# Patient Record
Sex: MALE | Race: WHITE | NOT HISPANIC OR LATINO | Employment: OTHER | URBAN - METROPOLITAN AREA
[De-identification: names, ages, dates, MRNs, and addresses within clinical notes are randomized per-mention and may not be internally consistent; named-entity substitution may affect disease eponyms.]

---

## 2019-04-09 ENCOUNTER — OFFICE VISIT (OUTPATIENT)
Dept: GASTROENTEROLOGY | Facility: CLINIC | Age: 69
End: 2019-04-09

## 2019-04-09 VITALS
BODY MASS INDEX: 27.29 KG/M2 | DIASTOLIC BLOOD PRESSURE: 72 MMHG | WEIGHT: 163.8 LBS | HEIGHT: 65 IN | SYSTOLIC BLOOD PRESSURE: 138 MMHG

## 2019-04-09 DIAGNOSIS — K59.00 CONSTIPATION, UNSPECIFIED CONSTIPATION TYPE: ICD-10-CM

## 2019-04-09 DIAGNOSIS — R13.10 DYSPHAGIA, UNSPECIFIED TYPE: Primary | ICD-10-CM

## 2019-04-09 PROCEDURE — 99203 OFFICE O/P NEW LOW 30 MIN: CPT | Performed by: INTERNAL MEDICINE

## 2019-04-09 RX ORDER — ERGOCALCIFEROL 1.25 MG/1
50000 CAPSULE ORAL WEEKLY
COMMUNITY

## 2019-04-09 NOTE — PROGRESS NOTES
"    PATIENT INFORMATION  Jackson Tariq       - 1950    CHIEF COMPLAINT  Chief Complaint   Patient presents with   • Difficulty Swallowing   • Constipation       HISTORY OF PRESENT ILLNESS  HPI    70 yo with solid food dysphagia for at least 2 years. This occurs about once monthly. No previous egd. Heartburn 2-3 times weekly. Severity is moderate.  Weight has been stable. No family history of colon cancer, polyps or esophageal cancer.  He is edentulous  Last cls was 10 years ago. He turned in cologuard test today.   REVIEW OF SYSTEMS  Review of Systems   HENT: Positive for trouble swallowing.    Respiratory: Positive for cough and choking.    Gastrointestinal: Positive for constipation.   All other systems reviewed and are negative.        ACTIVE PROBLEMS  There are no active problems to display for this patient.        PAST MEDICAL HISTORY  Past Medical History:   Diagnosis Date   • Vitamin D deficiency          SURGICAL HISTORY  History reviewed. No pertinent surgical history.      FAMILY HISTORY  Family History   Problem Relation Age of Onset   • Colon cancer Neg Hx    • Colon polyps Neg Hx          SOCIAL HISTORY  Social History     Occupational History   • Not on file   Tobacco Use   • Smoking status: Never Smoker   • Smokeless tobacco: Never Used   Substance and Sexual Activity   • Alcohol use: No     Frequency: Never   • Drug use: Not on file   • Sexual activity: Not on file       Debilities/Disabilities Identified: None    Emotional Behavior: Appropriate    CURRENT MEDICATIONS    Current Outpatient Medications:   •  vitamin D (ERGOCALCIFEROL) 32261 units capsule capsule, Take 50,000 Units by mouth 1 (One) Time Per Week., Disp: , Rfl:     ALLERGIES  Patient has no known allergies.    VITALS  Vitals:    19 1330   BP: 138/72   Weight: 74.3 kg (163 lb 12.8 oz)   Height: 165.1 cm (65\")       LAST RESULTS   No results found for any previous visit.     No results found.    PHYSICAL EXAM  Physical Exam "   Constitutional: He is oriented to person, place, and time. He appears well-developed and well-nourished. No distress.   HENT:   Head: Normocephalic and atraumatic.   edentulous   Eyes: EOM are normal. Pupils are equal, round, and reactive to light.   Neck: Neck supple. No tracheal deviation present.   Cardiovascular: Normal rate, regular rhythm, normal heart sounds and intact distal pulses. Exam reveals no gallop and no friction rub.   No murmur heard.  Pulmonary/Chest: Effort normal and breath sounds normal. No respiratory distress. He has no wheezes. He has no rales. He exhibits no tenderness.   Abdominal: Soft. Bowel sounds are normal. He exhibits no distension. There is no tenderness. There is no rebound and no guarding.   Musculoskeletal: He exhibits no edema.   Lymphadenopathy:     He has no cervical adenopathy.   Neurological: He is alert and oriented to person, place, and time.   Skin: Skin is warm. He is not diaphoretic. No erythema.   Psychiatric: He has a normal mood and affect. His behavior is normal. Judgment and thought content normal.   Nursing note and vitals reviewed.      ASSESSMENT  Diagnoses and all orders for this visit:    Dysphagia, unspecified type  -     Case Request; Standing    Constipation, unspecified constipation type    Other orders  -     vitamin D (ERGOCALCIFEROL) 34858 units capsule capsule; Take 50,000 Units by mouth 1 (One) Time Per Week.  -     Follow Anesthesia Guidelines / Standing Orders; Future  -     Implement Anesthesia orders day of procedure.; Standing  -     Obtain informed consent; Standing          PLAN  No Follow-up on file.    Antireflux measures and dietary modifications reviewed. Low acid diet reviewed. Keep head of bed elevated. Stop eating/drinking at least 3 hours prior to bedtime. Eliminate caffeine and carbonated beverages.  Weight loss encouraged if BMI over 25.    Indications, risks and procedure were discussed with the patient, including but not limited  to, bleeding, infection, possibility of perforation and possible polypectomy. All of the patient's questions were answered, and signed informed consent was obtained and placed on the chart.

## 2019-04-15 ENCOUNTER — TRANSCRIBE ORDERS (OUTPATIENT)
Dept: ADMINISTRATIVE | Facility: HOSPITAL | Age: 69
End: 2019-04-15

## 2019-04-16 ENCOUNTER — TELEPHONE (OUTPATIENT)
Dept: SURGERY | Facility: CLINIC | Age: 69
End: 2019-04-16

## 2019-04-25 ENCOUNTER — APPOINTMENT (OUTPATIENT)
Dept: BONE DENSITY | Facility: HOSPITAL | Age: 69
End: 2019-04-25

## 2019-04-25 PROCEDURE — 77080 DXA BONE DENSITY AXIAL: CPT

## 2019-05-02 ENCOUNTER — TELEPHONE (OUTPATIENT)
Dept: GASTROENTEROLOGY | Facility: CLINIC | Age: 69
End: 2019-05-02

## 2019-05-02 ENCOUNTER — ANESTHESIA EVENT (OUTPATIENT)
Dept: PERIOP | Facility: HOSPITAL | Age: 69
End: 2019-05-02

## 2019-05-02 NOTE — TELEPHONE ENCOUNTER
Patient did not stop his vitamin D until yesterday. He is scheduled for and EGD with possible dilation tomorrow. It is okay to proceed?

## 2019-05-03 ENCOUNTER — HOSPITAL ENCOUNTER (OUTPATIENT)
Facility: HOSPITAL | Age: 69
Setting detail: HOSPITAL OUTPATIENT SURGERY
Discharge: HOME | End: 2019-05-03
Attending: INTERNAL MEDICINE | Admitting: INTERNAL MEDICINE

## 2019-05-03 ENCOUNTER — ANESTHESIA (OUTPATIENT)
Dept: PERIOP | Facility: HOSPITAL | Age: 69
End: 2019-05-03

## 2019-05-03 VITALS
DIASTOLIC BLOOD PRESSURE: 72 MMHG | OXYGEN SATURATION: 97 % | HEIGHT: 65 IN | HEART RATE: 64 BPM | WEIGHT: 159.6 LBS | SYSTOLIC BLOOD PRESSURE: 131 MMHG | TEMPERATURE: 97.7 F | RESPIRATION RATE: 16 BRPM | BODY MASS INDEX: 26.59 KG/M2

## 2019-05-03 DIAGNOSIS — R13.10 DYSPHAGIA, UNSPECIFIED TYPE: ICD-10-CM

## 2019-05-03 PROCEDURE — 88305 TISSUE EXAM BY PATHOLOGIST: CPT | Performed by: INTERNAL MEDICINE

## 2019-05-03 PROCEDURE — 43239 EGD BIOPSY SINGLE/MULTIPLE: CPT | Performed by: INTERNAL MEDICINE

## 2019-05-03 PROCEDURE — 88312 SPECIAL STAINS GROUP 1: CPT | Performed by: INTERNAL MEDICINE

## 2019-05-03 PROCEDURE — 25010000002 PROPOFOL 10 MG/ML EMULSION: Performed by: NURSE ANESTHETIST, CERTIFIED REGISTERED

## 2019-05-03 RX ORDER — GLYCOPYRROLATE 0.2 MG/ML
INJECTION INTRAMUSCULAR; INTRAVENOUS AS NEEDED
Status: DISCONTINUED | OUTPATIENT
Start: 2019-05-03 | End: 2019-05-03 | Stop reason: SURG

## 2019-05-03 RX ORDER — OMEPRAZOLE 20 MG/1
20 CAPSULE, DELAYED RELEASE ORAL 2 TIMES DAILY
Qty: 60 CAPSULE | Refills: 5 | Status: SHIPPED | OUTPATIENT
Start: 2019-05-03 | End: 2019-12-02 | Stop reason: SDUPTHER

## 2019-05-03 RX ORDER — SODIUM CHLORIDE 9 MG/ML
40 INJECTION, SOLUTION INTRAVENOUS AS NEEDED
Status: DISCONTINUED | OUTPATIENT
Start: 2019-05-03 | End: 2019-05-03 | Stop reason: HOSPADM

## 2019-05-03 RX ORDER — SODIUM CHLORIDE 0.9 % (FLUSH) 0.9 %
1-10 SYRINGE (ML) INJECTION AS NEEDED
Status: DISCONTINUED | OUTPATIENT
Start: 2019-05-03 | End: 2019-05-03 | Stop reason: HOSPADM

## 2019-05-03 RX ORDER — PROPOFOL 10 MG/ML
VIAL (ML) INTRAVENOUS AS NEEDED
Status: DISCONTINUED | OUTPATIENT
Start: 2019-05-03 | End: 2019-05-03 | Stop reason: SURG

## 2019-05-03 RX ORDER — LIDOCAINE HYDROCHLORIDE 10 MG/ML
INJECTION, SOLUTION INFILTRATION; PERINEURAL AS NEEDED
Status: DISCONTINUED | OUTPATIENT
Start: 2019-05-03 | End: 2019-05-03 | Stop reason: SURG

## 2019-05-03 RX ORDER — SODIUM CHLORIDE, SODIUM LACTATE, POTASSIUM CHLORIDE, CALCIUM CHLORIDE 600; 310; 30; 20 MG/100ML; MG/100ML; MG/100ML; MG/100ML
9 INJECTION, SOLUTION INTRAVENOUS CONTINUOUS
Status: DISCONTINUED | OUTPATIENT
Start: 2019-05-03 | End: 2019-05-03 | Stop reason: HOSPADM

## 2019-05-03 RX ORDER — LIDOCAINE HYDROCHLORIDE 10 MG/ML
0.5 INJECTION, SOLUTION EPIDURAL; INFILTRATION; INTRACAUDAL; PERINEURAL ONCE AS NEEDED
Status: DISCONTINUED | OUTPATIENT
Start: 2019-05-03 | End: 2019-05-03 | Stop reason: HOSPADM

## 2019-05-03 RX ORDER — PROPOFOL 10 MG/ML
VIAL (ML) INTRAVENOUS CONTINUOUS PRN
Status: DISCONTINUED | OUTPATIENT
Start: 2019-05-03 | End: 2019-05-03 | Stop reason: SURG

## 2019-05-03 RX ORDER — SODIUM CHLORIDE 0.9 % (FLUSH) 0.9 %
3 SYRINGE (ML) INJECTION EVERY 12 HOURS SCHEDULED
Status: DISCONTINUED | OUTPATIENT
Start: 2019-05-03 | End: 2019-05-03 | Stop reason: HOSPADM

## 2019-05-03 RX ADMIN — PROPOFOL 50 MG: 10 INJECTION, EMULSION INTRAVENOUS at 10:50

## 2019-05-03 RX ADMIN — PROPOFOL 50 MG: 10 INJECTION, EMULSION INTRAVENOUS at 10:47

## 2019-05-03 RX ADMIN — LIDOCAINE HYDROCHLORIDE 50 MG: 10 INJECTION, SOLUTION INFILTRATION; PERINEURAL at 10:43

## 2019-05-03 RX ADMIN — SODIUM CHLORIDE, POTASSIUM CHLORIDE, SODIUM LACTATE AND CALCIUM CHLORIDE: 600; 310; 30; 20 INJECTION, SOLUTION INTRAVENOUS at 10:41

## 2019-05-03 RX ADMIN — GLYCOPYRROLATE 0.1 MG: 0.2 INJECTION INTRAMUSCULAR; INTRAVENOUS at 10:41

## 2019-05-03 RX ADMIN — PROPOFOL 50 MG: 10 INJECTION, EMULSION INTRAVENOUS at 10:43

## 2019-05-03 RX ADMIN — PROPOFOL 100 MCG/KG/MIN: 10 INJECTION, EMULSION INTRAVENOUS at 10:43

## 2019-05-03 NOTE — ANESTHESIA PREPROCEDURE EVALUATION
Anesthesia Evaluation     Patient summary reviewed   no history of anesthetic complications:  NPO Solid Status: > 8 hours  NPO Liquid Status: > 8 hours           Airway   Mallampati: II  TM distance: >3 FB  Neck ROM: full  No difficulty expected  Dental    (+) edentulous    Pulmonary - negative pulmonary ROS and normal exam    breath sounds clear to auscultation  Cardiovascular - negative cardio ROS and normal exam    Rhythm: regular  Rate: normal        Neuro/Psych- negative ROS  GI/Hepatic/Renal/Endo - negative ROS     Musculoskeletal (-) negative ROS    Abdominal  - normal exam   Substance History - negative use     OB/GYN negative ob/gyn ROS         Other - negative ROS                       Anesthesia Plan    ASA 1     MAC     intravenous induction   Anesthetic plan, all risks, benefits, and alternatives have been provided, discussed and informed consent has been obtained with: patient.

## 2019-05-03 NOTE — ANESTHESIA POSTPROCEDURE EVALUATION
Patient: Jackson Tariq    Procedure Summary     Date:  05/03/19 Room / Location:   [facility] /  [facility]    Anesthesia Start:  1041 Anesthesia Stop:  1058    Procedure:  ESOPHAGOGASTRODUODENOSCOPY with biopsies (N/A Esophagus) Diagnosis:       Dysphagia, unspecified type      (Dysphagia, unspecified type [R13.10])    Surgeon:  Radha Del Real MD Provider:  Chris Shields CRNA    Anesthesia Type:  MAC ASA Status:  1          Anesthesia Type: MAC  Last vitals  BP   115/60 (05/03/19 1102)   Temp   97.7 °F (36.5 °C) (05/03/19 1102)   Pulse   77 (05/03/19 1102)   Resp   16 (05/03/19 1102)     SpO2   98 % (05/03/19 1102)     Post Anesthesia Care and Evaluation    Patient location during evaluation: bedside  Patient participation: complete - patient participated  Level of consciousness: awake and alert  Pain score: 0  Pain management: adequate  Airway patency: patent  Anesthetic complications: No anesthetic complications  PONV Status: none  Cardiovascular status: acceptable  Respiratory status: acceptable  Hydration status: acceptable

## 2019-05-03 NOTE — OP NOTE
Patient Name:  Jackson Tariq  YOB: 1950    Date of Procedure:  5/3/2019    Procedure Performed: EGD     Indications: Dysphagia    Pre-op Diagnosis:   Dysphagia, unspecified type [R13.10]    Post-Op Diagnosis Codes:     * Dysphagia, unspecified type [R13.10]         Staff:  Surgeon(s):  Radha Del Real MD         Consent: Procedure EGDIndications, risks and procedure were discussed with the patient, including but not limited to, bleeding, infection, possibility of perforation and possible polypectomy. All of the patient's questions were answered, and signed informed consent was obtained and placed on the chart.      Sedation: Sedation was provided by anesthesia.      Estimated Blood Loss: minimal    Specimens:   ID Type Source Tests Collected by Time   A :  Tissue Gastric, Body TISSUE PATHOLOGY EXAM Radha Del Real MD 5/3/2019 1050   B :  Tissue Esophagus, Distal TISSUE PATHOLOGY EXAM Radha Del Real MD 5/3/2019 1052         Description of Procedure:   After excellent sedation a flexible endoscope was passed into the oropharynx into the esophagus.  There is evidence of grade D ulcerative esophagitis.  There is a sliding hiatal hernia that is noted.  The scope was easily traversed into the stomach following to the antrum.  Gastritis is noted here biopsies are obtained using forceps.  Scope was retroflexed here straightened and passed into the duodenal bulb following to the second portion with ease.  The entire examined small bowel mucosa overall appears normal.  Upon withdrawal scope careful examination mucosa was made.  The scope was then withdrawn back into the esophageal area and multiple biopsies are obtained using forceps.  The scope was withdrawn outpatient with no immediate complications       Findings:   Ulcerative esophagitis  Hiatal hernia  Gastritis we will start PPI therapy twice daily  Antireflux measures and dietary modifications  History back in the office in 4 to 6  weeks    Complications: None        Radha Del Real MD     Date: 5/3/2019  Time: 10:59 AM

## 2019-05-03 NOTE — H&P
"PATIENT INFORMATION  Jackson Tariq         - 1950     CHIEF COMPLAINT      Chief Complaint   Patient presents with   • Difficulty Swallowing   • Constipation         HISTORY OF PRESENT ILLNESS  HPI     68 yo with solid food dysphagia for at least 2 years. This occurs about once monthly. No previous egd. Heartburn 2-3 times weekly. Severity is moderate.  Weight has been stable. No family history of colon cancer, polyps or esophageal cancer.  He is edentulous  Last cls was 10 years ago. He turned in cologuard test today.   REVIEW OF SYSTEMS  Review of Systems   HENT: Positive for trouble swallowing.    Respiratory: Positive for cough and choking.    Gastrointestinal: Positive for constipation.   All other systems reviewed and are negative.           ACTIVE PROBLEMS  There are no active problems to display for this patient.           PAST MEDICAL HISTORY  Medical History        Past Medical History:   Diagnosis Date   • Vitamin D deficiency                 SURGICAL HISTORY  Surgical History   History reviewed. No pertinent surgical history.           FAMILY HISTORY        Family History   Problem Relation Age of Onset   • Colon cancer Neg Hx     • Colon polyps Neg Hx              SOCIAL HISTORY  Social History            Occupational History   • Not on file   Tobacco Use   • Smoking status: Never Smoker   • Smokeless tobacco: Never Used   Substance and Sexual Activity   • Alcohol use: No       Frequency: Never   • Drug use: Not on file   • Sexual activity: Not on file         Debilities/Disabilities Identified: None     Emotional Behavior: Appropriate     CURRENT MEDICATIONS     Current Outpatient Medications:   •  vitamin D (ERGOCALCIFEROL) 20798 units capsule capsule, Take 50,000 Units by mouth 1 (One) Time Per Week., Disp: , Rfl:      ALLERGIES  Patient has no known allergies.     VITALS  Vitals       Vitals:     19 1330   BP: 138/72   Weight: 74.3 kg (163 lb 12.8 oz)   Height: 165.1 cm (65\")    "         LAST RESULTS           No results found for any previous visit.      No results found.     PHYSICAL EXAM  Physical Exam   Constitutional: He is oriented to person, place, and time. He appears well-developed and well-nourished. No distress.   HENT:   Head: Normocephalic and atraumatic.   edentulous   Eyes: EOM are normal. Pupils are equal, round, and reactive to light.   Neck: Neck supple. No tracheal deviation present.   Cardiovascular: Normal rate, regular rhythm, normal heart sounds and intact distal pulses. Exam reveals no gallop and no friction rub.   No murmur heard.  Pulmonary/Chest: Effort normal and breath sounds normal. No respiratory distress. He has no wheezes. He has no rales. He exhibits no tenderness.   Abdominal: Soft. Bowel sounds are normal. He exhibits no distension. There is no tenderness. There is no rebound and no guarding.   Musculoskeletal: He exhibits no edema.   Lymphadenopathy:     He has no cervical adenopathy.   Neurological: He is alert and oriented to person, place, and time.   Skin: Skin is warm. He is not diaphoretic. No erythema.   Psychiatric: He has a normal mood and affect. His behavior is normal. Judgment and thought content normal.   Nursing note and vitals reviewed.        ASSESSMENT  Diagnoses and all orders for this visit:     Dysphagia, unspecified type  -     Case Request; Standing     Constipation, unspecified constipation type     Other orders  -     vitamin D (ERGOCALCIFEROL) 47262 units capsule capsule; Take 50,000 Units by mouth 1 (One) Time Per Week.  -     Follow Anesthesia Guidelines / Standing Orders; Future  -     Implement Anesthesia orders day of procedure.; Standing  -     Obtain informed consent; Standing              PLAN  No Follow-up on file.     Antireflux measures and dietary modifications reviewed. Low acid diet reviewed. Keep head of bed elevated. Stop eating/drinking at least 3 hours prior to bedtime. Eliminate caffeine and carbonated  beverages.  Weight loss encouraged if BMI over 25.     Indications, risks and procedure were discussed with the patient, including but not limited to, bleeding, infection, possibility of perforation and possible polypectomy. All of the patient's questions were answered, and signed informed consent was obtained and placed on the chart.

## 2019-05-06 LAB
CYTO UR: NORMAL
LAB AP CASE REPORT: NORMAL
PATH REPORT.FINAL DX SPEC: NORMAL
PATH REPORT.GROSS SPEC: NORMAL

## 2019-05-09 NOTE — PROGRESS NOTES
EGD with esophageal biopsies with ulcerative esophagitis.  No evidence of Wylie's esophagus.  Fungal stains are negative.  Have him continue on PPI therapy and see me back in the office in follow-up.

## 2019-05-29 ENCOUNTER — OFFICE VISIT (OUTPATIENT)
Dept: GASTROENTEROLOGY | Facility: CLINIC | Age: 69
End: 2019-05-29

## 2019-05-29 VITALS
WEIGHT: 162 LBS | SYSTOLIC BLOOD PRESSURE: 132 MMHG | BODY MASS INDEX: 26.99 KG/M2 | DIASTOLIC BLOOD PRESSURE: 74 MMHG | HEIGHT: 65 IN

## 2019-05-29 DIAGNOSIS — K21.9 GASTROESOPHAGEAL REFLUX DISEASE, ESOPHAGITIS PRESENCE NOT SPECIFIED: ICD-10-CM

## 2019-05-29 DIAGNOSIS — K20.90 ESOPHAGITIS: Primary | ICD-10-CM

## 2019-05-29 PROCEDURE — 99213 OFFICE O/P EST LOW 20 MIN: CPT | Performed by: INTERNAL MEDICINE

## 2019-05-29 NOTE — PROGRESS NOTES
PATIENT INFORMATION  Jackson Tariq       - 1950    CHIEF COMPLAINT  Chief Complaint   Patient presents with   • Follow-up     follow up on EGD       HISTORY OF PRESENT ILLNESS  HPI    He is here in follow up after EGD. Ulcerative esophagitis. He was started on omeprazole daily in am and pm. Initially had diarrhea but is better now.  Pathology reviewed with him:  1. Stomach, Biopsy: Benign gastric mucosa with               A. No atypia or metaplasia.               B.  No significant inflammation.               C.  No Helicobacter pylori by H&E staining.     2. Esophagus, Distal, Biopsy: Benign squamous and gastric mucosa with               A. Area consistent with ulceration.               B. Erosion.               C. No viral changes.               D. No fungal organisms identified.               E. No intestinal metaplasia.    Dysphagia is better since starting medications.  Eating well. Weight is stable.  cologuard was negative.  REVIEW OF SYSTEMS  Review of Systems   Gastrointestinal: Positive for diarrhea.   All other systems reviewed and are negative.        ACTIVE PROBLEMS  Patient Active Problem List    Diagnosis   • Dysphagia [R13.10]         PAST MEDICAL HISTORY  Past Medical History:   Diagnosis Date   • Vitamin D deficiency          SURGICAL HISTORY  Past Surgical History:   Procedure Laterality Date   • ENDOSCOPY N/A 5/3/2019    Procedure: ESOPHAGOGASTRODUODENOSCOPY with biopsies;  Surgeon: Radha Del Real MD;  Location: [facility];  Service: Gastroenterology         FAMILY HISTORY  Family History   Problem Relation Age of Onset   • Colon cancer Neg Hx    • Colon polyps Neg Hx          SOCIAL HISTORY  Social History     Occupational History   • Not on file   Tobacco Use   • Smoking status: Never Smoker   • Smokeless tobacco: Never Used   Substance and Sexual Activity   • Alcohol use: No     Frequency: Never   • Drug use: Not on file   • Sexual activity: Not on file       Debilities/Disabilities  "Identified: None    Emotional Behavior: Appropriate    CURRENT MEDICATIONS    Current Outpatient Medications:   •  omeprazole (priLOSEC) 20 MG capsule, Take 1 capsule by mouth 2 (Two) Times a Day., Disp: 60 capsule, Rfl: 5  •  vitamin D (ERGOCALCIFEROL) 98823 units capsule capsule, Take 50,000 Units by mouth 1 (One) Time Per Week., Disp: , Rfl:     ALLERGIES  Patient has no known allergies.    VITALS  Vitals:    05/29/19 1446   BP: 132/74   Weight: 73.5 kg (162 lb)   Height: 165.1 cm (65\")       LAST RESULTS   Admission on 05/03/2019, Discharged on 05/03/2019   Component Date Value Ref Range Status   • Case Report 05/03/2019    Final                    Value:Surgical Pathology Report                         Case: [id number]                                  Authorizing Provider:  Radha Del Real MD         Collected:           05/03/2019 10:50 AM          Ordering Location:     [facility]   Received:            05/03/2019 12:53 PM                                 OR                                                                           Pathologist:           Matteo Oconnell MD                                                         Specimens:   1) - Gastric, Body                                                                                  2) - Esophagus, Distal                                                                    • Final Diagnosis 05/03/2019    Final                    Value:This result contains rich text formatting which cannot be displayed here.   • Gross Description 05/03/2019    Final                    Value:This result contains rich text formatting which cannot be displayed here.   • Microscopic Description 05/03/2019    Final                    Value:This result contains rich text formatting which cannot be displayed here.     No results found.    PHYSICAL EXAM  Physical Exam   Constitutional: He is oriented to person, place, and time. He appears well-developed and " "well-nourished. No distress.   HENT:   Head: Normocephalic and atraumatic.   Eyes: EOM are normal. Pupils are equal, round, and reactive to light.   Neck: Neck supple. No tracheal deviation present.   Cardiovascular: Normal rate, regular rhythm, normal heart sounds and intact distal pulses. Exam reveals no gallop and no friction rub.   No murmur heard.  Pulmonary/Chest: Effort normal and breath sounds normal. No respiratory distress. He has no wheezes. He has no rales. He exhibits no tenderness.   Abdominal: Soft. Bowel sounds are normal. He exhibits no distension. There is no tenderness. There is no rebound and no guarding.   Musculoskeletal: He exhibits no edema.   Lymphadenopathy:     He has no cervical adenopathy.   Neurological: He is alert and oriented to person, place, and time.   Skin: Skin is warm. He is not diaphoretic. No erythema.   Psychiatric: He has a normal mood and affect. His behavior is normal. Judgment and thought content normal.   Nursing note and vitals reviewed.      ASSESSMENT  Diagnoses and all orders for this visit:    Esophagitis    Gastroesophageal reflux disease, esophagitis presence not specified          PLAN  No Follow-up on file.    Antireflux measures and dietary modifications reviewed. Low acid diet reviewed. Keep head of bed elevated. Stop eating/drinking at least 3 hours prior to bedtime. Eliminate caffeine and carbonated beverages.  Weight loss encouraged if BMI over 25.      Continue on ppi bid   Return in 3 months.  He has osteopenia and is started on a \"bone pill\".   Risk of long term ppi use discussed.  Plan on decreasing dose to once daily on return visit.                "

## 2019-08-28 ENCOUNTER — OFFICE VISIT (OUTPATIENT)
Dept: GASTROENTEROLOGY | Facility: CLINIC | Age: 69
End: 2019-08-28

## 2019-08-28 VITALS
HEIGHT: 65 IN | DIASTOLIC BLOOD PRESSURE: 72 MMHG | BODY MASS INDEX: 26.29 KG/M2 | SYSTOLIC BLOOD PRESSURE: 128 MMHG | WEIGHT: 157.8 LBS

## 2019-08-28 DIAGNOSIS — K21.9 GASTROESOPHAGEAL REFLUX DISEASE, ESOPHAGITIS PRESENCE NOT SPECIFIED: Primary | ICD-10-CM

## 2019-08-28 PROCEDURE — 99213 OFFICE O/P EST LOW 20 MIN: CPT | Performed by: INTERNAL MEDICINE

## 2019-08-28 RX ORDER — ALENDRONATE SODIUM 70 MG/1
TABLET ORAL
COMMUNITY
Start: 2019-08-20

## 2019-08-28 NOTE — PROGRESS NOTES
PATIENT INFORMATION  Jackson Tariq       - 1950    CHIEF COMPLAINT  Chief Complaint   Patient presents with   • Follow-up     3 mo follow up on Esophagitis       HISTORY OF PRESENT ILLNESS  HPI    He is here in follow up for gerd. He is on bid dosing. Dysphagia is better. He has noticed some issues with diarrhea on omeprazole.  Weight is stable.  No breakthrough episodes     REVIEW OF SYSTEMS  Review of Systems   HENT: Positive for mouth sores.    All other systems reviewed and are negative.        ACTIVE PROBLEMS  Patient Active Problem List    Diagnosis   • Dysphagia [R13.10]         PAST MEDICAL HISTORY  Past Medical History:   Diagnosis Date   • Vitamin D deficiency          SURGICAL HISTORY  Past Surgical History:   Procedure Laterality Date   • COLONOSCOPY     • ENDOSCOPY N/A 5/3/2019    Procedure: ESOPHAGOGASTRODUODENOSCOPY with biopsies;  Surgeon: Radha Del Real MD;  Location: [facility];  Service: Gastroenterology         FAMILY HISTORY  Family History   Problem Relation Age of Onset   • Colon cancer Neg Hx    • Colon polyps Neg Hx          SOCIAL HISTORY  Social History     Occupational History   • Not on file   Tobacco Use   • Smoking status: Never Smoker   • Smokeless tobacco: Never Used   Substance and Sexual Activity   • Alcohol use: No     Frequency: Never   • Drug use: Not on file   • Sexual activity: Not on file       Debilities/Disabilities Identified: None    Emotional Behavior: Appropriate    CURRENT MEDICATIONS    Current Outpatient Medications:   •  alendronate (FOSAMAX) 70 MG tablet, , Disp: , Rfl:   •  omeprazole (priLOSEC) 20 MG capsule, Take 1 capsule by mouth 2 (Two) Times a Day., Disp: 60 capsule, Rfl: 5  •  vitamin D (ERGOCALCIFEROL) 59748 units capsule capsule, Take 50,000 Units by mouth 1 (One) Time Per Week., Disp: , Rfl:     ALLERGIES  Patient has no known allergies.    VITALS  Vitals:    19 1311   BP: 128/72   Weight: 71.6 kg (157 lb 12.8 oz)   Height: 165.1 cm  "(65\")       LAST RESULTS   Admission on 05/03/2019, Discharged on 05/03/2019   Component Date Value Ref Range Status   • Case Report 05/03/2019    Final                    Value:Surgical Pathology Report                         Case: [id number]                                  Authorizing Provider:  Radha Del Real MD         Collected:           05/03/2019 10:50 AM          Ordering Location:     [facility]   Received:            05/03/2019 12:53 PM                                 OR                                                                           Pathologist:           Matteo Oconnell MD                                                         Specimens:   1) - Gastric, Body                                                                                  2) - Esophagus, Distal                                                                    • Final Diagnosis 05/03/2019    Final                    Value:This result contains rich text formatting which cannot be displayed here.   • Gross Description 05/03/2019    Final                    Value:This result contains rich text formatting which cannot be displayed here.   • Microscopic Description 05/03/2019    Final                    Value:This result contains rich text formatting which cannot be displayed here.     No results found.    PHYSICAL EXAM  Physical Exam   Constitutional: He is oriented to person, place, and time. He appears well-developed and well-nourished. No distress.   HENT:   Head: Normocephalic and atraumatic.   Eyes: EOM are normal. Pupils are equal, round, and reactive to light.   Neck: Neck supple. No tracheal deviation present.   Cardiovascular: Normal rate, regular rhythm, normal heart sounds and intact distal pulses. Exam reveals no gallop and no friction rub.   No murmur heard.  Pulmonary/Chest: Effort normal and breath sounds normal. No respiratory distress. He has no wheezes. He has no rales. He exhibits no " tenderness.   Abdominal: Soft. Bowel sounds are normal. He exhibits no distension. There is no tenderness. There is no rebound and no guarding.   Musculoskeletal: He exhibits no edema.   Lymphadenopathy:     He has no cervical adenopathy.   Neurological: He is alert and oriented to person, place, and time.   Skin: Skin is warm. He is not diaphoretic. No erythema.   Psychiatric: He has a normal mood and affect. His behavior is normal. Judgment and thought content normal.   Nursing note and vitals reviewed.      ASSESSMENT  Diagnoses and all orders for this visit:    Gastroesophageal reflux disease, esophagitis presence not specified    Other orders  -     alendronate (FOSAMAX) 70 MG tablet          PLAN  No Follow-up on file.    Antireflux measures and dietary modifications reviewed. Low acid diet reviewed. Keep head of bed elevated. Stop eating/drinking at least 3 hours prior to bedtime. Eliminate caffeine and carbonated beverages.  Weight loss encouraged if BMI over 25.    Decrease ppi to once daily.  He states recent cologuard test was negative

## 2019-12-02 RX ORDER — OMEPRAZOLE 20 MG/1
CAPSULE, DELAYED RELEASE ORAL
Qty: 180 CAPSULE | Refills: 1 | Status: SHIPPED | OUTPATIENT
Start: 2019-12-02 | End: 2020-12-08 | Stop reason: SDUPTHER

## 2020-12-08 RX ORDER — OMEPRAZOLE 20 MG/1
20 CAPSULE, DELAYED RELEASE ORAL 2 TIMES DAILY
Qty: 180 CAPSULE | Refills: 3 | Status: SHIPPED | OUTPATIENT
Start: 2020-12-08

## 2021-05-03 ENCOUNTER — TRANSCRIBE ORDERS (OUTPATIENT)
Dept: ADMINISTRATIVE | Facility: HOSPITAL | Age: 71
End: 2021-05-03

## 2021-05-03 DIAGNOSIS — Z78.0 MENOPAUSE: Primary | ICD-10-CM

## 2021-05-11 ENCOUNTER — APPOINTMENT (OUTPATIENT)
Dept: BONE DENSITY | Facility: HOSPITAL | Age: 71
End: 2021-05-11

## 2021-05-11 DIAGNOSIS — Z78.0 MENOPAUSE: ICD-10-CM

## 2021-05-11 PROCEDURE — 77080 DXA BONE DENSITY AXIAL: CPT

## 2022-07-20 RX ORDER — OMEPRAZOLE 20 MG/1
CAPSULE, DELAYED RELEASE ORAL
Qty: 180 CAPSULE | Refills: 0 | OUTPATIENT
Start: 2022-07-20

## 2023-07-17 PROBLEM — E55.9 VITAMIN D DEFICIENCY: Chronic | Status: ACTIVE | Noted: 2023-07-17

## 2023-07-17 PROBLEM — M85.89 OSTEOPENIA OF MULTIPLE SITES: Chronic | Status: ACTIVE | Noted: 2023-07-17

## 2023-07-31 ENCOUNTER — TELEPHONE (OUTPATIENT)
Dept: INTERNAL MEDICINE | Facility: CLINIC | Age: 73
End: 2023-07-31

## 2023-07-31 ENCOUNTER — OFFICE VISIT (OUTPATIENT)
Dept: INTERNAL MEDICINE | Facility: CLINIC | Age: 73
End: 2023-07-31
Payer: MEDICARE

## 2023-07-31 VITALS
SYSTOLIC BLOOD PRESSURE: 138 MMHG | OXYGEN SATURATION: 97 % | TEMPERATURE: 98.4 F | WEIGHT: 150.2 LBS | HEIGHT: 65 IN | HEART RATE: 68 BPM | DIASTOLIC BLOOD PRESSURE: 60 MMHG | BODY MASS INDEX: 25.02 KG/M2

## 2023-07-31 DIAGNOSIS — D64.9 MILD ANEMIA: ICD-10-CM

## 2023-07-31 DIAGNOSIS — N28.9 RENAL INSUFFICIENCY: ICD-10-CM

## 2023-07-31 DIAGNOSIS — M85.89 OSTEOPENIA OF MULTIPLE SITES: Chronic | ICD-10-CM

## 2023-07-31 DIAGNOSIS — R97.20 ELEVATED PSA: ICD-10-CM

## 2023-07-31 DIAGNOSIS — Z00.00 ENCOUNTER FOR MEDICARE ANNUAL WELLNESS EXAM: Primary | ICD-10-CM

## 2023-07-31 PROCEDURE — 1159F MED LIST DOCD IN RCRD: CPT | Performed by: NURSE PRACTITIONER

## 2023-07-31 PROCEDURE — G0439 PPPS, SUBSEQ VISIT: HCPCS | Performed by: NURSE PRACTITIONER

## 2023-07-31 PROCEDURE — 1160F RVW MEDS BY RX/DR IN RCRD: CPT | Performed by: NURSE PRACTITIONER

## 2023-07-31 PROCEDURE — 1170F FXNL STATUS ASSESSED: CPT | Performed by: NURSE PRACTITIONER

## 2023-08-04 ENCOUNTER — HOSPITAL ENCOUNTER (OUTPATIENT)
Dept: CARDIOLOGY | Facility: HOSPITAL | Age: 73
Discharge: HOME | End: 2023-08-04
Payer: MEDICARE

## 2023-08-04 VITALS — HEIGHT: 65 IN | WEIGHT: 150 LBS | BODY MASS INDEX: 24.99 KG/M2

## 2023-08-04 DIAGNOSIS — R01.1 CARDIAC MURMUR: ICD-10-CM

## 2023-08-04 PROCEDURE — 93306 TTE W/DOPPLER COMPLETE: CPT | Performed by: INTERNAL MEDICINE

## 2023-08-04 PROCEDURE — 93306 TTE W/DOPPLER COMPLETE: CPT

## 2023-08-05 ENCOUNTER — TELEPHONE (OUTPATIENT)
Dept: CARDIOLOGY | Facility: CLINIC | Age: 73
End: 2023-08-05
Payer: MEDICARE

## 2023-08-05 LAB
AORTIC DIMENSIONLESS INDEX: 0.7 (DI)
BH CV ECHO MEAS - AI P1/2T: 687.4 MSEC
BH CV ECHO MEAS - AO MAX PG: 6.6 MMHG
BH CV ECHO MEAS - AO MEAN PG: 3 MMHG
BH CV ECHO MEAS - AO ROOT DIAM: 3.4 CM
BH CV ECHO MEAS - AO V2 MAX: 128 CM/SEC
BH CV ECHO MEAS - AO V2 VTI: 29.1 CM
BH CV ECHO MEAS - AVA(I,D): 2.47 CM2
BH CV ECHO MEAS - EDV(CUBED): 148.9 ML
BH CV ECHO MEAS - EDV(MOD-SP2): 162 ML
BH CV ECHO MEAS - EDV(MOD-SP4): 171 ML
BH CV ECHO MEAS - EF(MOD-BP): 57.1 %
BH CV ECHO MEAS - EF(MOD-SP2): 59.3 %
BH CV ECHO MEAS - EF(MOD-SP4): 57.9 %
BH CV ECHO MEAS - ESV(CUBED): 58.1 ML
BH CV ECHO MEAS - ESV(MOD-SP2): 66 ML
BH CV ECHO MEAS - ESV(MOD-SP4): 72 ML
BH CV ECHO MEAS - FS: 26.9 %
BH CV ECHO MEAS - IVS/LVPW: 0.91 CM
BH CV ECHO MEAS - IVSD: 1 CM
BH CV ECHO MEAS - LAT PEAK E' VEL: 16.6 CM/SEC
BH CV ECHO MEAS - LV DIASTOLIC VOL/BSA (35-75): 97.7 CM2
BH CV ECHO MEAS - LV MASS(C)D: 213.9 GRAMS
BH CV ECHO MEAS - LV MAX PG: 2.6 MMHG
BH CV ECHO MEAS - LV MEAN PG: 1 MMHG
BH CV ECHO MEAS - LV SYSTOLIC VOL/BSA (12-30): 41.1 CM2
BH CV ECHO MEAS - LV V1 MAX: 80.4 CM/SEC
BH CV ECHO MEAS - LV V1 VTI: 19.6 CM
BH CV ECHO MEAS - LVIDD: 5.3 CM
BH CV ECHO MEAS - LVIDS: 3.9 CM
BH CV ECHO MEAS - LVOT AREA: 3.7 CM2
BH CV ECHO MEAS - LVOT DIAM: 2.16 CM
BH CV ECHO MEAS - LVPWD: 1.1 CM
BH CV ECHO MEAS - MED PEAK E' VEL: 10.7 CM/SEC
BH CV ECHO MEAS - MR MAX PG: 159 MMHG
BH CV ECHO MEAS - MR MAX VEL: 630.4 CM/SEC
BH CV ECHO MEAS - MV A DUR: 0.12 SEC
BH CV ECHO MEAS - MV A MAX VEL: 44 CM/SEC
BH CV ECHO MEAS - MV DEC SLOPE: 322.6 CM/SEC2
BH CV ECHO MEAS - MV DEC TIME: 174 MSEC
BH CV ECHO MEAS - MV E MAX VEL: 73.4 CM/SEC
BH CV ECHO MEAS - MV E/A: 1.67
BH CV ECHO MEAS - MV MAX PG: 3.9 MMHG
BH CV ECHO MEAS - MV MEAN PG: 1.59 MMHG
BH CV ECHO MEAS - MV P1/2T: 90.9 MSEC
BH CV ECHO MEAS - MV V2 VTI: 35.6 CM
BH CV ECHO MEAS - MVA(P1/2T): 2.42 CM2
BH CV ECHO MEAS - MVA(VTI): 2.02 CM2
BH CV ECHO MEAS - PA ACC TIME: 0.12 SEC
BH CV ECHO MEAS - PA V2 MAX: 88.3 CM/SEC
BH CV ECHO MEAS - RAP SYSTOLE: 8 MMHG
BH CV ECHO MEAS - RV MAX PG: 1.03 MMHG
BH CV ECHO MEAS - RV V1 MAX: 50.8 CM/SEC
BH CV ECHO MEAS - RV V1 VTI: 9.8 CM
BH CV ECHO MEAS - RVSP: 18.7 MMHG
BH CV ECHO MEAS - SI(MOD-SP2): 54.8 ML/M2
BH CV ECHO MEAS - SI(MOD-SP4): 56.6 ML/M2
BH CV ECHO MEAS - SV(LVOT): 71.9 ML
BH CV ECHO MEAS - SV(MOD-SP2): 96 ML
BH CV ECHO MEAS - SV(MOD-SP4): 99 ML
BH CV ECHO MEAS - TAPSE (>1.6): 2.6 CM
BH CV ECHO MEAS - TR MAX PG: 10.7 MMHG
BH CV ECHO MEAS - TR MAX VEL: 163.6 CM/SEC
BH CV ECHO MEASUREMENTS AVERAGE E/E' RATIO: 5.38
BH CV XLRA - RV BASE: 3.8 CM
BH CV XLRA - RV LENGTH: 7.9 CM
BH CV XLRA - RV MID: 3.2 CM
BH CV XLRA - TDI S': 12.6 CM/SEC
LEFT ATRIUM VOLUME INDEX: 48.9 ML/M2

## 2023-08-07 DIAGNOSIS — I34.1 MITRAL VALVE PROLAPSE: ICD-10-CM

## 2023-08-07 DIAGNOSIS — I34.0 MITRAL VALVE INSUFFICIENCY, UNSPECIFIED ETIOLOGY: ICD-10-CM

## 2023-08-07 DIAGNOSIS — I51.7 LEFT ATRIAL ENLARGEMENT: Primary | ICD-10-CM

## 2023-08-07 NOTE — TELEPHONE ENCOUNTER
I talked to ENRIQUE Medina who will contact patient referred to cardiology for further evaluation.

## 2023-08-07 NOTE — TELEPHONE ENCOUNTER
Called ENRIQUE Medina's office and transferred call to Dr. Mcdaniels.    Thank you,  Becka FRANCO RN  Triage Nurse [facility]   14:20 EDT

## 2023-08-07 NOTE — TELEPHONE ENCOUNTER
Discussed with Dr. Mcdaniels: call after clinic is finished.  If ENRIQUE Medina is unavailable when call is made, ok to leave message with Dr. Mcdaniels' cell phone. Primary purpose of call is to notify of abnormal echo and determine if anything further is needed from out office.    Thank you,  Becka FRANCO RN  Triage Nurse [facility] [place]   09:54 EDT

## 2023-08-10 ENCOUNTER — APPOINTMENT (OUTPATIENT)
Dept: BONE DENSITY | Facility: HOSPITAL | Age: 73
End: 2023-08-10
Payer: MEDICARE

## 2023-08-10 DIAGNOSIS — M85.89 OSTEOPENIA OF MULTIPLE SITES: Chronic | ICD-10-CM

## 2023-08-10 PROCEDURE — 77080 DXA BONE DENSITY AXIAL: CPT

## 2023-08-15 ENCOUNTER — OFFICE VISIT (OUTPATIENT)
Dept: CARDIOLOGY | Facility: CLINIC | Age: 73
End: 2023-08-15
Payer: MEDICARE

## 2023-08-15 VITALS
OXYGEN SATURATION: 96 % | HEART RATE: 60 BPM | WEIGHT: 147 LBS | SYSTOLIC BLOOD PRESSURE: 150 MMHG | BODY MASS INDEX: 24.49 KG/M2 | RESPIRATION RATE: 16 BRPM | DIASTOLIC BLOOD PRESSURE: 70 MMHG | HEIGHT: 65 IN

## 2023-08-15 DIAGNOSIS — R94.31 ABNORMAL ELECTROCARDIOGRAM (ECG) (EKG): ICD-10-CM

## 2023-08-15 DIAGNOSIS — I34.0 NONRHEUMATIC MITRAL VALVE REGURGITATION: Primary | ICD-10-CM

## 2023-08-15 PROCEDURE — 99204 OFFICE O/P NEW MOD 45 MIN: CPT | Performed by: STUDENT IN AN ORGANIZED HEALTH CARE EDUCATION/TRAINING PROGRAM

## 2023-08-15 PROCEDURE — 1160F RVW MEDS BY RX/DR IN RCRD: CPT | Performed by: STUDENT IN AN ORGANIZED HEALTH CARE EDUCATION/TRAINING PROGRAM

## 2023-08-15 PROCEDURE — 1159F MED LIST DOCD IN RCRD: CPT | Performed by: STUDENT IN AN ORGANIZED HEALTH CARE EDUCATION/TRAINING PROGRAM

## 2023-08-15 PROCEDURE — 93000 ELECTROCARDIOGRAM COMPLETE: CPT | Performed by: STUDENT IN AN ORGANIZED HEALTH CARE EDUCATION/TRAINING PROGRAM

## 2023-08-15 NOTE — PROGRESS NOTES
[facility]    Subjective:     Encounter Date:08/15/23      Patient ID: Jackson Tariq is a 73 y.o. male.    Chief Complaint: No chief complaint on file.  Establish care for murmur  History of Present Illness    Mr. Tariq is a very pleasant 73-year-old gentleman without significant past medical history outside of osteopenia and vitamin D deficiency, who presents for further evaluation of a cardiac murmur.  He denies any prior cardiac history or cardiac testing.  He states that he recently saw a new PCP who detected a murmur on exam and underwent an echocardiogram.  He does report that at baseline, he states he feels well and is without symptoms.  He denies any chest pains, denies any significant shortness of breath, and states that he tries to do aerobic exercises every morning.  When he does his morning activities, he does not particularly note any significant cardiac symptoms.  He does not have any palpitations, no shortness of breath he does have intermittent PVCs on his EKG.  He is never been told that he has a murmur in the past, and otherwise states he feels well.    The following portions of the patient's history were reviewed and updated as appropriate: allergies, current medications, past family history, past medical history, past social history, past surgical history and problem list.    Past Medical History:   Diagnosis Date    Osteopenia of multiple sites 07/17/2023    Vitamin D deficiency        Past Surgical History:   Procedure Laterality Date    COLONOSCOPY      ENDOSCOPY N/A 5/3/2019    Procedure: ESOPHAGOGASTRODUODENOSCOPY with biopsies;  Surgeon: Radha Del Real MD;  Location: [facility];  Service: Gastroenterology           ECG 12 Lead    Date/Time: 8/15/2023 10:10 AM  Performed by: Konstantin Driscoll MD  Authorized by: Konstantin Driscoll MD   Comparison: compared with previous ECG from 8/15/2023  Similar to previous ECG  Comparison to previous ECG: I reviewed his EKG.  It is similar  "in appearance to the EKG obtained July 17, 2023.  At that time he had blood work that showed normal potassium levels.,  And his T waves were quite peaked at that time.  Rhythm: sinus rhythm  Rate: normal  Conduction: conduction normal  ST Segments: ST segments normal  T Waves: T waves normal  QRS axis: normal  Other findings: left ventricular hypertrophy and left atrial abnormality    Clinical impression: abnormal EKG           Objective:     Vitals:    08/15/23 0952   BP: 150/70   Pulse: 60   Resp: 16   SpO2: 96%   Weight: 66.7 kg (147 lb)   Height: 165.1 cm (65\")         Constitutional:       Appearance: Healthy appearance. Not in distress.   Neck:      Vascular: JVD normal.   Pulmonary:      Effort: Pulmonary effort is normal.      Breath sounds: Normal breath sounds.   Cardiovascular:      PMI at left midclavicular line. Normal rate. Regular rhythm. Normal S2.       Murmurs: There is no murmur.   Pulses:     Intact distal pulses.   Edema:     Peripheral edema absent.   Skin:     General: Skin is warm and dry.   Neurological:      General: No focal deficit present.      Mental Status: Alert, oriented to person, place, and time and oriented to person, place and time.   Psychiatric:         Mood and Affect: Mood and affect normal.       Lab Review:     Lipid Panel          7/21/2023    07:55   Lipid Panel   Total Cholesterol 168    Triglycerides 61    HDL Cholesterol 65    VLDL Cholesterol 12    LDL Cholesterol  91      BUN   Date Value Ref Range Status   07/21/2023 17 8 - 23 mg/dL Final     Creatinine   Date Value Ref Range Status   07/21/2023 1.47 (H) 0.76 - 1.27 mg/dL Final     Potassium   Date Value Ref Range Status   07/21/2023 4.2 3.5 - 5.2 mmol/L Final     ALT (SGPT)   Date Value Ref Range Status   07/21/2023 15 1 - 41 U/L Final     AST (SGOT)   Date Value Ref Range Status   07/21/2023 18 1 - 40 U/L Final         Performed        Assessment:          Diagnosis Plan   1. Nonrheumatic mitral valve " regurgitation  ECG 12 Lead    Adult Stress Echo W/ Cont or Stress Agent if Necessary Per Protocol      2. Abnormal electrocardiogram (ECG) (EKG)  Adult Stress Echo W/ Cont or Stress Agent if Necessary Per Protocol             Plan:         Mitral regurgitation, severe: LVEF 59, LVESD 39 mm  Abnormal EKG  He is very much borderline for severe MR stage C2  He denies any exertional symptoms, but I do question his functional capacity.  We will arrange for stress echo to evaluate severity of the mitral regurgitation ensure there is no complications like pulmonary hypertension.  If his exercise capacity is excellent, I do think that very close monitoring can be reasonable, but if he does have diminished exercise capacity, worsening pulmonary hypertension, I think proceeding with valve replacement is the next best step.    His T waves could almost suggest hyperkalemia, but they were noted on his prior EKG 3 weeks ago which time his electrolytes were unremarkable.  This is likely just related to LVH.  Stress echo per above  Elevated blood pressure without diagnosis of hypertension: This may be exacerbating some of the hemodynamics were evaluating.  The pain on the stress echo results, with his blood pressure, we may end up needing to start him on losartan or antihypertensive therapy and then repeat an echo to ensure that this is not contributing.    Thank you for allowing me to participate in the care of Jackson Tariq. Feel free to contact me directly with any further questions or concerns.    RTC 3 months for symptom check-in, may need repeat echo at that time.  We will monitor his blood pressures at the time of his stress echo, and if he remains hypertensive, and his stress echo looks low risk, will start him on antihypertensive therapy and repeat his echo in 3 months, otherwise we will need to replete proceed with BRIE and valve replacement work-up if the echo shows any significant abnormalities.    Konstantin Driscoll,  MD [facility]  08/15/23  10:04 EDT       Current Outpatient Medications:     omeprazole (priLOSEC) 20 MG capsule, Take 1 capsule by mouth 2 (Two) Times a Day., Disp: 180 capsule, Rfl: 3    VITAMIN D PO, Take  by mouth., Disp: , Rfl:          Return in about 3 months (around 11/15/2023).      Part of this note may be an electronic transcription/translation of spoken language to printed text using the Dragon Dictation System.

## 2023-08-15 NOTE — PATIENT INSTRUCTIONS
We will obtain the stress heart ultrasound to make sure that there are no higher risk features due to the leaky valve, and make sure it is not causing symptoms or any heart failure.

## 2023-08-16 ENCOUNTER — TELEPHONE (OUTPATIENT)
Dept: INTERNAL MEDICINE | Facility: CLINIC | Age: 73
End: 2023-08-16
Payer: MEDICARE

## 2023-08-16 NOTE — TELEPHONE ENCOUNTER
Left him a message to see if he wants his old medical records or if he wants us to shred them.  They have been scanned into chart.

## 2023-08-23 ENCOUNTER — HOSPITAL ENCOUNTER (OUTPATIENT)
Dept: CARDIOLOGY | Facility: HOSPITAL | Age: 73
Discharge: HOME | End: 2023-08-23
Admitting: STUDENT IN AN ORGANIZED HEALTH CARE EDUCATION/TRAINING PROGRAM
Payer: MEDICARE

## 2023-08-23 VITALS — BODY MASS INDEX: 24.49 KG/M2 | WEIGHT: 147 LBS | HEIGHT: 65 IN

## 2023-08-23 DIAGNOSIS — I10 ESSENTIAL HYPERTENSION: Primary | ICD-10-CM

## 2023-08-23 DIAGNOSIS — I34.0 NONRHEUMATIC MITRAL VALVE REGURGITATION: ICD-10-CM

## 2023-08-23 DIAGNOSIS — R94.31 ABNORMAL ELECTROCARDIOGRAM (ECG) (EKG): ICD-10-CM

## 2023-08-23 LAB
BH CV ECHO MEAS - EDV(MOD-SP2): 142 ML
BH CV ECHO MEAS - EDV(MOD-SP4): 149 ML
BH CV ECHO MEAS - EF(MOD-BP): 60.4 %
BH CV ECHO MEAS - EF(MOD-SP2): 60.4 %
BH CV ECHO MEAS - EF(MOD-SP4): 69.1 %
BH CV ECHO MEAS - ESV(MOD-SP2): 51 ML
BH CV ECHO MEAS - ESV(MOD-SP4): 46 ML
BH CV ECHO MEAS - LAT PEAK E' VEL: 20.9 CM/SEC
BH CV ECHO MEAS - LV DIASTOLIC VOL/BSA (35-75): 85.9 CM2
BH CV ECHO MEAS - LV SYSTOLIC VOL/BSA (12-30): 26.5 CM2
BH CV ECHO MEAS - MED PEAK E' VEL: 12.7 CM/SEC
BH CV ECHO MEAS - RAP SYSTOLE: 3 MMHG
BH CV ECHO MEAS - RVSP: 41.6 MMHG
BH CV ECHO MEAS - SI(MOD-SP2): 52.4 ML/M2
BH CV ECHO MEAS - SI(MOD-SP4): 59.3 ML/M2
BH CV ECHO MEAS - SV(MOD-SP2): 91 ML
BH CV ECHO MEAS - SV(MOD-SP4): 103 ML
BH CV ECHO MEAS - TR MAX PG: 38.6 MMHG
BH CV ECHO MEAS - TR MAX VEL: 310.7 CM/SEC
BH CV STRESS BP STAGE 1: NORMAL
BH CV STRESS BP STAGE 2: NORMAL
BH CV STRESS BP STAGE 3: NORMAL
BH CV STRESS DURATION MIN STAGE 1: 3
BH CV STRESS DURATION MIN STAGE 2: 3
BH CV STRESS DURATION SEC STAGE 1: 0
BH CV STRESS DURATION SEC STAGE 2: 0
BH CV STRESS DURATION SEC STAGE 3: 26
BH CV STRESS ECHO POST STRESS EJECTION FRACTION EF: 65 %
BH CV STRESS GRADE STAGE 1: 10
BH CV STRESS GRADE STAGE 2: 12
BH CV STRESS GRADE STAGE 3: 14
BH CV STRESS HR STAGE 1: 98
BH CV STRESS HR STAGE 2: 124
BH CV STRESS HR STAGE 3: 136
BH CV STRESS METS STAGE 1: 4.6
BH CV STRESS METS STAGE 2: 7.1
BH CV STRESS METS STAGE 3: 7.7
BH CV STRESS PROTOCOL 1: NORMAL
BH CV STRESS RECOVERY BP: NORMAL MMHG
BH CV STRESS RECOVERY HR: 82 BPM
BH CV STRESS SPEED STAGE 1: 1.7
BH CV STRESS SPEED STAGE 2: 2.5
BH CV STRESS SPEED STAGE 3: 3.4
BH CV STRESS STAGE 1: 1
BH CV STRESS STAGE 2: 2
BH CV STRESS STAGE 3: 3
MAXIMAL PREDICTED HEART RATE: 148 BPM
PERCENT MAX PREDICTED HR: 94.59 %
STRESS BASELINE BP: NORMAL MMHG
STRESS BASELINE HR: 70 BPM
STRESS O2 SAT REST: 100 %
STRESS PERCENT HR: 111 %
STRESS POST ESTIMATED WORKLOAD: 7.7 METS
STRESS POST EXERCISE DUR MIN: 6 MIN
STRESS POST EXERCISE DUR SEC: 26 SEC
STRESS POST O2 SAT PEAK: 100 %
STRESS POST PEAK BP: NORMAL MMHG
STRESS POST PEAK HR: 140 BPM
STRESS TARGET HR: 126 BPM

## 2023-08-23 PROCEDURE — 93017 CV STRESS TEST TRACING ONLY: CPT

## 2023-08-23 PROCEDURE — 93350 STRESS TTE ONLY: CPT

## 2023-08-23 RX ORDER — VALSARTAN 160 MG/1
160 TABLET ORAL DAILY
Qty: 30 TABLET | Refills: 11 | Status: SHIPPED | OUTPATIENT
Start: 2023-08-23 | End: 2024-08-22

## 2023-08-23 NOTE — PROGRESS NOTES
I discussed findings of echocardiogram with patient.  He did develop mild pulmonary hypertension at peak stress, however his blood pressure was severely elevated at systolic 210, which likely influenced the severity of his mitral regurgitation as well as the pulmonary hypertension at peak stress.  His resting LVEF is 60, and although the stress echo was not geared to look for ischemia, he had a increase in his LVEF with stress and there were no significant wall motion abnormalities.  His exercise capacity is normal, he did not have symptoms during his treadmill.  His EKG findings are equivocal given the resting LVH pattern on the EKG.    We will start patient on valsartan for antihypertensive control.  He will return in 1 to 2 weeks for blood pressure check and obtain labs same day.  We will focus on antihypertensive regimen and monitor his echo very closely, he is in stage C1 of severe asymptomatic mitral regurgitation, very close to stage C2.  We will further discuss watchful waiting versus valve intervention with patient with BRIE/heart cath at follow-up in 4 weeks.

## 2023-09-05 ENCOUNTER — CLINICAL SUPPORT (OUTPATIENT)
Dept: CARDIOLOGY | Facility: CLINIC | Age: 73
End: 2023-09-05
Payer: MEDICARE

## 2023-09-05 ENCOUNTER — LAB (OUTPATIENT)
Dept: LAB | Facility: HOSPITAL | Age: 73
End: 2023-09-05
Payer: MEDICARE

## 2023-09-05 VITALS — DIASTOLIC BLOOD PRESSURE: 68 MMHG | SYSTOLIC BLOOD PRESSURE: 132 MMHG

## 2023-09-05 DIAGNOSIS — I10 ESSENTIAL HYPERTENSION: ICD-10-CM

## 2023-09-05 LAB
ANION GAP SERPL CALCULATED.3IONS-SCNC: 8.7 MMOL/L (ref 5–15)
BUN SERPL-MCNC: 16 MG/DL (ref 8–23)
BUN/CREAT SERPL: 13.3 (ref 7–25)
CALCIUM SPEC-SCNC: 9.2 MG/DL (ref 8.6–10.5)
CHLORIDE SERPL-SCNC: 100 MMOL/L (ref 98–107)
CO2 SERPL-SCNC: 23.3 MMOL/L (ref 22–29)
CREAT SERPL-MCNC: 1.2 MG/DL (ref 0.76–1.27)
EGFRCR SERPLBLD CKD-EPI 2021: 64.3 ML/MIN/1.73
GLUCOSE SERPL-MCNC: 110 MG/DL (ref 65–99)
POTASSIUM SERPL-SCNC: 3.9 MMOL/L (ref 3.5–5.2)
SODIUM SERPL-SCNC: 132 MMOL/L (ref 136–145)

## 2023-09-05 PROCEDURE — 36415 COLL VENOUS BLD VENIPUNCTURE: CPT

## 2023-09-05 PROCEDURE — 80048 BASIC METABOLIC PNL TOTAL CA: CPT

## 2023-09-05 RX ORDER — VALSARTAN 160 MG/1
160 TABLET ORAL 2 TIMES DAILY
Qty: 60 TABLET | Refills: 1 | Status: SHIPPED | OUTPATIENT
Start: 2023-09-05 | End: 2024-09-04

## 2023-09-05 NOTE — PROGRESS NOTES
Can you please call Mr. Tariq and let him know that his blood work looked great.  His kidney functions actually better than it was before despite starting the new medication.  There is a very mild issue with one of his electrolytes, called sodium, and isolation it does not mean there is any particular problems.  He just needs to monitor for any new symptoms of shortness of breath that might suggest that his valve issue is getting worse.  I think that he will do quite well with the afternoon dose of the valsartan, he is to take 160 mg twice per day and this should help his blood pressure remain normal, and that can help his heart valve out to.  Thank you for the help

## 2023-09-05 NOTE — PROGRESS NOTES
Pt presents today for bp check.  Verified medication and any medication changes.      BP Readings from Last 3 Encounters:   08/15/23 150/70   07/31/23 138/60   07/17/23 150/58         Today pt's bp was :    138/72 right   132/68 Left     Discussed pt with Dr. Driscoll, he recommended pt to increase Valsartan to 160 mg bid and to complete lab work today.  Informed pt he verbalized understanding.  Updated current rx for next refill.  Pt bp within office parameters.  Pt had no complaints and released from the office.

## 2023-09-06 NOTE — PROGRESS NOTES
Reviewed results and recommendations with Jackson Tariq.  Patient verbalized understanding of results and recommendations.    Thank you,  Becka FRANCO RN  Triage Nurse [facility] [place]   10:22 EDT

## 2023-09-20 ENCOUNTER — OFFICE VISIT (OUTPATIENT)
Dept: CARDIOLOGY | Facility: CLINIC | Age: 73
End: 2023-09-20
Payer: MEDICARE

## 2023-09-20 VITALS — HEIGHT: 65 IN | WEIGHT: 146 LBS | OXYGEN SATURATION: 99 % | BODY MASS INDEX: 24.32 KG/M2

## 2023-09-20 DIAGNOSIS — I34.0 SEVERE MITRAL REGURGITATION: ICD-10-CM

## 2023-09-20 DIAGNOSIS — R00.2 PALPITATIONS: ICD-10-CM

## 2023-09-20 DIAGNOSIS — R13.10 DYSPHAGIA, UNSPECIFIED TYPE: Primary | ICD-10-CM

## 2023-09-20 PROCEDURE — 1160F RVW MEDS BY RX/DR IN RCRD: CPT | Performed by: STUDENT IN AN ORGANIZED HEALTH CARE EDUCATION/TRAINING PROGRAM

## 2023-09-20 PROCEDURE — 99214 OFFICE O/P EST MOD 30 MIN: CPT | Performed by: STUDENT IN AN ORGANIZED HEALTH CARE EDUCATION/TRAINING PROGRAM

## 2023-09-20 PROCEDURE — 1159F MED LIST DOCD IN RCRD: CPT | Performed by: STUDENT IN AN ORGANIZED HEALTH CARE EDUCATION/TRAINING PROGRAM

## 2023-09-20 NOTE — PATIENT INSTRUCTIONS
We will get the heart monitor to make sure that the gas pains you have are not due to irregular heartbeats, called Afib. If you are having irregular heartbeats, it could suggest that we need to fix your heart valve sooner rather than later.     Before we would replace or fix the heart valve, we would need to do a scope ultrasound procedure called a BRIE. To do this safely, given your history of swallowing difficulty, I would like for you to be seen by a GI doctor before we do that, and get a swallowing test as well.

## 2023-09-20 NOTE — PROGRESS NOTES
"      [facility]    Subjective:     Encounter Date:09/20/23      Patient ID: Jackson Tariq is a 73 y.o. male.    Chief Complaint: No chief complaint on file.  Establish care for murmur  History of Present Illness    Mr. Tariq is a very pleasant 73-year-old gentleman without significant past medical history outside of osteopenia and vitamin D deficiency, who presents for further evaluation of a cardiac murmur.  He denies any prior cardiac history or cardiac testing.  He states that he recently saw a new PCP who detected a murmur on exam and underwent an echocardiogram.      He does report that at baseline, he states he feels well and is without symptoms.  He underwent a stress echo which revealed normal augmentation with stress, no significant ischemia, but most importantly did show that he had a mild elevation in his pulmonary pressures with exertion.  He remains in stage C 1 of mitral regurgitation.      He does report intermittent episodes of \"gas pains\" where he feels his heart race and feels a pressure in the center of his abdomen which seems to improve with belching.  He has had this ongoing for several years.  He does have a history of esophageal dysphagia and does have trouble swallowing some of his valsartan pills.  He was last eval by gastroenterology in 2020.       The following portions of the patient's history were reviewed and updated as appropriate: allergies, current medications, past family history, past medical history, past social history, past surgical history and problem list.    Past Medical History:   Diagnosis Date    Osteopenia of multiple sites 07/17/2023    Vitamin D deficiency        Past Surgical History:   Procedure Laterality Date    COLONOSCOPY      ENDOSCOPY N/A 5/3/2019    Procedure: ESOPHAGOGASTRODUODENOSCOPY with biopsies;  Surgeon: Radha Del Real MD;  Location: [facility];  Service: Gastroenterology         Procedures       Objective:     Vitals:    09/20/23 0906 " "09/20/23 0914 09/20/23 0916   SpO2: 100% 99% 99%   Weight: 66.2 kg (146 lb)     Height: 165.1 cm (65\")           Constitutional:       Appearance: Healthy appearance. Not in distress.   Neck:      Vascular: JVD normal.   Pulmonary:      Effort: Pulmonary effort is normal.      Breath sounds: Normal breath sounds.   Cardiovascular:      PMI at left midclavicular line. Normal rate. Regular rhythm. Normal S2.       Murmurs: There is no murmur.   Pulses:     Intact distal pulses.   Edema:     Peripheral edema absent.   Skin:     General: Skin is warm and dry.   Neurological:      General: No focal deficit present.      Mental Status: Alert, oriented to person, place, and time and oriented to person, place and time.   Psychiatric:         Mood and Affect: Mood and affect normal.       Lab Review:     Lipid Panel          7/21/2023    07:55   Lipid Panel   Total Cholesterol 168    Triglycerides 61    HDL Cholesterol 65    VLDL Cholesterol 12    LDL Cholesterol  91      BUN   Date Value Ref Range Status   09/05/2023 16 8 - 23 mg/dL Final     Creatinine   Date Value Ref Range Status   09/05/2023 1.20 0.76 - 1.27 mg/dL Final     Potassium   Date Value Ref Range Status   09/05/2023 3.9 3.5 - 5.2 mmol/L Final     ALT (SGPT)   Date Value Ref Range Status   07/21/2023 15 1 - 41 U/L Final     AST (SGOT)   Date Value Ref Range Status   07/21/2023 18 1 - 40 U/L Final         Performed  Orthostatics were obtained today in clinic as follows:  Supine 118/70, heart 56  Sitting 118/68, heart 58  Standing 116/62, heart 66      Assessment:          Diagnosis Plan   1. Dysphagia, unspecified type  Ambulatory Referral to Gastroenterology    FL esophagram complete      2. Severe mitral regurgitation  Holter Monitor - 72 Hour Up To 15 Days    Adult Transthoracic Echo Complete w/ Color, Spectral and Contrast if necessary per protocol      3. Palpitations  Holter Monitor - 72 Hour Up To 15 Days             Plan:         Mitral regurgitation, " "severe, stage C 1: LVEF 59, LVESD 39 mm  Abnormal EKG  He is very much borderline for severe MR stage C2.  Stress echo shows normal exercise capacity, but mild elevation of pulmonary pressures.  He is now having some intermittent \"gas pain\" which may ultimately be atrial fibrillation.  Arrange for 2-week patch monitor, if atrial fibrillation is demonstrated, he will need valve repair versus replacement  Otherwise, if the gas pains are likely due to GI origin, gastroenterology referral per below, and we will repeat an echocardiogram in February to reassess his LVEF or any findings that would suggest he needs repair of his asymptomatic severe MR  hypertension: Very well controlled today.  Orthostatics negative.  Continue valsartan 160 twice daily.  His creatinine actually improved on this regimen.  I suspect he does have a mild component of CKD related to hypertension that was previously uncontrolled.    History of esophagitis/dysphagia  He is going to need a BRIE at some point, but given his history with swallowing difficulty with valsartan, he needs further investigation before we proceed with elective BRIE  We will arrange for barium swallow  We will refer to GI for further evaluation giving his ongoing dysphagia, to get their input on whether or not a BRIE is safe to proceed given his previous history     RTC in February with echo same day to assess for any findings that would suggest he needs intervention with mitral repair in the setting of asymptomatic mitral regurgitation.  Currently stage C 1.  Arranging for barium swallow to ensure safety of BRIE, as well as GI evaluation.  We will check 2-week patch monitor to make sure atrial fibrillation is not occurring, as this would push us towards valve repair.     Konstantin Driscoll MD  [facility]  09/20/23  10:04 EDT       Current Outpatient Medications:     omeprazole (priLOSEC) 20 MG capsule, Take 1 capsule by mouth 2 (Two) Times a Day. (Patient taking " differently: Take 1 capsule by mouth Daily.), Disp: 180 capsule, Rfl: 3    valsartan (DIOVAN) 160 MG tablet, Take 1 tablet by mouth 2 (Two) Times a Day., Disp: 60 tablet, Rfl: 1    VITAMIN D PO, Take  by mouth., Disp: , Rfl:          Return in about 4 months (around 1/20/2024).      Part of this note may be an electronic transcription/translation of spoken language to printed text using the Dragon Dictation System.

## 2023-09-27 ENCOUNTER — HOSPITAL ENCOUNTER (OUTPATIENT)
Dept: CARDIOLOGY | Facility: HOSPITAL | Age: 73
Discharge: HOME | End: 2023-09-27
Admitting: STUDENT IN AN ORGANIZED HEALTH CARE EDUCATION/TRAINING PROGRAM
Payer: MEDICARE

## 2023-09-27 DIAGNOSIS — R00.2 PALPITATIONS: ICD-10-CM

## 2023-09-27 DIAGNOSIS — I34.0 SEVERE MITRAL REGURGITATION: ICD-10-CM

## 2023-09-27 PROCEDURE — 93246 EXT ECG>7D<15D RECORDING: CPT

## 2023-10-04 ENCOUNTER — HOSPITAL ENCOUNTER (OUTPATIENT)
Dept: GENERAL RADIOLOGY | Facility: HOSPITAL | Age: 73
Discharge: HOME | End: 2023-10-04
Admitting: STUDENT IN AN ORGANIZED HEALTH CARE EDUCATION/TRAINING PROGRAM
Payer: MEDICARE

## 2023-10-04 DIAGNOSIS — R13.10 DYSPHAGIA, UNSPECIFIED TYPE: ICD-10-CM

## 2023-10-04 PROCEDURE — 74221 X-RAY XM ESOPHAGUS 2CNTRST: CPT

## 2023-10-04 RX ADMIN — BARIUM SULFATE 340 ML: 980 POWDER, FOR SUSPENSION ORAL at 09:16

## 2023-10-04 RX ADMIN — BARIUM SULFATE 183 ML: 960 POWDER, FOR SUSPENSION ORAL at 09:16

## 2023-10-04 RX ADMIN — BARIUM SULFATE 700 MG: 700 TABLET ORAL at 09:16

## 2023-10-04 RX ADMIN — ANTACID/ANTIFLATULENT 1 PACKET: 380; 550; 10; 10 GRANULE, EFFERVESCENT ORAL at 09:16

## 2023-10-05 ENCOUNTER — TELEPHONE (OUTPATIENT)
Dept: CARDIOLOGY | Facility: CLINIC | Age: 73
End: 2023-10-05
Payer: MEDICARE

## 2023-10-05 NOTE — PROGRESS NOTES
Can we please call Mr. Tariq and let him know that his swallow study did not show any significant obstructions that would explain difficulty swallowing. There were no issues with his esophagus, or significant problems that we would encounter if we have to do the scope procedure, BRIE, that we talked about in clinic. For now, we can monitor his symptoms in clinic when I see him next in January, and we need to get another ultrasound across the chest before we need to proceed with the ultrasound scope procedure.     Unfortunately, it looks like he had an appointment with Dr. Moss that was marked as a no-show. Can we check into that and maybe get that rescheduled? Even though his barium swallow was normal, I would still like GI input to ensure it is safe to do a BRIE if we need to. Thank you!

## 2023-10-05 NOTE — TELEPHONE ENCOUNTER
Dr. Driscoll,    Called and spoke with patient. Went over results and recommendations. Pt verbalized understanding.    Pt said he isn't sure he wants to even do a BRIE because he had a friend who did it and had more problems with his throat afterwards. Because of this, he decided not to go to Dr. Moss.    I told him that you still recommend he goes, but the decision is up to him. He verbalized understanding and said he is going to think about it.    Thank you,    Jessica Perdue RN  Triage [facility] [place]  10/05/23 08:55 EDT     [facility] Pregnancy And Lactation Text: This medication is Pregnancy Category X and is absolutely contraindicated during pregnancy. It is unknown if it is excreted in breast milk.

## 2023-10-05 NOTE — TELEPHONE ENCOUNTER
----- Message from Konstantin Driscoll MD sent at 10/5/2023 12:56 AM EDT -----  Can we please call Mr. Tariq and let him know that his swallow study did not show any significant obstructions that would explain difficulty swallowing. There were no issues with his esophagus, or significant problems that we would encounter if we have to do the scope procedure, BRIE, that we talked about in clinic. For now, we can monitor his symptoms in clinic when I see him next in January, and we need to get another ultrasound across the chest before we need to proceed with the ultrasound scope procedure.     Unfortunately, it looks like he had an appointment with Dr. Moss that was marked as a no-show. Can we check into that and maybe get that rescheduled? Even though his barium swallow was normal, I would still like GI input to ensure it is safe to do a BRIE if we need to. Thank you!

## 2023-11-08 RX ORDER — VALSARTAN 160 MG/1
160 TABLET ORAL 2 TIMES DAILY
Qty: 60 TABLET | Refills: 0 | Status: SHIPPED | OUTPATIENT
Start: 2023-11-08

## 2023-12-12 RX ORDER — VALSARTAN 160 MG/1
160 TABLET ORAL 2 TIMES DAILY
Qty: 60 TABLET | Refills: 0 | Status: SHIPPED | OUTPATIENT
Start: 2023-12-12

## 2024-01-10 DIAGNOSIS — N28.9 RENAL INSUFFICIENCY: ICD-10-CM

## 2024-01-10 DIAGNOSIS — D64.9 MILD ANEMIA: ICD-10-CM

## 2024-01-10 DIAGNOSIS — R97.20 ELEVATED PSA: ICD-10-CM

## 2024-01-15 RX ORDER — VALSARTAN 160 MG/1
160 TABLET ORAL 2 TIMES DAILY
Qty: 60 TABLET | Refills: 0 | Status: SHIPPED | OUTPATIENT
Start: 2024-01-15

## 2024-01-18 LAB
ALBUMIN SERPL-MCNC: 4.7 G/DL (ref 3.5–5.2)
ALBUMIN/GLOB SERPL: 2 G/DL
ALP SERPL-CCNC: 106 U/L (ref 39–117)
ALT SERPL-CCNC: 15 U/L (ref 1–41)
AST SERPL-CCNC: 19 U/L (ref 1–40)
BASOPHILS # BLD AUTO: 0.04 10*3/MM3 (ref 0–0.2)
BASOPHILS NFR BLD AUTO: 0.6 % (ref 0–1.5)
BILIRUB SERPL-MCNC: 0.9 MG/DL (ref 0–1.2)
BUN SERPL-MCNC: 13 MG/DL (ref 8–23)
BUN/CREAT SERPL: 9 (ref 7–25)
CALCIUM SERPL-MCNC: 9.6 MG/DL (ref 8.6–10.5)
CHLORIDE SERPL-SCNC: 103 MMOL/L (ref 98–107)
CO2 SERPL-SCNC: 26 MMOL/L (ref 22–29)
CREAT SERPL-MCNC: 1.44 MG/DL (ref 0.76–1.27)
EGFRCR SERPLBLD CKD-EPI 2021: 51.6 ML/MIN/1.73
EOSINOPHIL # BLD AUTO: 0.06 10*3/MM3 (ref 0–0.4)
EOSINOPHIL NFR BLD AUTO: 0.9 % (ref 0.3–6.2)
ERYTHROCYTE [DISTWIDTH] IN BLOOD BY AUTOMATED COUNT: 12.8 % (ref 12.3–15.4)
FERRITIN SERPL-MCNC: 209 NG/ML (ref 30–400)
FOLATE SERPL-MCNC: 16.5 NG/ML (ref 4.78–24.2)
GLOBULIN SER CALC-MCNC: 2.3 GM/DL
GLUCOSE SERPL-MCNC: 96 MG/DL (ref 65–99)
HCT VFR BLD AUTO: 39.6 % (ref 37.5–51)
HGB BLD-MCNC: 13.6 G/DL (ref 13–17.7)
IMM GRANULOCYTES # BLD AUTO: 0.02 10*3/MM3 (ref 0–0.05)
IMM GRANULOCYTES NFR BLD AUTO: 0.3 % (ref 0–0.5)
IRON SATN MFR SERPL: 37 % (ref 20–50)
IRON SERPL-MCNC: 126 MCG/DL (ref 59–158)
LYMPHOCYTES # BLD AUTO: 1.57 10*3/MM3 (ref 0.7–3.1)
LYMPHOCYTES NFR BLD AUTO: 22.8 % (ref 19.6–45.3)
MCH RBC QN AUTO: 32 PG (ref 26.6–33)
MCHC RBC AUTO-ENTMCNC: 34.3 G/DL (ref 31.5–35.7)
MCV RBC AUTO: 93.2 FL (ref 79–97)
MONOCYTES # BLD AUTO: 0.46 10*3/MM3 (ref 0.1–0.9)
MONOCYTES NFR BLD AUTO: 6.7 % (ref 5–12)
NEUTROPHILS # BLD AUTO: 4.73 10*3/MM3 (ref 1.7–7)
NEUTROPHILS NFR BLD AUTO: 68.7 % (ref 42.7–76)
NRBC BLD AUTO-RTO: 0 /100 WBC (ref 0–0.2)
PLATELET # BLD AUTO: 238 10*3/MM3 (ref 140–450)
POTASSIUM SERPL-SCNC: 4.7 MMOL/L (ref 3.5–5.2)
PROT SERPL-MCNC: 7 G/DL (ref 6–8.5)
PSA SERPL-MCNC: 4.26 NG/ML (ref 0–4)
RBC # BLD AUTO: 4.25 10*6/MM3 (ref 4.14–5.8)
SODIUM SERPL-SCNC: 141 MMOL/L (ref 136–145)
TIBC SERPL-MCNC: 337 MCG/DL
UIBC SERPL-MCNC: 211 MCG/DL (ref 112–346)
VIT B12 SERPL-MCNC: 570 PG/ML (ref 211–946)
WBC # BLD AUTO: 6.88 10*3/MM3 (ref 3.4–10.8)

## 2024-01-24 ENCOUNTER — HOSPITAL ENCOUNTER (OUTPATIENT)
Dept: CARDIOLOGY | Facility: HOSPITAL | Age: 74
Discharge: HOME | End: 2024-01-24
Admitting: STUDENT IN AN ORGANIZED HEALTH CARE EDUCATION/TRAINING PROGRAM
Payer: MEDICARE

## 2024-01-24 ENCOUNTER — OFFICE VISIT (OUTPATIENT)
Dept: CARDIOLOGY | Facility: CLINIC | Age: 74
End: 2024-01-24
Payer: MEDICARE

## 2024-01-24 VITALS
WEIGHT: 154.5 LBS | DIASTOLIC BLOOD PRESSURE: 65 MMHG | SYSTOLIC BLOOD PRESSURE: 140 MMHG | HEART RATE: 75 BPM | OXYGEN SATURATION: 98 % | BODY MASS INDEX: 25.74 KG/M2 | HEIGHT: 65 IN

## 2024-01-24 VITALS — BODY MASS INDEX: 24.32 KG/M2 | WEIGHT: 146 LBS | HEIGHT: 65 IN

## 2024-01-24 DIAGNOSIS — I10 PRIMARY HYPERTENSION: ICD-10-CM

## 2024-01-24 DIAGNOSIS — I34.0 SEVERE MITRAL REGURGITATION: ICD-10-CM

## 2024-01-24 DIAGNOSIS — I34.1 MITRAL VALVE PROLAPSE: ICD-10-CM

## 2024-01-24 DIAGNOSIS — I34.0 SEVERE MITRAL VALVE REGURGITATION: Primary | ICD-10-CM

## 2024-01-24 LAB
AORTIC DIMENSIONLESS INDEX: 0.8 (DI)
BH CV ECHO MEAS - AI P1/2T: 418.1 MSEC
BH CV ECHO MEAS - AO MAX PG: 7.1 MMHG
BH CV ECHO MEAS - AO MEAN PG: 4 MMHG
BH CV ECHO MEAS - AO ROOT DIAM: 3.3 CM
BH CV ECHO MEAS - AO V2 MAX: 133 CM/SEC
BH CV ECHO MEAS - AO V2 VTI: 26.7 CM
BH CV ECHO MEAS - AVA(I,D): 2.6 CM2
BH CV ECHO MEAS - EDV(CUBED): 157.5 ML
BH CV ECHO MEAS - EDV(MOD-SP2): 135 ML
BH CV ECHO MEAS - EDV(MOD-SP4): 139 ML
BH CV ECHO MEAS - EF(MOD-BP): 62.6 %
BH CV ECHO MEAS - EF(MOD-SP2): 63 %
BH CV ECHO MEAS - EF(MOD-SP4): 62.6 %
BH CV ECHO MEAS - ESV(CUBED): 64.3 ML
BH CV ECHO MEAS - ESV(MOD-SP2): 50 ML
BH CV ECHO MEAS - ESV(MOD-SP4): 52 ML
BH CV ECHO MEAS - FS: 25.8 %
BH CV ECHO MEAS - IVS/LVPW: 1 CM
BH CV ECHO MEAS - IVSD: 1 CM
BH CV ECHO MEAS - LAT PEAK E' VEL: 18 CM/SEC
BH CV ECHO MEAS - LV DIASTOLIC VOL/BSA (35-75): 80.3 CM2
BH CV ECHO MEAS - LV MASS(C)D: 206.7 GRAMS
BH CV ECHO MEAS - LV MAX PG: 4.1 MMHG
BH CV ECHO MEAS - LV MEAN PG: 2 MMHG
BH CV ECHO MEAS - LV SYSTOLIC VOL/BSA (12-30): 30 CM2
BH CV ECHO MEAS - LV V1 MAX: 101 CM/SEC
BH CV ECHO MEAS - LV V1 VTI: 20.2 CM
BH CV ECHO MEAS - LVIDD: 5.4 CM
BH CV ECHO MEAS - LVIDS: 4 CM
BH CV ECHO MEAS - LVOT AREA: 3.4 CM2
BH CV ECHO MEAS - LVOT DIAM: 2.07 CM
BH CV ECHO MEAS - LVPWD: 1 CM
BH CV ECHO MEAS - MED PEAK E' VEL: 10.9 CM/SEC
BH CV ECHO MEAS - MR MAX PG: 155.6 MMHG
BH CV ECHO MEAS - MR MAX VEL: 623.7 CM/SEC
BH CV ECHO MEAS - MV A DUR: 0.12 SEC
BH CV ECHO MEAS - MV A MAX VEL: 60.3 CM/SEC
BH CV ECHO MEAS - MV DEC SLOPE: 537.1 CM/SEC2
BH CV ECHO MEAS - MV DEC TIME: 264 SEC
BH CV ECHO MEAS - MV E MAX VEL: 92.8 CM/SEC
BH CV ECHO MEAS - MV E/A: 1.54
BH CV ECHO MEAS - MV MAX PG: 4.8 MMHG
BH CV ECHO MEAS - MV MEAN PG: 2.35 MMHG
BH CV ECHO MEAS - MV P1/2T: 58.8 MSEC
BH CV ECHO MEAS - MV V2 VTI: 28 CM
BH CV ECHO MEAS - MVA(P1/2T): 3.7 CM2
BH CV ECHO MEAS - MVA(VTI): 2.44 CM2
BH CV ECHO MEAS - PA ACC TIME: 0.1 SEC
BH CV ECHO MEAS - PA V2 MAX: 103.1 CM/SEC
BH CV ECHO MEAS - PULM A REVS DUR: 0.11 SEC
BH CV ECHO MEAS - PULM A REVS VEL: 38.8 CM/SEC
BH CV ECHO MEAS - PULM DIAS VEL: 76.7 CM/SEC
BH CV ECHO MEAS - PULM S/D: 0.7
BH CV ECHO MEAS - PULM SYS VEL: 53.5 CM/SEC
BH CV ECHO MEAS - RAP SYSTOLE: 3 MMHG
BH CV ECHO MEAS - RV MAX PG: 2.09 MMHG
BH CV ECHO MEAS - RV V1 MAX: 72.3 CM/SEC
BH CV ECHO MEAS - RV V1 VTI: 13.1 CM
BH CV ECHO MEAS - RVSP: 23.8 MMHG
BH CV ECHO MEAS - SI(MOD-SP2): 49.1 ML/M2
BH CV ECHO MEAS - SI(MOD-SP4): 50.3 ML/M2
BH CV ECHO MEAS - SV(LVOT): 68.1 ML
BH CV ECHO MEAS - SV(MOD-SP2): 85 ML
BH CV ECHO MEAS - SV(MOD-SP4): 87 ML
BH CV ECHO MEAS - TAPSE (>1.6): 2.48 CM
BH CV ECHO MEAS - TR MAX PG: 20.8 MMHG
BH CV ECHO MEAS - TR MAX VEL: 227.8 CM/SEC
BH CV ECHO MEASUREMENTS AVERAGE E/E' RATIO: 6.42
BH CV XLRA - RV BASE: 3.4 CM
BH CV XLRA - TDI S': 15.8 CM/SEC
LEFT ATRIUM VOLUME INDEX: 35.1 ML/M2

## 2024-01-24 PROCEDURE — 1160F RVW MEDS BY RX/DR IN RCRD: CPT | Performed by: STUDENT IN AN ORGANIZED HEALTH CARE EDUCATION/TRAINING PROGRAM

## 2024-01-24 PROCEDURE — 93306 TTE W/DOPPLER COMPLETE: CPT

## 2024-01-24 PROCEDURE — 1159F MED LIST DOCD IN RCRD: CPT | Performed by: STUDENT IN AN ORGANIZED HEALTH CARE EDUCATION/TRAINING PROGRAM

## 2024-01-24 PROCEDURE — 99214 OFFICE O/P EST MOD 30 MIN: CPT | Performed by: STUDENT IN AN ORGANIZED HEALTH CARE EDUCATION/TRAINING PROGRAM

## 2024-01-24 PROCEDURE — 93306 TTE W/DOPPLER COMPLETE: CPT | Performed by: INTERNAL MEDICINE

## 2024-01-24 RX ORDER — VALSARTAN 160 MG/1
160 TABLET ORAL 2 TIMES DAILY
Qty: 180 TABLET | Refills: 3 | Status: SHIPPED | OUTPATIENT
Start: 2024-01-24 | End: 2025-01-23

## 2024-01-24 RX ORDER — CARVEDILOL 3.12 MG/1
3.12 TABLET ORAL 2 TIMES DAILY
Qty: 60 TABLET | Refills: 11 | Status: SHIPPED | OUTPATIENT
Start: 2024-01-24

## 2024-01-24 NOTE — PATIENT INSTRUCTIONS
I would like to add carvedilol to your regimen. We are starting at 3.125mg twice per day, this goes all the way up to 25mg twice per day.    You should continue the valsartan medication 160mg twice per day. You can take carvedilol with this.     In two weeks, we would like to bring you back to recheck your blood pressure. We might increase the carvedilol then.

## 2024-01-24 NOTE — PROGRESS NOTES
[facility]    Subjective:     Encounter Date:01/24/24      Patient ID: Jackson Tariq is a 73 y.o. male.    Chief Complaint: No chief complaint on file.  Establish care for murmur  History of Present Illness    Mr. Tariq is a very pleasant 73-year-old gentleman without significant past medical history outside of osteopenia and vitamin D deficiency, who presents for further evaluation of a cardiac murmur.  He was referred after a new PCP detected a murmur    He was noted to have severe mitral regurgitation with bileaflet prolapse on TTE.  He did 6 minutes 30 seconds on the treadmill.  No ischemia was noted.  He was noted to be markedly hypertensive for which he was started on medical therapy and presents today for further evaluation.     He underwent a stress echo which revealed normal augmentation with stress, no significant ischemia, but most importantly did show that he had a mild elevation in his pulmonary pressures with exertion.  His blood pressure systolic was near 220 around that time.  He remains in stage C 1 of mitral regurgitation.      Since last visit.  He has done well.  He is tolerating valsartan 160 twice daily very well.  No chest pain, no significant shortness of breath.     Previous card testing:    Left ventricular systolic function is normal. Calculated left ventricular EF = 62.6%    Left ventricular wall thickness is consistent with mild concentric hypertrophy.    Left ventricular diastolic function was normal.    There is moderate, holosystolic bileaflet mitral valve prolapse present.Moderate to severe mitral valve regurgitation is present with multiple jets directed both anteriorly and posteriorly.    Normal left atrial size present.  There is no evidence for pulmonary hypertension.    Holter monitor, 5-day October 2023:    A relatively benign monitor study.    There was a 6 beat ventricular run.    There were 3 nonsustained atrial runs with longest of 5 beats.    No  "significant sustained tachyarrhythmia noted.    A patient triggered event did not have arrhythmia correlation.    The following portions of the patient's history were reviewed and updated as appropriate: allergies, current medications, past family history, past medical history, past social history, past surgical history and problem list.    Past Medical History:   Diagnosis Date    Osteopenia of multiple sites 07/17/2023    Vitamin D deficiency        Past Surgical History:   Procedure Laterality Date    COLONOSCOPY      ENDOSCOPY N/A 5/3/2019    Procedure: ESOPHAGOGASTRODUODENOSCOPY with biopsies;  Surgeon: Radha Del Real MD;  Location: [facility];  Service: Gastroenterology         Procedures       Objective:     Vitals:    01/24/24 1045   BP: 140/65   BP Location: Left arm   Pulse: 75   SpO2: 98%   Weight: 70.1 kg (154 lb 8 oz)   Height: 165.1 cm (65\")         Constitutional:       Appearance: Healthy appearance. Not in distress.   Neck:      Vascular: JVD normal.   Pulmonary:      Effort: Pulmonary effort is normal.      Breath sounds: Normal breath sounds.   Cardiovascular:      PMI at left midclavicular line. Normal rate. Regular rhythm. Normal S2.       Murmurs: There is a grade 4/6 crescendo-decrescendo, mid to late systolic murmur at the apex.   Pulses:     Intact distal pulses.   Edema:     Peripheral edema absent.   Skin:     General: Skin is warm and dry.   Neurological:      General: No focal deficit present.      Mental Status: Alert, oriented to person, place, and time and oriented to person, place and time.   Psychiatric:         Mood and Affect: Mood and affect normal.         Lab Review:     Lipid Panel          7/21/2023    07:55   Lipid Panel   Total Cholesterol 168    Triglycerides 61    HDL Cholesterol 65    VLDL Cholesterol 12    LDL Cholesterol  91      BUN   Date Value Ref Range Status   01/18/2024 13 8 - 23 mg/dL Final   09/05/2023 16 8 - 23 mg/dL Final     Creatinine   Date Value Ref Range " Status   01/18/2024 1.44 (H) 0.76 - 1.27 mg/dL Final   09/05/2023 1.20 0.76 - 1.27 mg/dL Final     Potassium   Date Value Ref Range Status   01/18/2024 4.7 3.5 - 5.2 mmol/L Final   09/05/2023 3.9 3.5 - 5.2 mmol/L Final     ALT (SGPT)   Date Value Ref Range Status   01/18/2024 15 1 - 41 U/L Final     AST (SGOT)   Date Value Ref Range Status   01/18/2024 19 1 - 40 U/L Final            Assessment:          Diagnosis Plan   1. Severe mitral valve regurgitation  valsartan (DIOVAN) 160 MG tablet    carvedilol (COREG) 3.125 MG tablet    Adult Transthoracic Echo Limited W/ Cont if Necessary Per Protocol      2. Mitral valve prolapse  valsartan (DIOVAN) 160 MG tablet    carvedilol (COREG) 3.125 MG tablet    Adult Transthoracic Echo Limited W/ Cont if Necessary Per Protocol      3. Primary hypertension               Plan:         Mitral regurgitation, severe, stage C 1: LVEF 63, LVESD 39 mm  Abnormal EKG  He is very much borderline for severe MR stage C2.  Stress echo shows normal exercise capacity, but mild elevation of pulmonary pressures.  He was very hypertensive during the stress echo 210/62  He had some intermittent gas pains most recently but was checked with a patch Holter and there is no evidence of any sustained arrhythmias or A-fib.  His left atrial cavity is borderline dilated, we will keep a close watch on this Frankie 35 mL/m²  hypertension: Mildly hypertensive, better better controlled overall continue valsartan 160 twice daily.  Stable.  I suspect he does have a mild component of CKD related to hypertension that was previously uncontrolled.    Add low-dose carvedilol 3.125 twice daily to his regimen  Bring back in 2 weeks for BP recheck  History of esophagitis/dysphagia  He is going to need a BRIE at some point, he had a history of dysphagia, underwent Esophagram October 2023 which revealed silent aspiration with hypertrophied cricopharyngeus muscle and impaired relaxation, tiny sliding hiatal hernia with small  volume of esophageal reflux.  Patient declined GI evaluation.  Will continue this discussion to see whether or not BRIE is warranted, patient declines a BRIE at this time as he states he had a friend who had 1 and had significant dysphagia afterward.  I am not sure what a BRIE would change right now since he does remain in stage C1 of mitral regurgitation.  Monitoring this closely.      RTC 6 months.  Monitor closely for any worsening dyspnea or any chest pain episodes.  He remains in stage C1 of mitral regurgitation, EF improved with blood pressure control.    Will monitor closely for any subclinical signs of worsening MR on repeat echo, limited, in 6 months.  If he needs surgical evaluation, will likely send for surgical opinion on whether or not the valve can be repaired or replaced.  Patient declines BRIE at this time, obviously this could determine our timing of intervention should he have any worsening of his EF or LV dilation     Konstantin Driscoll MD  [facility]  01/24/24  10:04 EDT       Current Outpatient Medications:     omeprazole (priLOSEC) 20 MG capsule, Take 1 capsule by mouth 2 (Two) Times a Day. (Patient taking differently: Take 1 capsule by mouth Daily.), Disp: 180 capsule, Rfl: 3    valsartan (DIOVAN) 160 MG tablet, Take 1 tablet by mouth 2 (Two) Times a Day., Disp: 180 tablet, Rfl: 3    VITAMIN D PO, Take  by mouth., Disp: , Rfl:     carvedilol (COREG) 3.125 MG tablet, Take 1 tablet by mouth 2 (Two) Times a Day., Disp: 60 tablet, Rfl: 11         Return in about 6 months (around 7/24/2024).      Part of this note may be an electronic transcription/translation of spoken language to printed text using the Dragon Dictation System.

## 2024-02-01 ENCOUNTER — OFFICE VISIT (OUTPATIENT)
Dept: INTERNAL MEDICINE | Facility: CLINIC | Age: 74
End: 2024-02-01
Payer: MEDICARE

## 2024-02-01 VITALS
OXYGEN SATURATION: 98 % | HEART RATE: 55 BPM | HEIGHT: 65 IN | WEIGHT: 156.4 LBS | BODY MASS INDEX: 26.06 KG/M2 | DIASTOLIC BLOOD PRESSURE: 62 MMHG | SYSTOLIC BLOOD PRESSURE: 138 MMHG | TEMPERATURE: 97.5 F

## 2024-02-01 DIAGNOSIS — I34.0 NONRHEUMATIC MITRAL VALVE REGURGITATION: Primary | ICD-10-CM

## 2024-02-01 DIAGNOSIS — R13.10 DYSPHAGIA, UNSPECIFIED TYPE: ICD-10-CM

## 2024-02-01 DIAGNOSIS — R97.20 ELEVATED PSA: ICD-10-CM

## 2024-02-01 DIAGNOSIS — N18.31 STAGE 3A CHRONIC KIDNEY DISEASE: ICD-10-CM

## 2024-02-01 DIAGNOSIS — I15.8 OTHER SECONDARY HYPERTENSION: ICD-10-CM

## 2024-02-01 PROCEDURE — 1159F MED LIST DOCD IN RCRD: CPT | Performed by: NURSE PRACTITIONER

## 2024-02-01 PROCEDURE — 99214 OFFICE O/P EST MOD 30 MIN: CPT | Performed by: NURSE PRACTITIONER

## 2024-02-01 PROCEDURE — 1160F RVW MEDS BY RX/DR IN RCRD: CPT | Performed by: NURSE PRACTITIONER

## 2024-02-01 NOTE — PROGRESS NOTES
"Subjective   Jackson Tariq is a 73 y.o. male presenting today for follow up of   Chief Complaint   Patient presents with    Anemia     F/u    Renal insufficiency         Outpatient Medications Marked as Taking for the 2/1/24 encounter (Office Visit) with Caroline Medina APRN   Medication Sig Dispense Refill    carvedilol (COREG) 3.125 MG tablet Take 1 tablet by mouth 2 (Two) Times a Day. 60 tablet 11    omeprazole (priLOSEC) 20 MG capsule Take 1 capsule by mouth 2 (Two) Times a Day. (Patient taking differently: Take 1 capsule by mouth Daily.) 180 capsule 3    valsartan (DIOVAN) 160 MG tablet Take 1 tablet by mouth 2 (Two) Times a Day. 180 tablet 3    VITAMIN D PO Take  by mouth.         Mr. Tariq is a 74 yo male w/ MVR, HTN, dysphagia, CKD3A.    He is f/b Cardiology and is UTD w/ f/u care. Cardiology notes were reviewed.    Dysphagia is improved w/ omeprazole.      The following portions of the patient's history were reviewed and updated as appropriate: allergies, current medications, past family history, past medical history, past social history, past surgical history and problem list.    Review of Systems   Genitourinary:  Negative for decreased urine volume, difficulty urinating, dysuria, frequency, hematuria, nocturia, urgency and urinary incontinence.       Objective   Vitals:    02/01/24 0951   BP: 138/62   BP Location: Left arm   Patient Position: Sitting   Cuff Size: Adult   Pulse: 55   Temp: 97.5 °F (36.4 °C)   TempSrc: Infrared   SpO2: 98%   Weight: 70.9 kg (156 lb 6.4 oz)   Height: 165.1 cm (65\")       BP Readings from Last 3 Encounters:   02/01/24 138/62   01/24/24 140/65   09/05/23 132/68        Wt Readings from Last 3 Encounters:   02/01/24 70.9 kg (156 lb 6.4 oz)   01/24/24 66.2 kg (146 lb)   01/24/24 70.1 kg (154 lb 8 oz)        Body mass index is 26.03 kg/m².  Nursing notes and vitals reviewed.    Physical Exam  Constitutional:       General: He is not in acute distress.     Appearance: He is " well-developed.   Neck:      Thyroid: No thyroid mass or thyromegaly.   Cardiovascular:      Rate and Rhythm: Regular rhythm.      Heart sounds: Murmur heard.   Pulmonary:      Effort: Pulmonary effort is normal.      Breath sounds: Normal breath sounds.   Musculoskeletal:      Cervical back: Neck supple.      Right lower leg: No edema.      Left lower leg: No edema.   Lymphadenopathy:      Cervical: No cervical adenopathy.   Neurological:      Mental Status: He is alert and oriented to person, place, and time.   Psychiatric:         Attention and Perception: He is attentive.         Mood and Affect: Mood and affect normal.         Speech: Speech normal.         Behavior: Behavior normal.         Thought Content: Thought content normal.         Recent Results (from the past 672 hour(s))   PSA DIAGNOSTIC    Collection Time: 01/18/24  8:40 AM    Specimen: Blood   Result Value Ref Range    PSA 4.260 (H) 0.000 - 4.000 ng/mL   Vitamin B12    Collection Time: 01/18/24  8:40 AM    Specimen: Blood   Result Value Ref Range    Vitamin B-12 570 211 - 946 pg/mL   Iron Profile    Collection Time: 01/18/24  8:40 AM    Specimen: Blood   Result Value Ref Range    TIBC 337 mcg/dL    UIBC 211 112 - 346 mcg/dL    Iron 126 59 - 158 mcg/dL    Iron Saturation 37 20 - 50 %   Ferritin    Collection Time: 01/18/24  8:40 AM    Specimen: Blood   Result Value Ref Range    Ferritin 209.00 30.00 - 400.00 ng/mL   Folate    Collection Time: 01/18/24  8:40 AM    Specimen: Blood   Result Value Ref Range    Folate 16.50 4.78 - 24.20 ng/mL   Comprehensive Metabolic Panel    Collection Time: 01/18/24  8:40 AM    Specimen: Blood   Result Value Ref Range    Glucose 96 65 - 99 mg/dL    BUN 13 8 - 23 mg/dL    Creatinine 1.44 (H) 0.76 - 1.27 mg/dL    EGFR Result 51.6 (L) >60.0 mL/min/1.73    BUN/Creatinine Ratio 9.0 7.0 - 25.0    Sodium 141 136 - 145 mmol/L    Potassium 4.7 3.5 - 5.2 mmol/L    Chloride 103 98 - 107 mmol/L    Total CO2 26.0 22.0 - 29.0  mmol/L    Calcium 9.6 8.6 - 10.5 mg/dL    Total Protein 7.0 6.0 - 8.5 g/dL    Albumin 4.7 3.5 - 5.2 g/dL    Globulin 2.3 gm/dL    A/G Ratio 2.0 g/dL    Total Bilirubin 0.9 0.0 - 1.2 mg/dL    Alkaline Phosphatase 106 39 - 117 U/L    AST (SGOT) 19 1 - 40 U/L    ALT (SGPT) 15 1 - 41 U/L   CBC & Differential    Collection Time: 01/18/24  8:40 AM    Specimen: Blood   Result Value Ref Range    WBC 6.88 3.40 - 10.80 10*3/mm3    RBC 4.25 4.14 - 5.80 10*6/mm3    Hemoglobin 13.6 13.0 - 17.7 g/dL    Hematocrit 39.6 37.5 - 51.0 %    MCV 93.2 79.0 - 97.0 fL    MCH 32.0 26.6 - 33.0 pg    MCHC 34.3 31.5 - 35.7 g/dL    RDW 12.8 12.3 - 15.4 %    Platelets 238 140 - 450 10*3/mm3    Neutrophil Rel % 68.7 42.7 - 76.0 %    Lymphocyte Rel % 22.8 19.6 - 45.3 %    Monocyte Rel % 6.7 5.0 - 12.0 %    Eosinophil Rel % 0.9 0.3 - 6.2 %    Basophil Rel % 0.6 0.0 - 1.5 %    Neutrophils Absolute 4.73 1.70 - 7.00 10*3/mm3    Lymphocytes Absolute 1.57 0.70 - 3.10 10*3/mm3    Monocytes Absolute 0.46 0.10 - 0.90 10*3/mm3    Eosinophils Absolute 0.06 0.00 - 0.40 10*3/mm3    Basophils Absolute 0.04 0.00 - 0.20 10*3/mm3    Immature Granulocyte Rel % 0.3 0.0 - 0.5 %    Immature Grans Absolute 0.02 0.00 - 0.05 10*3/mm3    nRBC 0.0 0.0 - 0.2 /100 WBC   Adult Transthoracic Echo Complete w/ Color, Spectral and Contrast if necessary per protocol    Collection Time: 01/24/24  9:57 AM   Result Value Ref Range    EF(MOD-bp) 62.6 %    LVIDd 5.4 cm    LVIDs 4.0 cm    IVSd 1.00 cm    LVPWd 1.00 cm    FS 25.8 %    IVS/LVPW 1.00 cm    ESV(cubed) 64.3 ml    LV Sys Vol (BSA corrected) 30.0 cm2    EDV(cubed) 157.5 ml    LV Paez Vol (BSA corrected) 80.3 cm2    LV mass(C)d 206.7 grams    LVOT area 3.4 cm2    LVOT diam 2.07 cm    EDV(MOD-sp2) 135.0 ml    EDV(MOD-sp4) 139.0 ml    ESV(MOD-sp2) 50.0 ml    ESV(MOD-sp4) 52.0 ml    SV(MOD-sp2) 85.0 ml    SV(MOD-sp4) 87.0 ml    SI(MOD-sp2) 49.1 ml/m2    SI(MOD-sp4) 50.3 ml/m2    EF(MOD-sp2) 63.0 %    EF(MOD-sp4) 62.6 %    MV E max  mitch 92.8 cm/sec    MV A max mitch 60.3 cm/sec    MV dec time 264 sec    MV E/A 1.54     Pulm A Revs Dur 0.11 sec    MV A dur 0.12 sec    LA ESV Index (BP) 35.1 ml/m2    Med Peak E' Mitch 10.9 cm/sec    Lat Peak E' Mitch 18.0 cm/sec    TR max mitch 227.8 cm/sec    Avg E/e' ratio 6.42     SV(LVOT) 68.1 ml    RV Base 3.4 cm    TAPSE (>1.6) 2.48 cm    RV S' 15.8 cm/sec    Pulm Sys Mitch 53.5 cm/sec    Pulm Paez Mitch 76.7 cm/sec    Pulm S/D 0.70     Pulm A Revs Mitch 38.8 cm/sec    LV V1 max 101.0 cm/sec    LV V1 max PG 4.1 mmHg    LV V1 mean PG 2.00 mmHg    LV V1 VTI 20.2 cm    Ao pk mitch 133.0 cm/sec    Ao max PG 7.1 mmHg    Ao mean PG 4.0 mmHg    Ao V2 VTI 26.7 cm    KATRINA(I,D) 2.6 cm2    AI P1/2t 418.1 msec    MV max PG 4.8 mmHg    MV mean PG 2.35 mmHg    MV V2 VTI 28.0 cm    MV P1/2t 58.8 msec    MVA(P1/2t) 3.7 cm2    MVA(VTI) 2.44 cm2    MV dec slope 537.1 cm/sec2    MR max mitch 623.7 cm/sec    MR max .6 mmHg    TR max PG 20.8 mmHg    RVSP(TR) 23.8 mmHg    RAP systole 3.0 mmHg    RV V1 max PG 2.09 mmHg    RV V1 max 72.3 cm/sec    RV V1 VTI 13.1 cm    PA V2 max 103.1 cm/sec    PA acc time 0.10 sec    Ao root diam 3.3 cm    Dimensionless Index 0.80 (DI)         Assessment & Plan   Diagnoses and all orders for this visit:    1. Nonrheumatic mitral valve regurgitation (Primary)    2. Other secondary hypertension  -     CBC (No Diff); Future  -     Comprehensive Metabolic Panel; Future  -     Lipid Panel With / Chol / HDL Ratio; Future  -     Urinalysis without microscopic (no culture) - Urine, Clean Catch; Future    3. Dysphagia, unspecified type    4. Elevated PSA  -     Ambulatory Referral to Urology  -     PSA DIAGNOSTIC; Future    5. Stage 3a chronic kidney disease  -     Comprehensive Metabolic Panel; Future  -     Urinalysis without microscopic (no culture) - Urine, Clean Catch; Future        Except as noted above, pt will continue current medications as noted in the medication list. I will continue to authorize refills as  needed.    Discussed adequate water intake and avoidance of OTC nephrotoxins.        Medications, including side effects, were discussed with the patient. Patient verbalized understanding.  The plan of care was discussed. All questions were answered. Patient verbalized understanding.      Return in about 6 months (around 8/1/2024) for fasting labs one week prior to, [employer] Well[employer].

## 2024-02-06 ENCOUNTER — CLINICAL SUPPORT (OUTPATIENT)
Dept: CARDIOLOGY | Facility: CLINIC | Age: 74
End: 2024-02-06
Payer: MEDICARE

## 2024-02-06 VITALS — DIASTOLIC BLOOD PRESSURE: 60 MMHG | SYSTOLIC BLOOD PRESSURE: 130 MMHG | HEART RATE: 58 BPM | OXYGEN SATURATION: 98 %

## 2024-05-01 ENCOUNTER — LAB (OUTPATIENT)
Dept: LAB | Facility: HOSPITAL | Age: 74
End: 2024-05-01
Payer: MEDICARE

## 2024-05-01 ENCOUNTER — TRANSCRIBE ORDERS (OUTPATIENT)
Dept: ADMINISTRATIVE | Facility: HOSPITAL | Age: 74
End: 2024-05-01
Payer: MEDICARE

## 2024-05-01 DIAGNOSIS — R97.20 ELEVATED PROSTATE SPECIFIC ANTIGEN (PSA): Primary | ICD-10-CM

## 2024-05-01 DIAGNOSIS — R97.20 ELEVATED PROSTATE SPECIFIC ANTIGEN (PSA): ICD-10-CM

## 2024-05-01 PROCEDURE — 36415 COLL VENOUS BLD VENIPUNCTURE: CPT

## 2024-05-01 PROCEDURE — 84153 ASSAY OF PSA TOTAL: CPT

## 2024-05-01 PROCEDURE — 84154 ASSAY OF PSA FREE: CPT

## 2024-05-02 LAB
PSA FREE MFR SERPL: 35.1 %
PSA FREE SERPL-MCNC: 1.37 NG/ML
PSA SERPL-MCNC: 3.9 NG/ML (ref 0–4)

## 2024-05-07 ENCOUNTER — APPOINTMENT (OUTPATIENT)
Dept: GENERAL RADIOLOGY | Facility: HOSPITAL | Age: 74
End: 2024-05-07
Payer: MEDICARE

## 2024-05-07 ENCOUNTER — HOSPITAL ENCOUNTER (EMERGENCY)
Facility: HOSPITAL | Age: 74
Discharge: HOME | End: 2024-05-07
Attending: EMERGENCY MEDICINE
Payer: MEDICARE

## 2024-05-07 VITALS
HEART RATE: 75 BPM | TEMPERATURE: 98 F | DIASTOLIC BLOOD PRESSURE: 86 MMHG | SYSTOLIC BLOOD PRESSURE: 140 MMHG | WEIGHT: 160 LBS | BODY MASS INDEX: 25.71 KG/M2 | OXYGEN SATURATION: 98 % | HEIGHT: 66 IN | RESPIRATION RATE: 18 BRPM

## 2024-05-07 DIAGNOSIS — S61.012A THUMB LACERATION, LEFT, INITIAL ENCOUNTER: Primary | ICD-10-CM

## 2024-05-07 DIAGNOSIS — Z23 IMMUNIZATION DUE: ICD-10-CM

## 2024-05-07 PROCEDURE — 90715 TDAP VACCINE 7 YRS/> IM: CPT | Performed by: EMERGENCY MEDICINE

## 2024-05-07 PROCEDURE — 25010000002 TETANUS-DIPHTH-ACELL PERTUSSIS 5-2.5-18.5 LF-MCG/0.5 SUSPENSION PREFILLED SYRINGE: Performed by: EMERGENCY MEDICINE

## 2024-05-07 PROCEDURE — 99283 EMERGENCY DEPT VISIT LOW MDM: CPT

## 2024-05-07 PROCEDURE — 90471 IMMUNIZATION ADMIN: CPT | Performed by: EMERGENCY MEDICINE

## 2024-05-07 PROCEDURE — 73140 X-RAY EXAM OF FINGER(S): CPT

## 2024-05-07 RX ORDER — LIDOCAINE HYDROCHLORIDE 20 MG/ML
10 INJECTION, SOLUTION INFILTRATION; PERINEURAL ONCE
Status: COMPLETED | OUTPATIENT
Start: 2024-05-07 | End: 2024-05-07

## 2024-05-07 RX ORDER — CEPHALEXIN 500 MG/1
500 CAPSULE ORAL 4 TIMES DAILY
Qty: 28 CAPSULE | Refills: 0 | Status: SHIPPED | OUTPATIENT
Start: 2024-05-07 | End: 2024-05-14

## 2024-05-07 RX ORDER — LIDOCAINE HYDROCHLORIDE 20 MG/ML
INJECTION, SOLUTION INFILTRATION; PERINEURAL
Status: COMPLETED
Start: 2024-05-07 | End: 2024-05-07

## 2024-05-07 RX ADMIN — LIDOCAINE HYDROCHLORIDE 10 ML: 20 INJECTION, SOLUTION INFILTRATION; PERINEURAL at 12:28

## 2024-05-07 RX ADMIN — TETANUS TOXOID, REDUCED DIPHTHERIA TOXOID AND ACELLULAR PERTUSSIS VACCINE, ADSORBED 0.5 ML: 5; 2.5; 8; 8; 2.5 SUSPENSION INTRAMUSCULAR at 11:02

## 2024-05-07 NOTE — DISCHARGE INSTRUCTIONS
Rest and increase fluids.  Take medications as directed.  Follow-up with your primary care, ER or urgent care in 10 to 14 days for suture removal.  Keep area clean and dry.  Keep area covered with bandage as instructed.  Return to the emergency department if symptoms get worse or change

## 2024-05-07 NOTE — ED PROVIDER NOTES
Subjective   History of Present Illness  74-year-old  male patient presented to the emergency department via private vehicle with a chief complaint of left thumb pain/laceration.  Patient states he was working in his basement when a door slammed down on his thumb.  He states that he began having significant pain and bleeding.  He states that he is not sure when his last tetanus immunization was.    History provided by:  Medical records and patient      Review of Systems   Constitutional: Negative.    HENT: Negative.     Respiratory: Negative.     Cardiovascular: Negative.    Gastrointestinal: Negative.    Musculoskeletal: Negative.    Skin:  Positive for wound. Negative for color change, pallor and rash.   Neurological: Negative.    Hematological: Negative.    Psychiatric/Behavioral: Negative.     All other systems reviewed and are negative.      Past Medical History:   Diagnosis Date    Osteopenia of multiple sites 07/17/2023    Vitamin D deficiency        No Known Allergies    Past Surgical History:   Procedure Laterality Date    COLONOSCOPY      ENDOSCOPY N/A 5/3/2019    Procedure: ESOPHAGOGASTRODUODENOSCOPY with biopsies;  Surgeon: Radha Del Real MD;  Location: [facility];  Service: Gastroenterology       Family History   Problem Relation Age of Onset    Colon cancer Neg Hx     Colon polyps Neg Hx        Social History     Socioeconomic History    Marital status:    Tobacco Use    Smoking status: Never    Smokeless tobacco: Never   Vaping Use    Vaping status: Never Used   Substance and Sexual Activity    Alcohol use: No    Drug use: Never    Sexual activity: Defer           Objective   Physical Exam  Vitals and nursing note reviewed.   Constitutional:       General: He is not in acute distress.     Appearance: He is normal weight. He is not ill-appearing, toxic-appearing or diaphoretic.   HENT:      Head: Normocephalic and atraumatic.      Mouth/Throat:      Mouth: Mucous membranes are moist.       Pharynx: Oropharynx is clear.   Eyes:      Extraocular Movements: Extraocular movements intact.      Pupils: Pupils are equal, round, and reactive to light.   Cardiovascular:      Rate and Rhythm: Normal rate and regular rhythm.      Heart sounds: Normal heart sounds.   Pulmonary:      Effort: Pulmonary effort is normal.      Breath sounds: Normal breath sounds.   Abdominal:      General: Abdomen is scaphoid. Bowel sounds are normal. There is no distension or abdominal bruit. There are no signs of injury.      Palpations: Abdomen is soft.      Tenderness: There is no abdominal tenderness.   Musculoskeletal:         General: Signs of injury present.      Left hand: Laceration and tenderness present.        Hands:    Skin:     General: Skin is warm and dry.      Capillary Refill: Capillary refill takes less than 2 seconds.   Neurological:      General: No focal deficit present.      Mental Status: He is alert and oriented to person, place, and time.   Psychiatric:         Mood and Affect: Mood normal.         Behavior: Behavior normal.         Thought Content: Thought content normal.         Judgment: Judgment normal.         Laceration Repair    Date/Time: 5/7/2024 12:22 PM    Performed by: Russ Oleary APRN  Authorized by: Anthony Baron MD    Consent:     Consent obtained:  Verbal    Consent given by:  Patient    Risks, benefits, and alternatives were discussed: yes      Risks discussed:  Infection, pain, retained foreign body, tendon damage, poor cosmetic result, need for additional repair, nerve damage, poor wound healing and vascular damage    Alternatives discussed:  No treatment, delayed treatment, observation and referral  Universal protocol:     Procedure explained and questions answered to patient or proxy's satisfaction: yes      Relevant documents present and verified: yes      Test results available: yes      Imaging studies available: yes      Required blood products, implants, devices, and  special equipment available: yes      Site/side marked: yes      Immediately prior to procedure, a time out was called: yes      Patient identity confirmed:  Verbally with patient, hospital-assigned identification number and arm band  Anesthesia:     Anesthesia method:  Nerve block    Block location:  Thumb web space    Block needle gauge:  25 G    Block anesthetic:  Lidocaine 2% w/o epi    Block injection procedure:  Anatomic landmarks identified, introduced needle and incremental injection    Block outcome:  Anesthesia achieved  Laceration details:     Location:  Finger    Finger location:  L thumb    Length (cm):  2    Depth (mm):  0.5  Pre-procedure details:     Preparation:  Patient was prepped and draped in usual sterile fashion and imaging obtained to evaluate for foreign bodies  Exploration:     Limited defect created (wound extended): no      Hemostasis achieved with:  Direct pressure    Imaging obtained: x-ray      Imaging outcome: foreign body not noted      Wound exploration: wound explored through full range of motion and entire depth of wound visualized      Wound extent: areolar tissue violated      Contaminated: no    Treatment:     Area cleansed with:  Chlorhexidine and saline    Amount of cleaning:  Extensive    Irrigation solution:  Sterile saline    Irrigation volume:  20    Irrigation method:  Syringe    Visualized foreign bodies/material removed: no      Debridement:  None    Undermining:  None    Scar revision: no      Layers/structures repaired:  Deep dermal/superficial fascia  Deep dermal/superficial fascia:     Suture size:  6-0    Suture material:  Plain gut    Suture technique:  Horizontal mattress    Number of sutures:  2  Skin repair:     Repair method:  Sutures    Suture size:  4-0    Suture material:  Nylon    Suture technique:  Simple interrupted    Number of sutures:  18  Approximation:     Approximation:  Close  Repair type:     Repair type:  Complex  Post-procedure details:      Dressing:  Antibiotic ointment, non-adherent dressing and adhesive bandage    Procedure completion:  Tolerated             ED Course                                             Medical Decision Making  Differential diagnosis includes fracture, dislocation, tendon injury, nerve injury, and complex laceration.    XR Finger 2+ View Left    Result Date: 5/7/2024  Impression: No evidence of fracture or dislocation. Electronically Signed: Fran Michael MD  5/7/2024 11:18 AM EDT  Workstation ID: [id number]       Discussed imaging and assessment findings.  Patient has a complex laceration of his left thumb.  Laceration was repaired.  We will send in the patient Keflex 500 mg by mouth 4 times a day for 7 days.  He should follow-up in 10 to 14 days for suture removal.  He should return to the emergency department if the wound becomes more severe or changes.  This was discussed with him at length.  Patient understands and agrees to discharge plan.    Problems Addressed:  Immunization due: complicated acute illness or injury  Thumb laceration, left, initial encounter: complicated acute illness or injury    Amount and/or Complexity of Data Reviewed  Radiology: ordered. Decision-making details documented in ED Course.    Risk  Prescription drug management.        Final diagnoses:   Thumb laceration, left, initial encounter   Immunization due       ED Disposition  ED Disposition       ED Disposition   Discharge    Condition   Stable    Comment   --               Caroline Medina Ch, APRN  [address]  [address]  [place] [address] [phone]    Schedule an appointment as soon as possible for a visit in 14 days  For wound re-check, For suture removal    [facility] EMERGENCY DEPARTMENT  [address]  [place] [address] [phone]    If symptoms worsen, For wound re-check, For suture removal         Medication List        New Prescriptions      cephalexin 500 MG capsule  Commonly known as:  KEFLEX  Take 1 capsule by mouth 4 (Four) Times a Day for 7 days.            Changed      omeprazole 20 MG capsule  Commonly known as: priLOSEC  Take 1 capsule by mouth 2 (Two) Times a Day.  What changed: when to take this               Where to Get Your Medications        These medications were sent to [facility] - [place] - [address] - [phone]  - [phone]   [address], [place] [address]      Phone: [phone]   cephalexin 500 MG capsule            Russ Oleary, APRN  05/07/24 3055

## 2024-05-08 ENCOUNTER — TELEPHONE (OUTPATIENT)
Dept: INTERNAL MEDICINE | Facility: CLINIC | Age: 74
End: 2024-05-08

## 2024-05-08 ENCOUNTER — PATIENT OUTREACH (OUTPATIENT)
Dept: CASE MANAGEMENT | Facility: OTHER | Age: 74
End: 2024-05-08
Payer: MEDICARE

## 2024-05-08 NOTE — TELEPHONE ENCOUNTER
Caller: Ysabel Tariq    Relationship: Emergency Contact    Best call back number: [phone]     Who are you requesting to speak with (clinical staff, provider,  specific staff member): CLINICAL STAFF    What was the call regarding: PATIENT'S WIFE STATES THAT HE GOT STITCHES IN HIS THUMB ON 5/7/24. HE WAS ADVISED TO FOLLOW UP WITH PCP IN 14 DAYS TO HAVE THEM REMOVED. FIRST AVAILABLE APPOINTMENT WAS 16 DAYS AFTER STITCHES. REQUESTS CALL BACK TO CONFIRM IF THAT IS CLOSE ENOUGH, OR IF HE NEEDS TO BE SEEN SOONER

## 2024-05-08 NOTE — TELEPHONE ENCOUNTER
Caller: Ysabel Tariq    Relationship to patient: Emergency Contact    Best call back number: [phone]    Chief complaint: STITCHES REMOVAL    Type of visit: PROCEDURE VISIT    Requested date: 2 WEEKS

## 2024-05-09 NOTE — TELEPHONE ENCOUNTER
Tried to call patient to get appointment changed but was unable to reach and left a voicemail for her to call back.

## 2024-05-23 ENCOUNTER — OFFICE VISIT (OUTPATIENT)
Dept: INTERNAL MEDICINE | Facility: CLINIC | Age: 74
End: 2024-05-23
Payer: MEDICARE

## 2024-05-23 VITALS
HEART RATE: 55 BPM | DIASTOLIC BLOOD PRESSURE: 64 MMHG | TEMPERATURE: 98 F | BODY MASS INDEX: 24.11 KG/M2 | WEIGHT: 150 LBS | OXYGEN SATURATION: 96 % | SYSTOLIC BLOOD PRESSURE: 128 MMHG | HEIGHT: 66 IN

## 2024-05-23 DIAGNOSIS — S61.012A LACERATION OF LEFT THUMB WITHOUT FOREIGN BODY WITHOUT DAMAGE TO NAIL, INITIAL ENCOUNTER: Primary | ICD-10-CM

## 2024-05-23 DIAGNOSIS — Z48.02 ENCOUNTER FOR REMOVAL OF SUTURES: ICD-10-CM

## 2024-05-23 PROCEDURE — 1159F MED LIST DOCD IN RCRD: CPT | Performed by: NURSE PRACTITIONER

## 2024-05-23 PROCEDURE — 99213 OFFICE O/P EST LOW 20 MIN: CPT | Performed by: NURSE PRACTITIONER

## 2024-05-23 PROCEDURE — 15853 REMOVAL SUTR/STAPL XREQ ANES: CPT | Performed by: NURSE PRACTITIONER

## 2024-05-23 PROCEDURE — 1160F RVW MEDS BY RX/DR IN RCRD: CPT | Performed by: NURSE PRACTITIONER

## 2024-05-23 NOTE — PROGRESS NOTES
"Subjective   Jackson Tariq is a 74 y.o. male presenting today for follow up of   Chief Complaint   Patient presents with    Suture / Staple Removal     5/7 stitches placed by ED for thumb laceration. States he has 18 external stitches.          Outpatient Medications Marked as Taking for the 5/23/24 encounter (Office Visit) with Caroline Medina APRN   Medication Sig Dispense Refill    carvedilol (COREG) 3.125 MG tablet Take 1 tablet by mouth 2 (Two) Times a Day. 60 tablet 11    omeprazole (priLOSEC) 20 MG capsule Take 1 capsule by mouth 2 (Two) Times a Day. (Patient taking differently: Take 1 capsule by mouth Daily.) 180 capsule 3    valsartan (DIOVAN) 160 MG tablet Take 1 tablet by mouth 2 (Two) Times a Day. 180 tablet 3    VITAMIN D PO Take  by mouth.         ED notes reviewed. Pt reports completion of abx.      The following portions of the patient's history were reviewed and updated as appropriate: allergies, current medications, past family history, past medical history, past social history, past surgical history and problem list.    Review of Systems   Skin:  Positive for wound (denies discharge).       Objective   Vitals:    05/23/24 1437   BP: 128/64   BP Location: Left arm   Patient Position: Sitting   Cuff Size: Adult   Pulse: 55   Temp: 98 °F (36.7 °C)   TempSrc: Infrared   SpO2: 96%   Weight: 68 kg (150 lb)   Height: 167.6 cm (66\")       BP Readings from Last 3 Encounters:   05/23/24 128/64   05/07/24 140/86   02/06/24 130/60        Wt Readings from Last 3 Encounters:   05/23/24 68 kg (150 lb)   05/07/24 72.6 kg (160 lb)   02/01/24 70.9 kg (156 lb 6.4 oz)        Body mass index is 24.21 kg/m².  Nursing notes and vitals reviewed.    Physical Exam  Constitutional:       General: He is not in acute distress.     Appearance: He is well-developed.   Pulmonary:      Effort: Pulmonary effort is normal. No respiratory distress.   Skin:     General: Skin is warm and dry.      Findings: Laceration (L hand " 1st digit) present.   Neurological:      Mental Status: He is alert and oriented to person, place, and time.   Psychiatric:         Speech: Speech normal.         Thought Content: Thought content normal.               Assessment & Plan   Diagnoses and all orders for this visit:    1. Laceration of left thumb without foreign body without damage to nail, initial encounter (Primary)    2. Encounter for removal of sutures    Other orders  -     Suture Removal        Suture Removal    Date/Time: 5/23/2024 3:19 PM    Performed by: Caroline Medina APRN  Authorized by: Caroline Medina APRN  Consent: Verbal consent obtained.  Risks and benefits: risks, benefits and alternatives were discussed  Consent given by: patient  Patient understanding: patient states understanding of the procedure being performed  Body area: upper extremity  Location details: left thumb  Wound Appearance: clean  Sutures Removed: 18  Post-removal: Steri-Strips applied and dressing applied  Patient tolerance: patient tolerated the procedure well with no immediate complications          Advised to continue to keep covered w/ clean dry dressing and change this as often as needed.        The plan of care was discussed. All questions were answered. Patient verbalized understanding.      Return if symptoms worsen or fail to improve.

## 2024-06-21 ENCOUNTER — OFFICE VISIT (OUTPATIENT)
Dept: INTERNAL MEDICINE | Facility: CLINIC | Age: 74
End: 2024-06-21
Payer: MEDICARE

## 2024-06-21 VITALS
TEMPERATURE: 98.4 F | BODY MASS INDEX: 23.88 KG/M2 | DIASTOLIC BLOOD PRESSURE: 62 MMHG | HEART RATE: 61 BPM | WEIGHT: 148.6 LBS | OXYGEN SATURATION: 98 % | HEIGHT: 66 IN | SYSTOLIC BLOOD PRESSURE: 134 MMHG

## 2024-06-21 DIAGNOSIS — L03.012 CELLULITIS OF FINGER OF LEFT HAND: Primary | ICD-10-CM

## 2024-06-21 PROCEDURE — 99213 OFFICE O/P EST LOW 20 MIN: CPT | Performed by: INTERNAL MEDICINE

## 2024-06-21 RX ORDER — SULFAMETHOXAZOLE AND TRIMETHOPRIM 800; 160 MG/1; MG/1
1 TABLET ORAL 2 TIMES DAILY
Qty: 14 TABLET | Refills: 0 | Status: SHIPPED | OUTPATIENT
Start: 2024-06-21

## 2024-06-21 NOTE — PROGRESS NOTES
"      Jackson Tariq is a 74 y.o. male who presents with a chief complaint of   Chief Complaint   Patient presents with    Wound Infection     Blistering/swelling where sutures were removed on L thumb       Wound Infection         5/7 had injury to thumb and had 20 stitches placed and keflex given.  PCP took out sutures on 5/23/24      The following portions of the patient's history were reviewed and updated as appropriate: allergies, current medications, past family history, past medical history, past social history, past surgical history and problem list.      Current Outpatient Medications:     carvedilol (COREG) 3.125 MG tablet, Take 1 tablet by mouth 2 (Two) Times a Day., Disp: 60 tablet, Rfl: 11    omeprazole (priLOSEC) 20 MG capsule, Take 1 capsule by mouth 2 (Two) Times a Day. (Patient taking differently: Take 1 capsule by mouth Daily.), Disp: 180 capsule, Rfl: 3    valsartan (DIOVAN) 160 MG tablet, Take 1 tablet by mouth 2 (Two) Times a Day., Disp: 180 tablet, Rfl: 3    VITAMIN D PO, Take  by mouth., Disp: , Rfl:     sulfamethoxazole-trimethoprim (Bactrim DS) 800-160 MG per tablet, Take 1 tablet by mouth 2 (Two) Times a Day., Disp: 14 tablet, Rfl: 0            Physical Exam  /62 (BP Location: Left arm, Patient Position: Sitting, Cuff Size: Adult)   Pulse 61   Temp 98.4 °F (36.9 °C) (Infrared)   Ht 167.6 cm (65.98\")   Wt 67.4 kg (148 lb 9.6 oz)   SpO2 98%   BMI 24.00 kg/m²     Physical Exam  Vitals reviewed.   Constitutional:       General: He is not in acute distress.     Appearance: Normal appearance.   HENT:      Head: Normocephalic and atraumatic.      Nose: Nose normal.      Mouth/Throat:      Mouth: Mucous membranes are moist.   Eyes:      Conjunctiva/sclera: Conjunctivae normal.   Pulmonary:      Effort: Pulmonary effort is normal.   Musculoskeletal:         General: Tenderness present.   Skin:     General: Skin is warm and dry.      Findings: Erythema present.   Neurological:      General: " No focal deficit present.      Mental Status: He is alert.   Psychiatric:         Mood and Affect: Mood normal.           Results for orders placed or performed in visit on 05/01/24   PSA, Total & Free    Specimen: Blood   Result Value Ref Range    PSA 3.9 0.0 - 4.0 ng/mL    PSA, Free 1.37 N/A ng/mL    PSA, Free % 35.1 %           Diagnoses and all orders for this visit:    1. Cellulitis of finger of left hand (Primary)  -     sulfamethoxazole-trimethoprim (Bactrim DS) 800-160 MG per tablet; Take 1 tablet by mouth 2 (Two) Times a Day.  Dispense: 14 tablet; Refill: 0  -     Ambulatory Referral to Hand Surgery

## 2024-07-17 ENCOUNTER — OFFICE VISIT (OUTPATIENT)
Dept: CARDIOLOGY | Facility: CLINIC | Age: 74
End: 2024-07-17
Payer: MEDICARE

## 2024-07-17 ENCOUNTER — TRANSCRIBE ORDERS (OUTPATIENT)
Dept: CARDIOLOGY | Facility: CLINIC | Age: 74
End: 2024-07-17
Payer: MEDICARE

## 2024-07-17 ENCOUNTER — HOSPITAL ENCOUNTER (OUTPATIENT)
Dept: CARDIOLOGY | Facility: HOSPITAL | Age: 74
Discharge: HOME | End: 2024-07-17
Payer: MEDICARE

## 2024-07-17 ENCOUNTER — LAB (OUTPATIENT)
Dept: LAB | Facility: HOSPITAL | Age: 74
End: 2024-07-17
Payer: MEDICARE

## 2024-07-17 VITALS
DIASTOLIC BLOOD PRESSURE: 58 MMHG | OXYGEN SATURATION: 100 % | WEIGHT: 145.8 LBS | BODY MASS INDEX: 23.43 KG/M2 | RESPIRATION RATE: 18 BRPM | HEART RATE: 58 BPM | HEIGHT: 66 IN | SYSTOLIC BLOOD PRESSURE: 130 MMHG

## 2024-07-17 VITALS
SYSTOLIC BLOOD PRESSURE: 156 MMHG | DIASTOLIC BLOOD PRESSURE: 56 MMHG | BODY MASS INDEX: 24.66 KG/M2 | WEIGHT: 148 LBS | HEIGHT: 65 IN | HEART RATE: 61 BPM

## 2024-07-17 DIAGNOSIS — I15.8 OTHER SECONDARY HYPERTENSION: ICD-10-CM

## 2024-07-17 DIAGNOSIS — Z13.6 SCREENING FOR ISCHEMIC HEART DISEASE: ICD-10-CM

## 2024-07-17 DIAGNOSIS — I34.1 MITRAL VALVE PROLAPSE: ICD-10-CM

## 2024-07-17 DIAGNOSIS — R97.20 ELEVATED PSA: ICD-10-CM

## 2024-07-17 DIAGNOSIS — I34.0 SEVERE MITRAL VALVE REGURGITATION: ICD-10-CM

## 2024-07-17 DIAGNOSIS — I34.0 SEVERE MITRAL REGURGITATION: Primary | ICD-10-CM

## 2024-07-17 DIAGNOSIS — Z01.810 PRE-OPERATIVE CARDIOVASCULAR EXAMINATION: ICD-10-CM

## 2024-07-17 DIAGNOSIS — N18.31 STAGE 3A CHRONIC KIDNEY DISEASE: ICD-10-CM

## 2024-07-17 DIAGNOSIS — Z01.810 PRE-OPERATIVE CARDIOVASCULAR EXAMINATION: Primary | ICD-10-CM

## 2024-07-17 LAB
ANION GAP SERPL CALCULATED.3IONS-SCNC: 9 MMOL/L (ref 5–15)
BASOPHILS # BLD AUTO: 0.04 10*3/MM3 (ref 0–0.2)
BASOPHILS NFR BLD AUTO: 0.8 % (ref 0–1.5)
BH CV ECHO MEAS - EDV(CUBED): 185.2 ML
BH CV ECHO MEAS - EDV(MOD-SP2): 172 ML
BH CV ECHO MEAS - EDV(MOD-SP4): 174 ML
BH CV ECHO MEAS - EF(MOD-BP): 59.6 %
BH CV ECHO MEAS - EF(MOD-SP2): 61 %
BH CV ECHO MEAS - EF(MOD-SP4): 57.5 %
BH CV ECHO MEAS - ESV(CUBED): 64.3 ML
BH CV ECHO MEAS - ESV(MOD-SP2): 67 ML
BH CV ECHO MEAS - ESV(MOD-SP4): 74 ML
BH CV ECHO MEAS - FS: 29.7 %
BH CV ECHO MEAS - IVS/LVPW: 1 CM
BH CV ECHO MEAS - IVSD: 0.9 CM
BH CV ECHO MEAS - LV DIASTOLIC VOL/BSA (35-75): 100 CM2
BH CV ECHO MEAS - LV MASS(C)D: 197.5 GRAMS
BH CV ECHO MEAS - LV SYSTOLIC VOL/BSA (12-30): 42.5 CM2
BH CV ECHO MEAS - LVIDD: 5.7 CM
BH CV ECHO MEAS - LVIDS: 4 CM
BH CV ECHO MEAS - LVPWD: 0.9 CM
BH CV ECHO MEAS - MR MAX PG: 151.8 MMHG
BH CV ECHO MEAS - MR MAX VEL: 616 CM/SEC
BH CV ECHO MEAS - RAP SYSTOLE: 3 MMHG
BH CV ECHO MEAS - RVSP: 28.6 MMHG
BH CV ECHO MEAS - SV(MOD-SP2): 105 ML
BH CV ECHO MEAS - SV(MOD-SP4): 100 ML
BH CV ECHO MEAS - SVI(MOD-SP2): 60.3 ML/M2
BH CV ECHO MEAS - SVI(MOD-SP4): 57.5 ML/M2
BH CV ECHO MEAS - TR MAX PG: 25.6 MMHG
BH CV ECHO MEAS - TR MAX VEL: 253.1 CM/SEC
BUN SERPL-MCNC: 14 MG/DL (ref 8–23)
BUN/CREAT SERPL: 9.6 (ref 7–25)
CALCIUM SPEC-SCNC: 9.2 MG/DL (ref 8.6–10.5)
CHLORIDE SERPL-SCNC: 106 MMOL/L (ref 98–107)
CO2 SERPL-SCNC: 23 MMOL/L (ref 22–29)
CREAT SERPL-MCNC: 1.46 MG/DL (ref 0.76–1.27)
DEPRECATED RDW RBC AUTO: 42.3 FL (ref 37–54)
EGFRCR SERPLBLD CKD-EPI 2021: 50.5 ML/MIN/1.73
EOSINOPHIL # BLD AUTO: 0.07 10*3/MM3 (ref 0–0.4)
EOSINOPHIL NFR BLD AUTO: 1.4 % (ref 0.3–6.2)
ERYTHROCYTE [DISTWIDTH] IN BLOOD BY AUTOMATED COUNT: 12.7 % (ref 12.3–15.4)
GLUCOSE SERPL-MCNC: 93 MG/DL (ref 65–99)
HCT VFR BLD AUTO: 34.1 % (ref 37.5–51)
HGB BLD-MCNC: 11.5 G/DL (ref 13–17.7)
IMM GRANULOCYTES # BLD AUTO: 0.01 10*3/MM3 (ref 0–0.05)
IMM GRANULOCYTES NFR BLD AUTO: 0.2 % (ref 0–0.5)
LYMPHOCYTES # BLD AUTO: 1.43 10*3/MM3 (ref 0.7–3.1)
LYMPHOCYTES NFR BLD AUTO: 28 % (ref 19.6–45.3)
MCH RBC QN AUTO: 31.3 PG (ref 26.6–33)
MCHC RBC AUTO-ENTMCNC: 33.7 G/DL (ref 31.5–35.7)
MCV RBC AUTO: 92.9 FL (ref 79–97)
MONOCYTES # BLD AUTO: 0.49 10*3/MM3 (ref 0.1–0.9)
MONOCYTES NFR BLD AUTO: 9.6 % (ref 5–12)
NEUTROPHILS NFR BLD AUTO: 3.06 10*3/MM3 (ref 1.7–7)
NEUTROPHILS NFR BLD AUTO: 60 % (ref 42.7–76)
NRBC BLD AUTO-RTO: 0 /100 WBC (ref 0–0.2)
PLATELET # BLD AUTO: 197 10*3/MM3 (ref 140–450)
PMV BLD AUTO: 10.7 FL (ref 6–12)
POTASSIUM SERPL-SCNC: 4.2 MMOL/L (ref 3.5–5.2)
RBC # BLD AUTO: 3.67 10*6/MM3 (ref 4.14–5.8)
SODIUM SERPL-SCNC: 138 MMOL/L (ref 136–145)
WBC NRBC COR # BLD AUTO: 5.1 10*3/MM3 (ref 3.4–10.8)

## 2024-07-17 PROCEDURE — 93325 DOPPLER ECHO COLOR FLOW MAPG: CPT

## 2024-07-17 PROCEDURE — 85025 COMPLETE CBC W/AUTO DIFF WBC: CPT

## 2024-07-17 PROCEDURE — 36415 COLL VENOUS BLD VENIPUNCTURE: CPT

## 2024-07-17 PROCEDURE — 93308 TTE F-UP OR LMTD: CPT

## 2024-07-17 PROCEDURE — 80048 BASIC METABOLIC PNL TOTAL CA: CPT

## 2024-07-17 PROCEDURE — 93321 DOPPLER ECHO F-UP/LMTD STD: CPT

## 2024-07-17 NOTE — H&P (VIEW-ONLY)
[facility]    Subjective:     Encounter Date:07/17/24      Patient ID: Jackson Tariq is a 74 y.o. male.    Chief Complaint:   Chief Complaint   Patient presents with    Follow-up     6 mths   Follow-up for mitral regurgitation  History of Present Illness    Mr. Tariq is a very pleasant 73-year-old gentleman without significant past medical history outside of osteopenia and vitamin D deficiency, who presents for further evaluation of a cardiac murmur.  He was referred after a new PCP detected a murmur    He was noted to have severe mitral regurgitation with bileaflet prolapse on TTE.  He did 6 minutes 30 seconds on the treadmill.  No ischemia was noted.  He was noted to be markedly hypertensive for which he was started on medical therapy and presents today for further evaluation.    Today, he denies any symptoms.  He states he is functionally active, and he has no complaints.   He underwent a stress echo which revealed normal augmentation with stress, no significant ischemia, but most importantly did show that he had a mild elevation in his pulmonary pressures with exertion.  His blood pressure systolic was near 220 around that time.  He remains in stage C 1 of mitral regurgitation.      Since last visit.  He has done well.  He is tolerating valsartan 160 twice daily very well.  No chest pain, no significant shortness of breath.     Previous card testing:   Echocardiogram performed July 1724:    Left ventricular systolic function is normal. Calculated left ventricular EF = 59.6%    The left ventricular cavity is borderline dilated when indexed for BSA    Left ventricular diastolic function was not assessed.    Severe mitral valve regurgitation is present.  PI SA radius 1.2 cm.  The jet is highly eccentric however appears definitively severe on today's study and there appears to be a mobile element around the P2 segment likely indicative of a torn cord with subsequent flail component.  The left  atrial size does appear mildly enlarged when compared to previous study.    Estimated right ventricular systolic pressure from tricuspid regurgitation is normal (<35 mmHg). Calculated right ventricular systolic pressure from tricuspid regurgitation is 29 mmHg.    Holter monitor, 5-day October 2023:    A relatively benign monitor study.    There was a 6 beat ventricular run.    There were 3 nonsustained atrial runs with longest of 5 beats.    No significant sustained tachyarrhythmia noted.    A patient triggered event did not have arrhythmia correlation.    Stress echo August 23:    Equivocal echocardiographic evidence for myocardial ischemia.  Wall motion was normal, however delayed stress echo images were obtained, in lieu of obtaining TR severity to evaluate severity of mitral regurgitation.  Functional capacity is normal    Estimated right ventricular systolic pressure from tricuspid regurgitation normal at rest.  Calculated right ventricular systolic pressure from tricuspid regurgitation is 42 mmHg at peak stress, notably his blood pressure showed a hypertensive response with /62 at time of peak stress.    Left ventricular systolic function is normal. Calculated left ventricular EF = 60.4%    Left ventricular diastolic function was normal.    There are myxomatous changes of the mitral valve apparatus present. There is bileaflet mitral valve prolapse present.    Equivocal ECG evidence of myocardial ischemia. Baseline previous EKG with LVH findings renders the ST segment changes at peak stress equivocal    The following portions of the patient's history were reviewed and updated as appropriate: allergies, current medications, past family history, past medical history, past social history, past surgical history and problem list.    Past Medical History:   Diagnosis Date    Osteopenia of multiple sites 07/17/2023    Vitamin D deficiency        Past Surgical History:   Procedure Laterality Date    COLONOSCOPY    "   ENDOSCOPY N/A 5/3/2019    Procedure: ESOPHAGOGASTRODUODENOSCOPY with biopsies;  Surgeon: Radha Del Real MD;  Location: [facility];  Service: Gastroenterology         Procedures       Objective:     Vitals:    07/17/24 1026   BP: 130/58   BP Location: Right arm   Patient Position: Sitting   Cuff Size: Adult   Pulse: 58   Resp: 18   SpO2: 100%   Weight: 66.1 kg (145 lb 12.8 oz)   Height: 167.6 cm (66\")         Constitutional:       Appearance: Healthy appearance. Not in distress.   Neck:      Vascular: JVD normal.   Pulmonary:      Effort: Pulmonary effort is normal.      Breath sounds: Normal breath sounds.   Cardiovascular:      PMI at left midclavicular line. Normal rate. Regular rhythm. Normal S2.       Murmurs: There is a grade 4/6 crescendo-decrescendo, mid to late systolic murmur at the apex.   Pulses:     Intact distal pulses.   Edema:     Peripheral edema absent.   Skin:     General: Skin is warm and dry.   Neurological:      General: No focal deficit present.      Mental Status: Alert, oriented to person, place, and time and oriented to person, place and time.   Psychiatric:         Mood and Affect: Mood and affect normal.         Lab Review:         BUN   Date Value Ref Range Status   01/18/2024 13 8 - 23 mg/dL Final   09/05/2023 16 8 - 23 mg/dL Final     Creatinine   Date Value Ref Range Status   01/18/2024 1.44 (H) 0.76 - 1.27 mg/dL Final   09/05/2023 1.20 0.76 - 1.27 mg/dL Final     Potassium   Date Value Ref Range Status   01/18/2024 4.7 3.5 - 5.2 mmol/L Final   09/05/2023 3.9 3.5 - 5.2 mmol/L Final     ALT (SGPT)   Date Value Ref Range Status   01/18/2024 15 1 - 41 U/L Final     AST (SGOT)   Date Value Ref Range Status   01/18/2024 19 1 - 40 U/L Final            Assessment:          Diagnosis Plan   1. Severe mitral regurgitation  Case Request Cath Lab: Right and Left Heart Cath    Ambulatory Referral to Cardiothoracic Surgery             Plan:         Mitral regurgitation, severe, stage C 2: LVEF " 59, LVESD 40mm  Abnormal EKG  On today's echo the LVEF is mildly decreased compared to previous.  This could be normal variation, but given his overall valve appearance, even though he lacks symptoms, it may be time to move forward with valve repair   In reviewing his echo, it does appear that there is a flail, probably P2 segment with possible torn cord, there is a subsequent severe MR jet.  His left atrium does appear mildly dilated compared to previous.  It does appear this valve could be repairable, we could get a better look at it with a transesophageal echo but patient has declined this.  He is concerned due to complications that a friend of his did during his scope ultrasound.  I do think the technical quality of today's transthoracic echo is actually quite good and I think the etiology of his mitral regurgitation is pretty well-delineated on this transthoracic..  I will refer him to Dr. Gaitan for surgical opinion for valve repair.    Will arrange for left and right heart cath, obviously if there is significant coronary heart disease this may push us even more strongly towards repair and replacement   hypertension: Controlled on current regimen of valsartan 160 twice daily and carvedilol 3.125 twice daily    History of esophagitis/dysphagia  He declined a GI evaluation as he does not want to get a transesophageal echo due to his concerns about esophageal damage, as his friend had an experience with that apparently..  If ultimately this is required to further investigate his valve, we can consider this since the new probes are much smaller.  Patient is still hesitant, but again his transthoracic has pretty good windows and I think we can see what is going on with his valve.       RTC 3 months.  Arranging for left and right heart cath, and will arrange for surgical planning with Dr. Gaitan regarding valve repair.        Konstantin Driscoll MD  [facility]  07/17/24  10:04 EDT       Current Outpatient  Medications:     carvedilol (COREG) 3.125 MG tablet, Take 1 tablet by mouth 2 (Two) Times a Day., Disp: 60 tablet, Rfl: 11    omeprazole (priLOSEC) 20 MG capsule, Take 1 capsule by mouth 2 (Two) Times a Day. (Patient taking differently: Take 1 capsule by mouth Daily.), Disp: 180 capsule, Rfl: 3    sulfamethoxazole-trimethoprim (Bactrim DS) 800-160 MG per tablet, Take 1 tablet by mouth 2 (Two) Times a Day., Disp: 14 tablet, Rfl: 0    valsartan (DIOVAN) 160 MG tablet, Take 1 tablet by mouth 2 (Two) Times a Day., Disp: 180 tablet, Rfl: 3    VITAMIN D PO, Take  by mouth., Disp: , Rfl:          Return in about 3 months (around 10/17/2024).      Part of this note may be an electronic transcription/translation of spoken language to printed text using the Dragon Dictation System.

## 2024-07-17 NOTE — LETTER
July 17, 2024       No Recipients    Patient: Jackson Tariq   YOB: 1950   Date of Visit: 7/17/2024     Dear Farshad Gaitan MD:       Thank you for referring Jackson Tariq to me for evaluation. Below are the relevant portions of my assessment and plan of care.    If you have questions, please do not hesitate to call me. I look forward to following Jackson along with you.         Sincerely,        Konstantin Driscoll MD        CC:   No Recipients    Konstantin Driscoll MD  07/17/24 1251  Sign when Signing Visit        [facility]    Subjective:     Encounter Date:07/17/24      Patient ID: Jackson Tariq is a 74 y.o. male.    Chief Complaint:   Chief Complaint   Patient presents with   • Follow-up     6 mths   Follow-up for mitral regurgitation  History of Present Illness    Mr. Tariq is a very pleasant 73-year-old gentleman without significant past medical history outside of osteopenia and vitamin D deficiency, who presents for further evaluation of a cardiac murmur.  He was referred after a new PCP detected a murmur    He was noted to have severe mitral regurgitation with bileaflet prolapse on TTE.  He did 6 minutes 30 seconds on the treadmill.  No ischemia was noted.  He was noted to be markedly hypertensive for which he was started on medical therapy and presents today for further evaluation.    Today, he denies any symptoms.  He states he is functionally active, and he has no complaints.   He underwent a stress echo which revealed normal augmentation with stress, no significant ischemia, but most importantly did show that he had a mild elevation in his pulmonary pressures with exertion.  His blood pressure systolic was near 220 around that time.  He remains in stage C 1 of mitral regurgitation.      Since last visit.  He has done well.  He is tolerating valsartan 160 twice daily very well.  No chest pain, no significant shortness of breath.     Previous card testing:  • Echocardiogram  performed July 1724:  •  Left ventricular systolic function is normal. Calculated left ventricular EF = 59.6%  •  The left ventricular cavity is borderline dilated when indexed for BSA  •  Left ventricular diastolic function was not assessed.  •  Severe mitral valve regurgitation is present.  PI SA radius 1.2 cm.  The jet is highly eccentric however appears definitively severe on today's study and there appears to be a mobile element around the P2 segment likely indicative of a torn cord with subsequent flail component.  The left atrial size does appear mildly enlarged when compared to previous study.  •  Estimated right ventricular systolic pressure from tricuspid regurgitation is normal (<35 mmHg). Calculated right ventricular systolic pressure from tricuspid regurgitation is 29 mmHg.    Holter monitor, 5-day October 2023:  •  A relatively benign monitor study.  •  There was a 6 beat ventricular run.  •  There were 3 nonsustained atrial runs with longest of 5 beats.  •  No significant sustained tachyarrhythmia noted.  •  A patient triggered event did not have arrhythmia correlation.    Stress echo August 23:  •  Equivocal echocardiographic evidence for myocardial ischemia.  Wall motion was normal, however delayed stress echo images were obtained, in lieu of obtaining TR severity to evaluate severity of mitral regurgitation.  Functional capacity is normal  •  Estimated right ventricular systolic pressure from tricuspid regurgitation normal at rest.  Calculated right ventricular systolic pressure from tricuspid regurgitation is 42 mmHg at peak stress, notably his blood pressure showed a hypertensive response with /62 at time of peak stress.  •  Left ventricular systolic function is normal. Calculated left ventricular EF = 60.4%  •  Left ventricular diastolic function was normal.  •  There are myxomatous changes of the mitral valve apparatus present. There is bileaflet mitral valve prolapse present.  •   "Equivocal ECG evidence of myocardial ischemia. Baseline previous EKG with LVH findings renders the ST segment changes at peak stress equivocal    The following portions of the patient's history were reviewed and updated as appropriate: allergies, current medications, past family history, past medical history, past social history, past surgical history and problem list.    Past Medical History:   Diagnosis Date   • Osteopenia of multiple sites 07/17/2023   • Vitamin D deficiency        Past Surgical History:   Procedure Laterality Date   • COLONOSCOPY     • ENDOSCOPY N/A 5/3/2019    Procedure: ESOPHAGOGASTRODUODENOSCOPY with biopsies;  Surgeon: Radha Del Real MD;  Location: [facility];  Service: Gastroenterology         Procedures       Objective:     Vitals:    07/17/24 1026   BP: 130/58   BP Location: Right arm   Patient Position: Sitting   Cuff Size: Adult   Pulse: 58   Resp: 18   SpO2: 100%   Weight: 66.1 kg (145 lb 12.8 oz)   Height: 167.6 cm (66\")         Constitutional:       Appearance: Healthy appearance. Not in distress.   Neck:      Vascular: JVD normal.   Pulmonary:      Effort: Pulmonary effort is normal.      Breath sounds: Normal breath sounds.   Cardiovascular:      PMI at left midclavicular line. Normal rate. Regular rhythm. Normal S2.       Murmurs: There is a grade 4/6 crescendo-decrescendo, mid to late systolic murmur at the apex.   Pulses:     Intact distal pulses.   Edema:     Peripheral edema absent.   Skin:     General: Skin is warm and dry.   Neurological:      General: No focal deficit present.      Mental Status: Alert, oriented to person, place, and time and oriented to person, place and time.   Psychiatric:         Mood and Affect: Mood and affect normal.         Lab Review:         BUN   Date Value Ref Range Status   01/18/2024 13 8 - 23 mg/dL Final   09/05/2023 16 8 - 23 mg/dL Final     Creatinine   Date Value Ref Range Status   01/18/2024 1.44 (H) 0.76 - 1.27 mg/dL Final   09/05/2023 " 1.20 0.76 - 1.27 mg/dL Final     Potassium   Date Value Ref Range Status   01/18/2024 4.7 3.5 - 5.2 mmol/L Final   09/05/2023 3.9 3.5 - 5.2 mmol/L Final     ALT (SGPT)   Date Value Ref Range Status   01/18/2024 15 1 - 41 U/L Final     AST (SGOT)   Date Value Ref Range Status   01/18/2024 19 1 - 40 U/L Final            Assessment:          Diagnosis Plan   1. Severe mitral regurgitation  Case Request Cath Lab: Right and Left Heart Cath    Ambulatory Referral to Cardiothoracic Surgery             Plan:         Mitral regurgitation, severe, stage C 2: LVEF 59, LVESD 40mm  Abnormal EKG  On today's echo the LVEF is mildly decreased compared to previous.  This could be normal variation, but given his overall valve appearance, even though he lacks symptoms, it may be time to move forward with valve repair   In reviewing his echo, it does appear that there is a flail, probably P2 segment with possible torn cord, there is a subsequent severe MR jet.  His left atrium does appear mildly dilated compared to previous.  It does appear this valve could be repairable, we could get a better look at it with a transesophageal echo but patient has declined this.  He is concerned due to complications that a friend of his did during his scope ultrasound.  I do think the technical quality of today's transthoracic echo is actually quite good and I think the etiology of his mitral regurgitation is pretty well-delineated on this transthoracic..  I will refer him to Dr. Gaitan for surgical opinion for valve repair.    Will arrange for left and right heart cath, obviously if there is significant coronary heart disease this may push us even more strongly towards repair and replacement   hypertension: Controlled on current regimen of valsartan 160 twice daily and carvedilol 3.125 twice daily    History of esophagitis/dysphagia  He declined a GI evaluation as he does not want to get a transesophageal echo due to his concerns about esophageal  damage, as his friend had an experience with that apparently..  If ultimately this is required to further investigate his valve, we can consider this since the new probes are much smaller.  Patient is still hesitant, but again his transthoracic has pretty good windows and I think we can see what is going on with his valve.       RTC 3 months.  Arranging for left and right heart cath, and will arrange for surgical planning with Dr. Gaitan regarding valve repair.        Konstantin Driscoll MD  [facility]  07/17/24  10:04 EDT       Current Outpatient Medications:   •  carvedilol (COREG) 3.125 MG tablet, Take 1 tablet by mouth 2 (Two) Times a Day., Disp: 60 tablet, Rfl: 11  •  omeprazole (priLOSEC) 20 MG capsule, Take 1 capsule by mouth 2 (Two) Times a Day. (Patient taking differently: Take 1 capsule by mouth Daily.), Disp: 180 capsule, Rfl: 3  •  sulfamethoxazole-trimethoprim (Bactrim DS) 800-160 MG per tablet, Take 1 tablet by mouth 2 (Two) Times a Day., Disp: 14 tablet, Rfl: 0  •  valsartan (DIOVAN) 160 MG tablet, Take 1 tablet by mouth 2 (Two) Times a Day., Disp: 180 tablet, Rfl: 3  •  VITAMIN D PO, Take  by mouth., Disp: , Rfl:          Return in about 3 months (around 10/17/2024).      Part of this note may be an electronic transcription/translation of spoken language to printed text using the Dragon Dictation System.

## 2024-07-17 NOTE — PROGRESS NOTES
[facility]    Subjective:     Encounter Date:07/17/24      Patient ID: aJckson Tariq is a 74 y.o. male.    Chief Complaint:   Chief Complaint   Patient presents with    Follow-up     6 mths   Follow-up for mitral regurgitation  History of Present Illness    Mr. Tariq is a very pleasant 73-year-old gentleman without significant past medical history outside of osteopenia and vitamin D deficiency, who presents for further evaluation of a cardiac murmur.  He was referred after a new PCP detected a murmur    He was noted to have severe mitral regurgitation with bileaflet prolapse on TTE.  He did 6 minutes 30 seconds on the treadmill.  No ischemia was noted.  He was noted to be markedly hypertensive for which he was started on medical therapy and presents today for further evaluation.    Today, he denies any symptoms.  He states he is functionally active, and he has no complaints.   He underwent a stress echo which revealed normal augmentation with stress, no significant ischemia, but most importantly did show that he had a mild elevation in his pulmonary pressures with exertion.  His blood pressure systolic was near 220 around that time.  He remains in stage C 1 of mitral regurgitation.      Since last visit.  He has done well.  He is tolerating valsartan 160 twice daily very well.  No chest pain, no significant shortness of breath.     Previous card testing:   Echocardiogram performed July 1724:    Left ventricular systolic function is normal. Calculated left ventricular EF = 59.6%    The left ventricular cavity is borderline dilated when indexed for BSA    Left ventricular diastolic function was not assessed.    Severe mitral valve regurgitation is present.  PI SA radius 1.2 cm.  The jet is highly eccentric however appears definitively severe on today's study and there appears to be a mobile element around the P2 segment likely indicative of a torn cord with subsequent flail component.  The left  atrial size does appear mildly enlarged when compared to previous study.    Estimated right ventricular systolic pressure from tricuspid regurgitation is normal (<35 mmHg). Calculated right ventricular systolic pressure from tricuspid regurgitation is 29 mmHg.    Holter monitor, 5-day October 2023:    A relatively benign monitor study.    There was a 6 beat ventricular run.    There were 3 nonsustained atrial runs with longest of 5 beats.    No significant sustained tachyarrhythmia noted.    A patient triggered event did not have arrhythmia correlation.    Stress echo August 23:    Equivocal echocardiographic evidence for myocardial ischemia.  Wall motion was normal, however delayed stress echo images were obtained, in lieu of obtaining TR severity to evaluate severity of mitral regurgitation.  Functional capacity is normal    Estimated right ventricular systolic pressure from tricuspid regurgitation normal at rest.  Calculated right ventricular systolic pressure from tricuspid regurgitation is 42 mmHg at peak stress, notably his blood pressure showed a hypertensive response with /62 at time of peak stress.    Left ventricular systolic function is normal. Calculated left ventricular EF = 60.4%    Left ventricular diastolic function was normal.    There are myxomatous changes of the mitral valve apparatus present. There is bileaflet mitral valve prolapse present.    Equivocal ECG evidence of myocardial ischemia. Baseline previous EKG with LVH findings renders the ST segment changes at peak stress equivocal    The following portions of the patient's history were reviewed and updated as appropriate: allergies, current medications, past family history, past medical history, past social history, past surgical history and problem list.    Past Medical History:   Diagnosis Date    Osteopenia of multiple sites 07/17/2023    Vitamin D deficiency        Past Surgical History:   Procedure Laterality Date    COLONOSCOPY    "   ENDOSCOPY N/A 5/3/2019    Procedure: ESOPHAGOGASTRODUODENOSCOPY with biopsies;  Surgeon: Radha Del Real MD;  Location: [facility];  Service: Gastroenterology         Procedures       Objective:     Vitals:    07/17/24 1026   BP: 130/58   BP Location: Right arm   Patient Position: Sitting   Cuff Size: Adult   Pulse: 58   Resp: 18   SpO2: 100%   Weight: 66.1 kg (145 lb 12.8 oz)   Height: 167.6 cm (66\")         Constitutional:       Appearance: Healthy appearance. Not in distress.   Neck:      Vascular: JVD normal.   Pulmonary:      Effort: Pulmonary effort is normal.      Breath sounds: Normal breath sounds.   Cardiovascular:      PMI at left midclavicular line. Normal rate. Regular rhythm. Normal S2.       Murmurs: There is a grade 4/6 crescendo-decrescendo, mid to late systolic murmur at the apex.   Pulses:     Intact distal pulses.   Edema:     Peripheral edema absent.   Skin:     General: Skin is warm and dry.   Neurological:      General: No focal deficit present.      Mental Status: Alert, oriented to person, place, and time and oriented to person, place and time.   Psychiatric:         Mood and Affect: Mood and affect normal.         Lab Review:         BUN   Date Value Ref Range Status   01/18/2024 13 8 - 23 mg/dL Final   09/05/2023 16 8 - 23 mg/dL Final     Creatinine   Date Value Ref Range Status   01/18/2024 1.44 (H) 0.76 - 1.27 mg/dL Final   09/05/2023 1.20 0.76 - 1.27 mg/dL Final     Potassium   Date Value Ref Range Status   01/18/2024 4.7 3.5 - 5.2 mmol/L Final   09/05/2023 3.9 3.5 - 5.2 mmol/L Final     ALT (SGPT)   Date Value Ref Range Status   01/18/2024 15 1 - 41 U/L Final     AST (SGOT)   Date Value Ref Range Status   01/18/2024 19 1 - 40 U/L Final            Assessment:          Diagnosis Plan   1. Severe mitral regurgitation  Case Request Cath Lab: Right and Left Heart Cath    Ambulatory Referral to Cardiothoracic Surgery             Plan:         Mitral regurgitation, severe, stage C 2: LVEF " 59, LVESD 40mm  Abnormal EKG  On today's echo the LVEF is mildly decreased compared to previous.  This could be normal variation, but given his overall valve appearance, even though he lacks symptoms, it may be time to move forward with valve repair   In reviewing his echo, it does appear that there is a flail, probably P2 segment with possible torn cord, there is a subsequent severe MR jet.  His left atrium does appear mildly dilated compared to previous.  It does appear this valve could be repairable, we could get a better look at it with a transesophageal echo but patient has declined this.  He is concerned due to complications that a friend of his did during his scope ultrasound.  I do think the technical quality of today's transthoracic echo is actually quite good and I think the etiology of his mitral regurgitation is pretty well-delineated on this transthoracic..  I will refer him to Dr. Gaitan for surgical opinion for valve repair.    Will arrange for left and right heart cath, obviously if there is significant coronary heart disease this may push us even more strongly towards repair and replacement   hypertension: Controlled on current regimen of valsartan 160 twice daily and carvedilol 3.125 twice daily    History of esophagitis/dysphagia  He declined a GI evaluation as he does not want to get a transesophageal echo due to his concerns about esophageal damage, as his friend had an experience with that apparently..  If ultimately this is required to further investigate his valve, we can consider this since the new probes are much smaller.  Patient is still hesitant, but again his transthoracic has pretty good windows and I think we can see what is going on with his valve.       RTC 3 months.  Arranging for left and right heart cath, and will arrange for surgical planning with Dr. Gaitan regarding valve repair.        Konstantin Driscoll MD  [facility]  07/17/24  10:04 EDT       Current Outpatient  Medications:     carvedilol (COREG) 3.125 MG tablet, Take 1 tablet by mouth 2 (Two) Times a Day., Disp: 60 tablet, Rfl: 11    omeprazole (priLOSEC) 20 MG capsule, Take 1 capsule by mouth 2 (Two) Times a Day. (Patient taking differently: Take 1 capsule by mouth Daily.), Disp: 180 capsule, Rfl: 3    sulfamethoxazole-trimethoprim (Bactrim DS) 800-160 MG per tablet, Take 1 tablet by mouth 2 (Two) Times a Day., Disp: 14 tablet, Rfl: 0    valsartan (DIOVAN) 160 MG tablet, Take 1 tablet by mouth 2 (Two) Times a Day., Disp: 180 tablet, Rfl: 3    VITAMIN D PO, Take  by mouth., Disp: , Rfl:          Return in about 3 months (around 10/17/2024).      Part of this note may be an electronic transcription/translation of spoken language to printed text using the Dragon Dictation System.

## 2024-07-24 ENCOUNTER — HOSPITAL ENCOUNTER (OUTPATIENT)
Facility: HOSPITAL | Age: 74
Setting detail: HOSPITAL OUTPATIENT SURGERY
Discharge: HOME | End: 2024-07-24
Attending: INTERNAL MEDICINE | Admitting: INTERNAL MEDICINE
Payer: MEDICARE

## 2024-07-24 VITALS
RESPIRATION RATE: 16 BRPM | SYSTOLIC BLOOD PRESSURE: 136 MMHG | DIASTOLIC BLOOD PRESSURE: 62 MMHG | BODY MASS INDEX: 22.4 KG/M2 | HEART RATE: 60 BPM | OXYGEN SATURATION: 99 % | TEMPERATURE: 98.5 F | HEIGHT: 67 IN | WEIGHT: 142.7 LBS

## 2024-07-24 DIAGNOSIS — I34.0 SEVERE MITRAL REGURGITATION: ICD-10-CM

## 2024-07-24 LAB
HCT VFR BLDA CALC: 33 % (ref 38–51)
HCT VFR BLDA CALC: 34 % (ref 38–51)
HGB BLDA-MCNC: 11.2 G/DL (ref 12–17)
HGB BLDA-MCNC: 11.6 G/DL (ref 12–17)
SAO2 % BLDA: 79 % (ref 95–98)
SAO2 % BLDA: 92 % (ref 95–98)

## 2024-07-24 PROCEDURE — C1894 INTRO/SHEATH, NON-LASER: HCPCS | Performed by: INTERNAL MEDICINE

## 2024-07-24 PROCEDURE — 25510000001 IOPAMIDOL PER 1 ML: Performed by: INTERNAL MEDICINE

## 2024-07-24 PROCEDURE — 93460 R&L HRT ART/VENTRICLE ANGIO: CPT | Performed by: INTERNAL MEDICINE

## 2024-07-24 PROCEDURE — C1769 GUIDE WIRE: HCPCS | Performed by: INTERNAL MEDICINE

## 2024-07-24 PROCEDURE — 82810 BLOOD GASES O2 SAT ONLY: CPT

## 2024-07-24 PROCEDURE — 25010000002 MIDAZOLAM PER 1 MG: Performed by: INTERNAL MEDICINE

## 2024-07-24 PROCEDURE — 85014 HEMATOCRIT: CPT

## 2024-07-24 PROCEDURE — 25010000002 FENTANYL CITRATE (PF) 50 MCG/ML SOLUTION: Performed by: INTERNAL MEDICINE

## 2024-07-24 PROCEDURE — 85018 HEMOGLOBIN: CPT

## 2024-07-24 PROCEDURE — 25810000003 SODIUM CHLORIDE 0.9 % SOLUTION: Performed by: INTERNAL MEDICINE

## 2024-07-24 RX ORDER — NITROGLYCERIN 0.4 MG/1
0.4 TABLET SUBLINGUAL
Status: DISCONTINUED | OUTPATIENT
Start: 2024-07-24 | End: 2024-07-24 | Stop reason: HOSPADM

## 2024-07-24 RX ORDER — FENTANYL CITRATE 50 UG/ML
INJECTION, SOLUTION INTRAMUSCULAR; INTRAVENOUS
Status: DISCONTINUED | OUTPATIENT
Start: 2024-07-24 | End: 2024-07-24 | Stop reason: HOSPADM

## 2024-07-24 RX ORDER — SODIUM CHLORIDE 9 MG/ML
75 INJECTION, SOLUTION INTRAVENOUS CONTINUOUS
Status: DISCONTINUED | OUTPATIENT
Start: 2024-07-24 | End: 2024-07-24 | Stop reason: HOSPADM

## 2024-07-24 RX ORDER — ACETAMINOPHEN 325 MG/1
650 TABLET ORAL EVERY 4 HOURS PRN
Status: DISCONTINUED | OUTPATIENT
Start: 2024-07-24 | End: 2024-07-24 | Stop reason: HOSPADM

## 2024-07-24 RX ORDER — MIDAZOLAM HYDROCHLORIDE 1 MG/ML
INJECTION INTRAMUSCULAR; INTRAVENOUS
Status: DISCONTINUED | OUTPATIENT
Start: 2024-07-24 | End: 2024-07-24 | Stop reason: HOSPADM

## 2024-07-24 RX ORDER — SODIUM CHLORIDE 0.9 % (FLUSH) 0.9 %
10 SYRINGE (ML) INJECTION EVERY 12 HOURS SCHEDULED
Status: DISCONTINUED | OUTPATIENT
Start: 2024-07-24 | End: 2024-07-24 | Stop reason: HOSPADM

## 2024-07-24 RX ORDER — LIDOCAINE HYDROCHLORIDE 20 MG/ML
INJECTION, SOLUTION INFILTRATION; PERINEURAL
Status: DISCONTINUED | OUTPATIENT
Start: 2024-07-24 | End: 2024-07-24 | Stop reason: HOSPADM

## 2024-07-24 RX ADMIN — SODIUM CHLORIDE 75 ML/HR: 9 INJECTION, SOLUTION INTRAVENOUS at 09:12

## 2024-07-24 NOTE — DISCHARGE INSTRUCTIONS
[facility]  [address] [place] [address]    Coronary Angiogram (Radial/Ulnar Approach) After Care    Refer to this sheet in the next few weeks. These instructions provide you with information on caring for yourself after your procedure. Your caregiver may also give you more specific instructions. Your treatment has been planned according to current medical practices, but problems sometimes occur. Call your caregiver if you have any problems or questions after your procedure.    Home Care Instructions:  You may shower the day after the procedure. Remove the bandage (dressing) and gently wash the site with plain soap and water. Gently pat the site dry. You may apply a band aid daily for 2 days if desired.    Do not apply powder or lotion to the site.  Do not submerge the affected site in water for 3 to 5 days or until the site is completely healed.   Do not lift, push or pull anything over 5 pounds for 5 days after your procedure or as directed by your physician.  As a reference, a gallon of milk weighs 8 pounds.   Inspect the site at least twice daily. You may notice some bruising at the site and it may be tender for 1 to 2 weeks.     Increase your fluid intake for the next 2 days.    Keep arm elevated for 24 hours. For the remainder of the day, keep your arm in “Pledge of Allegiance” position when up and about.     You may drive 24 hours after the procedure unless otherwise instructed by your caregiver.  Do not operate machinery or power tools for 24 hours.  A responsible adult should be with you for the first 24 hours after you arrive home. Do not make any important legal decisions or sign legal papers for 24 hours.  Do not drink alcohol for 24 hours.    Metformin or any medications containing Metformin should not be taken for 48 hours after your procedure.      Call Your Doctor if:   You have unusual pain at the radial/ulnar (wrist) site.  You have redness, warmth, swelling, or pain at the  radial/ulnar (wrist) site.  You have drainage (other than a small amount of blood on the dressing).  `You have chills or a fever > 101.  Your arm becomes pale or dark, cool, tingly, or numb.  You develop chest pain, shortness of breath, feel faint or pass out.    You have heavy bleeding from the site, hold pressure on the site for 20 minutes.  If the bleeding stops, apply a fresh bandage and call your cardiologist.  However, if you        continue to have bleeding, call 911 and continue to apply pressure to the site.   You have any symptoms of a stroke.  Remember BE FAST  B-balance. Sudden trouble walking or loss of balance.  E-eyes.  Sudden changes in how you see or a sudden onset of a very bad headache.   F-face. Sudden weakness or loss of feeling of the face or facial droop on one side.   A-arms Sudden weakness or numbness in one arm.  One arm drifts down if they are both held out in front of you. This happens suddenly and usually on one side of the body.   S-speech.  Sudden trouble speaking, slurred speech or trouble understanding what are saying.   T-time  Time to call emergency services.  Write down the symptoms and the time they started.

## 2024-07-24 NOTE — Clinical Note
Hemostasis started on the right radial artery. Manual pressure applied to vessel. Manual pressure was held by . Manual pressure was held for 15 min. Hemostasis achieved successfully. Closure device additional comment: 4x4 and tensoplast applied

## 2024-07-24 NOTE — Clinical Note
Hemostasis started on the right brachial vein. Manual pressure applied to vessel. Manual pressure was held by . Manual pressure was held for 5 min. Hemostasis achieved successfully. Closure device additional comment: 4x4 and tegaderm applied

## 2024-07-26 LAB
ALBUMIN SERPL-MCNC: 4.5 G/DL (ref 3.5–5.2)
ALBUMIN/GLOB SERPL: 1.9 G/DL
ALP SERPL-CCNC: 95 U/L (ref 39–117)
ALT SERPL-CCNC: 12 U/L (ref 1–41)
APPEARANCE UR: CLEAR
AST SERPL-CCNC: 18 U/L (ref 1–40)
BILIRUB SERPL-MCNC: 1.1 MG/DL (ref 0–1.2)
BILIRUB UR QL STRIP: NEGATIVE
BUN SERPL-MCNC: 12 MG/DL (ref 8–23)
BUN/CREAT SERPL: 8.2 (ref 7–25)
CALCIUM SERPL-MCNC: 9.4 MG/DL (ref 8.6–10.5)
CHLORIDE SERPL-SCNC: 104 MMOL/L (ref 98–107)
CHOLEST SERPL-MCNC: 171 MG/DL (ref 0–200)
CHOLEST/HDLC SERPL: 2.71 {RATIO}
CO2 SERPL-SCNC: 24.3 MMOL/L (ref 22–29)
COLOR UR: YELLOW
CREAT SERPL-MCNC: 1.46 MG/DL (ref 0.76–1.27)
EGFRCR SERPLBLD CKD-EPI 2021: 50.5 ML/MIN/1.73
ERYTHROCYTE [DISTWIDTH] IN BLOOD BY AUTOMATED COUNT: 12.9 % (ref 12.3–15.4)
GLOBULIN SER CALC-MCNC: 2.4 GM/DL
GLUCOSE SERPL-MCNC: 93 MG/DL (ref 65–99)
GLUCOSE UR QL STRIP: NEGATIVE
HCT VFR BLD AUTO: 36.8 % (ref 37.5–51)
HDLC SERPL-MCNC: 63 MG/DL (ref 40–60)
HGB BLD-MCNC: 12.6 G/DL (ref 13–17.7)
HGB UR QL STRIP: NEGATIVE
KETONES UR QL STRIP: NEGATIVE
LDLC SERPL CALC-MCNC: 91 MG/DL (ref 0–100)
LEUKOCYTE ESTERASE UR QL STRIP: ABNORMAL
MCH RBC QN AUTO: 31.8 PG (ref 26.6–33)
MCHC RBC AUTO-ENTMCNC: 34.2 G/DL (ref 31.5–35.7)
MCV RBC AUTO: 92.9 FL (ref 79–97)
NITRITE UR QL STRIP: NEGATIVE
PH UR STRIP: 7.5 [PH] (ref 5–8)
PLATELET # BLD AUTO: 212 10*3/MM3 (ref 140–450)
POTASSIUM SERPL-SCNC: 4.5 MMOL/L (ref 3.5–5.2)
PROT SERPL-MCNC: 6.9 G/DL (ref 6–8.5)
PROT UR QL STRIP: NEGATIVE
PSA SERPL-MCNC: 4.07 NG/ML (ref 0–4)
RBC # BLD AUTO: 3.96 10*6/MM3 (ref 4.14–5.8)
SODIUM SERPL-SCNC: 137 MMOL/L (ref 136–145)
SP GR UR STRIP: 1.02 (ref 1–1.03)
TRIGL SERPL-MCNC: 96 MG/DL (ref 0–150)
UROBILINOGEN UR STRIP-MCNC: ABNORMAL MG/DL
VLDLC SERPL CALC-MCNC: 17 MG/DL (ref 5–40)
WBC # BLD AUTO: 5.55 10*3/MM3 (ref 3.4–10.8)

## 2024-08-26 ENCOUNTER — OFFICE VISIT (OUTPATIENT)
Dept: INTERNAL MEDICINE | Facility: CLINIC | Age: 74
End: 2024-08-26
Payer: MEDICARE

## 2024-08-26 VITALS
SYSTOLIC BLOOD PRESSURE: 120 MMHG | WEIGHT: 149 LBS | TEMPERATURE: 97.7 F | DIASTOLIC BLOOD PRESSURE: 60 MMHG | BODY MASS INDEX: 23.34 KG/M2 | HEART RATE: 62 BPM | OXYGEN SATURATION: 97 %

## 2024-08-26 DIAGNOSIS — R13.10 DYSPHAGIA, UNSPECIFIED TYPE: ICD-10-CM

## 2024-08-26 DIAGNOSIS — I34.0 SEVERE MITRAL REGURGITATION: ICD-10-CM

## 2024-08-26 DIAGNOSIS — Z00.00 ENCOUNTER FOR MEDICARE ANNUAL WELLNESS EXAM: Primary | ICD-10-CM

## 2024-08-26 DIAGNOSIS — E55.9 VITAMIN D DEFICIENCY: Chronic | ICD-10-CM

## 2024-08-26 DIAGNOSIS — R97.20 ELEVATED PSA: ICD-10-CM

## 2024-08-26 DIAGNOSIS — I15.8 OTHER SECONDARY HYPERTENSION: ICD-10-CM

## 2024-08-26 DIAGNOSIS — I34.0 NONRHEUMATIC MITRAL VALVE REGURGITATION: ICD-10-CM

## 2024-08-26 DIAGNOSIS — D64.9 MILD ANEMIA: ICD-10-CM

## 2024-08-26 DIAGNOSIS — N18.31 STAGE 3A CHRONIC KIDNEY DISEASE: ICD-10-CM

## 2024-08-26 DIAGNOSIS — M85.89 OSTEOPENIA OF MULTIPLE SITES: Chronic | ICD-10-CM

## 2024-08-26 PROCEDURE — 1170F FXNL STATUS ASSESSED: CPT | Performed by: NURSE PRACTITIONER

## 2024-08-26 PROCEDURE — G0439 PPPS, SUBSEQ VISIT: HCPCS | Performed by: NURSE PRACTITIONER

## 2024-08-26 PROCEDURE — 1160F RVW MEDS BY RX/DR IN RCRD: CPT | Performed by: NURSE PRACTITIONER

## 2024-08-26 PROCEDURE — 99214 OFFICE O/P EST MOD 30 MIN: CPT | Performed by: NURSE PRACTITIONER

## 2024-08-26 PROCEDURE — 1159F MED LIST DOCD IN RCRD: CPT | Performed by: NURSE PRACTITIONER

## 2024-08-26 NOTE — PROGRESS NOTES
Subjective   The ABCs of the Annual Wellness Visit  Medicare Wellness Visit      Jackson Tariq is a 74 y.o. patient who presents for a Medicare Wellness Visit.    The following portions of the patient's history were reviewed and   updated as appropriate: allergies, current medications, past family history, past medical history, past social history, past surgical history, and problem list.    Compared to one year ago, the patient's physical   health is the same.  Compared to one year ago, the patient's mental   health is the same.    Recent Hospitalizations:  He was not admitted to the hospital during the last year.     Current Medical Providers:  Patient Care Team:  Caroline Medina APRN as PCP - General (Family Medicine)  Konstantin Driscoll MD as Consulting Physician (Cardiology)  Maksim Ortiz MD as Consulting Physician (Urology)    Outpatient Medications Marked as Taking for the 8/26/24 encounter (Office Visit) with Caroline Medina APRN   Medication Sig Dispense Refill    carvedilol (COREG) 3.125 MG tablet Take 1 tablet by mouth 2 (Two) Times a Day. 60 tablet 11    omeprazole (priLOSEC) 20 MG capsule Take 1 capsule by mouth 2 (Two) Times a Day. (Patient taking differently: Take 1 capsule by mouth Daily.) 180 capsule 3    valsartan (DIOVAN) 160 MG tablet Take 1 tablet by mouth 2 (Two) Times a Day. 180 tablet 3    VITAMIN D PO Take  by mouth.           No opioid medication identified on active medication list. I have reviewed chart for other potential  high risk medication/s and harmful drug interactions in the elderly.      Aspirin is not on active medication list.  Aspirin use is not indicated based on review of current medical condition/s. Risk of harm outweighs potential benefits.  .    Patient Active Problem List   Diagnosis    Dysphagia    Osteopenia of multiple sites    Vitamin D deficiency    Nonrheumatic mitral valve regurgitation    Severe mitral regurgitation    Other secondary  "hypertension    Stage 3a chronic kidney disease    Mild anemia    Elevated PSA     Advance Care Planning Advance Directive is not on file.  ACP discussion was held with the patient during this visit. Patient does not have an advance directive, information provided.            Objective   Vitals:    24 1301   BP: 120/60   BP Location: Left arm   Patient Position: Sitting   Cuff Size: Adult   Pulse: 62   Temp: 97.7 °F (36.5 °C)   TempSrc: Infrared   SpO2: 97%   Weight: 67.6 kg (149 lb)       Estimated body mass index is 23.34 kg/m² as calculated from the following:    Height as of 24: 170.2 cm (67\").    Weight as of this encounter: 67.6 kg (149 lb).    BMI is within normal parameters. No other follow-up for BMI required.       Does the patient have evidence of cognitive impairment? No  Lab Results   Component Value Date    CHLPL 171 2024    TRIG 96 2024    HDL 63 (H) 2024    LDL 91 2024    VLDL 17 2024                                                                                                Health  Risk Assessment    Smoking Status:  Social History     Tobacco Use   Smoking Status Never   Smokeless Tobacco Never     Alcohol Consumption:  Social History     Substance and Sexual Activity   Alcohol Use No       Fall Risk Screen  STEADI Fall Risk Assessment was completed, and patient is at LOW risk for falls.Assessment completed on:2024    Depression Screenin/26/2024     1:00 PM   PHQ-2/PHQ-9 Depression Screening   Little Interest or Pleasure in Doing Things 0-->not at all   Feeling Down, Depressed or Hopeless 0-->not at all   PHQ-9: Brief Depression Severity Measure Score 0     Health Habits and Functional and Cognitive Screenin/26/2024    12:57 PM   Functional & Cognitive Status   Do you have difficulty preparing food and eating? No   Do you have difficulty bathing yourself, getting dressed or grooming yourself? No   Do you have difficulty using the " toilet? No   Do you have difficulty moving around from place to place? No   Do you have trouble with steps or getting out of a bed or a chair? No   Current Diet Well Balanced Diet   Dental Exam Up to date   Eye Exam Not up to date   Exercise (times per week) 5 times per week   Current Exercises Include Stationary Bicycling/Spin Class;Light Weights   Do you need help using the phone?  No   Are you deaf or do you have serious difficulty hearing?  Yes   Do you need help to go to places out of walking distance? No   Do you need help shopping? No   Do you need help preparing meals?  No   Do you need help with housework?  No   Do you need help with laundry? No   Do you need help taking your medications? No   Do you need help managing money? No   Do you ever drive or ride in a car without wearing a seat belt? No   Have you felt unusual stress, anger or loneliness in the last month? No   Who do you live with? Spouse   If you need help, do you have trouble finding someone available to you? No   Have you been bothered in the last four weeks by sexual problems? No   Do you have difficulty concentrating, remembering or making decisions? No           Age-appropriate Screening Schedule:  Refer to the list below for future screening recommendations based on patient's age, sex and/or medical conditions. Orders for these recommended tests are listed in the plan section. The patient has been provided with a written plan.    Health Maintenance List  Health Maintenance   Topic Date Due    INFLUENZA VACCINE  08/01/2024    LIPID PANEL  07/26/2025    ANNUAL WELLNESS VISIT  08/26/2025    COLORECTAL CANCER SCREENING  07/31/2026    TDAP/TD VACCINES (2 - Td or Tdap) 05/07/2034    HEPATITIS C SCREENING  Completed    Pneumococcal Vaccine 65+  Completed    ZOSTER VACCINE  Completed    COVID-19 Vaccine  Discontinued                                                                                                                                       "          CMS Preventative Services Quick Reference  Risk Factors Identified During Encounter  None Identified    The above risks/problems have been discussed with the patient.  Pertinent information has been shared with the patient in the After Visit Summary.  An After Visit Summary and PPPS were made available to the patient.    Follow Up:   Next Medicare Wellness visit to be scheduled in 1 year.         Additional E&M Note during same encounter follows:  Patient has additional, significant, and separately identifiable condition(s)/problem(s) that require work above and beyond the Medicare Wellness Visit     Chief Complaint  Medicare Wellness-subsequent    Subjective   HPI  Jackson Tariq is also being seen today for additional medical problem/s.        Mr. Tariq has severe non-rheumatic mitral valve regurgitation. He is f/b Dr. Driscoll w/ Cardiology and has an upcomming appt w/ Dr. Gaitan to further evaluate for surgery.    HTN - current regimen includes Coreg and Valsartan. He does not SMBP.    Dysphagia - declines EGD. Asymptomatic w/ QD PPI.    Osteopenia - His previous PCP tx'ed him with fosamax for many years. Previous PCP stopped this prior to retiring. Pt is not sure why, \"wanted to see how I would do with out it.\" Last DXA 08/23 w/ worsening BMD. Pt takes Vit D supp but not Ca.    Elevated PSA - follows w/ Urology    He suffered a crush injury/laceration to his L thumb in May. This healed w/o issue but he reports continued pain. He was referred by my partner to [facility] who then referred him to OT. He attend 4wks of OT and then stopped going d/t needing to care for his wife. He noted no improvement. He massages the area daily w/ Vit E oil.        Objective   Vital Signs:  /60 (BP Location: Left arm, Patient Position: Sitting, Cuff Size: Adult)   Pulse 62   Temp 97.7 °F (36.5 °C) (Infrared)   Wt 67.6 kg (149 lb)   SpO2 97%   BMI 23.34 kg/m²   Physical Exam  Constitutional:       " General: He is not in acute distress.     Appearance: Normal appearance. He is well-developed.   HENT:      Head: Normocephalic.      Right Ear: Hearing, tympanic membrane, ear canal and external ear normal.      Left Ear: Hearing, tympanic membrane, ear canal and external ear normal.      Nose: Nose normal. No mucosal edema or rhinorrhea.      Mouth/Throat:      Mouth: Mucous membranes are moist.      Pharynx: Oropharynx is clear. Uvula midline.   Eyes:      General: Lids are normal.      Extraocular Movements: Extraocular movements intact.      Conjunctiva/sclera: Conjunctivae normal.      Pupils: Pupils are equal, round, and reactive to light.   Neck:      Thyroid: No thyroid mass or thyromegaly.   Cardiovascular:      Rate and Rhythm: Regular rhythm.      Pulses: Normal pulses.      Heart sounds: Murmur heard.      Crescendo decrescendo systolic murmur is present with a grade of 4/6.      No friction rub. No gallop.   Pulmonary:      Effort: Pulmonary effort is normal.      Breath sounds: Normal breath sounds. No wheezing, rhonchi or rales.   Abdominal:      General: Bowel sounds are normal.      Palpations: Abdomen is soft.      Tenderness: There is no abdominal tenderness. There is no guarding.      Hernia: No hernia is present.   Musculoskeletal:         General: No deformity. Normal range of motion.      Cervical back: Normal range of motion and neck supple.      Right lower leg: No edema.      Left lower leg: No edema.   Lymphadenopathy:      Cervical: No cervical adenopathy.   Skin:     General: Skin is warm and dry.      Findings: No lesion or rash.   Neurological:      General: No focal deficit present.      Mental Status: He is alert and oriented to person, place, and time.      Cranial Nerves: No cranial nerve deficit.      Sensory: No sensory deficit.      Motor: Motor function is intact.      Coordination: Coordination is intact.      Gait: Gait normal.      Deep Tendon Reflexes: Reflexes are normal  and symmetric.   Psychiatric:         Attention and Perception: He is attentive.         Mood and Affect: Mood and affect normal.         Speech: Speech normal.         Behavior: Behavior normal.         Thought Content: Thought content normal.         The following data was reviewed by: ENRIQUE Keith on 08/26/2024:     Recent Results (from the past 1008 hour(s))   Adult Transthoracic Echo Limited W/ Cont if Necessary Per Protocol    Collection Time: 07/17/24 10:06 AM   Result Value Ref Range    EF(MOD-bp) 59.6 %    LVIDd 5.7 cm    LVIDs 4.0 cm    IVSd 0.90 cm    LVPWd 0.90 cm    FS 29.7 %    IVS/LVPW 1.00 cm    ESV(cubed) 64.3 ml    LV Sys Vol (BSA corrected) 42.5 cm2    EDV(cubed) 185.2 ml    LV Paez Vol (BSA corrected) 100.0 cm2    LV mass(C)d 197.5 grams    EDV(MOD-sp2) 172.0 ml    EDV(MOD-sp4) 174.0 ml    ESV(MOD-sp2) 67.0 ml    ESV(MOD-sp4) 74.0 ml    SV(MOD-sp2) 105.0 ml    SV(MOD-sp4) 100.0 ml    SVi(MOD-SP2) 60.3 ml/m2    SVi(MOD-SP4) 57.5 ml/m2    EF(MOD-sp2) 61.0 %    EF(MOD-sp4) 57.5 %    TR max hoang 253.1 cm/sec    MR max hoang 616.0 cm/sec    MR max .8 mmHg    TR max PG 25.6 mmHg    RVSP(TR) 28.6 mmHg    RAP systole 3.0 mmHg   Basic Metabolic Panel    Collection Time: 07/17/24  3:02 PM    Specimen: Blood   Result Value Ref Range    Glucose 93 65 - 99 mg/dL    BUN 14 8 - 23 mg/dL    Creatinine 1.46 (H) 0.76 - 1.27 mg/dL    Sodium 138 136 - 145 mmol/L    Potassium 4.2 3.5 - 5.2 mmol/L    Chloride 106 98 - 107 mmol/L    CO2 23.0 22.0 - 29.0 mmol/L    Calcium 9.2 8.6 - 10.5 mg/dL    BUN/Creatinine Ratio 9.6 7.0 - 25.0    Anion Gap 9.0 5.0 - 15.0 mmol/L    eGFR 50.5 (L) >60.0 mL/min/1.73   CBC Auto Differential    Collection Time: 07/17/24  3:02 PM    Specimen: Blood   Result Value Ref Range    WBC 5.10 3.40 - 10.80 10*3/mm3    RBC 3.67 (L) 4.14 - 5.80 10*6/mm3    Hemoglobin 11.5 (L) 13.0 - 17.7 g/dL    Hematocrit 34.1 (L) 37.5 - 51.0 %    MCV 92.9 79.0 - 97.0 fL    MCH 31.3 26.6 - 33.0 pg     MCHC 33.7 31.5 - 35.7 g/dL    RDW 12.7 12.3 - 15.4 %    RDW-SD 42.3 37.0 - 54.0 fl    MPV 10.7 6.0 - 12.0 fL    Platelets 197 140 - 450 10*3/mm3    Neutrophil % 60.0 42.7 - 76.0 %    Lymphocyte % 28.0 19.6 - 45.3 %    Monocyte % 9.6 5.0 - 12.0 %    Eosinophil % 1.4 0.3 - 6.2 %    Basophil % 0.8 0.0 - 1.5 %    Immature Grans % 0.2 0.0 - 0.5 %    Neutrophils, Absolute 3.06 1.70 - 7.00 10*3/mm3    Lymphocytes, Absolute 1.43 0.70 - 3.10 10*3/mm3    Monocytes, Absolute 0.49 0.10 - 0.90 10*3/mm3    Eosinophils, Absolute 0.07 0.00 - 0.40 10*3/mm3    Basophils, Absolute 0.04 0.00 - 0.20 10*3/mm3    Immature Grans, Absolute 0.01 0.00 - 0.05 10*3/mm3    nRBC 0.0 0.0 - 0.2 /100 WBC   POC Panel 9, ISTAT    Collection Time: 07/24/24 10:26 AM    Specimen: Blood   Result Value Ref Range    Hemoglobin 11.6 (L) 12.0 - 17.0 g/dL    Hematocrit 34 (L) 38 - 51 %    O2 Saturation, Arterial 79 (L) 95 - 98 %   POC Panel 9, ISTAT    Collection Time: 07/24/24 10:31 AM    Specimen: Blood   Result Value Ref Range    Hemoglobin 11.2 (L) 12.0 - 17.0 g/dL    Hematocrit 33 (L) 38 - 51 %    O2 Saturation, Arterial 92 (L) 95 - 98 %   Urinalysis without microscopic (no culture) - Urine, Clean Catch    Collection Time: 07/26/24  8:10 AM    Specimen: Urine, Clean Catch   Result Value Ref Range    Specific Gravity, UA 1.018 1.005 - 1.030    pH, UA 7.5 5.0 - 8.0    Color, UA Yellow     Appearance, UA Clear Clear    Leukocytes, UA Trace (A) Negative    Protein Negative Negative    Glucose, UA Negative Negative    Ketones Negative Negative    Blood, UA Negative Negative    Bilirubin, UA Negative Negative    Urobilinogen, UA Comment     Nitrite, UA Negative Negative   PSA DIAGNOSTIC    Collection Time: 07/26/24  8:10 AM    Specimen: Blood   Result Value Ref Range    PSA 4.070 (H) 0.000 - 4.000 ng/mL   Lipid Panel With / Chol / HDL Ratio    Collection Time: 07/26/24  8:10 AM    Specimen: Blood   Result Value Ref Range    Total Cholesterol 171 0 - 200  mg/dL    Triglycerides 96 0 - 150 mg/dL    HDL Cholesterol 63 (H) 40 - 60 mg/dL    VLDL Cholesterol Juaquin 17 5 - 40 mg/dL    LDL Chol Calc (NIH) 91 0 - 100 mg/dL    Chol/HDL Ratio 2.71    Comprehensive Metabolic Panel    Collection Time: 07/26/24  8:10 AM    Specimen: Blood   Result Value Ref Range    Glucose 93 65 - 99 mg/dL    BUN 12 8 - 23 mg/dL    Creatinine 1.46 (H) 0.76 - 1.27 mg/dL    EGFR Result 50.5 (L) >60.0 mL/min/1.73    BUN/Creatinine Ratio 8.2 7.0 - 25.0    Sodium 137 136 - 145 mmol/L    Potassium 4.5 3.5 - 5.2 mmol/L    Chloride 104 98 - 107 mmol/L    Total CO2 24.3 22.0 - 29.0 mmol/L    Calcium 9.4 8.6 - 10.5 mg/dL    Total Protein 6.9 6.0 - 8.5 g/dL    Albumin 4.5 3.5 - 5.2 g/dL    Globulin 2.4 gm/dL    A/G Ratio 1.9 g/dL    Total Bilirubin 1.1 0.0 - 1.2 mg/dL    Alkaline Phosphatase 95 39 - 117 U/L    AST (SGOT) 18 1 - 40 U/L    ALT (SGPT) 12 1 - 41 U/L   CBC (No Diff)    Collection Time: 07/26/24  8:10 AM    Specimen: Blood   Result Value Ref Range    WBC 5.55 3.40 - 10.80 10*3/mm3    RBC 3.96 (L) 4.14 - 5.80 10*6/mm3    Hemoglobin 12.6 (L) 13.0 - 17.7 g/dL    Hematocrit 36.8 (L) 37.5 - 51.0 %    MCV 92.9 79.0 - 97.0 fL    MCH 31.8 26.6 - 33.0 pg    MCHC 34.2 31.5 - 35.7 g/dL    RDW 12.9 12.3 - 15.4 %    Platelets 212 140 - 450 10*3/mm3     Office Visit with Konstantin Driscoll MD (07/17/2024)     UROLOGY - SCAN - F/U BPH & PSA, 1ST UROLOGY, 05/09/2024 (05/09/2024)     UROLOGY - SCAN - BPH/ HEMATOSPERMIA, 1ST UROLOGY, 03/28/2024 (03/28/2024)      Assessment and Plan               Encounter for Medicare annual wellness exam    Severe mitral regurgitation    Nonrheumatic mitral valve regurgitation    Other secondary hypertension  - controlled  - continue Coreg and Valsartan  Dysphagia, unspecified type  - asymptomatic w/ QD PPI  - refuses EGD  Osteopenia of multiple sites  - continue Vit D  - add calcium supp  Elevated PSA  - f/u w/ Urology  Mild anemia  - improved  - ?etiology, CKD vs. GI vs. MR  -  trend  Stage 3a chronic kidney disease  - stable    Vitamin D deficiency  - continue supplement      Orders Placed This Encounter   Procedures    Comprehensive Metabolic Panel     Standing Status:   Future     Standing Expiration Date:   8/26/2025     Order Specific Question:   Release to patient     Answer:   Routine Release [[id number]]    Ferritin     Standing Status:   Future     Standing Expiration Date:   8/26/2025     Order Specific Question:   Release to patient     Answer:   Routine Release [[id number]]    Folate     Standing Status:   Future     Standing Expiration Date:   8/26/2025     Order Specific Question:   Release to patient     Answer:   Routine Release [[id number]]    Iron Profile     Standing Status:   Future     Standing Expiration Date:   8/26/2025     Order Specific Question:   Release to patient     Answer:   Routine Release [[id number]]    Vitamin B12     Standing Status:   Future     Standing Expiration Date:   8/26/2025     Order Specific Question:   Release to patient     Answer:   Routine Release [[id number]]    Vitamin D,25-Hydroxy     Standing Status:   Future     Standing Expiration Date:   8/26/2025     Order Specific Question:   Release to patient     Answer:   Routine Release [[id number]]    CBC & Differential     Standing Status:   Future     Standing Expiration Date:   8/26/2025     Order Specific Question:   Manual Differential     Answer:   No     Order Specific Question:   Release to patient     Answer:   Routine Release [[id number]]             Follow Up   Return in about 6 months (around 2/26/2025) for fasting labs one week prior to.  Patient was given instructions and counseling regarding his condition or for health maintenance advice. Please see specific information pulled into the AVS if appropriate.

## 2024-08-30 ENCOUNTER — OFFICE VISIT (OUTPATIENT)
Dept: CARDIAC SURGERY | Facility: CLINIC | Age: 74
End: 2024-08-30
Payer: MEDICARE

## 2024-08-30 VITALS
OXYGEN SATURATION: 100 % | WEIGHT: 145 LBS | DIASTOLIC BLOOD PRESSURE: 71 MMHG | HEART RATE: 60 BPM | TEMPERATURE: 97.1 F | SYSTOLIC BLOOD PRESSURE: 158 MMHG | BODY MASS INDEX: 23.3 KG/M2 | HEIGHT: 66 IN | RESPIRATION RATE: 20 BRPM

## 2024-08-30 DIAGNOSIS — N18.31 STAGE 3A CHRONIC KIDNEY DISEASE: ICD-10-CM

## 2024-08-30 DIAGNOSIS — I34.0 SEVERE MITRAL REGURGITATION: ICD-10-CM

## 2024-08-30 DIAGNOSIS — I34.0 NONRHEUMATIC MITRAL VALVE REGURGITATION: Primary | ICD-10-CM

## 2024-08-30 DIAGNOSIS — I34.0 SEVERE MITRAL REGURGITATION: Primary | ICD-10-CM

## 2024-08-30 PROCEDURE — 1159F MED LIST DOCD IN RCRD: CPT | Performed by: THORACIC SURGERY (CARDIOTHORACIC VASCULAR SURGERY)

## 2024-08-30 PROCEDURE — 99204 OFFICE O/P NEW MOD 45 MIN: CPT | Performed by: THORACIC SURGERY (CARDIOTHORACIC VASCULAR SURGERY)

## 2024-08-30 PROCEDURE — 1160F RVW MEDS BY RX/DR IN RCRD: CPT | Performed by: THORACIC SURGERY (CARDIOTHORACIC VASCULAR SURGERY)

## 2024-08-30 NOTE — LETTER
September 1, 2024     Konstantin Driscoll MD  [address]  [facility] [address]    Patient: Jackson Tariq   YOB: 1950   Date of Visit: 8/30/2024     Dear Konstantin Driscoll MD:       Thank you for referring Jackson Tariq to me for evaluation. Below are the relevant portions of my assessment and plan of care.    If you have questions, please do not hesitate to call me. I look forward to following Jackson along with you.         Sincerely,        Farshad Gaitan MD        CC: ENRIQUE Keith Sebastian, MD  09/01/24 0740  Sign when Signing Visit  9/1/2024    Seen on 8/30/2024    Chief Complaint     Evaluation of mitral regurgitation    History of Present Illness:       Dear Konstantin and Colleagues,  It was nice to see Jackson Tariq in consultation at your request. He is a 74 y.o. male with known anemia, stage III chronic kidney disease, dysphagia and osteopenia who was evaluated for a heart murmur and found mitral regurgitation. He denies dyspnea, orthopnea, chest pain, syncope or TIA or lower extremity edema.  He is functionally active and has no complaints.  He was placed on valsartan 160 mg twice a day which improvement of his blood pressure.  In my office his systolic blood pressure is 158 mmHg.  He had a stress echo this August that showed equivocal evidence of myocardial ischemia and there were myxomatous changes of the mitral valve and possible bileaflet mitral valve prolapse.  The calculated right ventricular systolic pressure from tricuspid regurgitation was 42 mmHg at peak stress but also his blood pressure was very high, 210/62 at the time of the peak stress.  He had a cardiac cath that showed a lesion in the mid LAD described as 50% by my opinion probably 70-80%.  The PA pressures were 30/5 with a mean of 15 and a V wave of 32.  He has no history of rheumatic or scarlet fever, endocarditis or IV drug use    Patient Active Problem List   Diagnosis   • Dysphagia   •  Osteopenia of multiple sites   • Vitamin D deficiency   • Nonrheumatic mitral valve regurgitation   • Severe mitral regurgitation   • Other secondary hypertension   • Stage 3a chronic kidney disease   • Mild anemia   • Elevated PSA       Past Medical History:   Diagnosis Date   • Osteopenia of multiple sites 07/17/2023   • Vitamin D deficiency        Past Surgical History:   Procedure Laterality Date   • CARDIAC CATHETERIZATION N/A 7/24/2024    Procedure: Left Heart Cath;  Surgeon: Susan De Jesus MD;  Location:  [facility];  Service: Cardiovascular;  Laterality: N/A;  severe mitral regurgitation   • CARDIAC CATHETERIZATION N/A 7/24/2024    Procedure: Right Heart Cath;  Surgeon: Susan De Jesus MD;  Location:  [facility];  Service: Cardiovascular;  Laterality: N/A;   • CARDIAC CATHETERIZATION N/A 7/24/2024    Procedure: Coronary angiography;  Surgeon: Susan De Jesus MD;  Location:  [facility];  Service: Cardiovascular;  Laterality: N/A;   • COLONOSCOPY     • ENDOSCOPY N/A 05/03/2019    Procedure: ESOPHAGOGASTRODUODENOSCOPY with biopsies;  Surgeon: Radha Del Real MD;  Location: [facility];  Service: Gastroenterology   • TRIGGER FINGER RELEASE      doesn't remember which side       No Known Allergies      Current Outpatient Medications:   •  carvedilol (COREG) 3.125 MG tablet, Take 1 tablet by mouth 2 (Two) Times a Day., Disp: 60 tablet, Rfl: 11  •  omeprazole (priLOSEC) 20 MG capsule, Take 1 capsule by mouth 2 (Two) Times a Day. (Patient taking differently: Take 1 capsule by mouth Daily.), Disp: 180 capsule, Rfl: 3  •  valsartan (DIOVAN) 160 MG tablet, Take 1 tablet by mouth 2 (Two) Times a Day., Disp: 180 tablet, Rfl: 3  •  VITAMIN D PO, Take  by mouth., Disp: , Rfl:     Social History     Socioeconomic History   • Marital status:    Tobacco Use   • Smoking status: Never   • Smokeless tobacco: Never   Vaping Use   • Vaping status: Never Used   Substance and  Sexual Activity   • Alcohol use: No   • Drug use: Never   • Sexual activity: Defer       Family History   Problem Relation Age of Onset   • Colon cancer Neg Hx    • Colon polyps Neg Hx      Review of Systems:  As HPI, otherwise noncontributory    Physical Exam:    Vital Signs:  Weight: 65.8 kg (145 lb)   Body mass index is 23.4 kg/m².  Temp: 97.1 °F (36.2 °C)   Heart Rate: 60   BP: 158/71     Constitutional:       Appearance: Well-developed.   Eyes:      Conjunctiva/sclera: Conjunctivae normal.      Pupils: Pupils are equal, round, and reactive to light.   HENT:      Head: Normocephalic and atraumatic.      Nose: Nose normal.   Neck:      Thyroid: No thyromegaly.      Vascular: No JVD.      Lymphadenopathy: No cervical adenopathy.   Pulmonary:      Effort: Pulmonary effort is normal.      Breath sounds: Normal breath sounds. No rales.   Cardiovascular:      Normal rate. Regular rhythm.      Murmurs: There is a grade 3/6 high frequency blowing holosystolic murmur at the apex.      No gallop.    Pulses:     Intact distal pulses. No decreased pulses.   Edema:     Peripheral edema absent.   Abdominal:      General: Bowel sounds are normal. There is no distension.      Palpations: Abdomen is soft. There is no abdominal mass.      Tenderness: There is no abdominal tenderness.   Musculoskeletal: Normal range of motion.         General: No tenderness or deformity.      Cervical back: Normal range of motion and neck supple. Skin:     General: Skin is warm and dry.      Findings: No erythema or rash.   Neurological:      Mental Status: Alert and oriented to person, place, and time.      Deep Tendon Reflexes: Reflexes are normal and symmetric.   Psychiatric:         Behavior: Behavior normal.          Assessment:     Problems Addressed this Visit          Cardiac and Vasculature    Nonrheumatic mitral valve regurgitation - Primary    Severe mitral regurgitation       Genitourinary and Reproductive     Stage 3a chronic kidney  disease     Diagnoses         Codes Comments    Nonrheumatic mitral valve regurgitation    -  Primary ICD-10-CM: I34.0  ICD-9-CM: 424.0     Severe mitral regurgitation     ICD-10-CM: I34.0  ICD-9-CM: 424.0     Stage 3a chronic kidney disease     ICD-10-CM: N18.31  ICD-9-CM: 585.3           Assessment/recommendation:     Severe mitral regurgitation myxomatous-degenerative mitral valve disease, asymptomatic but pulmonary hypertensive response in the stress echocardiogram.  I discussed with the patient and his wife the natural history of mitral regurgitation and the guidelines for intervention.  I truly believe he will benefit from a BRIE to further address the valve anatomy and severity with normal pressure and increased pressure.  The severity is real then surgery will be indicated.  I discussed with the patient the options of medical management versus mitral valve repair I quoted a repairability rate of over 90 to 95%.  Mitral valve repair is not feasible then a biologic mitral replacement will be the next option.  I quoted a risk of surgery of 1% mortality and 10% complication rate.  I discussed the risks (STS calculated), benefits and terms of surgery and he wishes to proceed at some point in the near future  Single-vessel coronary artery disease with a resting lesion in the proximal LAD described as 50% may be more in my opinion.  I will review the films again with the interventional cardiologist for consensus about the severity of the lesion  Hypertension, better controlled.  I believe still there is room for increasing medications given his intermittent pressures of 160 mmHg systolic    Thank you for allowing me to participate in his care.    Regards,    Farshad Gaitan M.D.    I spent over 45 minutes before, during after the office visit and reviewing the records, examining the patient, reviewing interpreting myself the 2D echo and then the stress echocardiogram, spent time in discussing the findings and  options and my recommendation of a BRIE and possible surgery, spent time discussing the natural history of mitral valve disease and the guidance for intervention I spent time in documenting in the electronic record and discussing the case with the cardiology team

## 2024-09-01 NOTE — PROGRESS NOTES
9/1/2024    Seen on 8/30/2024    Chief Complaint     Evaluation of mitral regurgitation    History of Present Illness:       Dear Konstantin and Colleagues,  It was nice to see Jackson Tariq in consultation at your request. He is a 74 y.o. male with known anemia, stage III chronic kidney disease, dysphagia and osteopenia who was evaluated for a heart murmur and found mitral regurgitation. He denies dyspnea, orthopnea, chest pain, syncope or TIA or lower extremity edema.  He is functionally active and has no complaints.  He was placed on valsartan 160 mg twice a day which improvement of his blood pressure.  In my office his systolic blood pressure is 158 mmHg.  He had a stress echo this August that showed equivocal evidence of myocardial ischemia and there were myxomatous changes of the mitral valve and possible bileaflet mitral valve prolapse.  The calculated right ventricular systolic pressure from tricuspid regurgitation was 42 mmHg at peak stress but also his blood pressure was very high, 210/62 at the time of the peak stress.  He had a cardiac cath that showed a lesion in the mid LAD described as 50% by my opinion probably 70-80%.  The PA pressures were 30/5 with a mean of 15 and a V wave of 32.  He has no history of rheumatic or scarlet fever, endocarditis or IV drug use    Patient Active Problem List   Diagnosis    Dysphagia    Osteopenia of multiple sites    Vitamin D deficiency    Nonrheumatic mitral valve regurgitation    Severe mitral regurgitation    Other secondary hypertension    Stage 3a chronic kidney disease    Mild anemia    Elevated PSA       Past Medical History:   Diagnosis Date    Osteopenia of multiple sites 07/17/2023    Vitamin D deficiency        Past Surgical History:   Procedure Laterality Date    CARDIAC CATHETERIZATION N/A 7/24/2024    Procedure: Left Heart Cath;  Surgeon: Susan De Jesus MD;  Location: [facility];  Service: Cardiovascular;  Laterality: N/A;  severe mitral  regurgitation    CARDIAC CATHETERIZATION N/A 7/24/2024    Procedure: Right Heart Cath;  Surgeon: Susan De Jesus MD;  Location:  [facility];  Service: Cardiovascular;  Laterality: N/A;    CARDIAC CATHETERIZATION N/A 7/24/2024    Procedure: Coronary angiography;  Surgeon: Susan De Jesus MD;  Location:  [facility];  Service: Cardiovascular;  Laterality: N/A;    COLONOSCOPY      ENDOSCOPY N/A 05/03/2019    Procedure: ESOPHAGOGASTRODUODENOSCOPY with biopsies;  Surgeon: Radha Del Real MD;  Location:  [facility];  Service: Gastroenterology    TRIGGER FINGER RELEASE      doesn't remember which side       No Known Allergies      Current Outpatient Medications:     carvedilol (COREG) 3.125 MG tablet, Take 1 tablet by mouth 2 (Two) Times a Day., Disp: 60 tablet, Rfl: 11    omeprazole (priLOSEC) 20 MG capsule, Take 1 capsule by mouth 2 (Two) Times a Day. (Patient taking differently: Take 1 capsule by mouth Daily.), Disp: 180 capsule, Rfl: 3    valsartan (DIOVAN) 160 MG tablet, Take 1 tablet by mouth 2 (Two) Times a Day., Disp: 180 tablet, Rfl: 3    VITAMIN D PO, Take  by mouth., Disp: , Rfl:     Social History     Socioeconomic History    Marital status:    Tobacco Use    Smoking status: Never    Smokeless tobacco: Never   Vaping Use    Vaping status: Never Used   Substance and Sexual Activity    Alcohol use: No    Drug use: Never    Sexual activity: Defer       Family History   Problem Relation Age of Onset    Colon cancer Neg Hx     Colon polyps Neg Hx      Review of Systems:  As HPI, otherwise noncontributory    Physical Exam:    Vital Signs:  Weight: 65.8 kg (145 lb)   Body mass index is 23.4 kg/m².  Temp: 97.1 °F (36.2 °C)   Heart Rate: 60   BP: 158/71     Constitutional:       Appearance: Well-developed.   Eyes:      Conjunctiva/sclera: Conjunctivae normal.      Pupils: Pupils are equal, round, and reactive to light.   HENT:      Head: Normocephalic and atraumatic.      Nose: Nose  normal.   Neck:      Thyroid: No thyromegaly.      Vascular: No JVD.      Lymphadenopathy: No cervical adenopathy.   Pulmonary:      Effort: Pulmonary effort is normal.      Breath sounds: Normal breath sounds. No rales.   Cardiovascular:      Normal rate. Regular rhythm.      Murmurs: There is a grade 3/6 high frequency blowing holosystolic murmur at the apex.      No gallop.    Pulses:     Intact distal pulses. No decreased pulses.   Edema:     Peripheral edema absent.   Abdominal:      General: Bowel sounds are normal. There is no distension.      Palpations: Abdomen is soft. There is no abdominal mass.      Tenderness: There is no abdominal tenderness.   Musculoskeletal: Normal range of motion.         General: No tenderness or deformity.      Cervical back: Normal range of motion and neck supple. Skin:     General: Skin is warm and dry.      Findings: No erythema or rash.   Neurological:      Mental Status: Alert and oriented to person, place, and time.      Deep Tendon Reflexes: Reflexes are normal and symmetric.   Psychiatric:         Behavior: Behavior normal.          Assessment:     Problems Addressed this Visit          Cardiac and Vasculature    Nonrheumatic mitral valve regurgitation - Primary    Severe mitral regurgitation       Genitourinary and Reproductive     Stage 3a chronic kidney disease     Diagnoses         Codes Comments    Nonrheumatic mitral valve regurgitation    -  Primary ICD-10-CM: I34.0  ICD-9-CM: 424.0     Severe mitral regurgitation     ICD-10-CM: I34.0  ICD-9-CM: 424.0     Stage 3a chronic kidney disease     ICD-10-CM: N18.31  ICD-9-CM: 585.3           Assessment/recommendation:     Severe mitral regurgitation myxomatous-degenerative mitral valve disease, asymptomatic but pulmonary hypertensive response in the stress echocardiogram.  I discussed with the patient and his wife the natural history of mitral regurgitation and the guidelines for intervention.  I truly believe he will  benefit from a BRIE to further address the valve anatomy and severity with normal pressure and increased pressure.  The severity is real then surgery will be indicated.  I discussed with the patient the options of medical management versus mitral valve repair I quoted a repairability rate of over 90 to 95%.  Mitral valve repair is not feasible then a biologic mitral replacement will be the next option.  I quoted a risk of surgery of 1% mortality and 10% complication rate.  I discussed the risks (STS calculated), benefits and terms of surgery and he wishes to proceed at some point in the near future  Single-vessel coronary artery disease with a resting lesion in the proximal LAD described as 50% may be more in my opinion.  I will review the films again with the interventional cardiologist for consensus about the severity of the lesion  Hypertension, better controlled.  I believe still there is room for increasing medications given his intermittent pressures of 160 mmHg systolic    Thank you for allowing me to participate in his care.    Regards,    Farshad Gaitan M.D.    I spent over 45 minutes before, during after the office visit and reviewing the records, examining the patient, reviewing interpreting myself the 2D echo and then the stress echocardiogram, spent time in discussing the findings and options and my recommendation of a BRIE and possible surgery, spent time discussing the natural history of mitral valve disease and the guidance for intervention I spent time in documenting in the electronic record and discussing the case with the cardiology team

## 2024-09-10 ENCOUNTER — TRANSCRIBE ORDERS (OUTPATIENT)
Dept: CARDIOLOGY | Facility: CLINIC | Age: 74
End: 2024-09-10
Payer: MEDICARE

## 2024-09-10 DIAGNOSIS — Z01.810 PRE-OPERATIVE CARDIOVASCULAR EXAMINATION: Primary | ICD-10-CM

## 2024-09-10 DIAGNOSIS — Z13.6 SCREENING FOR ISCHEMIC HEART DISEASE: ICD-10-CM

## 2024-09-18 ENCOUNTER — LAB (OUTPATIENT)
Dept: LAB | Facility: HOSPITAL | Age: 74
End: 2024-09-18
Payer: MEDICARE

## 2024-09-18 DIAGNOSIS — Z01.810 PRE-OPERATIVE CARDIOVASCULAR EXAMINATION: ICD-10-CM

## 2024-09-18 DIAGNOSIS — Z13.6 SCREENING FOR ISCHEMIC HEART DISEASE: ICD-10-CM

## 2024-09-18 LAB
ANION GAP SERPL CALCULATED.3IONS-SCNC: 9.8 MMOL/L (ref 5–15)
BUN SERPL-MCNC: 16 MG/DL (ref 8–23)
BUN/CREAT SERPL: 11.3 (ref 7–25)
CALCIUM SPEC-SCNC: 9.6 MG/DL (ref 8.6–10.5)
CHLORIDE SERPL-SCNC: 103 MMOL/L (ref 98–107)
CO2 SERPL-SCNC: 26.2 MMOL/L (ref 22–29)
CREAT SERPL-MCNC: 1.42 MG/DL (ref 0.76–1.27)
DEPRECATED RDW RBC AUTO: 45.2 FL (ref 37–54)
EGFRCR SERPLBLD CKD-EPI 2021: 52.2 ML/MIN/1.73
ERYTHROCYTE [DISTWIDTH] IN BLOOD BY AUTOMATED COUNT: 12.9 % (ref 12.3–15.4)
GLUCOSE SERPL-MCNC: 93 MG/DL (ref 65–99)
HCT VFR BLD AUTO: 39.7 % (ref 37.5–51)
HGB BLD-MCNC: 13 G/DL (ref 13–17.7)
MCH RBC QN AUTO: 31.3 PG (ref 26.6–33)
MCHC RBC AUTO-ENTMCNC: 32.7 G/DL (ref 31.5–35.7)
MCV RBC AUTO: 95.7 FL (ref 79–97)
PLATELET # BLD AUTO: 235 10*3/MM3 (ref 140–450)
PMV BLD AUTO: 11 FL (ref 6–12)
POTASSIUM SERPL-SCNC: 4.5 MMOL/L (ref 3.5–5.2)
RBC # BLD AUTO: 4.15 10*6/MM3 (ref 4.14–5.8)
SODIUM SERPL-SCNC: 139 MMOL/L (ref 136–145)
WBC NRBC COR # BLD AUTO: 7.1 10*3/MM3 (ref 3.4–10.8)

## 2024-09-18 PROCEDURE — 36415 COLL VENOUS BLD VENIPUNCTURE: CPT

## 2024-09-18 PROCEDURE — 80048 BASIC METABOLIC PNL TOTAL CA: CPT

## 2024-09-18 PROCEDURE — 85027 COMPLETE CBC AUTOMATED: CPT

## 2024-10-01 ENCOUNTER — ANESTHESIA (OUTPATIENT)
Dept: CARDIOLOGY | Facility: HOSPITAL | Age: 74
End: 2024-10-01

## 2024-10-01 ENCOUNTER — ANESTHESIA EVENT (OUTPATIENT)
Dept: CARDIOLOGY | Facility: HOSPITAL | Age: 74
End: 2024-10-01

## 2024-10-01 ENCOUNTER — HOSPITAL ENCOUNTER (OUTPATIENT)
Dept: POSTOP/PACU | Facility: HOSPITAL | Age: 74
Discharge: HOME | End: 2024-10-01
Payer: MEDICARE

## 2024-10-01 DIAGNOSIS — I34.0 SEVERE MITRAL REGURGITATION: ICD-10-CM

## 2024-10-01 NOTE — NURSING NOTE
Case cancelled per anesthesia (dr Prajpaati), pt ate cereal for breakfast at 4am.  Wife informed. Pt discharged. Pt to call dr jose office to reschedule.

## 2024-10-17 ENCOUNTER — ANESTHESIA EVENT (OUTPATIENT)
Dept: POSTOP/PACU | Facility: HOSPITAL | Age: 74
End: 2024-10-17
Payer: MEDICARE

## 2024-10-17 ENCOUNTER — HOSPITAL ENCOUNTER (OUTPATIENT)
Dept: POSTOP/PACU | Facility: HOSPITAL | Age: 74
Discharge: HOME | End: 2024-10-17
Payer: MEDICARE

## 2024-10-17 ENCOUNTER — ANESTHESIA (OUTPATIENT)
Dept: POSTOP/PACU | Facility: HOSPITAL | Age: 74
End: 2024-10-17
Payer: MEDICARE

## 2024-10-17 VITALS — OXYGEN SATURATION: 100 % | HEART RATE: 64 BPM | SYSTOLIC BLOOD PRESSURE: 96 MMHG | DIASTOLIC BLOOD PRESSURE: 36 MMHG

## 2024-10-17 VITALS
HEIGHT: 66 IN | BODY MASS INDEX: 23.3 KG/M2 | SYSTOLIC BLOOD PRESSURE: 114 MMHG | RESPIRATION RATE: 16 BRPM | WEIGHT: 145 LBS | DIASTOLIC BLOOD PRESSURE: 57 MMHG | HEART RATE: 65 BPM | OXYGEN SATURATION: 100 %

## 2024-10-17 LAB
BH CV ECHO MEAS - AO MAX PG: 11.2 MMHG
BH CV ECHO MEAS - AO MEAN PG: 5.2 MMHG
BH CV ECHO MEAS - AO V2 MAX: 167.7 CM/SEC
BH CV ECHO MEAS - AO V2 VTI: 25.6 CM
BH CV ECHO MEAS - AVA(I,D): 1.15 CM2
BH CV ECHO MEAS - LAT PEAK E' VEL: 9.1 CM/SEC
BH CV ECHO MEAS - LV MAX PG: 1.11 MMHG
BH CV ECHO MEAS - LV MEAN PG: 0.5 MMHG
BH CV ECHO MEAS - LV V1 MAX: 52.7 CM/SEC
BH CV ECHO MEAS - LV V1 VTI: 7.9 CM
BH CV ECHO MEAS - LVIDD: 6.6 CM
BH CV ECHO MEAS - LVOT AREA: 3.8 CM2
BH CV ECHO MEAS - LVOT DIAM: 2.19 CM
BH CV ECHO MEAS - MED PEAK E' VEL: 11.5 CM/SEC
BH CV ECHO MEAS - MR MAX PG: 84.6 MMHG
BH CV ECHO MEAS - MR MAX VEL: 460 CM/SEC
BH CV ECHO MEAS - MV A MAX VEL: 46.7 CM/SEC
BH CV ECHO MEAS - MV E MAX VEL: 78.9 CM/SEC
BH CV ECHO MEAS - MV E/A: 1.69
BH CV ECHO MEAS - MV MAX PG: 2.07 MMHG
BH CV ECHO MEAS - MV MEAN PG: 1.06 MMHG
BH CV ECHO MEAS - MV V2 VTI: 24.6 CM
BH CV ECHO MEAS - MVA(VTI): 1.2 CM2
BH CV ECHO MEAS - RAP SYSTOLE: 3 MMHG
BH CV ECHO MEAS - RVSP: 33 MMHG
BH CV ECHO MEAS - SV(LVOT): 29.5 ML
BH CV ECHO MEAS - SVI(LVOT): 16.7 ML/M2
BH CV ECHO MEAS - TR MAX PG: 30 MMHG
BH CV ECHO MEAS - TR MAX VEL: 273.4 CM/SEC
BH CV ECHO MEASUREMENTS AVERAGE E/E' RATIO: 7.66
BH CV ECHO MVA 3D PLANIMETER/TRACE: 7.35 CM2
BH CV ECHO SHUNT ASSESSMENT PERFORMED (HIDDEN SCRIPTING): 1
MR PISA EROA: 0.51 CM2
PISA ALIASING VEL: 30 M/S
PISA RADIUS: 1.1 CM

## 2024-10-17 PROCEDURE — 25810000003 SODIUM CHLORIDE 0.9 % SOLUTION: Performed by: NURSE ANESTHETIST, CERTIFIED REGISTERED

## 2024-10-17 PROCEDURE — 93325 DOPPLER ECHO COLOR FLOW MAPG: CPT

## 2024-10-17 PROCEDURE — 93312 ECHO TRANSESOPHAGEAL: CPT

## 2024-10-17 PROCEDURE — 93320 DOPPLER ECHO COMPLETE: CPT

## 2024-10-17 PROCEDURE — 25010000002 PROPOFOL 10 MG/ML EMULSION: Performed by: NURSE ANESTHETIST, CERTIFIED REGISTERED

## 2024-10-17 PROCEDURE — 25010000002 LIDOCAINE 2% SOLUTION: Performed by: NURSE ANESTHETIST, CERTIFIED REGISTERED

## 2024-10-17 RX ORDER — FLUMAZENIL 0.1 MG/ML
0.2 INJECTION INTRAVENOUS AS NEEDED
Status: DISCONTINUED | OUTPATIENT
Start: 2024-10-17 | End: 2024-10-18 | Stop reason: HOSPADM

## 2024-10-17 RX ORDER — PROPOFOL 10 MG/ML
VIAL (ML) INTRAVENOUS CONTINUOUS PRN
Status: DISCONTINUED | OUTPATIENT
Start: 2024-10-17 | End: 2024-10-17

## 2024-10-17 RX ORDER — PROPOFOL 10 MG/ML
VIAL (ML) INTRAVENOUS AS NEEDED
Status: DISCONTINUED | OUTPATIENT
Start: 2024-10-17 | End: 2024-10-17 | Stop reason: SURG

## 2024-10-17 RX ORDER — DROPERIDOL 2.5 MG/ML
0.62 INJECTION, SOLUTION INTRAMUSCULAR; INTRAVENOUS
Status: DISCONTINUED | OUTPATIENT
Start: 2024-10-17 | End: 2024-10-18 | Stop reason: HOSPADM

## 2024-10-17 RX ORDER — PROMETHAZINE HYDROCHLORIDE 25 MG/1
25 TABLET ORAL ONCE AS NEEDED
Status: DISCONTINUED | OUTPATIENT
Start: 2024-10-17 | End: 2024-10-18 | Stop reason: HOSPADM

## 2024-10-17 RX ORDER — SODIUM CHLORIDE 9 MG/ML
INJECTION, SOLUTION INTRAVENOUS CONTINUOUS PRN
Status: DISCONTINUED | OUTPATIENT
Start: 2024-10-17 | End: 2024-10-17 | Stop reason: SURG

## 2024-10-17 RX ORDER — NALOXONE HCL 0.4 MG/ML
0.2 VIAL (ML) INJECTION AS NEEDED
Status: DISCONTINUED | OUTPATIENT
Start: 2024-10-17 | End: 2024-10-18 | Stop reason: HOSPADM

## 2024-10-17 RX ORDER — DIPHENHYDRAMINE HYDROCHLORIDE 50 MG/ML
12.5 INJECTION INTRAMUSCULAR; INTRAVENOUS
Status: DISCONTINUED | OUTPATIENT
Start: 2024-10-17 | End: 2024-10-18 | Stop reason: HOSPADM

## 2024-10-17 RX ORDER — PROMETHAZINE HYDROCHLORIDE 25 MG/1
25 SUPPOSITORY RECTAL ONCE AS NEEDED
Status: DISCONTINUED | OUTPATIENT
Start: 2024-10-17 | End: 2024-10-18 | Stop reason: HOSPADM

## 2024-10-17 RX ORDER — LIDOCAINE HYDROCHLORIDE 20 MG/ML
INJECTION, SOLUTION INFILTRATION; PERINEURAL AS NEEDED
Status: DISCONTINUED | OUTPATIENT
Start: 2024-10-17 | End: 2024-10-17 | Stop reason: SURG

## 2024-10-17 RX ORDER — ONDANSETRON 2 MG/ML
4 INJECTION INTRAMUSCULAR; INTRAVENOUS ONCE AS NEEDED
Status: DISCONTINUED | OUTPATIENT
Start: 2024-10-17 | End: 2024-10-18 | Stop reason: HOSPADM

## 2024-10-17 RX ADMIN — SODIUM CHLORIDE: 9 INJECTION, SOLUTION INTRAVENOUS at 07:10

## 2024-10-17 RX ADMIN — PROPOFOL 180 MCG/KG/MIN: 10 INJECTION, EMULSION INTRAVENOUS at 07:15

## 2024-10-17 RX ADMIN — LIDOCAINE HYDROCHLORIDE 60 MG: 20 INJECTION, SOLUTION INFILTRATION; PERINEURAL at 07:15

## 2024-10-17 RX ADMIN — PROPOFOL 70 MG: 10 INJECTION, EMULSION INTRAVENOUS at 07:15

## 2024-10-17 NOTE — ANESTHESIA PREPROCEDURE EVALUATION
Anesthesia Evaluation     Patient summary reviewed   no history of anesthetic complications:   NPO Solid Status: > 8 hours  NPO Liquid Status: > 2 hours           Airway   Mallampati: II  TM distance: >3 FB  Neck ROM: full  No difficulty expected  Dental    (+) edentulous    Pulmonary     breath sounds clear to auscultation  (-) shortness of breath, recent URI, not a smokerSleep apnea: STOP BANG 3.  Cardiovascular     ECG reviewed  Patient on routine beta blocker and Beta blocker given within 24 hours of surgery  Rhythm: regular  Rate: normal    (+) hypertension, valvular problems/murmurs (Sev MR) MR and MVP  (-) past MI, dysrhythmias, angina    ROS comment: 7/2024 HC- Narrative  ·  No evidence of obstructive coronary disease.  ·  Normal right and left-sided filling pressures.  ·  Recommendations: Proceed with mitral valve surgery    1/2024 ECHO - Interpretation Summary       ·  Left ventricular systolic function is normal. Calculated left ventricular EF = 59.6%  ·  The left ventricular cavity is borderline dilated when indexed for BSA  ·  Left ventricular diastolic function was not assessed.  ·  Severe mitral valve regurgitation is present.  PI SA radius 1.2 cm.  The jet is highly eccentric however appears definitively severe on today's study and there appears to be a mobile element around the P2 segment likely indicative of a torn cord with subsequent flail component.  The left atrial size does appear mildly enlarged when compared to previous study.  ·  Estimated right ventricular systolic pressure from tricuspid regurgitation is normal (<35 mmHg). Calculated right ventricular systolic pressure from tricuspid regurgitation is 29 mmHg      Neuro/Psych  (-) seizures, CVA  GI/Hepatic/Renal/Endo    (+) GERD, renal disease (Stg3a, Cr 1.42)- CRI  (-)  obesity    Musculoskeletal     (-) neck stiffness  Abdominal    Substance History      OB/GYN          Other        (-) blood dyscrasia                    Anesthesia  Plan    ASA 3     MAC     (MAC anesthesia discussed with patient and/or patient representative. Risks (including but not limited to intra-op awareness), benefits, and alternatives were discussed. Understanding was voiced with an agreement to proceed with a MAC technique and General as a backup option. )    Anesthetic plan, risks, benefits, and alternatives have been provided, discussed and informed consent has been obtained with: patient.        CODE STATUS:

## 2024-10-17 NOTE — ANESTHESIA POSTPROCEDURE EVALUATION
Patient: Jackson Tariq    Procedure Summary       Date: 10/17/24 Room / Location: [facility] PACU    Anesthesia Start: 0711 Anesthesia Stop: 0739    Procedure: ADULT TRANSESOPHAGEAL ECHO (BRIE) W/ CONT IF NECESSARY PER PROTOCOL Diagnosis:       Severe mitral regurgitation      (Valvular Disease)      (Mitral Valve Assessment)    Scheduled Providers: Víctor Mistry MD Provider: Dane Catherine DO    Anesthesia Type: MAC ASA Status: 3            Anesthesia Type: MAC    Vitals  Vitals Value Taken Time   /57 10/17/24 0825   Temp     Pulse 60 10/17/24 0828   Resp 18 10/17/24 0815   SpO2 99 % 10/17/24 0828   Vitals shown include unfiled device data.        Post Anesthesia Care and Evaluation    Patient location during evaluation: bedside  Patient participation: complete - patient participated  Level of consciousness: awake and alert  Pain management: adequate    Airway patency: patent  Anesthetic complications: No anesthetic complications  PONV Status: controlled  Cardiovascular status: acceptable and hemodynamically stable  Respiratory status: acceptable, spontaneous ventilation and nonlabored ventilation  Hydration status: acceptable    Comments: /52   Pulse 65   Resp 18   SpO2 99%

## 2024-10-22 ENCOUNTER — OFFICE VISIT (OUTPATIENT)
Dept: CARDIOLOGY | Facility: CLINIC | Age: 74
End: 2024-10-22
Payer: MEDICARE

## 2024-10-22 VITALS
BODY MASS INDEX: 23.8 KG/M2 | WEIGHT: 148.1 LBS | RESPIRATION RATE: 20 BRPM | DIASTOLIC BLOOD PRESSURE: 58 MMHG | HEIGHT: 66 IN | OXYGEN SATURATION: 99 % | HEART RATE: 60 BPM | SYSTOLIC BLOOD PRESSURE: 132 MMHG

## 2024-10-22 DIAGNOSIS — N18.31 STAGE 3A CHRONIC KIDNEY DISEASE: ICD-10-CM

## 2024-10-22 DIAGNOSIS — I34.0 SEVERE MITRAL REGURGITATION: Primary | ICD-10-CM

## 2024-10-22 DIAGNOSIS — I25.10 NONOBSTRUCTIVE ATHEROSCLEROSIS OF CORONARY ARTERY: ICD-10-CM

## 2024-10-22 RX ORDER — ATORVASTATIN CALCIUM 20 MG/1
20 TABLET, FILM COATED ORAL DAILY
Qty: 30 TABLET | Refills: 11 | Status: SHIPPED | OUTPATIENT
Start: 2024-10-22

## 2024-10-22 NOTE — PROGRESS NOTES
[facility]    Subjective:     Encounter Date:10/22/24      Patient ID: Jackson Tariq is a 74 y.o. male.    Chief Complaint:   Chief Complaint   Patient presents with    Follow-up     3 mths   Follow-up for mitral regurgitation  History of Present Illness    Mr. Tariq is a very pleasant 74 y.o. gentleman without significant past medical history outside of osteopenia and vitamin D deficiency, who presents for further evaluation of a cardiac murmur.  He was referred after a new PCP detected a murmur    He was noted to have severe mitral regurgitation with bileaflet prolapse on TTE.  He underwent a stress echo to assess symptomatology, did 6 minutes 30 seconds on the treadmill.  No ischemia was noted.  He was noted to be markedly hypertensive for which he was started on medical therapy and presents today for further evaluation.    Today, he denies any symptoms.  He states he is functionally active, and he has no complaints.     He underwent a stress echo which revealed normal augmentation with stress, no significant ischemia, but most importantly did show that he had a mild elevation in his pulmonary pressures with exertion.      His blood pressure systolic was near 220 around that time.  He underwent a repeat echocardiogram which showed that his LVEF had dropped, he was previously in stage C 1 of mitral regurgitation but progressed to stage C2 as his EF dropped below 60.    He recently underwent a transesophageal echocardiogram with my colleague Dr. Mistry, which confirmed a severely regurgitant mitral jet with a PISA radius greater than 1, and etiology secondary to prolapsed/flail P2 with torn chordae.    He underwent coronary angiogram which revealed nonobstructive CAD with a moderately obstructive mid LAD lesion at the level of the diagonal takeoff.    He continues to deny any chest pain or shortness of breath    Previous card testing:   Echocardiogram performed July 1724:    Left  ventricular systolic function is normal. Calculated left ventricular EF = 59.6%    The left ventricular cavity is borderline dilated when indexed for BSA    Left ventricular diastolic function was not assessed.    Severe mitral valve regurgitation is present.  PI SA radius 1.2 cm.  The jet is highly eccentric however appears definitively severe on today's study and there appears to be a mobile element around the P2 segment likely indicative of a torn cord with subsequent flail component.  The left atrial size does appear mildly enlarged when compared to previous study.    Estimated right ventricular systolic pressure from tricuspid regurgitation is normal (<35 mmHg). Calculated right ventricular systolic pressure from tricuspid regurgitation is 29 mmHg.    Holter monitor, 5-day October 2023:    A relatively benign monitor study.    There was a 6 beat ventricular run.    There were 3 nonsustained atrial runs with longest of 5 beats.    No significant sustained tachyarrhythmia noted.    A patient triggered event did not have arrhythmia correlation.    Stress echo August 23:    Equivocal echocardiographic evidence for myocardial ischemia.  Wall motion was normal, however delayed stress echo images were obtained, in lieu of obtaining TR severity to evaluate severity of mitral regurgitation.  Functional capacity is normal    Estimated right ventricular systolic pressure from tricuspid regurgitation normal at rest.  Calculated right ventricular systolic pressure from tricuspid regurgitation is 42 mmHg at peak stress, notably his blood pressure showed a hypertensive response with /62 at time of peak stress.    Left ventricular systolic function is normal. Calculated left ventricular EF = 60.4%    Left ventricular diastolic function was normal.    There are myxomatous changes of the mitral valve apparatus present. There is bileaflet mitral valve prolapse present.    Equivocal ECG evidence of myocardial ischemia.  Baseline previous EKG with LVH findings renders the ST segment changes at peak stress equivocal    Transesophageal echo performed October 17, 2024:    The mitral valve leaflets are thickened. There is a torn chordae associated with the P2 segment of the posterior mitral valve leaflet. The posterior mitral valve leaflet is partially flail.    There is severe and highly eccentric anteriorly directed mitral regurgitation. There is flow reversal in the left upper pulmonary vein    The left ventricular cavity is moderately dilated    Left ventricular systolic function is normal. Left ventricular ejection fraction appears to be 61 - 65%.    Left ventricular diastolic function was normal.    Normal right ventricular cavity size and systolic function noted.    The left atrial cavity is mildly dilated.    There is a tiny PFO noted during the agitated saline study    Mild tricuspid valve regurgitation is present    Calculated right ventricular systolic pressure from tricuspid regurgitation is 33 mmHg.    There are mild plaques in the descending thoracic aorta.    There is no evidence of pericardial effusion.    The following portions of the patient's history were reviewed and updated as appropriate: allergies, current medications, past family history, past medical history, past social history, past surgical history and problem list.    Past Medical History:   Diagnosis Date    Osteopenia of multiple sites 07/17/2023    Vitamin D deficiency        Past Surgical History:   Procedure Laterality Date    CARDIAC CATHETERIZATION N/A 7/24/2024    Procedure: Left Heart Cath;  Surgeon: Susan De Jesus MD;  Location:  [facility];  Service: Cardiovascular;  Laterality: N/A;  severe mitral regurgitation    CARDIAC CATHETERIZATION N/A 7/24/2024    Procedure: Right Heart Cath;  Surgeon: Susan De Jesus MD;  Location:  [facility];  Service: Cardiovascular;  Laterality: N/A;    CARDIAC CATHETERIZATION N/A  "7/24/2024    Procedure: Coronary angiography;  Surgeon: Susan De Jesus MD;  Location:  [facility];  Service: Cardiovascular;  Laterality: N/A;    COLONOSCOPY      ENDOSCOPY N/A 05/03/2019    Procedure: ESOPHAGOGASTRODUODENOSCOPY with biopsies;  Surgeon: Radha Del Real MD;  Location:  [facility];  Service: Gastroenterology    TRIGGER FINGER RELEASE      doesn't remember which side           ECG 12 Lead    Date/Time: 10/22/2024 11:35 AM  Performed by: Konstantin Driscoll MD    Authorized by: Konstantin Driscoll MD  Comparison: compared with previous ECG from 7/24/2024  Similar to previous ECG  Rhythm: sinus rhythm  Rate: normal  Conduction: 1st degree AV block  ST Segments: ST segments normal  T Waves: T waves normal  QRS axis: normal  Other findings: left ventricular hypertrophy    Clinical impression: abnormal EKG             Objective:     Vitals:    10/22/24 1042   BP: 132/58   BP Location: Left arm   Patient Position: Sitting   Cuff Size: Adult   Pulse: 60   Resp: 20   SpO2: 99%   Weight: 67.2 kg (148 lb 1.6 oz)   Height: 167.6 cm (66\")         Constitutional:       Appearance: Healthy appearance. Not in distress.   Neck:      Vascular: JVD normal.   Pulmonary:      Effort: Pulmonary effort is normal.      Breath sounds: Normal breath sounds.   Cardiovascular:      PMI at left midclavicular line. Normal rate. Regular rhythm. Normal S2.       Murmurs: There is a grade 4/6 crescendo-decrescendo, mid to late systolic murmur at the apex.   Pulses:     Intact distal pulses.   Edema:     Peripheral edema absent.   Skin:     General: Skin is warm and dry.   Neurological:      General: No focal deficit present.      Mental Status: Alert, oriented to person, place, and time and oriented to person, place and time.   Psychiatric:         Mood and Affect: Mood and affect normal.         Lab Review:     Lipid Panel          7/26/2024    08:10   Lipid Panel   Total Cholesterol 171    Triglycerides 96    HDL Cholesterol " 63    VLDL Cholesterol 17    LDL Cholesterol  91        BUN   Date Value Ref Range Status   09/18/2024 16 8 - 23 mg/dL Final     Creatinine   Date Value Ref Range Status   09/18/2024 1.42 (H) 0.76 - 1.27 mg/dL Final     Potassium   Date Value Ref Range Status   09/18/2024 4.5 3.5 - 5.2 mmol/L Final     ALT (SGPT)   Date Value Ref Range Status   07/26/2024 12 1 - 41 U/L Final     AST (SGOT)   Date Value Ref Range Status   07/26/2024 18 1 - 40 U/L Final            Assessment:          Diagnosis Plan   1. Severe mitral regurgitation        2. Stage 3a chronic kidney disease        3. Nonobstructive atherosclerosis of coronary artery               Plan:         Mitral regurgitation, severe, stage C 2: LVEF 59, LVESD 40mm  Abnormal EKG  Asymptomatic, but his EF dropped below 60.  Systolic dimension greater than 4 cm.    BRIE confirmed likely repairable mitral valve with a torn chordae and flail/prolapse P2 segment.    He has appointment upcoming with Dr. Gaitan to discuss surgical repair.    I feel that this valve is likely repairable, given that his EF has declined slightly, systolic dimension borderline, even with lack of symptoms he has an indication for valve repair if it is able to be repaired.    Left heart catheterization does reveal calcified coronary arteries, luminal irregularities, there is a intermediate mid LAD lesion at the level of the diagonal takeoff.  Coronary disease, nonobstructive/borderline obstructive.  Noted on preop cath.  Asymptomatic  LDL in the 90s, consistently.  He does have calcified coronaries.  After risk, benefit discussion we will start Lipitor 20.  Target LDL less than 70.  hypertension: Controlled on current regimen of valsartan 160 twice daily and carvedilol 3.125 twice daily    History of esophagitis/dysphagia  He tolerated transesophageal echo without issue.    RTC 3 months.  Likely valve repair in the interim.         Konstantin Driscoll MD  [facility]  10/22/24  10:04  EDT       Current Outpatient Medications:     Calcium Carb-Cholecalciferol (CALCIUM 500 +D PO), Take 500 Int'l Units by mouth 2 (Two) Times a Day., Disp: , Rfl:     carvedilol (COREG) 3.125 MG tablet, Take 1 tablet by mouth 2 (Two) Times a Day., Disp: 60 tablet, Rfl: 11    omeprazole (priLOSEC) 20 MG capsule, Take 1 capsule by mouth 2 (Two) Times a Day. (Patient taking differently: Take 1 capsule by mouth Daily.), Disp: 180 capsule, Rfl: 3    valsartan (DIOVAN) 160 MG tablet, Take 1 tablet by mouth 2 (Two) Times a Day., Disp: 180 tablet, Rfl: 3    atorvastatin (LIPITOR) 20 MG tablet, Take 1 tablet by mouth Daily., Disp: 30 tablet, Rfl: 11         Return in about 3 months (around 1/22/2025).      Part of this note may be an electronic transcription/translation of spoken language to printed text using the Dragon Dictation System.

## 2024-10-22 NOTE — LETTER
October 22, 2024     Farshad Gaitan MD  [address]  [address]  [place] [address]    Patient: Jackson Tariq   YOB: 1950   Date of Visit: 10/22/2024     Dear Farshad Gaitan MD:       Thank you for referring Jackson Tariq to me for evaluation. Below are the relevant portions of my assessment and plan of care.    If you have questions, please do not hesitate to call me. I look forward to following Jackson along with you.         Sincerely,        Konstantin Driscoll MD        CC: No Recipients    Konstantin Driscoll MD  10/22/24 1121  Incomplete        [facility]    Subjective:     Encounter Date:10/22/24      Patient ID: Jackson Tariq is a 74 y.o. male.    Chief Complaint:   Chief Complaint   Patient presents with   • Follow-up     3 mths   Follow-up for mitral regurgitation  History of Present Illness    Mr. Tariq is a very pleasant 74 y.o. gentleman without significant past medical history outside of osteopenia and vitamin D deficiency, who presents for further evaluation of a cardiac murmur.  He was referred after a new PCP detected a murmur    He was noted to have severe mitral regurgitation with bileaflet prolapse on TTE.  He underwent a stress echo to assess symptomatology, did 6 minutes 30 seconds on the treadmill.  No ischemia was noted.  He was noted to be markedly hypertensive for which he was started on medical therapy and presents today for further evaluation.    Today, he denies any symptoms.  He states he is functionally active, and he has no complaints.     He underwent a stress echo which revealed normal augmentation with stress, no significant ischemia, but most importantly did show that he had a mild elevation in his pulmonary pressures with exertion.      His blood pressure systolic was near 220 around that time.  He underwent a repeat echocardiogram which showed that his LVEF had dropped, he was previously in stage C 1 of mitral regurgitation but progressed to stage  C2 as his EF dropped below 60.    He recently underwent a transesophageal echocardiogram with my colleague Dr. Mistry, which confirmed a severely regurgitant mitral jet with a PISA radius greater than 1, and etiology secondary to prolapsed/flail P2 with torn chordae.    He underwent coronary angiogram which revealed nonobstructive CAD with a moderately obstructive mid LAD lesion at the level of the diagonal takeoff.    He continues to deny any chest pain or shortness of breath    Previous card testing:  • Echocardiogram performed July 1724:  •  Left ventricular systolic function is normal. Calculated left ventricular EF = 59.6%  •  The left ventricular cavity is borderline dilated when indexed for BSA  •  Left ventricular diastolic function was not assessed.  •  Severe mitral valve regurgitation is present.  PI SA radius 1.2 cm.  The jet is highly eccentric however appears definitively severe on today's study and there appears to be a mobile element around the P2 segment likely indicative of a torn cord with subsequent flail component.  The left atrial size does appear mildly enlarged when compared to previous study.  •  Estimated right ventricular systolic pressure from tricuspid regurgitation is normal (<35 mmHg). Calculated right ventricular systolic pressure from tricuspid regurgitation is 29 mmHg.    Holter monitor, 5-day October 2023:  •  A relatively benign monitor study.  •  There was a 6 beat ventricular run.  •  There were 3 nonsustained atrial runs with longest of 5 beats.  •  No significant sustained tachyarrhythmia noted.  •  A patient triggered event did not have arrhythmia correlation.    Stress echo August 23:  •  Equivocal echocardiographic evidence for myocardial ischemia.  Wall motion was normal, however delayed stress echo images were obtained, in lieu of obtaining TR severity to evaluate severity of mitral regurgitation.  Functional capacity is normal  •  Estimated right ventricular systolic  pressure from tricuspid regurgitation normal at rest.  Calculated right ventricular systolic pressure from tricuspid regurgitation is 42 mmHg at peak stress, notably his blood pressure showed a hypertensive response with /62 at time of peak stress.  •  Left ventricular systolic function is normal. Calculated left ventricular EF = 60.4%  •  Left ventricular diastolic function was normal.  •  There are myxomatous changes of the mitral valve apparatus present. There is bileaflet mitral valve prolapse present.  •  Equivocal ECG evidence of myocardial ischemia. Baseline previous EKG with LVH findings renders the ST segment changes at peak stress equivocal    Transesophageal echo performed October 17, 2024:  •  The mitral valve leaflets are thickened. There is a torn chordae associated with the P2 segment of the posterior mitral valve leaflet. The posterior mitral valve leaflet is partially flail.  •  There is severe and highly eccentric anteriorly directed mitral regurgitation. There is flow reversal in the left upper pulmonary vein  •  The left ventricular cavity is moderately dilated  •  Left ventricular systolic function is normal. Left ventricular ejection fraction appears to be 61 - 65%.  •  Left ventricular diastolic function was normal.  •  Normal right ventricular cavity size and systolic function noted.  •  The left atrial cavity is mildly dilated.  •  There is a tiny PFO noted during the agitated saline study  •  Mild tricuspid valve regurgitation is present  •  Calculated right ventricular systolic pressure from tricuspid regurgitation is 33 mmHg.  •  There are mild plaques in the descending thoracic aorta.  •  There is no evidence of pericardial effusion.    The following portions of the patient's history were reviewed and updated as appropriate: allergies, current medications, past family history, past medical history, past social history, past surgical history and problem list.    Past Medical  "History:   Diagnosis Date   • Osteopenia of multiple sites 07/17/2023   • Vitamin D deficiency        Past Surgical History:   Procedure Laterality Date   • CARDIAC CATHETERIZATION N/A 7/24/2024    Procedure: Left Heart Cath;  Surgeon: Susan De Jesus MD;  Location:  [facility];  Service: Cardiovascular;  Laterality: N/A;  severe mitral regurgitation   • CARDIAC CATHETERIZATION N/A 7/24/2024    Procedure: Right Heart Cath;  Surgeon: Susan De Jesus MD;  Location:  [facility];  Service: Cardiovascular;  Laterality: N/A;   • CARDIAC CATHETERIZATION N/A 7/24/2024    Procedure: Coronary angiography;  Surgeon: Susan De Jesus MD;  Location:  [facility];  Service: Cardiovascular;  Laterality: N/A;   • COLONOSCOPY     • ENDOSCOPY N/A 05/03/2019    Procedure: ESOPHAGOGASTRODUODENOSCOPY with biopsies;  Surgeon: Radha Del Real MD;  Location: [facility] OR;  Service: Gastroenterology   • TRIGGER FINGER RELEASE      doesn't remember which side         Procedures       Objective:     Vitals:    10/22/24 1042   BP: 132/58   BP Location: Left arm   Patient Position: Sitting   Cuff Size: Adult   Pulse: 60   Resp: 20   SpO2: 99%   Weight: 67.2 kg (148 lb 1.6 oz)   Height: 167.6 cm (66\")         Constitutional:       Appearance: Healthy appearance. Not in distress.   Neck:      Vascular: JVD normal.   Pulmonary:      Effort: Pulmonary effort is normal.      Breath sounds: Normal breath sounds.   Cardiovascular:      PMI at left midclavicular line. Normal rate. Regular rhythm. Normal S2.       Murmurs: There is a grade 4/6 crescendo-decrescendo, mid to late systolic murmur at the apex.   Pulses:     Intact distal pulses.   Edema:     Peripheral edema absent.   Skin:     General: Skin is warm and dry.   Neurological:      General: No focal deficit present.      Mental Status: Alert, oriented to person, place, and time and oriented to person, place and time.   Psychiatric:         Mood and " Affect: Mood and affect normal.         Lab Review:     Lipid Panel          7/26/2024    08:10   Lipid Panel   Total Cholesterol 171    Triglycerides 96    HDL Cholesterol 63    VLDL Cholesterol 17    LDL Cholesterol  91        BUN   Date Value Ref Range Status   09/18/2024 16 8 - 23 mg/dL Final     Creatinine   Date Value Ref Range Status   09/18/2024 1.42 (H) 0.76 - 1.27 mg/dL Final     Potassium   Date Value Ref Range Status   09/18/2024 4.5 3.5 - 5.2 mmol/L Final     ALT (SGPT)   Date Value Ref Range Status   07/26/2024 12 1 - 41 U/L Final     AST (SGOT)   Date Value Ref Range Status   07/26/2024 18 1 - 40 U/L Final            Assessment:          Diagnosis Plan   1. Severe mitral regurgitation        2. Stage 3a chronic kidney disease        3. Nonobstructive atherosclerosis of coronary artery               Plan:         Mitral regurgitation, severe, stage C 2: LVEF 59, LVESD 40mm  Abnormal EKG  Asymptomatic, but his EF dropped below 60.  Systolic dimension greater than 4 cm.    BRIE confirmed likely repairable mitral valve with a torn chordae and flail/prolapse P2 segment.    He has appointment upcoming with Dr. Gaitan to discuss surgical repair.    I feel that this valve is likely repairable, given that his EF has declined slightly, systolic dimension borderline, even with lack of symptoms he has an indication for valve repair if it is able to be repaired.    Left heart catheterization does reveal calcified coronary arteries, luminal irregularities, there is a intermediate mid LAD lesion at the level of the diagonal takeoff.  Coronary disease, nonobstructive/borderline obstructive.  Noted on preop cath.  Asymptomatic  LDL in the 90s, consistently.  He does have calcified coronaries.  After risk, benefit discussion we will start Lipitor 20.  Target LDL less than 70.  hypertension: Controlled on current regimen of valsartan 160 twice daily and carvedilol 3.125 twice daily    History of esophagitis/dysphagia  He  tolerated transesophageal echo without issue.    RTC 3 months.  Likely valve repair in the interim.         Konstantin Driscoll MD  [facility]  10/22/24  10:04 EDT       Current Outpatient Medications:   •  Calcium Carb-Cholecalciferol (CALCIUM 500 +D PO), Take 500 Int'l Units by mouth 2 (Two) Times a Day., Disp: , Rfl:   •  carvedilol (COREG) 3.125 MG tablet, Take 1 tablet by mouth 2 (Two) Times a Day., Disp: 60 tablet, Rfl: 11  •  omeprazole (priLOSEC) 20 MG capsule, Take 1 capsule by mouth 2 (Two) Times a Day. (Patient taking differently: Take 1 capsule by mouth Daily.), Disp: 180 capsule, Rfl: 3  •  valsartan (DIOVAN) 160 MG tablet, Take 1 tablet by mouth 2 (Two) Times a Day., Disp: 180 tablet, Rfl: 3  •  atorvastatin (LIPITOR) 20 MG tablet, Take 1 tablet by mouth Daily., Disp: 30 tablet, Rfl: 11         Return in about 3 months (around 1/22/2025).      Part of this note may be an electronic transcription/translation of spoken language to printed text using the Dragon Dictation System.

## 2024-11-09 DIAGNOSIS — R13.10 DYSPHAGIA, UNSPECIFIED TYPE: ICD-10-CM

## 2024-11-18 ENCOUNTER — OFFICE VISIT (OUTPATIENT)
Dept: CARDIAC SURGERY | Facility: CLINIC | Age: 74
End: 2024-11-18
Payer: MEDICARE

## 2024-11-18 VITALS
SYSTOLIC BLOOD PRESSURE: 151 MMHG | TEMPERATURE: 97.5 F | HEIGHT: 67 IN | HEART RATE: 63 BPM | OXYGEN SATURATION: 100 % | WEIGHT: 150 LBS | RESPIRATION RATE: 20 BRPM | BODY MASS INDEX: 23.54 KG/M2 | DIASTOLIC BLOOD PRESSURE: 75 MMHG

## 2024-11-18 DIAGNOSIS — I34.0 SEVERE MITRAL REGURGITATION: ICD-10-CM

## 2024-11-18 DIAGNOSIS — I34.0 NONRHEUMATIC MITRAL VALVE REGURGITATION: Primary | ICD-10-CM

## 2024-11-18 DIAGNOSIS — I15.8 OTHER SECONDARY HYPERTENSION: ICD-10-CM

## 2024-11-18 PROCEDURE — 1159F MED LIST DOCD IN RCRD: CPT | Performed by: THORACIC SURGERY (CARDIOTHORACIC VASCULAR SURGERY)

## 2024-11-18 PROCEDURE — 1160F RVW MEDS BY RX/DR IN RCRD: CPT | Performed by: THORACIC SURGERY (CARDIOTHORACIC VASCULAR SURGERY)

## 2024-11-18 PROCEDURE — 99024 POSTOP FOLLOW-UP VISIT: CPT | Performed by: THORACIC SURGERY (CARDIOTHORACIC VASCULAR SURGERY)

## 2024-11-18 NOTE — LETTER
November 23, 2024     ENRIQUE Keith  [address]  [address]  [place] [address]    Patient: Jackson Tariq   YOB: 1950   Date of Visit: 11/18/2024     Dear ENRIQUE Keith:       Thank you for referring Jackson Tariq to me for evaluation. Below are the relevant portions of my assessment and plan of care.    If you have questions, please do not hesitate to call me. I look forward to following Jackson along with you.         Sincerely,        Farshad Gaitan MD        CC: MD Arnie Dumont Sebastian, MD  11/23/24 0743  Sign when Signing Visit  [facility] note  It was nice to see Mr. Tariq in my office in follow-up for his mitral regurgitation.-year-old male with history of stage III CKD, anemia who was found mitral regurgitation of severity.  As part of the workup he had a cardiac cath that showed initially 50% LAD for further review regimen we will significant be in the 80% range.  He does not have pulmonary hypertension.  His symptoms are level and minutes and fatigue and low energy.  I discussed with the patient and wife my recommendation of her valve repair with a 95 % repairability rate and cabg to LAD and PFO closure. I discussed the risks (STS calculated), benefits and alternatives of surgery and he wishes to proceed. Plan for surgery in january

## 2024-11-20 ENCOUNTER — PREP FOR SURGERY (OUTPATIENT)
Dept: OTHER | Facility: HOSPITAL | Age: 74
End: 2024-11-20
Payer: MEDICARE

## 2024-11-20 ENCOUNTER — TELEPHONE (OUTPATIENT)
Dept: CARDIAC SURGERY | Facility: CLINIC | Age: 74
End: 2024-11-20
Payer: MEDICARE

## 2024-11-20 DIAGNOSIS — R79.9 ABNORMAL FINDING OF BLOOD CHEMISTRY, UNSPECIFIED: ICD-10-CM

## 2024-11-20 DIAGNOSIS — I34.0 NONRHEUMATIC MITRAL VALVE REGURGITATION: Primary | ICD-10-CM

## 2024-11-20 DIAGNOSIS — I25.10 CORONARY ARTERY DISEASE INVOLVING NATIVE CORONARY ARTERY OF NATIVE HEART, UNSPECIFIED WHETHER ANGINA PRESENT: ICD-10-CM

## 2024-11-20 DIAGNOSIS — R79.1 ABNORMAL COAGULATION PROFILE: ICD-10-CM

## 2024-11-20 DIAGNOSIS — I79.8 OTHER DISORDERS OF ARTERIES, ARTERIOLES AND CAPILLARIES IN DISEASES CLASSIFIED ELSEWHERE: ICD-10-CM

## 2024-11-20 DIAGNOSIS — I11.0 HYPERTENSIVE HEART DISEASE WITH HEART FAILURE: ICD-10-CM

## 2024-11-20 RX ORDER — METOPROLOL TARTRATE 25 MG/1
12.5 TABLET, FILM COATED ORAL
OUTPATIENT
Start: 2024-11-21 | End: 2024-11-22

## 2024-11-20 RX ORDER — CHLORHEXIDINE GLUCONATE 500 MG/1
1 CLOTH TOPICAL EVERY 12 HOURS PRN
OUTPATIENT
Start: 2024-11-20

## 2024-11-20 RX ORDER — CHLORHEXIDINE GLUCONATE ORAL RINSE 1.2 MG/ML
15 SOLUTION DENTAL EVERY 12 HOURS
OUTPATIENT
Start: 2024-11-20 | End: 2024-11-21

## 2024-11-20 RX ORDER — CHLORHEXIDINE GLUCONATE ORAL RINSE 1.2 MG/ML
15 SOLUTION DENTAL ONCE
OUTPATIENT
Start: 2024-11-20 | End: 2024-11-20

## 2024-11-20 NOTE — TELEPHONE ENCOUNTER
Spoke to patient's wife. PAT is on 12- at 0800 with any testing to follow. Surgery is on 1-3-2025 at 0730 with an arrival time of 0500. She expressed a verbal understanding of these instructions. Was instructed to call back with any further questions.

## 2024-11-23 NOTE — PROGRESS NOTES
[facility] note  It was nice to see Mr. Tariq in my office in follow-up for his mitral regurgitation.-year-old male with history of stage III CKD, anemia who was found mitral regurgitation of severity.  As part of the workup he had a cardiac cath that showed initially 50% LAD for further review regimen we will significant be in the 80% range.  He does not have pulmonary hypertension.  His symptoms are level and minutes and fatigue and low energy.  I discussed with the patient and wife my recommendation of her valve repair with a 95 % repairability rate and cabg to LAD and PFO closure. I discussed the risks (STS calculated), benefits and alternatives of surgery and he wishes to proceed. Plan for surgery in january

## 2024-12-30 ENCOUNTER — TELEPHONE (OUTPATIENT)
Dept: CARDIAC SURGERY | Facility: CLINIC | Age: 74
End: 2024-12-30
Payer: MEDICARE

## 2024-12-30 NOTE — TELEPHONE ENCOUNTER
Caller: Ysabel Tariq    Relationship to patient: Emergency Contact    Best call back number: [phone]    Chief complaint: SURG RESCHEDULE    Type of visit:SURG    Requested date: NA     If rescheduling, when is the original appointment: 1/3    Additional notes:PLEASE CALL BACK PT SPOUSE TO RESCHEDULE. PT IS ILL.

## 2024-12-30 NOTE — TELEPHONE ENCOUNTER
Spoke with , requesting to reschedule surgery. Surgery currently scheduled on 1/3/24. Per Mrs. Tariq symptoms started yesterday 12/29 coughing, sneezing, sore throat. Advised I would discuss surgery rescheduling with .  verbalized understanding and this was agreeable.

## 2024-12-30 NOTE — TELEPHONE ENCOUNTER
Received message from Nargis, patient was sick. Arnie wanted his surgery moved to first of February. New date for surgery is 2-5-2025 at 0730, arrival time is 0500. PAT is now 2-3-2025 at 0700 with any testing to follow. Spoke to patient's wife with new dates and times. She expressed a verbal understanding of these changes.

## 2025-01-25 DIAGNOSIS — I34.1 MITRAL VALVE PROLAPSE: ICD-10-CM

## 2025-01-25 DIAGNOSIS — I34.0 SEVERE MITRAL VALVE REGURGITATION: ICD-10-CM

## 2025-01-27 RX ORDER — CARVEDILOL 3.12 MG/1
3.12 TABLET ORAL 2 TIMES DAILY
Qty: 60 TABLET | Refills: 0 | Status: SHIPPED | OUTPATIENT
Start: 2025-01-27

## 2025-01-28 ENCOUNTER — OFFICE VISIT (OUTPATIENT)
Dept: CARDIOLOGY | Facility: CLINIC | Age: 75
End: 2025-01-28
Payer: MEDICARE

## 2025-01-28 VITALS
DIASTOLIC BLOOD PRESSURE: 54 MMHG | HEIGHT: 67 IN | HEART RATE: 69 BPM | OXYGEN SATURATION: 98 % | BODY MASS INDEX: 24.47 KG/M2 | WEIGHT: 155.9 LBS | SYSTOLIC BLOOD PRESSURE: 118 MMHG

## 2025-01-28 DIAGNOSIS — N18.31 STAGE 3A CHRONIC KIDNEY DISEASE: ICD-10-CM

## 2025-01-28 DIAGNOSIS — E78.2 MIXED HYPERLIPIDEMIA: ICD-10-CM

## 2025-01-28 DIAGNOSIS — I34.0 SEVERE MITRAL REGURGITATION: Primary | ICD-10-CM

## 2025-01-28 DIAGNOSIS — I10 PRIMARY HYPERTENSION: ICD-10-CM

## 2025-01-28 RX ORDER — CLOTRIMAZOLE AND BETAMETHASONE DIPROPIONATE 10; .64 MG/G; MG/G
1 CREAM TOPICAL 2 TIMES DAILY
Qty: 15 G | Refills: 0 | Status: SHIPPED | OUTPATIENT
Start: 2025-01-28

## 2025-01-28 NOTE — PROGRESS NOTES
[facility]    Subjective:     Encounter Date:01/28/25      Patient ID: Jackson Tariq is a 74 y.o. male.    Chief Complaint:   Chief Complaint   Patient presents with    Follow-up   Follow-up for mitral regurgitation  History of Present Illness    Mr. Tariq is a very pleasant 74 y.o. gentleman without significant past medical history outside of osteopenia and vitamin D deficiency, who presents for further evaluation of a cardiac murmur.  He was referred after a new PCP detected a murmur    He was noted to have severe mitral regurgitation with bileaflet prolapse on TTE.  He underwent a stress echo to assess symptomatology, did 6 minutes 30 seconds on the treadmill.  No ischemia was noted.  He was noted to be markedly hypertensive for which he was started on medical therapy.    He continues to deny any symptoms, however on his stress echo there was no significant ischemia, but most importantly did show that he had a mild elevation in his pulmonary pressures with exertion.      His blood pressure systolic was near 220 around that time.  He underwent a repeat echocardiogram which showed that his LVEF had dropped, he was previously in stage C 1 of mitral regurgitation but progressed to stage C2 as his EF dropped below 60..    He recently underwent a transesophageal echocardiogram with my colleague Dr. Mistry, which confirmed a severely regurgitant mitral jet with a PISA radius greater than 1, and etiology secondary to prolapsed/flail P2 with torn chordae.    He underwent coronary angiogram which revealed borderline obstructive CAD with a 70% obstructive mid LAD lesion at the level of the diagonal takeoff.    He presents today with no new complaints.  His surgery is next week.    Previous card testing:   Echocardiogram performed July 1724:    Left ventricular systolic function is normal. Calculated left ventricular EF = 59.6%    The left ventricular cavity is borderline dilated when indexed for  BSA    Left ventricular diastolic function was not assessed.    Severe mitral valve regurgitation is present.  PI SA radius 1.2 cm.  The jet is highly eccentric however appears definitively severe on today's study and there appears to be a mobile element around the P2 segment likely indicative of a torn cord with subsequent flail component.  The left atrial size does appear mildly enlarged when compared to previous study.    Estimated right ventricular systolic pressure from tricuspid regurgitation is normal (<35 mmHg). Calculated right ventricular systolic pressure from tricuspid regurgitation is 29 mmHg.    Holter monitor, 5-day October 2023:    A relatively benign monitor study.    There was a 6 beat ventricular run.    There were 3 nonsustained atrial runs with longest of 5 beats.    No significant sustained tachyarrhythmia noted.    A patient triggered event did not have arrhythmia correlation.    Stress echo August 23:    Equivocal echocardiographic evidence for myocardial ischemia.  Wall motion was normal, however delayed stress echo images were obtained, in lieu of obtaining TR severity to evaluate severity of mitral regurgitation.  Functional capacity is normal    Estimated right ventricular systolic pressure from tricuspid regurgitation normal at rest.  Calculated right ventricular systolic pressure from tricuspid regurgitation is 42 mmHg at peak stress, notably his blood pressure showed a hypertensive response with /62 at time of peak stress.    Left ventricular systolic function is normal. Calculated left ventricular EF = 60.4%    Left ventricular diastolic function was normal.    There are myxomatous changes of the mitral valve apparatus present. There is bileaflet mitral valve prolapse present.    Equivocal ECG evidence of myocardial ischemia. Baseline previous EKG with LVH findings renders the ST segment changes at peak stress equivocal    Transesophageal echo performed October 17, 2024:    The  mitral valve leaflets are thickened. There is a torn chordae associated with the P2 segment of the posterior mitral valve leaflet. The posterior mitral valve leaflet is partially flail.    There is severe and highly eccentric anteriorly directed mitral regurgitation. There is flow reversal in the left upper pulmonary vein    The left ventricular cavity is moderately dilated    Left ventricular systolic function is normal. Left ventricular ejection fraction appears to be 61 - 65%.    Left ventricular diastolic function was normal.    Normal right ventricular cavity size and systolic function noted.    The left atrial cavity is mildly dilated.    There is a tiny PFO noted during the agitated saline study    Mild tricuspid valve regurgitation is present    Calculated right ventricular systolic pressure from tricuspid regurgitation is 33 mmHg.    There are mild plaques in the descending thoracic aorta.    There is no evidence of pericardial effusion.    The following portions of the patient's history were reviewed and updated as appropriate: allergies, current medications, past family history, past medical history, past social history, past surgical history and problem list.    Past Medical History:   Diagnosis Date    Osteopenia of multiple sites 07/17/2023    Vitamin D deficiency        Past Surgical History:   Procedure Laterality Date    CARDIAC CATHETERIZATION N/A 7/24/2024    Procedure: Left Heart Cath;  Surgeon: Susan De Jesus MD;  Location: [facility] CATH INVASIVE LOCATION;  Service: Cardiovascular;  Laterality: N/A;  severe mitral regurgitation    CARDIAC CATHETERIZATION N/A 7/24/2024    Procedure: Right Heart Cath;  Surgeon: Susan De Jesus MD;  Location: [facility] CATH INVASIVE LOCATION;  Service: Cardiovascular;  Laterality: N/A;    CARDIAC CATHETERIZATION N/A 7/24/2024    Procedure: Coronary angiography;  Surgeon: Susan De Jesus MD;  Location: [facility] CATH INVASIVE LOCATION;  Service: Cardiovascular;  Laterality:  "N/A;    COLONOSCOPY      ENDOSCOPY N/A 05/03/2019    Procedure: ESOPHAGOGASTRODUODENOSCOPY with biopsies;  Surgeon: Radha Del Real MD;  Location: [facility];  Service: Gastroenterology    TRIGGER FINGER RELEASE      doesn't remember which side         Procedures       Objective:     Vitals:    01/28/25 0912   BP: 118/54   Pulse: 69   SpO2: 98%   Weight: 70.7 kg (155 lb 14.4 oz)   Height: 170.2 cm (67\")         Constitutional:       Appearance: Healthy appearance. Not in distress.   Neck:      Vascular: JVD normal.   Pulmonary:      Effort: Pulmonary effort is normal.      Breath sounds: Normal breath sounds.   Cardiovascular:      PMI at left midclavicular line. Normal rate. Regular rhythm. Normal S2.       Murmurs: There is a grade 4/6 crescendo-decrescendo, mid to late systolic murmur at the apex.   Pulses:     Intact distal pulses.   Edema:     Peripheral edema absent.   Skin:     General: Skin is warm and dry.   Neurological:      General: No focal deficit present.      Mental Status: Alert, oriented to person, place, and time and oriented to person, place and time.   Psychiatric:         Mood and Affect: Mood and affect normal.         Lab Review:     Lipid Panel          7/26/2024    08:10   Lipid Panel   Total Cholesterol 171    Triglycerides 96    HDL Cholesterol 63    VLDL Cholesterol 17    LDL Cholesterol  91        BUN   Date Value Ref Range Status   09/18/2024 16 8 - 23 mg/dL Final     Creatinine   Date Value Ref Range Status   09/18/2024 1.42 (H) 0.76 - 1.27 mg/dL Final     Potassium   Date Value Ref Range Status   09/18/2024 4.5 3.5 - 5.2 mmol/L Final     ALT (SGPT)   Date Value Ref Range Status   07/26/2024 12 1 - 41 U/L Final     AST (SGOT)   Date Value Ref Range Status   07/26/2024 18 1 - 40 U/L Final            Assessment:          Diagnosis Plan   1. Severe mitral regurgitation        2. Primary hypertension        3. Mixed hyperlipidemia        4. Stage 3a chronic kidney disease               "   Plan:         Mitral regurgitation, severe, stage C 2: LVEF 59, LVESD 40mm  Abnormal EKG  Asymptomatic, but his EF dropped below 60.  Systolic dimension greater than 4 cm.    BRIE confirmed likely repairable mitral valve with a torn chordae and flail/prolapse P2 segment.    He has surgery next week for mitral valve repair with Dr. Gaitan.   Left heart catheterization does reveal calcified coronary arteries, luminal irregularities, but a 70% mid LAD lesion that likely will require LIMA.  Coronary disease, nonobstructive/borderline obstructive.  Noted on preop cath.  Asymptomatic  LDL in the 90s, consistently.  He does have calcified coronaries.  After risk, benefit discussion we will start Lipitor 20.  Target LDL less than 70.  hypertension: Controlled on current regimen of valsartan 160 twice daily and carvedilol 3.125 twice daily    History of esophagitis/dysphagia  He tolerated transesophageal echo without issue.    RTC 3 months.  Mitral valve repair next week.        Konstantin Driscoll MD  [facility]  01/28/25  10:04 EDT       Current Outpatient Medications:     atorvastatin (LIPITOR) 20 MG tablet, Take 1 tablet by mouth Daily., Disp: 30 tablet, Rfl: 11    Calcium Carb-Cholecalciferol (CALCIUM 500 +D PO), Take 500 Int'l Units by mouth 2 (Two) Times a Day., Disp: , Rfl:     carvedilol (COREG) 3.125 MG tablet, Take 1 tablet by mouth twice daily, Disp: 60 tablet, Rfl: 0    omeprazole (priLOSEC) 20 MG capsule, Take 1 capsule by mouth twice daily, Disp: 180 capsule, Rfl: 3    valsartan (DIOVAN) 160 MG tablet, Take 1 tablet by mouth 2 (Two) Times a Day., Disp: 180 tablet, Rfl: 3    clotrimazole-betamethasone (LOTRISONE) 1-0.05 % cream, Apply 1 Application topically to the appropriate area as directed 2 (Two) Times a Day., Disp: 15 g, Rfl: 0         Return in about 3 months (around 4/28/2025).      Part of this note may be an electronic transcription/translation of spoken language to printed text using the  [facility] Dictation System.

## 2025-02-03 ENCOUNTER — HOSPITAL ENCOUNTER (OUTPATIENT)
Dept: CARDIOLOGY | Facility: HOSPITAL | Age: 75
Discharge: HOME | End: 2025-02-03
Payer: MEDICARE

## 2025-02-03 ENCOUNTER — ANESTHESIA EVENT (OUTPATIENT)
Dept: PERIOP | Facility: HOSPITAL | Age: 75
End: 2025-02-03
Payer: MEDICARE

## 2025-02-03 ENCOUNTER — HOSPITAL ENCOUNTER (OUTPATIENT)
Dept: GENERAL RADIOLOGY | Facility: HOSPITAL | Age: 75
Discharge: HOME | End: 2025-02-03
Payer: MEDICARE

## 2025-02-03 ENCOUNTER — PRE-ADMISSION TESTING (OUTPATIENT)
Dept: PREADMISSION TESTING | Facility: HOSPITAL | Age: 75
DRG: 219 | End: 2025-02-03
Payer: MEDICARE

## 2025-02-03 VITALS
RESPIRATION RATE: 18 BRPM | WEIGHT: 153.7 LBS | HEART RATE: 64 BPM | OXYGEN SATURATION: 99 % | DIASTOLIC BLOOD PRESSURE: 68 MMHG | BODY MASS INDEX: 24.7 KG/M2 | HEIGHT: 66 IN | SYSTOLIC BLOOD PRESSURE: 158 MMHG | TEMPERATURE: 97.4 F

## 2025-02-03 DIAGNOSIS — R79.1 ABNORMAL COAGULATION PROFILE: ICD-10-CM

## 2025-02-03 DIAGNOSIS — I25.10 CORONARY ARTERY DISEASE INVOLVING NATIVE CORONARY ARTERY OF NATIVE HEART, UNSPECIFIED WHETHER ANGINA PRESENT: ICD-10-CM

## 2025-02-03 DIAGNOSIS — I79.8 OTHER DISORDERS OF ARTERIES, ARTERIOLES AND CAPILLARIES IN DISEASES CLASSIFIED ELSEWHERE: ICD-10-CM

## 2025-02-03 DIAGNOSIS — I34.0 NONRHEUMATIC MITRAL VALVE REGURGITATION: ICD-10-CM

## 2025-02-03 DIAGNOSIS — R79.9 ABNORMAL FINDING OF BLOOD CHEMISTRY, UNSPECIFIED: ICD-10-CM

## 2025-02-03 DIAGNOSIS — I11.0 HYPERTENSIVE HEART DISEASE WITH HEART FAILURE: ICD-10-CM

## 2025-02-03 LAB
ABO GROUP BLD: NORMAL
ALBUMIN SERPL-MCNC: 4.4 G/DL (ref 3.5–5.2)
ALBUMIN/GLOB SERPL: 1.6 G/DL
ALP SERPL-CCNC: 124 U/L (ref 39–117)
ALT SERPL W P-5'-P-CCNC: 26 U/L (ref 1–41)
ANION GAP SERPL CALCULATED.3IONS-SCNC: 4.6 MMOL/L (ref 5–15)
APTT PPP: 38.6 SECONDS (ref 22.7–35.4)
ARTERIAL PATENCY WRIST A: POSITIVE
AST SERPL-CCNC: 25 U/L (ref 1–40)
ATMOSPHERIC PRESS: 749.2 MMHG
BASE EXCESS BLDA CALC-SCNC: 0 MMOL/L (ref 0–2)
BASOPHILS # BLD AUTO: 0.06 10*3/MM3 (ref 0–0.2)
BASOPHILS NFR BLD AUTO: 0.8 % (ref 0–1.5)
BDY SITE: ABNORMAL
BH CV XLRA MEAS - DIST GSV CALF DIST LEFT: 0.07 CM
BH CV XLRA MEAS - DIST GSV CALF DIST RIGHT: 0.14 CM
BH CV XLRA MEAS - DIST GSV THIGH DIST LEFT: 0.12 CM
BH CV XLRA MEAS - DIST GSV THIGH DIST RIGHT: 0.16 CM
BH CV XLRA MEAS - DIST LSV CALF DIST LEFT: 0.08 CM
BH CV XLRA MEAS - DIST LSV CALF DIST RIGHT: 0.25 CM
BH CV XLRA MEAS - GSV ANKLE DIST LEFT: 0.18 CM
BH CV XLRA MEAS - GSV ANKLE DIST RIGHT: 0.18 CM
BH CV XLRA MEAS - GSV KNEE DIST LEFT: 0.12 CM
BH CV XLRA MEAS - GSV KNEE DIST RIGHT: 0.13 CM
BH CV XLRA MEAS - GSV ORIGIN DIST LEFT: 0.53 CM
BH CV XLRA MEAS - GSV ORIGIN DIST RIGHT: 0.54 CM
BH CV XLRA MEAS - MID GSV CALF LEFT: 0.1 CM
BH CV XLRA MEAS - MID GSV CALF RIGHT: 0.21 CM
BH CV XLRA MEAS - MID GSV THIGH  LEFT: 0.19 CM
BH CV XLRA MEAS - MID GSV THIGH  RIGHT: 0.3 CM
BH CV XLRA MEAS - MID LSV CALF DIST LEFT: 0.2 CM
BH CV XLRA MEAS - MID LSV CALF DIST RIGHT: 0.24 CM
BH CV XLRA MEAS - PROX GSV CALF DIST LEFT: 0.14 CM
BH CV XLRA MEAS - PROX GSV CALF DIST RIGHT: 0.29 CM
BH CV XLRA MEAS - PROX GSV THIGH  LEFT: 0.19 CM
BH CV XLRA MEAS - PROX GSV THIGH  RIGHT: 0.23 CM
BH CV XLRA MEAS - PROX LSV CALF DIST LEFT: 0.12 CM
BH CV XLRA MEAS - PROX LSV CALF DIST RIGHT: 0.27 CM
BH CV XLRA MEAS LEFT DIST CCA EDV: 19.1 CM/SEC
BH CV XLRA MEAS LEFT DIST CCA PSV: 112.6 CM/SEC
BH CV XLRA MEAS LEFT DIST ICA EDV: -22.2 CM/SEC
BH CV XLRA MEAS LEFT DIST ICA PSV: -84.9 CM/SEC
BH CV XLRA MEAS LEFT ICA/CCA RATIO: 0.75
BH CV XLRA MEAS LEFT MID ICA EDV: -22.2 CM/SEC
BH CV XLRA MEAS LEFT MID ICA PSV: -80.9 CM/SEC
BH CV XLRA MEAS LEFT PROX CCA EDV: 13.9 CM/SEC
BH CV XLRA MEAS LEFT PROX CCA PSV: 107.4 CM/SEC
BH CV XLRA MEAS LEFT PROX ECA PSV: -108.5 CM/SEC
BH CV XLRA MEAS LEFT PROX ICA EDV: -18.9 CM/SEC
BH CV XLRA MEAS LEFT PROX ICA PSV: -66.1 CM/SEC
BH CV XLRA MEAS LEFT PROX SCLA PSV: 134.9 CM/SEC
BH CV XLRA MEAS LEFT VERTEBRAL A PSV: -38.4 CM/SEC
BH CV XLRA MEAS RIGHT DIST CCA EDV: 13.9 CM/SEC
BH CV XLRA MEAS RIGHT DIST CCA PSV: 83.3 CM/SEC
BH CV XLRA MEAS RIGHT DIST ICA EDV: -24.4 CM/SEC
BH CV XLRA MEAS RIGHT DIST ICA PSV: -103.4 CM/SEC
BH CV XLRA MEAS RIGHT ICA/CCA RATIO: 1.24
BH CV XLRA MEAS RIGHT MID ICA EDV: -20.2 CM/SEC
BH CV XLRA MEAS RIGHT MID ICA PSV: -73 CM/SEC
BH CV XLRA MEAS RIGHT PROX CCA EDV: 14.7 CM/SEC
BH CV XLRA MEAS RIGHT PROX CCA PSV: 103 CM/SEC
BH CV XLRA MEAS RIGHT PROX ECA PSV: -87.6 CM/SEC
BH CV XLRA MEAS RIGHT PROX ICA EDV: -14.2 CM/SEC
BH CV XLRA MEAS RIGHT PROX ICA PSV: -44.1 CM/SEC
BH CV XLRA MEAS RIGHT PROX SCLA PSV: -91.5 CM/SEC
BH CV XLRA MEAS RIGHT VERTEBRAL A PSV: -54.1 CM/SEC
BILIRUB SERPL-MCNC: 1 MG/DL (ref 0–1.2)
BILIRUB UR QL STRIP: NEGATIVE
BLD GP AB SCN SERPL QL: NEGATIVE
BUN SERPL-MCNC: 18 MG/DL (ref 8–23)
BUN/CREAT SERPL: 12.5 (ref 7–25)
CALCIUM SPEC-SCNC: 9.4 MG/DL (ref 8.6–10.5)
CHLORIDE SERPL-SCNC: 100 MMOL/L (ref 98–107)
CHOLEST SERPL-MCNC: 118 MG/DL (ref 0–200)
CLARITY UR: CLEAR
CLOSE TME COLL+ADP + EPINEP PNL BLD: 97 % (ref 86–100)
CO2 BLDA-SCNC: 24.4 MMOL/L (ref 23–27)
CO2 SERPL-SCNC: 26.4 MMOL/L (ref 22–29)
COLOR UR: YELLOW
CREAT SERPL-MCNC: 1.44 MG/DL (ref 0.76–1.27)
DEPRECATED RDW RBC AUTO: 43.5 FL (ref 37–54)
EGFRCR SERPLBLD CKD-EPI 2021: 51.3 ML/MIN/1.73
EOSINOPHIL # BLD AUTO: 0.08 10*3/MM3 (ref 0–0.4)
EOSINOPHIL NFR BLD AUTO: 1 % (ref 0.3–6.2)
ERYTHROCYTE [DISTWIDTH] IN BLOOD BY AUTOMATED COUNT: 12.5 % (ref 12.3–15.4)
GLOBULIN UR ELPH-MCNC: 2.8 GM/DL
GLUCOSE SERPL-MCNC: 107 MG/DL (ref 65–99)
GLUCOSE UR STRIP-MCNC: NEGATIVE MG/DL
HBA1C MFR BLD: 5.2 % (ref 4.8–5.6)
HCO3 BLDA-SCNC: 23.4 MMOL/L (ref 22–28)
HCT VFR BLD AUTO: 37.9 % (ref 37.5–51)
HDLC SERPL-MCNC: 60 MG/DL (ref 40–60)
HEMODILUTION: NO
HGB BLD-MCNC: 12.5 G/DL (ref 13–17.7)
HGB UR QL STRIP.AUTO: NEGATIVE
IMM GRANULOCYTES # BLD AUTO: 0.03 10*3/MM3 (ref 0–0.05)
IMM GRANULOCYTES NFR BLD AUTO: 0.4 % (ref 0–0.5)
INR PPP: 1.11 (ref 0.9–1.1)
KETONES UR QL STRIP: NEGATIVE
LDLC SERPL CALC-MCNC: 41 MG/DL (ref 0–100)
LDLC/HDLC SERPL: 0.66 {RATIO}
LEUKOCYTE ESTERASE UR QL STRIP.AUTO: NEGATIVE
LYMPHOCYTES # BLD AUTO: 1.3 10*3/MM3 (ref 0.7–3.1)
LYMPHOCYTES NFR BLD AUTO: 17 % (ref 19.6–45.3)
MAGNESIUM SERPL-MCNC: 2.1 MG/DL (ref 1.6–2.4)
MCH RBC QN AUTO: 31.3 PG (ref 26.6–33)
MCHC RBC AUTO-ENTMCNC: 33 G/DL (ref 31.5–35.7)
MCV RBC AUTO: 95 FL (ref 79–97)
MODALITY: ABNORMAL
MONOCYTES # BLD AUTO: 0.59 10*3/MM3 (ref 0.1–0.9)
MONOCYTES NFR BLD AUTO: 7.7 % (ref 5–12)
NEUTROPHILS NFR BLD AUTO: 5.6 10*3/MM3 (ref 1.7–7)
NEUTROPHILS NFR BLD AUTO: 73.1 % (ref 42.7–76)
NITRITE UR QL STRIP: NEGATIVE
NRBC BLD AUTO-RTO: 0 /100 WBC (ref 0–0.2)
NT-PROBNP SERPL-MCNC: 326 PG/ML (ref 0–900)
PCO2 BLDA: 33.3 MM HG (ref 35–45)
PH BLDA: 7.46 PH UNITS (ref 7.35–7.45)
PH UR STRIP.AUTO: 8.5 [PH] (ref 5–8)
PLATELET # BLD AUTO: 235 10*3/MM3 (ref 140–450)
PMV BLD AUTO: 10 FL (ref 6–12)
PO2 BLDA: 102.7 MM HG (ref 80–100)
POTASSIUM SERPL-SCNC: 4.7 MMOL/L (ref 3.5–5.2)
PROT SERPL-MCNC: 7.2 G/DL (ref 6–8.5)
PROT UR QL STRIP: NEGATIVE
PROTHROMBIN TIME: 14.3 SECONDS (ref 11.7–14.2)
QT INTERVAL: 417 MS
QTC INTERVAL: 417 MS
RBC # BLD AUTO: 3.99 10*6/MM3 (ref 4.14–5.8)
RH BLD: POSITIVE
SAO2 % BLDCOA: 98.3 % (ref 92–98.5)
SODIUM SERPL-SCNC: 131 MMOL/L (ref 136–145)
SP GR UR STRIP: 1.01 (ref 1–1.03)
T&S EXPIRATION DATE: NORMAL
TOTAL RATE: 18 BREATHS/MINUTE
TRIGL SERPL-MCNC: 91 MG/DL (ref 0–150)
UROBILINOGEN UR QL STRIP: ABNORMAL
VLDLC SERPL-MCNC: 17 MG/DL (ref 5–40)
WBC NRBC COR # BLD AUTO: 7.66 10*3/MM3 (ref 3.4–10.8)

## 2025-02-03 PROCEDURE — 86920 COMPATIBILITY TEST SPIN: CPT

## 2025-02-03 PROCEDURE — 93010 ELECTROCARDIOGRAM REPORT: CPT | Performed by: INTERNAL MEDICINE

## 2025-02-03 PROCEDURE — 80053 COMPREHEN METABOLIC PANEL: CPT

## 2025-02-03 PROCEDURE — 82803 BLOOD GASES ANY COMBINATION: CPT | Performed by: THORACIC SURGERY (CARDIOTHORACIC VASCULAR SURGERY)

## 2025-02-03 PROCEDURE — 83735 ASSAY OF MAGNESIUM: CPT

## 2025-02-03 PROCEDURE — 86900 BLOOD TYPING SEROLOGIC ABO: CPT

## 2025-02-03 PROCEDURE — 86901 BLOOD TYPING SEROLOGIC RH(D): CPT

## 2025-02-03 PROCEDURE — 86850 RBC ANTIBODY SCREEN: CPT

## 2025-02-03 PROCEDURE — 85025 COMPLETE CBC W/AUTO DIFF WBC: CPT

## 2025-02-03 PROCEDURE — 93880 EXTRACRANIAL BILAT STUDY: CPT

## 2025-02-03 PROCEDURE — 85730 THROMBOPLASTIN TIME PARTIAL: CPT

## 2025-02-03 PROCEDURE — 85576 BLOOD PLATELET AGGREGATION: CPT

## 2025-02-03 PROCEDURE — 83036 HEMOGLOBIN GLYCOSYLATED A1C: CPT

## 2025-02-03 PROCEDURE — 71046 X-RAY EXAM CHEST 2 VIEWS: CPT

## 2025-02-03 PROCEDURE — 93005 ELECTROCARDIOGRAM TRACING: CPT

## 2025-02-03 PROCEDURE — 93880 EXTRACRANIAL BILAT STUDY: CPT | Performed by: SURGERY

## 2025-02-03 PROCEDURE — 93970 EXTREMITY STUDY: CPT

## 2025-02-03 PROCEDURE — 80061 LIPID PANEL: CPT

## 2025-02-03 PROCEDURE — 85610 PROTHROMBIN TIME: CPT

## 2025-02-03 PROCEDURE — 36600 WITHDRAWAL OF ARTERIAL BLOOD: CPT | Performed by: THORACIC SURGERY (CARDIOTHORACIC VASCULAR SURGERY)

## 2025-02-03 PROCEDURE — 93970 EXTREMITY STUDY: CPT | Performed by: SURGERY

## 2025-02-03 PROCEDURE — 36415 COLL VENOUS BLD VENIPUNCTURE: CPT

## 2025-02-03 PROCEDURE — 83880 ASSAY OF NATRIURETIC PEPTIDE: CPT

## 2025-02-03 PROCEDURE — 81003 URINALYSIS AUTO W/O SCOPE: CPT

## 2025-02-03 RX ORDER — CHLORHEXIDINE GLUCONATE ORAL RINSE 1.2 MG/ML
15 SOLUTION DENTAL EVERY 12 HOURS
Status: DISPENSED | OUTPATIENT
Start: 2025-02-03 | End: 2025-02-04

## 2025-02-03 NOTE — H&P
Patient Care Team:  Caroline Medina APRN as PCP - General (Family Medicine)  Konstantin Driscoll MD as Consulting Physician (Cardiology)  Maksim Ortiz MD as Consulting Physician (Urology)        Subjective     History of Present Illness:  Mr. Jackson Tariq is a 74 year old male with pmhx CAD, HLD, HTN, CKD stg 3, anemia, dysphagia, osteopenia, vitamin D deficiency who was evaluated by cardiologist for a heart murmur and found to have severe mitral regurgitation with bileaflet prolapse on TTE.. BRIE confirmed the severity of the MR. He underwent a coronary angiogram revealing borderline obstructive CAD with 70% obstructive mid LAD lesion. Patient was seen by Dr. Gaitan for surgical evaluation with recommendation for mitral valve repair v replacement, CABG, and PFO closure. Patient was originally going to have surgery in early January but had a cold a couple days prior and it needed to be rescheduled to early February.       Review of Systems   Constitutional: Negative.  Negative for fever.   Respiratory: Negative.     Cardiovascular: Negative.    Gastrointestinal: Negative.    Skin: Negative.  Negative for rash and wound.        Past Medical History:   Diagnosis Date    Chronic cough     Coronary artery disease     GERD (gastroesophageal reflux disease)     Hyperlipidemia     Hypertension     Mitral valve insufficiency     Osteopenia of multiple sites 07/17/2023    Vitamin D deficiency      Past Surgical History:   Procedure Laterality Date    CARDIAC CATHETERIZATION N/A 7/24/2024    Procedure: Left Heart Cath;  Surgeon: Susan De Jesus MD;  Location:  [facility] CATH INVASIVE LOCATION;  Service: Cardiovascular;  Laterality: N/A;  severe mitral regurgitation    CARDIAC CATHETERIZATION N/A 7/24/2024    Procedure: Right Heart Cath;  Surgeon: Susan De Jesus MD;  Location:  [facility] CATH INVASIVE LOCATION;  Service: Cardiovascular;  Laterality: N/A;    CARDIAC CATHETERIZATION N/A 7/24/2024    Procedure: Coronary  angiography;  Surgeon: uSsan De Jesus MD;  Location:  [facility] CATH INVASIVE LOCATION;  Service: Cardiovascular;  Laterality: N/A;    COLONOSCOPY      ENDOSCOPY N/A 05/03/2019    Procedure: ESOPHAGOGASTRODUODENOSCOPY with biopsies;  Surgeon: Radha Del Real MD;  Location:  [facility];  Service: Gastroenterology    TRIGGER FINGER RELEASE      doesn't remember which side     Family History   Problem Relation Age of Onset    Colon cancer Neg Hx     Colon polyps Neg Hx     Malig Hyperthermia Neg Hx      Social History     Tobacco Use    Smoking status: Never    Smokeless tobacco: Never   Vaping Use    Vaping status: Never Used   Substance Use Topics    Alcohol use: No    Drug use: Never     No medications prior to admission.       Allergies:  Patient has no known allergies.    Objective      Vital Signs  Temp:  [97.4 °F (36.3 °C)] 97.4 °F (36.3 °C)  Heart Rate:  [64] 64  Resp:  [18] 18  BP: (158-167)/(68-69) 158/68           Physical Exam  Constitutional:       Appearance: Normal appearance.   HENT:      Head: Normocephalic and atraumatic.      Mouth/Throat:      Mouth: Mucous membranes are moist.      Pharynx: Oropharynx is clear.   Eyes:      Pupils: Pupils are equal, round, and reactive to light.   Cardiovascular:      Rate and Rhythm: Normal rate and regular rhythm.      Pulses: Normal pulses.      Heart sounds: Murmur heard.   Pulmonary:      Effort: Pulmonary effort is normal.      Breath sounds: Normal breath sounds.   Abdominal:      General: Abdomen is flat.      Palpations: Abdomen is soft.      Tenderness: There is no abdominal tenderness.   Musculoskeletal:         General: Normal range of motion.   Skin:     General: Skin is warm and dry.      Findings: No rash.   Neurological:      General: No focal deficit present.      Mental Status: He is alert and oriented to person, place, and time.         Results Review:   Lab Results (last 24 hours)       ** No results found for the last 24 hours. **               Carotid Dopplers:  Study Findings       Right CCA Prox:  No plaque visualized.         Right CCA Dist:  Plaque present.        Right ICA Prox:  No plaque visualized.        Right ECA:  No plaque visualized.        Right Vertebral:  Antegrade flow noted.              Left CCA Prox:  No plaque visualized.        Left CCA Dist:  No plaque visualized.        Left ICA Prox:  Plaque present.        Left ECA:  No plaque visualized.        Left Vertebral:  Antegrade flow noted.   Study Impression      Right ICA:  Imaging indicates <50% stenosis.            Left ICA:  Imaging indicates <50% stenosis.     Vein Mapping:  Study Impression     Right great saphenous vein above knee:  with inadequate size.         Right great saphenous vein below knee:  with inadequate size.         Right small saphenous vein:  with adequate size.        Left great saphenous vein above knee:  with inadequate size.         Left great saphenous vein below knee:  with inadequate size.         Left small saphenous vein:  with inadequate size.     Cardiac Cath 07/24/2024:    LMCA: Medium caliber left main coronary artery bifurcates give the LAD and circumflex  LAD: Lady is a medium caliber vessel.  Calcified in the proximal segment.  Small caliber diagonal.  The mid segment has a discrete 50% stenosis.  Distal to that there are mild diffuse luminal regularities less than 30%.  Ramus: Absent  Circumflex: Large caliber circumflex.  Normal in the proximal segment.  Mild calcification.  Large arborizing OM is normal with 3 major branches with minimal plaque at the ostium of the upper branch.  RCA: Caliber, dominant right coronary artery.  Normal.    BRIE 10/17/2024:  Interpretation Summary    The mitral valve leaflets are thickened. There is a torn chordae associated with the P2 segment of the posterior mitral valve leaflet. The posterior mitral valve leaflet is partially flail.    There is severe and highly eccentric anteriorly directed mitral  regurgitation. There is flow reversal in the left upper pulmonary vein    The left ventricular cavity is moderately dilated    Left ventricular systolic function is normal. Left ventricular ejection fraction appears to be 61 - 65%.    Left ventricular diastolic function was normal.    Normal right ventricular cavity size and systolic function noted.    The left atrial cavity is mildly dilated.    There is a tiny PFO noted during the agitated saline study    Mild tricuspid valve regurgitation is present    Calculated right ventricular systolic pressure from tricuspid regurgitation is 33 mmHg.    There are mild plaques in the descending thoracic aorta.    There is no evidence of pericardial effusion.      Assessment & Plan       Nonrheumatic mitral valve regurgitation      Assessment & Plan    Severe Mitral Regurgitation  CAD  Hypertension  Hyperlipidemia  CKD stg 3 -- Baseline Cr 1.4  Hx esophagitis/dysphagia  Anemia  Osteopenia  GERD  Vitamin D deficiency      Dr. Gaitan's note 11/18/2024:  It was nice to see Mr. Tariq in my office in follow-up for his mitral regurgitation.-year-old male with history of stage III CKD, anemia who was found mitral regurgitation of severity.  As part of the workup he had a cardiac cath that showed initially 50% LAD for further review regimen we will significant be in the 80% range.  He does not have pulmonary hypertension.  His symptoms are level and minutes and fatigue and low energy.  I discussed with the patient and wife my recommendation of her valve repair with a 95 % repairability rate and cabg to LAD and PFO closure. I discussed the risks (STS calculated), benefits and alternatives of surgery and he wishes to proceed. Plan for surgery in january    - Mr Paz was seen in PAT by myself this morning. Patient denies recent illness or rash. All preoperative labs were within normal limits. Carotids, vein mapping, and cxr completed. I spent a significant amount of time explaining the  expected post operative course and answering any and all questions patient and family may have had. He is scheduled for surgery 02/05/2025.       Thank you for allowing us to participate in the care of this patient.      ENRIQUE Acosta  02/03/25  10:48 EST    **all problems new to this examiner  **EKG and CXR independently reviewed and interpreted

## 2025-02-03 NOTE — ANESTHESIA PREPROCEDURE EVALUATION
Anesthesia Evaluation     Patient summary reviewed   no history of anesthetic complications:   NPO Solid Status: > 8 hours  NPO Liquid Status: > 2 hours           Airway   Mallampati: II  TM distance: >3 FB  Neck ROM: full  No difficulty expected  Dental    (+) edentulous    Pulmonary     breath sounds clear to auscultation  (-) shortness of breath, recent URI, not a smokerSleep apnea: STOP BANG 3.  Cardiovascular     ECG reviewed  Patient on routine beta blocker and Beta blocker given within 24 hours of surgery  Rhythm: regular  Rate: normal    (+) hypertension, valvular problems/murmurs (Sev MR) MR and MVP, CAD  (-) past MI, dysrhythmias, angina    ROS comment: 7/2024 HC- Narrative  ·  No evidence of obstructive coronary disease.  ·  Normal right and left-sided filling pressures.  ·  Recommendations: Proceed with mitral valve surgery    1/2024 ECHO - Interpretation Summary       ·  Left ventricular systolic function is normal. Calculated left ventricular EF = 59.6%  ·  The left ventricular cavity is borderline dilated when indexed for BSA  ·  Left ventricular diastolic function was not assessed.  ·  Severe mitral valve regurgitation is present.  PI SA radius 1.2 cm.  The jet is highly eccentric however appears definitively severe on today's study and there appears to be a mobile element around the P2 segment likely indicative of a torn cord with subsequent flail component.  The left atrial size does appear mildly enlarged when compared to previous study.  ·  Estimated right ventricular systolic pressure from tricuspid regurgitation is normal (<35 mmHg). Calculated right ventricular systolic pressure from tricuspid regurgitation is 29 mmHg      Neuro/Psych  (-) seizures, CVA  GI/Hepatic/Renal/Endo    (+) GERD, renal disease (Stg3a, Cr 1.42)- CRI  (-)  obesity    Musculoskeletal     (-) neck stiffness  Abdominal    Substance History      OB/GYN          Other        (-) blood dyscrasia                Anesthesia  Plan    ASA 3     general, Liudmila and CVL     Postoperative Plan: Expected vent after surgery  Anesthetic plan, risks, benefits, and alternatives have been provided, discussed and informed consent has been obtained with: patient.      CODE STATUS:

## 2025-02-03 NOTE — DISCHARGE INSTRUCTIONS
Take the following medications the morning of surgery with a small sip of water:      If you are on prescription narcotic pain medication to control your pain you may also take that medication the morning of surgery.    General Instructions:  Do not eat or drink anything after midnight the night before surgery.  Infants may have breast milk up to four hours before surgery.  Infants drinking formula may drink formula up to six hours before surgery.   Patients who avoid smoking, chewing tobacco and alcohol for 4 weeks prior to surgery have a reduced risk of post-operative complications.  Quit smoking as many days before surgery as you can.  Do not smoke, use chewing tobacco or drink alcohol the day of surgery.   If applicable bring your C-PAP/ BI-PAP machine in with you to preop day of surgery.  Bring any papers given to you in the doctor’s office.  Wear clean comfortable clothes.  Do not wear contact lenses, false eyelashes or make-up.  Bring a case for your glasses.   Bring crutches or walker if applicable.  Remove all piercings.  Leave jewelry and any other valuables at home.  Hair extensions with metal clips must be removed prior to surgery.  The Pre-Admission Testing nurse will instruct you to bring medications if unable to obtain an accurate list in Pre-Admission Testing.        If you were given a blood bank ID arm band remember to bring it with you the day of surgery.    Preventing a Surgical Site Infection:  For 2 to 3 days before surgery, avoid shaving with a razor because the razor can irritate skin and make it easier to develop an infection.    Any areas of open skin can increase the risk of a post-operative wound infection by allowing bacteria to enter and travel throughout the body.  Notify your surgeon if you have any skin wounds / rashes even if it is not near the expected surgical site.  The area will need assessed to determine if surgery should be delayed until it is healed.  The night prior to  surgery shower using a fresh bar of anti-bacterial soap (such as Dial) and clean washcloth.  Sleep in a clean bed with clean clothing.  Do not allow pets to sleep with you.  Shower on the morning of surgery using a fresh bar of anti-bacterial soap (such as Dial) and clean washcloth.  Dry with a clean towel and dress in clean clothing.  Ask your surgeon if you will be receiving antibiotics prior to surgery.  Make sure you, your family, and all healthcare providers clean their hands with soap and water or an alcohol based hand  before caring for you or your wound.        CHLORHEXIDINE CLOTH INSTRUCTIONS  The morning of surgery follow these instructions using the Chlorhexidine cloths you've been given.  These steps reduce bacteria on the body.  Do not use the cloths near your eyes, ears mouth, genitalia or on open wounds.  Throw the cloths away after use but do not try to flush them down a toilet.      Open and remove one cloth at a time from the package.    Leave the cloth unfolded and begin the bathing.  Massage the skin with the cloths using gentle pressure to remove bacteria.  Do not scrub harshly.   Follow the steps below with one 2% CHG cloth per area (6 total cloths).  One cloth for neck, shoulders and chest.  One cloth for both arms, hands, fingers and underarms (do underarms last).  One cloth for the abdomen followed by groin.  One cloth for right leg and foot including between the toes.  One cloth for left leg and foot including between the toes.  The last cloth is to be used for the back of the neck, back and buttocks.    Allow the CHG to air dry 3 minutes on the skin which will give it time to work and decrease the chance of irritation.  The skin may feel sticky until it is dry.  Do not rinse with water or any other liquid or you will lose the beneficial effects of the CHG.  If mild skin irritation occurs, do rinse the skin to remove the CHG.  Report this to the nurse at time of admission.  Do not  apply lotions, creams, ointments, deodorants or perfumes after using the clothes. Dress in clean clothes before coming to the hospital.    BACTROBAN NASAL OINTMENT  There are many germs normally in your nose. Bactroban is an ointment that will help reduce these germs. Please follow these instructions for Bactroban use:      _1___The day before surgery in the evening              [facility]__2-3-88______    _2___The day of surgery in the morning    [facility]___4-5-00_____    **Squirt ½ package of Bactroban Ointment onto a cotton applicator and apply to inside of 1st nostril.  Squirt the remaining Bactroban and apply to the inside of the other nostril.    PERIDEX- ORAL:  Use only if your surgeon has ordered  Use the night before and morning of surgery - Swish, gargle, and spit - do not swallow.      Day of surgery:  Your arrival time is approximately two hours before your scheduled surgery time.  Please note if you have an early arrival time the surgery doors do not open before 5:00 AM.  Upon arrival, a Pre-op nurse and Anesthesiologist will review your health history, obtain vital signs, and answer questions you may have.  The only belongings needed at this time will be your home medications and if applicable your C-PAP/BI-PAP machine.  A Pre-op nurse will start an IV and you may receive medication in preparation for surgery, including something to help you relax.      Please be aware that surgery does come with discomfort.  We want to make every effort to control your discomfort so please discuss any uncontrolled symptoms with your nurse.   Your doctor will most likely have prescribed pain medications.      If you are going home after surgery you will receive individualized written care instructions before being discharged.  A responsible adult must drive you to and from the hospital on the day of your surgery and ideally stay with you through the night.  Discharge prescriptions can be filled by the hospital pharmacy during  regular pharmacy hours.  If you are having surgery late in the day/evening your prescription may be e-prescribed to your pharmacy.  Please verify your pharmacy hours or chose a 24 hour pharmacy to avoid not having access to your prescription because your pharmacy has closed for the day.    If you are staying overnight following surgery, you will be transported to your hospital room following the recovery period.  [facility] has all private rooms.    If you have any questions please call Pre-Admission Testing at [phone].  Deductibles and co-payments are collected on the day of service. Please be prepared to pay the required co-pay, deductible or deposit on the day of service as defined by your plan.    Call your surgeon immediately if you experience any of the following symptoms:  Sore Throat  Shortness of Breath or difficulty breathing  Cough  Chills  Body soreness or muscle pain  Headache  Fever  New loss of taste or smell  Do not arrive for your surgery ill.  Your procedure will need to be rescheduled to another time.  You will need to call your physician before the day of surgery to avoid any unnecessary exposure to hospital staff as well as other patients.

## 2025-02-05 ENCOUNTER — ANCILLARY PROCEDURE (OUTPATIENT)
Dept: PERIOP | Facility: HOSPITAL | Age: 75
DRG: 219 | End: 2025-02-05
Payer: MEDICARE

## 2025-02-05 ENCOUNTER — APPOINTMENT (OUTPATIENT)
Dept: GENERAL RADIOLOGY | Facility: HOSPITAL | Age: 75
DRG: 219 | End: 2025-02-05
Payer: MEDICARE

## 2025-02-05 ENCOUNTER — ANESTHESIA (OUTPATIENT)
Dept: PERIOP | Facility: HOSPITAL | Age: 75
End: 2025-02-05
Payer: MEDICARE

## 2025-02-05 ENCOUNTER — HOSPITAL ENCOUNTER (INPATIENT)
Facility: HOSPITAL | Age: 75
LOS: 7 days | Discharge: HOME HEALTH | DRG: 219 | End: 2025-02-12
Attending: THORACIC SURGERY (CARDIOTHORACIC VASCULAR SURGERY) | Admitting: THORACIC SURGERY (CARDIOTHORACIC VASCULAR SURGERY)
Payer: MEDICARE

## 2025-02-05 DIAGNOSIS — I34.0 NONRHEUMATIC MITRAL VALVE REGURGITATION: ICD-10-CM

## 2025-02-05 DIAGNOSIS — Z98.890 S/P MVR (MITRAL VALVE REPAIR): ICD-10-CM

## 2025-02-05 DIAGNOSIS — Z95.1 S/P CABG (CORONARY ARTERY BYPASS GRAFT): Primary | ICD-10-CM

## 2025-02-05 PROBLEM — I25.10 CAD (CORONARY ARTERY DISEASE): Status: ACTIVE | Noted: 2025-02-05

## 2025-02-05 LAB
ABO GROUP BLD: NORMAL
ACT BLD: 141 SECONDS (ref 82–152)
ACT BLD: 153 SECONDS (ref 82–152)
ACT BLD: 371 SECONDS (ref 82–152)
ACT BLD: 400 SECONDS (ref 82–152)
ACT BLD: 417 SECONDS (ref 82–152)
ACT BLD: 533 SECONDS (ref 82–152)
ALBUMIN SERPL-MCNC: 3.4 G/DL (ref 3.5–5.2)
ALBUMIN SERPL-MCNC: 3.7 G/DL (ref 3.5–5.2)
ANION GAP SERPL CALCULATED.3IONS-SCNC: 7 MMOL/L (ref 5–15)
ANION GAP SERPL CALCULATED.3IONS-SCNC: 9.6 MMOL/L (ref 5–15)
APTT PPP: 37.8 SECONDS (ref 22.7–35.4)
ARTERIAL PATENCY WRIST A: ABNORMAL
ATMOSPHERIC PRESS: 746.3 MMHG
ATMOSPHERIC PRESS: 746.8 MMHG
ATMOSPHERIC PRESS: 750.2 MMHG
ATMOSPHERIC PRESS: 750.7 MMHG
ATMOSPHERIC PRESS: 753.3 MMHG
ATMOSPHERIC PRESS: 755.6 MMHG
B-OH-BUTYR SERPL-SCNC: 0.41 MMOL/L (ref 0.02–0.27)
BASE EXCESS BLDA CALC-SCNC: -12.4 MMOL/L (ref 0–2)
BASE EXCESS BLDA CALC-SCNC: -2 MMOL/L (ref -5–5)
BASE EXCESS BLDA CALC-SCNC: -2 MMOL/L (ref -5–5)
BASE EXCESS BLDA CALC-SCNC: -2 MMOL/L (ref 0–2)
BASE EXCESS BLDA CALC-SCNC: -2.9 MMOL/L (ref 0–2)
BASE EXCESS BLDA CALC-SCNC: -3 MMOL/L (ref -5–5)
BASE EXCESS BLDA CALC-SCNC: -3.6 MMOL/L (ref 0–2)
BASE EXCESS BLDA CALC-SCNC: -4 MMOL/L (ref -5–5)
BASE EXCESS BLDA CALC-SCNC: -9.3 MMOL/L (ref 0–2)
BASE EXCESS BLDA CALC-SCNC: 1 MMOL/L (ref -5–5)
BASE EXCESS BLDA CALC-SCNC: 3 MMOL/L (ref -5–5)
BASE EXCESS BLDV CALC-SCNC: -2.9 MMOL/L (ref -2–2)
BASOPHILS # BLD AUTO: 0.02 10*3/MM3 (ref 0–0.2)
BASOPHILS NFR BLD AUTO: 0.2 % (ref 0–1.5)
BDY SITE: ABNORMAL
BUN BLDA-MCNC: 19 MG/DL (ref 8–26)
BUN SERPL-MCNC: 15 MG/DL (ref 8–23)
BUN SERPL-MCNC: 17 MG/DL (ref 8–23)
BUN/CREAT SERPL: 10.5 (ref 7–25)
BUN/CREAT SERPL: 11.3 (ref 7–25)
CA-I BLDA-SCNC: 1.81 MMOL/L (ref 2.2–2.55)
CA-I SERPL ISE-MCNC: 1.21 MMOL/L (ref 1.15–1.35)
CALCIUM SPEC-SCNC: 8.2 MG/DL (ref 8.6–10.5)
CALCIUM SPEC-SCNC: 8.4 MG/DL (ref 8.6–10.5)
CHLORIDE BLDA-SCNC: 116 MMOL/L (ref 98–107)
CHLORIDE SERPL-SCNC: 109 MMOL/L (ref 98–107)
CHLORIDE SERPL-SCNC: 110 MMOL/L (ref 98–107)
CO2 BLDA-SCNC: 10.4 MMOL/L (ref 23–27)
CO2 BLDA-SCNC: 15.8 MMOL/L (ref 23–27)
CO2 BLDA-SCNC: 18.4 MMOL/L (ref 23–27)
CO2 BLDA-SCNC: 22.8 MMOL/L (ref 23–27)
CO2 BLDA-SCNC: 22.9 MMOL/L (ref 23–27)
CO2 BLDA-SCNC: 23 MMOL/L (ref 24–29)
CO2 BLDA-SCNC: 23 MMOL/L (ref 24–29)
CO2 BLDA-SCNC: 23.6 MMOL/L (ref 23–27)
CO2 BLDA-SCNC: 23.8 MMOL/L (ref 23–27)
CO2 BLDA-SCNC: 25 MMOL/L (ref 24–29)
CO2 BLDA-SCNC: 25 MMOL/L (ref 24–29)
CO2 BLDA-SCNC: 27 MMOL/L (ref 24–29)
CO2 BLDA-SCNC: 29 MMOL/L (ref 24–29)
CO2 SERPL-SCNC: 19.4 MMOL/L (ref 22–29)
CO2 SERPL-SCNC: 21 MMOL/L (ref 22–29)
CREAT BLDA-MCNC: 1.92 MG/DL (ref 0.6–1.3)
CREAT SERPL-MCNC: 1.43 MG/DL (ref 0.76–1.27)
CREAT SERPL-MCNC: 1.51 MG/DL (ref 0.76–1.27)
D-LACTATE SERPL-SCNC: 12 MMOL/L (ref 0.5–2)
D-LACTATE SERPL-SCNC: 13.3 MMOL/L
DEPRECATED RDW RBC AUTO: 40.1 FL (ref 37–54)
DEPRECATED RDW RBC AUTO: 40.6 FL (ref 37–54)
DEPRECATED RDW RBC AUTO: 44.1 FL (ref 37–54)
DEVICE COMMENT: ABNORMAL
EGFRCR SERPLBLD CKD-EPI 2021: 48.5 ML/MIN/1.73
EGFRCR SERPLBLD CKD-EPI 2021: 51.7 ML/MIN/1.73
EOSINOPHIL # BLD AUTO: 0.03 10*3/MM3 (ref 0–0.4)
EOSINOPHIL NFR BLD AUTO: 0.4 % (ref 0.3–6.2)
ERYTHROCYTE [DISTWIDTH] IN BLOOD BY AUTOMATED COUNT: 12.1 % (ref 12.3–15.4)
ERYTHROCYTE [DISTWIDTH] IN BLOOD BY AUTOMATED COUNT: 12.2 % (ref 12.3–15.4)
ERYTHROCYTE [DISTWIDTH] IN BLOOD BY AUTOMATED COUNT: 12.6 % (ref 12.3–15.4)
FIBRINOGEN PPP-MCNC: 185 MG/DL (ref 219–464)
FIBRINOGEN PPP-MCNC: 197 MG/DL (ref 219–464)
FIBRINOGEN PPP-MCNC: 219 MG/DL (ref 219–464)
GAS FLOW AIRWAY: 4 LPM
GAS FLOW AIRWAY: 5 LPM
GLUCOSE BLDC GLUCOMTR-MCNC: 100 MG/DL (ref 70–130)
GLUCOSE BLDC GLUCOMTR-MCNC: 101 MG/DL (ref 70–130)
GLUCOSE BLDC GLUCOMTR-MCNC: 108 MG/DL (ref 70–130)
GLUCOSE BLDC GLUCOMTR-MCNC: 114 MG/DL (ref 70–130)
GLUCOSE BLDC GLUCOMTR-MCNC: 116 MG/DL (ref 70–130)
GLUCOSE BLDC GLUCOMTR-MCNC: 118 MG/DL (ref 70–130)
GLUCOSE BLDC GLUCOMTR-MCNC: 118 MG/DL (ref 70–130)
GLUCOSE BLDC GLUCOMTR-MCNC: 126 MG/DL (ref 70–130)
GLUCOSE BLDC GLUCOMTR-MCNC: 134 MG/DL (ref 70–130)
GLUCOSE BLDC GLUCOMTR-MCNC: 136 MG/DL (ref 70–130)
GLUCOSE BLDC GLUCOMTR-MCNC: 140 MG/DL (ref 70–130)
GLUCOSE BLDC GLUCOMTR-MCNC: 167 MG/DL (ref 70–130)
GLUCOSE BLDC GLUCOMTR-MCNC: 172 MG/DL (ref 70–130)
GLUCOSE BLDC GLUCOMTR-MCNC: 177 MG/DL (ref 70–130)
GLUCOSE BLDC GLUCOMTR-MCNC: 178 MG/DL (ref 70–130)
GLUCOSE BLDC GLUCOMTR-MCNC: 194 MG/DL (ref 70–130)
GLUCOSE BLDC GLUCOMTR-MCNC: 199 MG/DL (ref 65–99)
GLUCOSE BLDC GLUCOMTR-MCNC: 41 MG/DL (ref 70–130)
GLUCOSE BLDC GLUCOMTR-MCNC: 75 MG/DL (ref 70–130)
GLUCOSE BLDC GLUCOMTR-MCNC: 92 MG/DL (ref 70–130)
GLUCOSE SERPL-MCNC: 125 MG/DL (ref 65–99)
GLUCOSE SERPL-MCNC: 41 MG/DL (ref 65–99)
HCO3 BLDA-SCNC: 14.7 MMOL/L (ref 22–28)
HCO3 BLDA-SCNC: 17.2 MMOL/L (ref 22–28)
HCO3 BLDA-SCNC: 21.8 MMOL/L (ref 22–26)
HCO3 BLDA-SCNC: 21.8 MMOL/L (ref 22–28)
HCO3 BLDA-SCNC: 22 MMOL/L (ref 22–26)
HCO3 BLDA-SCNC: 22.4 MMOL/L (ref 22–28)
HCO3 BLDA-SCNC: 22.5 MMOL/L (ref 22–28)
HCO3 BLDA-SCNC: 23.5 MMOL/L (ref 22–26)
HCO3 BLDA-SCNC: 23.6 MMOL/L (ref 22–26)
HCO3 BLDA-SCNC: 25.5 MMOL/L (ref 22–26)
HCO3 BLDA-SCNC: 27.6 MMOL/L (ref 22–26)
HCO3 BLDV-SCNC: 21.7 MMOL/L (ref 22–28)
HCT VFR BLD AUTO: 26.6 % (ref 37.5–51)
HCT VFR BLD AUTO: 29.7 % (ref 37.5–51)
HCT VFR BLD AUTO: 30.4 % (ref 37.5–51)
HCT VFR BLDA CALC: 23 % (ref 38–51)
HCT VFR BLDA CALC: 25 % (ref 38–51)
HCT VFR BLDA CALC: 25 % (ref 38–51)
HCT VFR BLDA CALC: 26 % (ref 38–51)
HCT VFR BLDA CALC: 27 % (ref 38–51)
HCT VFR BLDA CALC: 27 % (ref 38–51)
HCT VFR BLDA CALC: 28 % (ref 38–51)
HEMODILUTION: NO
HGB BLD-MCNC: 10.4 G/DL (ref 13–17.7)
HGB BLD-MCNC: 9.4 G/DL (ref 13–17.7)
HGB BLD-MCNC: 9.9 G/DL (ref 13–17.7)
HGB BLDA-MCNC: 7.8 G/DL (ref 12–17)
HGB BLDA-MCNC: 8.5 G/DL (ref 12–17)
HGB BLDA-MCNC: 8.5 G/DL (ref 12–17)
HGB BLDA-MCNC: 8.8 G/DL (ref 12–17)
HGB BLDA-MCNC: 9 G/DL (ref 12–17)
HGB BLDA-MCNC: 9.2 G/DL (ref 12–17)
HGB BLDA-MCNC: 9.5 G/DL (ref 12–17)
IMM GRANULOCYTES # BLD AUTO: 0.02 10*3/MM3 (ref 0–0.05)
IMM GRANULOCYTES NFR BLD AUTO: 0.2 % (ref 0–0.5)
INHALED O2 CONCENTRATION: 100 %
INHALED O2 CONCENTRATION: 40 %
INR PPP: 1.61 (ref 0.9–1.1)
INR PPP: 2.01 (ref 0.9–1.1)
LYMPHOCYTES # BLD AUTO: 0.44 10*3/MM3 (ref 0.7–3.1)
LYMPHOCYTES NFR BLD AUTO: 5.4 % (ref 19.6–45.3)
Lab: ABNORMAL
MAGNESIUM SERPL-MCNC: 2.7 MG/DL (ref 1.6–2.4)
MAGNESIUM SERPL-MCNC: 3.1 MG/DL (ref 1.6–2.4)
MCH RBC QN AUTO: 31.4 PG (ref 26.6–33)
MCH RBC QN AUTO: 32.4 PG (ref 26.6–33)
MCH RBC QN AUTO: 32.5 PG (ref 26.6–33)
MCHC RBC AUTO-ENTMCNC: 32.6 G/DL (ref 31.5–35.7)
MCHC RBC AUTO-ENTMCNC: 35 G/DL (ref 31.5–35.7)
MCHC RBC AUTO-ENTMCNC: 35.3 G/DL (ref 31.5–35.7)
MCV RBC AUTO: 92 FL (ref 79–97)
MCV RBC AUTO: 92.5 FL (ref 79–97)
MCV RBC AUTO: 96.5 FL (ref 79–97)
MODALITY: ABNORMAL
MONOCYTES # BLD AUTO: 0.03 10*3/MM3 (ref 0.1–0.9)
MONOCYTES NFR BLD AUTO: 0.4 % (ref 5–12)
NEUTROPHILS NFR BLD AUTO: 7.59 10*3/MM3 (ref 1.7–7)
NEUTROPHILS NFR BLD AUTO: 93.4 % (ref 42.7–76)
NOTIFIED WHO: ABNORMAL
NRBC BLD AUTO-RTO: 0 /100 WBC (ref 0–0.2)
NT-PROBNP SERPL-MCNC: 1551 PG/ML (ref 0–900)
O2 A-A PPRESDIFF RESPIRATORY: 0.7 MMHG
PCO2 BLDA: 35.3 MM HG (ref 35–45)
PCO2 BLDA: 36.2 MM HG (ref 35–45)
PCO2 BLDA: 36.3 MM HG (ref 35–45)
PCO2 BLDA: 37.6 MM HG (ref 35–45)
PCO2 BLDA: 39.2 MM HG (ref 35–45)
PCO2 BLDA: 39.3 MM HG (ref 35–45)
PCO2 BLDA: 40.2 MM HG (ref 35–45)
PCO2 BLDA: 41.1 MM HG (ref 35–45)
PCO2 BLDA: 41.6 MM HG (ref 35–45)
PCO2 BLDA: 44.2 MM HG (ref 35–45)
PCO2 BLDA: 45.1 MM HG (ref 35–45)
PCO2 BLDV: 35.6 MM HG (ref 41–51)
PEEP RESPIRATORY: 5 CM[H2O]
PH BLDA: 7.2 PH UNITS (ref 7.35–7.45)
PH BLDA: 7.25 PH UNITS (ref 7.35–7.45)
PH BLDA: 7.31 PH UNITS (ref 7.35–7.45)
PH BLDA: 7.36 PH UNITS (ref 7.35–7.6)
PH BLDA: 7.39 PH UNITS (ref 7.35–7.45)
PH BLDA: 7.4 PH UNITS (ref 7.35–7.45)
PH BLDA: 7.4 PH UNITS (ref 7.35–7.6)
PH BLDA: 7.41 PH UNITS (ref 7.35–7.6)
PH BLDA: 7.41 PH UNITS (ref 7.35–7.6)
PH BLDA: 7.43 PH UNITS (ref 7.35–7.6)
PH BLDA: 7.44 PH UNITS (ref 7.35–7.6)
PH BLDV: 7.39 PH UNITS (ref 7.31–7.41)
PHOSPHATE SERPL-MCNC: 0.4 MG/DL (ref 2.5–4.5)
PHOSPHATE SERPL-MCNC: 1.5 MG/DL (ref 2.5–4.5)
PLATELET # BLD AUTO: 106 10*3/MM3 (ref 140–450)
PLATELET # BLD AUTO: 132 10*3/MM3 (ref 140–450)
PLATELET # BLD AUTO: 146 10*3/MM3 (ref 140–450)
PLATELET # BLD AUTO: 179 10*3/MM3 (ref 140–450)
PMV BLD AUTO: 10 FL (ref 6–12)
PMV BLD AUTO: 10.5 FL (ref 6–12)
PMV BLD AUTO: 9.7 FL (ref 6–12)
PO2 BLD: 422 MM[HG] (ref 0–500)
PO2 BLD: 425 MM[HG] (ref 0–500)
PO2 BLD: 531 MM[HG] (ref 0–500)
PO2 BLDA: 166 MM HG (ref 80–100)
PO2 BLDA: 168.8 MM HG (ref 80–100)
PO2 BLDA: 169.8 MM HG (ref 80–100)
PO2 BLDA: 191.7 MM HG (ref 80–100)
PO2 BLDA: 248 MMHG (ref 80–105)
PO2 BLDA: 436 MMHG (ref 80–105)
PO2 BLDA: 494 MMHG (ref 80–105)
PO2 BLDA: 530.6 MM HG (ref 80–100)
PO2 BLDA: 538 MMHG (ref 80–105)
PO2 BLDA: 542 MMHG (ref 80–105)
PO2 BLDA: 59 MMHG (ref 80–105)
PO2 BLDV: 31.3 MM HG (ref 35–45)
POTASSIUM BLDA-SCNC: 3.7 MMOL/L (ref 3.5–4.9)
POTASSIUM BLDA-SCNC: 3.8 MMOL/L (ref 3.5–4.9)
POTASSIUM BLDA-SCNC: 4.5 MMOL/L (ref 3.5–5.2)
POTASSIUM BLDA-SCNC: 4.7 MMOL/L (ref 3.5–4.9)
POTASSIUM BLDA-SCNC: 5 MMOL/L (ref 3.5–4.9)
POTASSIUM BLDA-SCNC: 5.6 MMOL/L (ref 3.5–4.9)
POTASSIUM BLDA-SCNC: 5.7 MMOL/L (ref 3.5–4.9)
POTASSIUM SERPL-SCNC: 3.7 MMOL/L (ref 3.5–5.2)
POTASSIUM SERPL-SCNC: 3.8 MMOL/L (ref 3.5–5.2)
POTASSIUM SERPL-SCNC: 4.5 MMOL/L (ref 3.5–5.2)
PROTHROMBIN TIME: 19.2 SECONDS (ref 11.7–14.2)
PROTHROMBIN TIME: 22.9 SECONDS (ref 11.7–14.2)
QT INTERVAL: 429 MS
QTC INTERVAL: 383 MS
RBC # BLD AUTO: 2.89 10*6/MM3 (ref 4.14–5.8)
RBC # BLD AUTO: 3.15 10*6/MM3 (ref 4.14–5.8)
RBC # BLD AUTO: 3.21 10*6/MM3 (ref 4.14–5.8)
READ BACK: YES
RH BLD: POSITIVE
SAO2 % BLDA: 100 % (ref 95–98)
SAO2 % BLDA: 90 % (ref 95–98)
SAO2 % BLDCOA: 100 % (ref 92–98.5)
SAO2 % BLDCOA: 99.2 % (ref 92–98.5)
SAO2 % BLDCOA: 99.4 % (ref 92–98.5)
SAO2 % BLDCOA: 99.4 % (ref 92–98.5)
SAO2 % BLDCOA: 99.5 % (ref 92–98.5)
SAO2 % BLDCOV: 60.3 % (ref 45–75)
SET MECH RESP RATE: 12
SET MECH RESP RATE: 15
SODIUM BLD-SCNC: 145 MMOL/L (ref 136–145)
SODIUM SERPL-SCNC: 137 MMOL/L (ref 136–145)
SODIUM SERPL-SCNC: 139 MMOL/L (ref 136–145)
TOTAL RATE: 15 BREATHS/MINUTE
TOTAL RATE: 16 BREATHS/MINUTE
TOTAL RATE: 16 BREATHS/MINUTE
VENTILATOR MODE: AC
VT ON VENT VENT: 650 ML
WBC NRBC COR # BLD AUTO: 14.96 10*3/MM3 (ref 3.4–10.8)
WBC NRBC COR # BLD AUTO: 26.27 10*3/MM3 (ref 3.4–10.8)
WBC NRBC COR # BLD AUTO: 8.13 10*3/MM3 (ref 3.4–10.8)

## 2025-02-05 PROCEDURE — C1751 CATH, INF, PER/CENT/MIDLINE: HCPCS | Performed by: ANESTHESIOLOGY

## 2025-02-05 PROCEDURE — 82803 BLOOD GASES ANY COMBINATION: CPT

## 2025-02-05 PROCEDURE — 99291 CRITICAL CARE FIRST HOUR: CPT | Performed by: ANESTHESIOLOGY

## 2025-02-05 PROCEDURE — 82010 KETONE BODYS QUAN: CPT | Performed by: ANESTHESIOLOGY

## 2025-02-05 PROCEDURE — 82330 ASSAY OF CALCIUM: CPT | Performed by: NURSE PRACTITIONER

## 2025-02-05 PROCEDURE — 80069 RENAL FUNCTION PANEL: CPT | Performed by: NURSE PRACTITIONER

## 2025-02-05 PROCEDURE — 25010000002 CALCIUM GLUCONATE-NACL 1-0.675 GM/50ML-% SOLUTION: Performed by: ANESTHESIOLOGY

## 2025-02-05 PROCEDURE — B24BZZ4 ULTRASONOGRAPHY OF HEART WITH AORTA, TRANSESOPHAGEAL: ICD-10-PCS | Performed by: ANESTHESIOLOGY

## 2025-02-05 PROCEDURE — C1729 CATH, DRAINAGE: HCPCS | Performed by: THORACIC SURGERY (CARDIOTHORACIC VASCULAR SURGERY)

## 2025-02-05 PROCEDURE — 85384 FIBRINOGEN ACTIVITY: CPT | Performed by: NURSE PRACTITIONER

## 2025-02-05 PROCEDURE — 25010000002 HEPARIN (PORCINE) PER 1000 UNITS: Performed by: THORACIC SURGERY (CARDIOTHORACIC VASCULAR SURGERY)

## 2025-02-05 PROCEDURE — 85018 HEMOGLOBIN: CPT

## 2025-02-05 PROCEDURE — 85384 FIBRINOGEN ACTIVITY: CPT | Performed by: ANESTHESIOLOGY

## 2025-02-05 PROCEDURE — 94799 UNLISTED PULMONARY SVC/PX: CPT

## 2025-02-05 PROCEDURE — 83735 ASSAY OF MAGNESIUM: CPT | Performed by: NURSE PRACTITIONER

## 2025-02-05 PROCEDURE — 85027 COMPLETE CBC AUTOMATED: CPT | Performed by: THORACIC SURGERY (CARDIOTHORACIC VASCULAR SURGERY)

## 2025-02-05 PROCEDURE — 02L70CK OCCLUSION OF LEFT ATRIAL APPENDAGE WITH EXTRALUMINAL DEVICE, OPEN APPROACH: ICD-10-PCS | Performed by: THORACIC SURGERY (CARDIOTHORACIC VASCULAR SURGERY)

## 2025-02-05 PROCEDURE — 25010000002 ALBUMIN HUMAN 5% PER 50 ML: Performed by: NURSE PRACTITIONER

## 2025-02-05 PROCEDURE — 85014 HEMATOCRIT: CPT

## 2025-02-05 PROCEDURE — 83605 ASSAY OF LACTIC ACID: CPT

## 2025-02-05 PROCEDURE — 5A12012 PERFORMANCE OF CARDIAC OUTPUT, SINGLE, MANUAL: ICD-10-PCS | Performed by: THORACIC SURGERY (CARDIOTHORACIC VASCULAR SURGERY)

## 2025-02-05 PROCEDURE — 25010000002 CEFAZOLIN PER 500 MG: Performed by: PHYSICIAN ASSISTANT

## 2025-02-05 PROCEDURE — 80047 BASIC METABLC PNL IONIZED CA: CPT

## 2025-02-05 PROCEDURE — P9041 ALBUMIN (HUMAN),5%, 50ML: HCPCS | Performed by: NURSE PRACTITIONER

## 2025-02-05 PROCEDURE — 86901 BLOOD TYPING SEROLOGIC RH(D): CPT

## 2025-02-05 PROCEDURE — 25010000002 LIDOCAINE 2% SOLUTION: Performed by: ANESTHESIOLOGY

## 2025-02-05 PROCEDURE — 25010000002 NITROGLYCERIN 200 MCG/ML SOLUTION: Performed by: ANESTHESIOLOGY

## 2025-02-05 PROCEDURE — 63710000001 INSULIN REGULAR HUMAN PER 5 UNITS: Performed by: ANESTHESIOLOGY

## 2025-02-05 PROCEDURE — 25010000002 POTASSIUM CHLORIDE PER 2 MEQ: Performed by: THORACIC SURGERY (CARDIOTHORACIC VASCULAR SURGERY)

## 2025-02-05 PROCEDURE — 25010000002 PROPOFOL 10 MG/ML EMULSION: Performed by: ANESTHESIOLOGY

## 2025-02-05 PROCEDURE — 25010000002 VASOPRESSIN 20 UNIT/ML SOLUTION

## 2025-02-05 PROCEDURE — 33533 CABG ARTERIAL SINGLE: CPT | Performed by: THORACIC SURGERY (CARDIOTHORACIC VASCULAR SURGERY)

## 2025-02-05 PROCEDURE — 25010000002 PROTAMINE SULFATE PER 10 MG: Performed by: ANESTHESIOLOGY

## 2025-02-05 PROCEDURE — 82947 ASSAY GLUCOSE BLOOD QUANT: CPT

## 2025-02-05 PROCEDURE — 06BP4ZZ EXCISION OF RIGHT SAPHENOUS VEIN, PERCUTANEOUS ENDOSCOPIC APPROACH: ICD-10-PCS | Performed by: THORACIC SURGERY (CARDIOTHORACIC VASCULAR SURGERY)

## 2025-02-05 PROCEDURE — 25810000003 SODIUM CHLORIDE 0.9 % SOLUTION 250 ML FLEX CONT: Performed by: ANESTHESIOLOGY

## 2025-02-05 PROCEDURE — C1889 IMPLANT/INSERT DEVICE, NOC: HCPCS | Performed by: THORACIC SURGERY (CARDIOTHORACIC VASCULAR SURGERY)

## 2025-02-05 PROCEDURE — 85610 PROTHROMBIN TIME: CPT | Performed by: ANESTHESIOLOGY

## 2025-02-05 PROCEDURE — 85384 FIBRINOGEN ACTIVITY: CPT | Performed by: THORACIC SURGERY (CARDIOTHORACIC VASCULAR SURGERY)

## 2025-02-05 PROCEDURE — C1713 ANCHOR/SCREW BN/BN,TIS/BN: HCPCS | Performed by: THORACIC SURGERY (CARDIOTHORACIC VASCULAR SURGERY)

## 2025-02-05 PROCEDURE — 33641 REPAIR HEART SEPTUM DEFECT: CPT | Performed by: THORACIC SURGERY (CARDIOTHORACIC VASCULAR SURGERY)

## 2025-02-05 PROCEDURE — 02100Z9 BYPASS CORONARY ARTERY, ONE ARTERY FROM LEFT INTERNAL MAMMARY, OPEN APPROACH: ICD-10-PCS | Performed by: THORACIC SURGERY (CARDIOTHORACIC VASCULAR SURGERY)

## 2025-02-05 PROCEDURE — 33517 CABG ARTERY-VEIN SINGLE: CPT | Performed by: THORACIC SURGERY (CARDIOTHORACIC VASCULAR SURGERY)

## 2025-02-05 PROCEDURE — 71045 X-RAY EXAM CHEST 1 VIEW: CPT

## 2025-02-05 PROCEDURE — 82948 REAGENT STRIP/BLOOD GLUCOSE: CPT

## 2025-02-05 PROCEDURE — 85049 AUTOMATED PLATELET COUNT: CPT | Performed by: THORACIC SURGERY (CARDIOTHORACIC VASCULAR SURGERY)

## 2025-02-05 PROCEDURE — 25010000002 PHENYLEPHRINE 10 MG/ML SOLUTION 5 ML VIAL: Performed by: ANESTHESIOLOGY

## 2025-02-05 PROCEDURE — 5A1221Z PERFORMANCE OF CARDIAC OUTPUT, CONTINUOUS: ICD-10-PCS | Performed by: THORACIC SURGERY (CARDIOTHORACIC VASCULAR SURGERY)

## 2025-02-05 PROCEDURE — 93010 ELECTROCARDIOGRAM REPORT: CPT | Performed by: INTERNAL MEDICINE

## 2025-02-05 PROCEDURE — 25010000002 NICARDIPINE 2.5 MG/ML SOLUTION 10 ML VIAL: Performed by: ANESTHESIOLOGY

## 2025-02-05 PROCEDURE — A4648 IMPLANTABLE TISSUE MARKER: HCPCS | Performed by: THORACIC SURGERY (CARDIOTHORACIC VASCULAR SURGERY)

## 2025-02-05 PROCEDURE — 25010000002 PAPAVERINE PER 60 MG: Performed by: THORACIC SURGERY (CARDIOTHORACIC VASCULAR SURGERY)

## 2025-02-05 PROCEDURE — 021009W BYPASS CORONARY ARTERY, ONE ARTERY FROM AORTA WITH AUTOLOGOUS VENOUS TISSUE, OPEN APPROACH: ICD-10-PCS | Performed by: THORACIC SURGERY (CARDIOTHORACIC VASCULAR SURGERY)

## 2025-02-05 PROCEDURE — 02Q50ZZ REPAIR ATRIAL SEPTUM, OPEN APPROACH: ICD-10-PCS | Performed by: THORACIC SURGERY (CARDIOTHORACIC VASCULAR SURGERY)

## 2025-02-05 PROCEDURE — 25010000002 METOCLOPRAMIDE PER 10 MG: Performed by: NURSE PRACTITIONER

## 2025-02-05 PROCEDURE — 02BG0ZZ EXCISION OF MITRAL VALVE, OPEN APPROACH: ICD-10-PCS | Performed by: THORACIC SURGERY (CARDIOTHORACIC VASCULAR SURGERY)

## 2025-02-05 PROCEDURE — 25010000002 HEPARIN (PORCINE) PER 1000 UNITS: Performed by: ANESTHESIOLOGY

## 2025-02-05 PROCEDURE — 85027 COMPLETE CBC AUTOMATED: CPT | Performed by: NURSE PRACTITIONER

## 2025-02-05 PROCEDURE — 33427 REPAIR OF MITRAL VALVE: CPT | Performed by: THORACIC SURGERY (CARDIOTHORACIC VASCULAR SURGERY)

## 2025-02-05 PROCEDURE — 25010000002 CEFAZOLIN PER 500 MG: Performed by: NURSE PRACTITIONER

## 2025-02-05 PROCEDURE — 93005 ELECTROCARDIOGRAM TRACING: CPT | Performed by: NURSE PRACTITIONER

## 2025-02-05 PROCEDURE — 25010000002 ACETAMINOPHEN 10 MG/ML SOLUTION: Performed by: NURSE PRACTITIONER

## 2025-02-05 PROCEDURE — 85730 THROMBOPLASTIN TIME PARTIAL: CPT | Performed by: NURSE PRACTITIONER

## 2025-02-05 PROCEDURE — 85025 COMPLETE CBC W/AUTO DIFF WBC: CPT | Performed by: NURSE PRACTITIONER

## 2025-02-05 PROCEDURE — 25010000002 EPINEPHRINE PER 0.1 MG: Performed by: ANESTHESIOLOGY

## 2025-02-05 PROCEDURE — 88305 TISSUE EXAM BY PATHOLOGIST: CPT | Performed by: THORACIC SURGERY (CARDIOTHORACIC VASCULAR SURGERY)

## 2025-02-05 PROCEDURE — 86900 BLOOD TYPING SEROLOGIC ABO: CPT

## 2025-02-05 PROCEDURE — 33508 ENDOSCOPIC VEIN HARVEST: CPT | Performed by: THORACIC SURGERY (CARDIOTHORACIC VASCULAR SURGERY)

## 2025-02-05 PROCEDURE — 94002 VENT MGMT INPAT INIT DAY: CPT

## 2025-02-05 PROCEDURE — 85347 COAGULATION TIME ACTIVATED: CPT

## 2025-02-05 PROCEDURE — 84132 ASSAY OF SERUM POTASSIUM: CPT | Performed by: THORACIC SURGERY (CARDIOTHORACIC VASCULAR SURGERY)

## 2025-02-05 PROCEDURE — 83880 ASSAY OF NATRIURETIC PEPTIDE: CPT | Performed by: THORACIC SURGERY (CARDIOTHORACIC VASCULAR SURGERY)

## 2025-02-05 PROCEDURE — 85610 PROTHROMBIN TIME: CPT | Performed by: NURSE PRACTITIONER

## 2025-02-05 PROCEDURE — 93318 ECHO TRANSESOPHAGEAL INTRAOP: CPT | Performed by: ANESTHESIOLOGY

## 2025-02-05 PROCEDURE — 25010000002 MAGNESIUM SULFATE PER 500 MG OF MAGNESIUM: Performed by: ANESTHESIOLOGY

## 2025-02-05 PROCEDURE — 02UG0JZ SUPPLEMENT MITRAL VALVE WITH SYNTHETIC SUBSTITUTE, OPEN APPROACH: ICD-10-PCS | Performed by: THORACIC SURGERY (CARDIOTHORACIC VASCULAR SURGERY)

## 2025-02-05 DEVICE — BND ANULPLSTY SIMUPLUS 38MM: Type: IMPLANTABLE DEVICE | Site: HEART | Status: FUNCTIONAL

## 2025-02-05 DEVICE — SS SUTURE, 4 PER SLEEVE
Type: IMPLANTABLE DEVICE | Site: STERNUM | Status: FUNCTIONAL
Brand: MYO/WIRE II

## 2025-02-05 DEVICE — COR-KNOT MINI® COMBO KITBASE PACKAGE TYPE - KITEACH STERILE PACKAGE KIT CONTAINS (2) SINGLE PATIENT USE COR-KNOT MINI® DEVICES AND (12) COR-KNOT® QUICK LOADS®.
Type: IMPLANTABLE DEVICE | Site: STERNUM | Status: FUNCTIONAL
Brand: COR-KNOT MINI®

## 2025-02-05 DEVICE — APPL CLIP LAA ATRICLIP FLXV 45MM: Type: IMPLANTABLE DEVICE | Site: HEART | Status: FUNCTIONAL

## 2025-02-05 DEVICE — SS SUTURE, 3 PER SLEEVE
Type: IMPLANTABLE DEVICE | Site: STERNUM | Status: FUNCTIONAL
Brand: MYO/WIRE II

## 2025-02-05 RX ORDER — ACETAMINOPHEN 325 MG/1
650 TABLET ORAL EVERY 4 HOURS PRN
Status: DISCONTINUED | OUTPATIENT
Start: 2025-02-06 | End: 2025-02-12 | Stop reason: HOSPADM

## 2025-02-05 RX ORDER — OXYCODONE HYDROCHLORIDE 10 MG/1
10 TABLET ORAL EVERY 4 HOURS PRN
Status: ACTIVE | OUTPATIENT
Start: 2025-02-05 | End: 2025-02-06

## 2025-02-05 RX ORDER — SODIUM CHLORIDE 9 MG/ML
INJECTION, SOLUTION INTRAVENOUS CONTINUOUS PRN
Status: DISCONTINUED | OUTPATIENT
Start: 2025-02-05 | End: 2025-02-05 | Stop reason: SURG

## 2025-02-05 RX ORDER — ROCURONIUM BROMIDE 10 MG/ML
INJECTION, SOLUTION INTRAVENOUS AS NEEDED
Status: DISCONTINUED | OUTPATIENT
Start: 2025-02-05 | End: 2025-02-05 | Stop reason: SURG

## 2025-02-05 RX ORDER — BISACODYL 5 MG/1
10 TABLET, DELAYED RELEASE ORAL DAILY PRN
Status: DISCONTINUED | OUTPATIENT
Start: 2025-02-05 | End: 2025-02-08 | Stop reason: SDUPTHER

## 2025-02-05 RX ORDER — PANTOPRAZOLE SODIUM 40 MG/1
40 TABLET, DELAYED RELEASE ORAL EVERY MORNING
Status: DISCONTINUED | OUTPATIENT
Start: 2025-02-06 | End: 2025-02-12 | Stop reason: HOSPADM

## 2025-02-05 RX ORDER — ACETAMINOPHEN 325 MG/1
650 TABLET ORAL EVERY 4 HOURS
Status: ACTIVE | OUTPATIENT
Start: 2025-02-05 | End: 2025-02-06

## 2025-02-05 RX ORDER — BISACODYL 10 MG
10 SUPPOSITORY, RECTAL RECTAL DAILY PRN
Status: DISCONTINUED | OUTPATIENT
Start: 2025-02-06 | End: 2025-02-12 | Stop reason: HOSPADM

## 2025-02-05 RX ORDER — ALBUMIN HUMAN 50 G/1000ML
1500 SOLUTION INTRAVENOUS AS NEEDED
Status: DISPENSED | OUTPATIENT
Start: 2025-02-05 | End: 2025-02-06

## 2025-02-05 RX ORDER — AMINOCAPROIC ACID 250 MG/ML
INJECTION, SOLUTION INTRAVENOUS AS NEEDED
Status: DISCONTINUED | OUTPATIENT
Start: 2025-02-05 | End: 2025-02-05 | Stop reason: SURG

## 2025-02-05 RX ORDER — DEXMEDETOMIDINE HYDROCHLORIDE 4 UG/ML
.2-1.5 INJECTION, SOLUTION INTRAVENOUS
Status: DISCONTINUED | OUTPATIENT
Start: 2025-02-05 | End: 2025-02-06

## 2025-02-05 RX ORDER — FENTANYL/ROPIVACAINE/NS/PF 2-625MCG/1
15 PLASTIC BAG, INJECTION (ML) EPIDURAL
Status: COMPLETED | OUTPATIENT
Start: 2025-02-05 | End: 2025-02-05

## 2025-02-05 RX ORDER — NICOTINE POLACRILEX 4 MG
15 LOZENGE BUCCAL
Status: DISCONTINUED | OUTPATIENT
Start: 2025-02-05 | End: 2025-02-06

## 2025-02-05 RX ORDER — MAGNESIUM SULFATE 1 G/100ML
1 INJECTION INTRAVENOUS EVERY 8 HOURS
Status: DISPENSED | OUTPATIENT
Start: 2025-02-05 | End: 2025-02-06

## 2025-02-05 RX ORDER — ACETAMINOPHEN 10 MG/ML
1000 INJECTION, SOLUTION INTRAVENOUS EVERY 8 HOURS
Status: COMPLETED | OUTPATIENT
Start: 2025-02-05 | End: 2025-02-06

## 2025-02-05 RX ORDER — LORAZEPAM 2 MG/ML
INJECTION INTRAMUSCULAR
Status: DISCONTINUED
Start: 2025-02-05 | End: 2025-02-05 | Stop reason: WASHOUT

## 2025-02-05 RX ORDER — MAGNESIUM HYDROXIDE 1200 MG/15ML
LIQUID ORAL AS NEEDED
Status: DISCONTINUED | OUTPATIENT
Start: 2025-02-05 | End: 2025-02-05 | Stop reason: HOSPADM

## 2025-02-05 RX ORDER — OXYCODONE HYDROCHLORIDE 10 MG/1
10 TABLET ORAL EVERY 4 HOURS PRN
Status: DISCONTINUED | OUTPATIENT
Start: 2025-02-05 | End: 2025-02-05

## 2025-02-05 RX ORDER — PROPOFOL 10 MG/ML
VIAL (ML) INTRAVENOUS AS NEEDED
Status: DISCONTINUED | OUTPATIENT
Start: 2025-02-05 | End: 2025-02-05 | Stop reason: SURG

## 2025-02-05 RX ORDER — DEXTROSE MONOHYDRATE 25 G/50ML
10-50 INJECTION, SOLUTION INTRAVENOUS
Status: DISCONTINUED | OUTPATIENT
Start: 2025-02-05 | End: 2025-02-06

## 2025-02-05 RX ORDER — DEXTROSE MONOHYDRATE 100 MG/ML
30 INJECTION, SOLUTION INTRAVENOUS CONTINUOUS
Status: DISCONTINUED | OUTPATIENT
Start: 2025-02-05 | End: 2025-02-06

## 2025-02-05 RX ORDER — IBUPROFEN 600 MG/1
1 TABLET ORAL
Status: DISCONTINUED | OUTPATIENT
Start: 2025-02-05 | End: 2025-02-06

## 2025-02-05 RX ORDER — METHADONE HYDROCHLORIDE 10 MG/ML
INJECTION, SOLUTION INTRAMUSCULAR; INTRAVENOUS; SUBCUTANEOUS AS NEEDED
Status: DISCONTINUED | OUTPATIENT
Start: 2025-02-05 | End: 2025-02-05 | Stop reason: SURG

## 2025-02-05 RX ORDER — MORPHINE SULFATE 2 MG/ML
4 INJECTION, SOLUTION INTRAMUSCULAR; INTRAVENOUS
Status: DISCONTINUED | OUTPATIENT
Start: 2025-02-05 | End: 2025-02-12 | Stop reason: HOSPADM

## 2025-02-05 RX ORDER — ACETAMINOPHEN 650 MG/1
650 SUPPOSITORY RECTAL EVERY 4 HOURS PRN
Status: DISCONTINUED | OUTPATIENT
Start: 2025-02-06 | End: 2025-02-12 | Stop reason: HOSPADM

## 2025-02-05 RX ORDER — POLYETHYLENE GLYCOL 3350 17 G/17G
17 POWDER, FOR SOLUTION ORAL DAILY PRN
Status: DISCONTINUED | OUTPATIENT
Start: 2025-02-05 | End: 2025-02-12 | Stop reason: HOSPADM

## 2025-02-05 RX ORDER — CYCLOBENZAPRINE HCL 10 MG
10 TABLET ORAL EVERY 8 HOURS PRN
Status: DISCONTINUED | OUTPATIENT
Start: 2025-02-06 | End: 2025-02-12 | Stop reason: HOSPADM

## 2025-02-05 RX ORDER — CALCIUM GLUCONATE 20 MG/ML
1000 INJECTION, SOLUTION INTRAVENOUS ONCE
Status: COMPLETED | OUTPATIENT
Start: 2025-02-05 | End: 2025-02-05

## 2025-02-05 RX ORDER — ATORVASTATIN CALCIUM 20 MG/1
40 TABLET, FILM COATED ORAL NIGHTLY
Status: DISCONTINUED | OUTPATIENT
Start: 2025-02-05 | End: 2025-02-12 | Stop reason: HOSPADM

## 2025-02-05 RX ORDER — NOREPINEPHRINE BITARTRATE 0.03 MG/ML
.02-.2 INJECTION, SOLUTION INTRAVENOUS CONTINUOUS PRN
Status: DISCONTINUED | OUTPATIENT
Start: 2025-02-05 | End: 2025-02-08

## 2025-02-05 RX ORDER — NITROGLYCERIN 0.4 MG/1
0.4 TABLET SUBLINGUAL
Status: DISCONTINUED | OUTPATIENT
Start: 2025-02-05 | End: 2025-02-12 | Stop reason: HOSPADM

## 2025-02-05 RX ORDER — NITROGLYCERIN 20 MG/100ML
5-200 INJECTION INTRAVENOUS
Status: DISCONTINUED | OUTPATIENT
Start: 2025-02-05 | End: 2025-02-06

## 2025-02-05 RX ORDER — CHLORHEXIDINE GLUCONATE ORAL RINSE 1.2 MG/ML
15 SOLUTION DENTAL EVERY 12 HOURS
Status: DISCONTINUED | OUTPATIENT
Start: 2025-02-05 | End: 2025-02-09

## 2025-02-05 RX ORDER — ALPRAZOLAM 0.25 MG
0.25 TABLET ORAL EVERY 8 HOURS PRN
Status: DISCONTINUED | OUTPATIENT
Start: 2025-02-05 | End: 2025-02-12 | Stop reason: HOSPADM

## 2025-02-05 RX ORDER — MILRINONE LACTATE 0.2 MG/ML
.25-.375 INJECTION, SOLUTION INTRAVENOUS CONTINUOUS PRN
Status: DISCONTINUED | OUTPATIENT
Start: 2025-02-05 | End: 2025-02-08

## 2025-02-05 RX ORDER — MORPHINE SULFATE 2 MG/ML
1 INJECTION, SOLUTION INTRAMUSCULAR; INTRAVENOUS EVERY 4 HOURS PRN
Status: ACTIVE | OUTPATIENT
Start: 2025-02-05 | End: 2025-02-12

## 2025-02-05 RX ORDER — MIDAZOLAM HYDROCHLORIDE 1 MG/ML
2 INJECTION, SOLUTION INTRAMUSCULAR; INTRAVENOUS
Status: DISCONTINUED | OUTPATIENT
Start: 2025-02-05 | End: 2025-02-06

## 2025-02-05 RX ORDER — ACETAMINOPHEN 160 MG/5ML
650 SOLUTION ORAL EVERY 4 HOURS PRN
Status: DISCONTINUED | OUTPATIENT
Start: 2025-02-06 | End: 2025-02-12 | Stop reason: HOSPADM

## 2025-02-05 RX ORDER — PANTOPRAZOLE SODIUM 40 MG/10ML
40 INJECTION, POWDER, LYOPHILIZED, FOR SOLUTION INTRAVENOUS ONCE
Status: COMPLETED | OUTPATIENT
Start: 2025-02-05 | End: 2025-02-05

## 2025-02-05 RX ORDER — HEPARIN SODIUM 1000 [USP'U]/ML
INJECTION, SOLUTION INTRAVENOUS; SUBCUTANEOUS AS NEEDED
Status: DISCONTINUED | OUTPATIENT
Start: 2025-02-05 | End: 2025-02-05 | Stop reason: SURG

## 2025-02-05 RX ORDER — NITROGLYCERIN 20 MG/100ML
INJECTION INTRAVENOUS AS NEEDED
Status: DISCONTINUED | OUTPATIENT
Start: 2025-02-05 | End: 2025-02-05 | Stop reason: SURG

## 2025-02-05 RX ORDER — ENOXAPARIN SODIUM 100 MG/ML
40 INJECTION SUBCUTANEOUS DAILY
Status: DISCONTINUED | OUTPATIENT
Start: 2025-02-06 | End: 2025-02-11

## 2025-02-05 RX ORDER — ACETAMINOPHEN 650 MG/1
650 SUPPOSITORY RECTAL EVERY 4 HOURS
Status: ACTIVE | OUTPATIENT
Start: 2025-02-05 | End: 2025-02-06

## 2025-02-05 RX ORDER — NALOXONE HCL 0.4 MG/ML
0.4 VIAL (ML) INJECTION
Status: DISCONTINUED | OUTPATIENT
Start: 2025-02-05 | End: 2025-02-12 | Stop reason: HOSPADM

## 2025-02-05 RX ORDER — MAGNESIUM SULFATE HEPTAHYDRATE 500 MG/ML
INJECTION, SOLUTION INTRAMUSCULAR; INTRAVENOUS AS NEEDED
Status: DISCONTINUED | OUTPATIENT
Start: 2025-02-05 | End: 2025-02-05 | Stop reason: SURG

## 2025-02-05 RX ORDER — METOPROLOL TARTRATE 25 MG/1
12.5 TABLET, FILM COATED ORAL EVERY 12 HOURS SCHEDULED
Status: DISCONTINUED | OUTPATIENT
Start: 2025-02-06 | End: 2025-02-06

## 2025-02-05 RX ORDER — CHLORHEXIDINE GLUCONATE ORAL RINSE 1.2 MG/ML
15 SOLUTION DENTAL ONCE
Status: COMPLETED | OUTPATIENT
Start: 2025-02-05 | End: 2025-02-05

## 2025-02-05 RX ORDER — BUPIVACAINE HCL/0.9 % NACL/PF 0.125 %
PLASTIC BAG, INJECTION (ML) EPIDURAL AS NEEDED
Status: DISCONTINUED | OUTPATIENT
Start: 2025-02-05 | End: 2025-02-05 | Stop reason: SURG

## 2025-02-05 RX ORDER — PROTAMINE SULFATE 10 MG/ML
INJECTION, SOLUTION INTRAVENOUS AS NEEDED
Status: DISCONTINUED | OUTPATIENT
Start: 2025-02-05 | End: 2025-02-05 | Stop reason: SURG

## 2025-02-05 RX ORDER — DOPAMINE HYDROCHLORIDE 160 MG/100ML
2-20 INJECTION, SOLUTION INTRAVENOUS CONTINUOUS PRN
Status: DISCONTINUED | OUTPATIENT
Start: 2025-02-05 | End: 2025-02-07

## 2025-02-05 RX ORDER — PAPAVERINE HYDROCHLORIDE 30 MG/ML
INJECTION INTRAMUSCULAR; INTRAVENOUS AS NEEDED
Status: DISCONTINUED | OUTPATIENT
Start: 2025-02-05 | End: 2025-02-05 | Stop reason: HOSPADM

## 2025-02-05 RX ORDER — HYDROCODONE BITARTRATE AND ACETAMINOPHEN 5; 325 MG/1; MG/1
2 TABLET ORAL EVERY 4 HOURS PRN
Status: DISCONTINUED | OUTPATIENT
Start: 2025-02-05 | End: 2025-02-12 | Stop reason: HOSPADM

## 2025-02-05 RX ORDER — LIDOCAINE HYDROCHLORIDE 20 MG/ML
INJECTION, SOLUTION INFILTRATION; PERINEURAL AS NEEDED
Status: DISCONTINUED | OUTPATIENT
Start: 2025-02-05 | End: 2025-02-05 | Stop reason: SURG

## 2025-02-05 RX ORDER — ONDANSETRON 2 MG/ML
4 INJECTION INTRAMUSCULAR; INTRAVENOUS EVERY 6 HOURS PRN
Status: DISCONTINUED | OUTPATIENT
Start: 2025-02-05 | End: 2025-02-12 | Stop reason: HOSPADM

## 2025-02-05 RX ORDER — MEPERIDINE HYDROCHLORIDE 25 MG/ML
25 INJECTION INTRAMUSCULAR; INTRAVENOUS; SUBCUTANEOUS EVERY 4 HOURS PRN
Status: ACTIVE | OUTPATIENT
Start: 2025-02-05 | End: 2025-02-05

## 2025-02-05 RX ORDER — PHENYLEPHRINE HCL IN 0.9% NACL 0.5 MG/5ML
.2-2 SYRINGE (ML) INTRAVENOUS CONTINUOUS PRN
Status: DISCONTINUED | OUTPATIENT
Start: 2025-02-05 | End: 2025-02-08

## 2025-02-05 RX ORDER — CHLORHEXIDINE GLUCONATE 500 MG/1
1 CLOTH TOPICAL EVERY 12 HOURS PRN
Status: DISCONTINUED | OUTPATIENT
Start: 2025-02-05 | End: 2025-02-05 | Stop reason: HOSPADM

## 2025-02-05 RX ORDER — METOPROLOL TARTRATE 25 MG/1
12.5 TABLET, FILM COATED ORAL
Status: COMPLETED | OUTPATIENT
Start: 2025-02-05 | End: 2025-02-05

## 2025-02-05 RX ORDER — ACETAMINOPHEN 160 MG/5ML
650 SOLUTION ORAL EVERY 4 HOURS
Status: ACTIVE | OUTPATIENT
Start: 2025-02-05 | End: 2025-02-06

## 2025-02-05 RX ORDER — METOCLOPRAMIDE HYDROCHLORIDE 5 MG/ML
10 INJECTION INTRAMUSCULAR; INTRAVENOUS EVERY 6 HOURS
Status: DISPENSED | OUTPATIENT
Start: 2025-02-05 | End: 2025-02-06

## 2025-02-05 RX ORDER — AMOXICILLIN 250 MG
2 CAPSULE ORAL NIGHTLY
Status: DISCONTINUED | OUTPATIENT
Start: 2025-02-06 | End: 2025-02-12

## 2025-02-05 RX ORDER — POTASSIUM CHLORIDE 29.8 MG/ML
20 INJECTION INTRAVENOUS ONCE
Status: COMPLETED | OUTPATIENT
Start: 2025-02-05 | End: 2025-02-05

## 2025-02-05 RX ORDER — ASPIRIN 81 MG/1
81 TABLET ORAL DAILY
Status: DISCONTINUED | OUTPATIENT
Start: 2025-02-06 | End: 2025-02-12 | Stop reason: HOSPADM

## 2025-02-05 RX ADMIN — PHENYLEPHRINE HYDROCHLORIDE 0.3 MCG/KG/MIN: 10 INJECTION, SOLUTION INTRAVENOUS at 08:30

## 2025-02-05 RX ADMIN — MAGNESIUM SULFATE HEPTAHYDRATE 2 G: 500 INJECTION, SOLUTION INTRAMUSCULAR; INTRAVENOUS at 10:12

## 2025-02-05 RX ADMIN — MUPIROCIN 1 APPLICATION: 20 OINTMENT TOPICAL at 12:07

## 2025-02-05 RX ADMIN — SODIUM CHLORIDE 2000 MG: 900 INJECTION INTRAVENOUS at 07:34

## 2025-02-05 RX ADMIN — POTASSIUM CHLORIDE 20 MEQ: 29.8 INJECTION, SOLUTION INTRAVENOUS at 12:33

## 2025-02-05 RX ADMIN — SODIUM BICARBONATE 100 MEQ: 84 INJECTION INTRAVENOUS at 22:31

## 2025-02-05 RX ADMIN — SODIUM CHLORIDE: 9 INJECTION, SOLUTION INTRAVENOUS at 06:41

## 2025-02-05 RX ADMIN — ATROPINE SULFATE 0.4 MG: 0.4 INJECTION, SOLUTION INTRAVENOUS at 07:51

## 2025-02-05 RX ADMIN — HEPARIN SODIUM 21000 UNITS: 1000 INJECTION, SOLUTION INTRAVENOUS; SUBCUTANEOUS at 08:25

## 2025-02-05 RX ADMIN — Medication 50 MCG: at 08:30

## 2025-02-05 RX ADMIN — MUPIROCIN 1 APPLICATION: 20 OINTMENT TOPICAL at 20:05

## 2025-02-05 RX ADMIN — ACETAMINOPHEN 1000 MG: 10 INJECTION INTRAVENOUS at 12:06

## 2025-02-05 RX ADMIN — PANTOPRAZOLE SODIUM 40 MG: 40 INJECTION, POWDER, FOR SOLUTION INTRAVENOUS at 12:14

## 2025-02-05 RX ADMIN — ROCURONIUM BROMIDE 50 MG: 10 INJECTION, SOLUTION INTRAVENOUS at 06:57

## 2025-02-05 RX ADMIN — METOPROLOL TARTRATE 12.5 MG: 25 TABLET, FILM COATED ORAL at 05:57

## 2025-02-05 RX ADMIN — 0.12% CHLORHEXIDINE GLUCONATE 15 ML: 1.2 RINSE ORAL at 23:56

## 2025-02-05 RX ADMIN — Medication 100 MCG: at 08:01

## 2025-02-05 RX ADMIN — ACETAMINOPHEN 1000 MG: 10 INJECTION INTRAVENOUS at 20:04

## 2025-02-05 RX ADMIN — 0.12% CHLORHEXIDINE GLUCONATE 15 ML: 1.2 RINSE ORAL at 05:57

## 2025-02-05 RX ADMIN — METHADONE HYDROCHLORIDE 10 MG: 10 INJECTION, SOLUTION INTRAMUSCULAR; INTRAVENOUS; SUBCUTANEOUS at 07:42

## 2025-02-05 RX ADMIN — METOCLOPRAMIDE 10 MG: 5 INJECTION, SOLUTION INTRAMUSCULAR; INTRAVENOUS at 12:07

## 2025-02-05 RX ADMIN — SODIUM CHLORIDE: 9 INJECTION, SOLUTION INTRAVENOUS at 07:22

## 2025-02-05 RX ADMIN — ROCURONIUM BROMIDE 20 MG: 10 INJECTION, SOLUTION INTRAVENOUS at 09:36

## 2025-02-05 RX ADMIN — NITROGLYCERIN 100 MCG: 20 INJECTION INTRAVENOUS at 09:57

## 2025-02-05 RX ADMIN — 0.12% CHLORHEXIDINE GLUCONATE 15 ML: 1.2 RINSE ORAL at 12:07

## 2025-02-05 RX ADMIN — Medication 100 MCG: at 08:13

## 2025-02-05 RX ADMIN — METOCLOPRAMIDE 10 MG: 5 INJECTION, SOLUTION INTRAMUSCULAR; INTRAVENOUS at 16:49

## 2025-02-05 RX ADMIN — ALBUMIN (HUMAN) 250 ML: 12.5 INJECTION, SOLUTION INTRAVENOUS at 13:28

## 2025-02-05 RX ADMIN — ATORVASTATIN CALCIUM 40 MG: 20 TABLET, FILM COATED ORAL at 20:04

## 2025-02-05 RX ADMIN — Medication 50 MEQ: at 23:51

## 2025-02-05 RX ADMIN — EPINEPHRINE 0.02 MCG/KG/MIN: 1 INJECTION, SOLUTION, CONCENTRATE INTRAVENOUS at 08:13

## 2025-02-05 RX ADMIN — INSULIN HUMAN 10 UNITS: 100 INJECTION, SOLUTION PARENTERAL at 09:39

## 2025-02-05 RX ADMIN — ROCURONIUM BROMIDE 20 MG: 10 INJECTION, SOLUTION INTRAVENOUS at 08:37

## 2025-02-05 RX ADMIN — POTASSIUM PHOSPHATE, MONOBASIC AND POTASSIUM PHOSPHATE, DIBASIC 15 MMOL: 224; 236 INJECTION, SOLUTION, CONCENTRATE INTRAVENOUS at 20:04

## 2025-02-05 RX ADMIN — ALBUMIN (HUMAN) 250 ML: 12.5 INJECTION, SOLUTION INTRAVENOUS at 16:50

## 2025-02-05 RX ADMIN — LIDOCAINE HYDROCHLORIDE 80 MG: 20 INJECTION, SOLUTION INFILTRATION; PERINEURAL at 06:56

## 2025-02-05 RX ADMIN — MUPIROCIN 1 APPLICATION: 20 OINTMENT TOPICAL at 05:57

## 2025-02-05 RX ADMIN — PROPOFOL 35 MCG/KG/MIN: 10 INJECTION, EMULSION INTRAVENOUS at 08:12

## 2025-02-05 RX ADMIN — METHADONE HYDROCHLORIDE 10 MG: 10 INJECTION, SOLUTION INTRAMUSCULAR; INTRAVENOUS; SUBCUTANEOUS at 06:45

## 2025-02-05 RX ADMIN — Medication 100 MCG: at 08:27

## 2025-02-05 RX ADMIN — SODIUM CHLORIDE 1 MCG/KG/HR: 9 INJECTION, SOLUTION INTRAVENOUS at 07:22

## 2025-02-05 RX ADMIN — ALBUMIN (HUMAN) 250 ML: 12.5 INJECTION, SOLUTION INTRAVENOUS at 15:34

## 2025-02-05 RX ADMIN — Medication 100 MCG: at 07:41

## 2025-02-05 RX ADMIN — Medication 50 MCG: at 08:06

## 2025-02-05 RX ADMIN — POTASSIUM PHOSPHATE, MONOBASIC AND POTASSIUM PHOSPHATE, DIBASIC 15 MMOL: 224; 236 INJECTION, SOLUTION, CONCENTRATE INTRAVENOUS at 19:00

## 2025-02-05 RX ADMIN — SODIUM BICARBONATE 50 MEQ: 84 INJECTION, SOLUTION INTRAVENOUS at 23:51

## 2025-02-05 RX ADMIN — Medication 100 MCG: at 10:27

## 2025-02-05 RX ADMIN — CALCIUM GLUCONATE 1000 MG: 20 INJECTION, SOLUTION INTRAVENOUS at 16:48

## 2025-02-05 RX ADMIN — ROCURONIUM BROMIDE 10 MG: 10 INJECTION, SOLUTION INTRAVENOUS at 08:58

## 2025-02-05 RX ADMIN — PROTAMINE SULFATE 250 MG: 10 INJECTION, SOLUTION INTRAVENOUS at 10:04

## 2025-02-05 RX ADMIN — CEFAZOLIN 2 G: 2 INJECTION, POWDER, FOR SOLUTION INTRAMUSCULAR; INTRAVENOUS at 17:54

## 2025-02-05 RX ADMIN — SODIUM CHLORIDE 2000 MG: 900 INJECTION INTRAVENOUS at 10:25

## 2025-02-05 RX ADMIN — PROPOFOL 100 MG: 10 INJECTION, EMULSION INTRAVENOUS at 06:56

## 2025-02-05 RX ADMIN — DEXTROSE MONOHYDRATE 50 ML: 25 INJECTION, SOLUTION INTRAVENOUS at 11:26

## 2025-02-05 RX ADMIN — NICARDIPINE HYDROCHLORIDE 3 MG/HR: 25 INJECTION, SOLUTION INTRAVENOUS at 09:54

## 2025-02-05 RX ADMIN — SODIUM BICARBONATE 50 MEQ: 84 INJECTION INTRAVENOUS at 23:53

## 2025-02-05 RX ADMIN — AMINOCAPROIC ACID 10 G: 250 INJECTION, SOLUTION INTRAVENOUS at 10:10

## 2025-02-05 RX ADMIN — DEXTROSE MONOHYDRATE 30 ML/HR: 100 INJECTION, SOLUTION INTRAVENOUS at 14:12

## 2025-02-05 RX ADMIN — ALBUMIN (HUMAN) 250 ML: 12.5 INJECTION, SOLUTION INTRAVENOUS at 12:46

## 2025-02-05 NOTE — ANESTHESIA PROCEDURE NOTES
Arterial Line      Patient reassessed immediately prior to procedure    Patient location during procedure: OR  Start time: 2/5/2025 6:50 AM  Stop Time:2/5/2025 6:55 AM       Line placed for hemodynamic monitoring.  Performed By   Anesthesiologist: Wilfredo Apodaca MD   Preanesthetic Checklist  Completed: patient identified, risks and benefits discussed, surgical consent, pre-op evaluation and timeout performed  Arterial Line Prep    Sterile Tech: cap, gloves and mask  Prep: alcohol swabs and ChloraPrep  Patient monitoring: blood pressure monitoring, continuous pulse oximetry and EKG  Arterial Line Procedure   Laterality:left  Location:  radial artery  Catheter size: 20 G   Guidance: ultrasound guided  PROCEDURE NOTE/ULTRASOUND INTERPRETATION.  Using ultrasound guidance the potential vascular sites for insertion of the catheter were visualized to determine the patency of the vessel to be used for vascular access.  After selecting the appropriate site for insertion, the needle was visualized under ultrasound being inserted into the radial artery, followed by ultrasound confirmation of wire and catheter placement. There were no abnormalities seen on ultrasound; an image was taken; and the patient tolerated the procedure with no complications.   Number of attempts: 2  Successful placement: yes Images: still images not obtained  Post Assessment   Dressing Type: occlusive dressing applied, secured with tape and wrist guard applied.   Complications no   Patient Tolerance: patient tolerated the procedure well with no apparent complications  Additional Notes  Ultrasound Interpretation:  Using ultrasound guidance the potential vascular sites for insertion of the catheter were visualized to determine the patency of the vessel to be used for vascular access.  After selecting the appropriate site for insertion, the needle was visualized under ultrasound being inserted into the vessel, followed by ultrasound confirmation of  wire and catheter placement.  There were no abnormalities seen on ultrasound; an image was taken/ and the patient tolerated the procedure with no complications.

## 2025-02-05 NOTE — ANESTHESIA PROCEDURE NOTES
Central Line      Patient reassessed immediately prior to procedure    Patient location during procedure: OR  Start time: 2/5/2025 7:05 AM  Stop Time:2/5/2025 7:15 AM  Indications: vascular access and central pressure monitoring  Staff  Anesthesiologist: Wilfredo Apodaca MD  Preanesthetic Checklist  Completed: patient identified and risks and benefits discussed  Central Line Prep  Sterile Tech:cap, gloves, gown, mask and sterile barriers  Prep: chloraprep  Patient monitoring: blood pressure monitoring, continuous pulse oximetry and EKG  Central Line Procedure  Laterality:right  Location:internal jugular  Catheter Type:double lumen and MAC  Catheter Size:9 Fr  Guidance:ultrasound guided  PROCEDURE NOTE/ULTRASOUND INTERPRETATION.  Using ultrasound guidance the potential vascular sites for insertion of the catheter were visualized to determine the patency of the vessel to be used for vascular access.  After selecting the appropriate site for insertion, the needle was visualized under ultrasound being inserted into the internal jugular vein, followed by ultrasound confirmation of wire and catheter placement. There were no abnormalities seen on ultrasound; an image was taken; and the patient tolerated the procedure with no complications. Images: still images not obtained  Assessment  Post procedure:biopatch applied, line sutured, occlusive dressing applied and secured with tape  Assessement:blood return through all ports  Complications:no  Patient Tolerance:patient tolerated the procedure well with no apparent complications  Additional Notes  Ultrasound Interpretation:  Using ultrasound guidance the potential vascular sites for insertion of the catheter were visualized to determine the patency of the vessel to be used for vascular access.  After selecting the appropriate site for insertion, the needle was visualized under ultrasound being inserted into the vessel, followed by ultrasound confirmation of wire and  catheter placement.  There were no abnormalities seen on ultrasound; an image was taken/ and the patient tolerated the procedure with no complications.

## 2025-02-05 NOTE — ANESTHESIA PROCEDURE NOTES
Airway  Date/Time: 2/5/2025 7:00 AM  Airway not difficult    General Information and Staff    Patient location during procedure: OR  Anesthesiologist: Wilfredo Apodaca MD    Indications and Patient Condition    Preoxygenated: yes  Mask difficulty assessment: 2 - vent by mask + OA or adjuvant +/- NMBA    Final Airway Details  Final airway type: endotracheal airway      Successful airway: ETT  Cuffed: yes   Successful intubation technique: direct laryngoscopy  Facilitating devices/methods: intubating stylet  Endotracheal tube insertion site: oral  Blade: Fleming  Blade size: 3  ETT size (mm): 8.0  Cormack-Lehane Classification: grade I - full view of glottis  Placement verified by: capnometry   Measured from: lips  ETT/EBT  to teeth (cm): 21  ETT/EBT  to lips (cm): 23  Number of attempts at approach: 1  Assessment: lips, teeth, and gum same as pre-op and atraumatic intubation

## 2025-02-05 NOTE — ANESTHESIA POSTPROCEDURE EVALUATION
Patient: Jackson Tariq    Procedure Summary       Date: 02/05/25 Room / Location: [address] CARDIOVASCULAR OPERATING ROOM    Anesthesia Start: 0640 Anesthesia Stop: 1108    Procedure: CORONARY ARTERY BYPASS GRAFT X2  WITH INTERNAL MAMMARY ARTERY GRAFT AND ENDOSCOPICALLY HARVESTED RIGHT SAPHENOUS VEIN;  STERNOTOMY; PRP; MITRAL VALVE REPAIR; PATENT FORAMEN OVALE CLOSURE; LEFT ATRIAL APPENDAGE CLOSURE WITH ATRICARE; TRANSESOPHAGEAL ECHOCARDIOGRAM WITH ANESTHESIA (Chest) Diagnosis:       Nonrheumatic mitral valve regurgitation      (Nonrheumatic mitral valve regurgitation [I34.0])    Surgeons: Farshad Gaitan MD Provider: Wilfredo Apodaca MD    Anesthesia Type: general, [place], CVL ASA Status: 3            Anesthesia Type: general, [place], CVL    Vitals  Vitals Value Taken Time   BP 99/63 02/05/25 1500   Temp 36.4 °C (97.52 °F) 02/05/25 1506   Pulse 82 02/05/25 1506   Resp 15 02/05/25 1200   SpO2 100 % 02/05/25 1506   Vitals shown include unfiled device data.        Post Anesthesia Care and Evaluation    Patient location during evaluation: bedside  Pain management: adequate    Airway patency: patent  Anesthetic complications: No anesthetic complications    Cardiovascular status: acceptable  Respiratory status: acceptable  Hydration status: acceptable

## 2025-02-05 NOTE — PLAN OF CARE
Goal Outcome Evaluation:                    POD 0. CABGx1 MV repair, PFO closure, MANJULA clip. Sinus mindi in the 40s. Pacing AAI 82 10 0.5. epi started per Dr. Dutta for low index. Extubated at 1735.Low BS- dextrose started. Adequate UOP. Electrolytes replaced per mar. Adequate UOP. Care on going.

## 2025-02-05 NOTE — PROGRESS NOTES
CVICU Intensivist Progress Note    HPI/Chief Complaint: 75 yo male with severe MR and coronary disease who presents immediately s/p complex MV repair + PFO closure + MANJULA occlusion + 2V CABG (LIMA - LAD, SVG - marginal)    PMHx: HTN, HL, osteopenia, CKD stage 3 w/ Cr 1.4, anemia, dysphagia, osteopenia, CAD, severe MR    Procedure: 2/5 complex mitral valve repair + PFO closure + LAAO + 2V CABG (LIMA - LAD, SVG - marginal). Intra-op BRIE showed LVEF 55%, severe MR w/ P2 flail/torn chordae --> no MR but occasional LALITO with hypovolemia, thick LV, nml RV. He received 335ml cellsaver only as well as an insulin bolus for hyperkalemia. Brought to CVICU on propofol, precedex, pheynlephrine and DDD paced at 70bpm    Hospital Course: n/a    Daily Assessment and Plan 2/5: Hemodynamics appropriate on PAC, will work to maintain euvolemia in setting of LALITO. Currently on phenylephrine for hypotension, hopefully able to wean off with volume resuscitation. Changed pacer from DDD to AAI 2/2 appropriate conduction; still requiring pacing due to bradycardia and associated worsened hypotension. Glucose upon arrival in the 40s, given amp of dextrose with appropriate initial improvement, however now again <100. Will start D10 @ 30ml/hr and attempt to wean off overnight if able. CXR shows ETT ~ 5cm above the edyta, advanced by 2cm to appropriate position. Minimal chest tube output though labs show thrombocytopenia, elevated INR and hypofibrinogenemia. Will hold off on transfusions for now unless chest tube output increases.    Afternoon Update: CI less than 2 despite volume resuscitation. SVO2 60%. Pacing at 80 bpm without much change. Epi started at .02 mcg/kg/min and 250ml more albumin given with improvement in CI to 2.1. Given his thick left ventricle, will attempt to resuscitate toward CVP ~12. We were able to wean off of the phenylephrine after the addition of low dose epi. Unfortunately, his native heart rate has not improved with the  epi so we will continue to AAI pace at 80bpm. Weaning sedation and working toward extubation now that hemodynamics have improved. Still with minimal chest tube output, fibrinogen remains low but no need to transfuse at this time. Platelets improved, Hgb 10.    Relevant Physical Exam:  Lungs clear bilaterally  Distal extremities w/ varicosities but warm and well perfused, 2+ pulses bilaterally    Neuro: propofol/precedex for sedation in immediate post-op period. Wean when hemodynamically appropriate for SBT. Pain control w/ morphine vs. Apap vs. Oxy prn    CV: CAD and severe MR s/p CABG and mitral repair. ASA + statin. Maintain euvolemia for hemodynamics in addition to prevention of LALITO   Rhythm: epicardial wires in place, currently pacing AAI @ 70bpm with underlying rate in the 40s    Pulm: maintain mechanical ventilation for now, adjust vent settings for appropriate oxygenation/ventilation.    Renal: CKD stage 3 with baseline Cr 1.4. Appropriate UOP at this time, continue volume resuscitation as needed. Replete electrolytes.    GI: start bowel regimen to avoid constipation/ileus. NPO for now, advance diet once appropriate. PPI for prophy.    Endo: stress induced hyperglycemia, A1C 5.2. Hypoglycemic out of OR, required dextrose; following glucose closely, may add D10 infusion if continues to drop.    ID: periop cefazolin for prophylaxis    Heme: acute blood loss anemia, received 335ml of cellsaver in OR, no other products. Coagulopathic and thrombocytopenic on initial labs from OR, however output has been low so will not transfuse for now. Repeat coags at the 4hour rivka or if chest tube output increases.Start Lovenox POD#1 for prophy if platelets improve.    acetaminophen, 1,000 mg, Intravenous, Q8H  acetaminophen, 650 mg, Oral, Q4H   Or  acetaminophen, 650 mg, Oral, Q4H   Or  acetaminophen, 650 mg, Rectal, Q4H  [START ON 2/6/2025] aspirin, 81 mg, Oral, Daily  atorvastatin, 40 mg, Oral, Nightly  ceFAZolin, 2 g,  Intravenous, Q8H  chlorhexidine, 15 mL, Mouth/Throat, Q12H  [START ON 2/6/2025] enoxaparin, 40 mg, Subcutaneous, Daily  magnesium sulfate, 1 g, Intravenous, Q8H  metoclopramide, 10 mg, Intravenous, Q6H  [START ON 2/6/2025] metoprolol tartrate, 12.5 mg, Oral, Q12H  mupirocin, 1 Application, Each Nare, BID  pantoprazole, 40 mg, Intravenous, Once   Followed by  [START ON 2/6/2025] pantoprazole, 40 mg, Oral, QAM  potassium chloride, 20 mEq, Intravenous, Once  [START ON 2/6/2025] senna-docusate sodium, 2 tablet, Oral, Nightly        ------------------------------------------------------------------------------------------------------------------------------------------------------  Prophy: HOB elevated + oral care + scds + comer stat lock + PPI + subglottic suction + no chemical DVT prophy 2/2 risk for bleeding in immediate post-op period  Code Status: full      Critical Care time: 51 mins on 2/5/25 by Abigail Dutta MD for the assessment and treatment of: interpretation of serial cardiac output measurements, evaluation of CXR, interpretation of serial blood gases, ventilator management, thrombocytopenia, coagulopathy, hypofigrinogenemia, bradycardia requiring temporary epicardial pacing, hypotension related to hypovolemia, hypoglycemia

## 2025-02-05 NOTE — ANESTHESIA PROCEDURE NOTES
[place]gan catheter      Patient reassessed immediately prior to procedure    Start time: 2/5/2025 7:15 AM  Stop Time:2/5/2025 7:20 AM  Staff  Anesthesiologist: Wilfredo Apodaca MD  Preanesthetic Checklist  Completed: patient identified, risks and benefits discussed, surgical consent, pre-op evaluation and timeout performed  Central Line Prep  Sterile Tech:cap, gloves, gown, mask and sterile barriers  Prep: chloraprep  Patient monitoring: blood pressure monitoring, continuous pulse oximetry and EKG  Central Line Procedure  Laterality:right  Location:internal jugular  Catheter Type:[place]  Assessment  Post procedure:biopatch applied, line sutured and occlusive dressing applied  Assessement:blood return through all ports and free fluid flow  Patient Tolerance:patient tolerated the procedure well with no apparent complications

## 2025-02-05 NOTE — OP NOTE
Operative Note    Date of Dictation: 02/05/25    Date of Procedure: Same    Referring Physician: Konstantin Driscoll MD    Indications:   Severe mitral regurgitation, symptomatic    Preoperative diagnosis:  1.  Severe degenerative mitral valve regurgitation  2.  Mitral valve prolapse with P2 segment affectation  3.  Patent foramen ovale  4.  Two-vessel coronary artery disease  5.  CKD 3    Postoperative diagnosis:  Same    Procedure:  1.  Complex mitral valve repair with a 38 mm Medtronic flexible band annuloplasty and triangular resection of the P2 segment  2.  CABG x 2 with a LIMA to the mid LAD and reverse individual saphenous vein graft to the first marginal  3.  Primary closure of patent foramen ovale  4.  Endoscopic vein harvest of the right lower extremity  5.  Epicardial closure of the left atrial appendage with a 40 mm atrial clip device       Surgeon: Farshad Gaitan MD     Assistants: Assistant: Pilar Muñoz CSA was responsible for performing the following activities: Retraction, Suction, Irrigation, Suturing, Closing, Placing Dressing, Harvesting of Vessels, Bypass Grafting, and all aspects of the complex cardiac case  and their skilled assistance was necessary for the success of this case..    Anesthesia: General endotracheal anesthesia and BRIE    Findings:  The saphenous vein was harvested endoscopically form the right leg. The vein had a diameter of 3.5 and was of good quality. The coronaries had diameters of 2.25 mm.  Of note, further review of the cardiac cath showed what in my opinion was a significant stenosis of proximal LAD and at least moderate stenosis (60-70%) of the first marginal.    Blood loss: Approximately 400 cc, most of it recovered with a Cell Saver device and cardiotomy suckers and was retransfused to the patient    Description of the procedure:     The patient was placed supine on the operative table. Anesthesia was given and lines placed. The patient was prepped and draped using the  usual sterile technique. A median sternotomy was performed with a scalpel and the layers carried down to the sternum using the electrocautery. The sternum was splited in the midline using a vertical oscillating saw. Hemostasis was achieved. The LIMA was harvested as a pedicle and prepared with papaverine. It was of good quality. 300 units/kg of IV heparin was given to an ACT over 400. A Kavon retractor was placed and the mediastinum exposed, the pericardium was opened and the edges tacked to the wound. Cannulation sutures were placed in the ascending aorta and right atrium. Small cannulas were placed and aorto right atrial cardiopulmonary bypass was started. Cardioplegia cannulas were placed and then the ascending aorta was clamped. One liter of cold blood cardioplegia was given in an antegrade and retrograde fashion to achieved diastolic arrest and further doses every 15 minutes thereafter.  Following, I complete anastomosis between the reverse saphenous vein graft to the marginal using a 7-0 Prolene continuous suture in end-to-side fashion.  I measured the graft to the  left side of the ascending aorta and completed the proximal anastomosis using a 4 mm punch and a 6-0 Prolene continuous suture.  Then I measured the base of the left atrial appendage and I deployed a 40 mm atrial clip device at the base with good closure.  Then, I completed the anastomosis between the skeletonize LIMA to the mid LAD using a 7.0 Prolene continuous suture in a small needle.  I reapplied the bulldog through the ANDREA.  Following, a left atriotomy was performed through the right interatrial groove.  I identified a small PFO approximately 5 mm may be more and then I used a 4-0 Prolene continuous suture in a double layer to close it primarily.  The left atrial cavity and the mitral valve were exposed using self retracted blades attached to the retractor. The valve was explored and it was clear there was a prolapse in the mid segment of  the P2 segment with 2 ruptured chordae.  I performed triangular resection two thirds into the leaflet from ruptured chordae to rupture chordae on the free edge.  I used a 5-0 Prolene continuous suture and double layer to close the gap.  Then I used 2-0 Ethibond nonpledgeted suture in a plication fashion from trigone to trigone posteriorly annulus and selected a 38 mm Medtronic flexible band.  The sutures were passed symmetrically through the band and then I used the core knot device to secure the knots on the band.  I tested the repair with a saline syringe and there was no leakage or whatsoever.  The coaptation area was approximately 8 mm to 10 mm.  The patient was systemically rewarmed, I get the 1 dose of cardioplegia and then completion I removed the aortic cross-clamp with steep suction on the aortic vent.  I removed the bulldog clamp from the LIMA as well. All anastomoses were checked and had good flow and morphology. The suture lines were checked for hemostasis as well. The left pleural space was suctioned and the lungs ventilated. The heart was paced till regular atrial rhythm resumed. I allowed the heart to eject and the left heart chambers were de-aired using the standard techniques under BRIE guidance. Once hemodynamics were acceptable, then the CPB was discontinued and the venous and cardioplegia cannulas removed. The matching dose of protamine was given and the aortic cannula removed as well. AV temporary wires and pleural and mediastinal chest tubes were placed and the wound sprayed with platelet rich plasma.  The perioperative BRIE showed adequate ventricular contractility and no MR or whatsoever.  The sternum was closed with single and double wires and soft tissue in layers of reabsorbable material. The wounds were covered with sterile dressings.    Specimen removed: Triangular portion of the posterior leaflet tissue    CPB time: 88 minutes    Aortic clamp time: 68 minutes       Complications: None            Disposition: Cardiovascular recovery room           Condition: Critical but stable.

## 2025-02-05 NOTE — ANESTHESIA PROCEDURE NOTES
Diagnostic Intraoperative BRIE with 2D and 3D imaging and followup color Doppler study    Procedure Performed: Diagnostic Intraoperative BRIE with 2D and 3D imaging and followup color Doppler study       Start Time:  2/5/2025 7:40 AM       End Time:   2/5/2025 8:17 AM      General Procedure Information  Physician Requesting Echo: Farshad Gaitan MD  Location performed:  OR  Bite block not placed  Probe Insertion:  Easy  Probe Type:  Multiplane  Modalities:  Color flow mapping, continuous wave Doppler and pulse wave Doppler    Echocardiographic and Doppler Measurements    Ventricles    Left Ventricle:  Cavity size dilated.  Global Function normal.  Ejection Fraction 55%.          Valves    Aortic Valve:  Annulus normal.  Stenosis not present.  Regurgitation mild.  Leaflets normal.  Leaflet motions normal.      Mitral Valve:  Stenosis not present.  Regurgitation severe.  Leaflets myxomatous.  Leaflet motions flail.  Specific leaflet segments with abnormal motions are described in the following comments:       P2    Tricuspid Valve:  Annulus normal.  Regurgitation mild.  Leaflets normal.        Aorta    Ascending Aorta:  Size normal.    Descending Aorta:  Size normal.        Atria      Left Atrium:  Size dilated.  Left atrial appendage normal.      Septa    Atrial Septum:  Intra-atrial septal morphology contains patent foramen ovale.                  Anesthesia Information      Echocardiogram Comments:       Diagnosis: non-rheumatic aortic insufficiency.  Severe mitral regurgitation, eccentric wall-hugging jet away from posterior leaflet.  P2 flail with torn chordae.      Post-CPB there was no mitral regurgitation.  Mitral valve annuloplasty ring seen.  Left atrial appendage obliterated.  Interatrial septum intact.  Occasional LALITO seen when patient was hypovolemic.  Thick, hypertrophied LV.

## 2025-02-06 ENCOUNTER — APPOINTMENT (OUTPATIENT)
Dept: CARDIOLOGY | Facility: HOSPITAL | Age: 75
DRG: 219 | End: 2025-02-06
Payer: MEDICARE

## 2025-02-06 ENCOUNTER — APPOINTMENT (OUTPATIENT)
Dept: GENERAL RADIOLOGY | Facility: HOSPITAL | Age: 75
DRG: 219 | End: 2025-02-06
Payer: MEDICARE

## 2025-02-06 LAB
ALBUMIN SERPL-MCNC: 3.8 G/DL (ref 3.5–5.2)
ANION GAP SERPL CALCULATED.3IONS-SCNC: 10 MMOL/L (ref 5–15)
ANION GAP SERPL CALCULATED.3IONS-SCNC: 11 MMOL/L (ref 5–15)
ANION GAP SERPL CALCULATED.3IONS-SCNC: 9.2 MMOL/L (ref 5–15)
AORTIC DIMENSIONLESS INDEX: 0.77 (DI)
ARTERIAL PATENCY WRIST A: ABNORMAL
ATMOSPHERIC PRESS: 745.2 MMHG
ATMOSPHERIC PRESS: 746.8 MMHG
ATMOSPHERIC PRESS: 747.1 MMHG
ATMOSPHERIC PRESS: 747.4 MMHG
AV MEAN PRESS GRAD SYS DOP V1V2: 2.21 MMHG
AV VMAX SYS DOP: 98.9 CM/SEC
B-OH-BUTYR SERPL-SCNC: 0.16 MMOL/L (ref 0.02–0.27)
BASE EXCESS BLDA CALC-SCNC: -1.3 MMOL/L (ref 0–2)
BASE EXCESS BLDA CALC-SCNC: -19.2 MMOL/L (ref 0–2)
BASE EXCESS BLDA CALC-SCNC: 2.1 MMOL/L (ref 0–2)
BASE EXCESS BLDA CALC-SCNC: 2.8 MMOL/L (ref 0–2)
BASOPHILS # BLD AUTO: 0.02 10*3/MM3 (ref 0–0.2)
BASOPHILS NFR BLD AUTO: 0.1 % (ref 0–1.5)
BDY SITE: ABNORMAL
BH CV ECHO MEAS - AI P1/2T: 636.8 MSEC
BH CV ECHO MEAS - AO MAX PG: 3.9 MMHG
BH CV ECHO MEAS - AO V2 VTI: 18.7 CM
BH CV ECHO MEAS - AVA(I,D): 2.9 CM2
BH CV ECHO MEAS - EDV(MOD-SP2): 90 ML
BH CV ECHO MEAS - EDV(MOD-SP4): 97 ML
BH CV ECHO MEAS - EF(MOD-SP2): 36.7 %
BH CV ECHO MEAS - EF(MOD-SP4): 34 %
BH CV ECHO MEAS - ESV(MOD-SP2): 57 ML
BH CV ECHO MEAS - ESV(MOD-SP4): 64 ML
BH CV ECHO MEAS - LAT PEAK E' VEL: 9.8 CM/SEC
BH CV ECHO MEAS - LV DIASTOLIC VOL/BSA (35-75): 53.8 CM2
BH CV ECHO MEAS - LV MAX PG: 2.42 MMHG
BH CV ECHO MEAS - LV MEAN PG: 1.28 MMHG
BH CV ECHO MEAS - LV SYSTOLIC VOL/BSA (12-30): 35.5 CM2
BH CV ECHO MEAS - LV V1 MAX: 77.7 CM/SEC
BH CV ECHO MEAS - LV V1 VTI: 14.6 CM
BH CV ECHO MEAS - LVOT AREA: 3.8 CM2
BH CV ECHO MEAS - LVOT DIAM: 2.19 CM
BH CV ECHO MEAS - MED PEAK E' VEL: 5.2 CM/SEC
BH CV ECHO MEAS - MV A MAX VEL: 48.4 CM/SEC
BH CV ECHO MEAS - MV DEC SLOPE: 732.1 CM/SEC2
BH CV ECHO MEAS - MV DEC TIME: 0.23 SEC
BH CV ECHO MEAS - MV E MAX VEL: 114 CM/SEC
BH CV ECHO MEAS - MV E/A: 2.36
BH CV ECHO MEAS - MV MAX PG: 6.2 MMHG
BH CV ECHO MEAS - MV MEAN PG: 2.27 MMHG
BH CV ECHO MEAS - MV P1/2T: 53.8 MSEC
BH CV ECHO MEAS - MV V2 VTI: 24.3 CM
BH CV ECHO MEAS - MVA(P1/2T): 4.1 CM2
BH CV ECHO MEAS - MVA(VTI): 2.25 CM2
BH CV ECHO MEAS - PA ACC TIME: 0.06 SEC
BH CV ECHO MEAS - PULM DIAS VEL: 68.6 CM/SEC
BH CV ECHO MEAS - PULM S/D: 0.64
BH CV ECHO MEAS - PULM SYS VEL: 43.8 CM/SEC
BH CV ECHO MEAS - RV MAX PG: 0.61 MMHG
BH CV ECHO MEAS - RV V1 MAX: 39 CM/SEC
BH CV ECHO MEAS - RV V1 VTI: 7.6 CM
BH CV ECHO MEAS - SV(LVOT): 54.6 ML
BH CV ECHO MEAS - SV(MOD-SP2): 33 ML
BH CV ECHO MEAS - SV(MOD-SP4): 33 ML
BH CV ECHO MEAS - SVI(LVOT): 30.3 ML/M2
BH CV ECHO MEAS - SVI(MOD-SP2): 18.3 ML/M2
BH CV ECHO MEAS - SVI(MOD-SP4): 18.3 ML/M2
BH CV ECHO MEASUREMENTS AVERAGE E/E' RATIO: 15.2
BH CV XLRA - RV BASE: 2.6 CM
BH CV XLRA - RV LENGTH: 5.1 CM
BH CV XLRA - RV MID: 1.94 CM
BUN SERPL-MCNC: 17 MG/DL (ref 8–23)
BUN SERPL-MCNC: 18 MG/DL (ref 8–23)
BUN SERPL-MCNC: 18 MG/DL (ref 8–23)
BUN/CREAT SERPL: 10.1 (ref 7–25)
BUN/CREAT SERPL: 11 (ref 7–25)
BUN/CREAT SERPL: 11.3 (ref 7–25)
CA-I SERPL ISE-MCNC: 1.12 MMOL/L (ref 1.15–1.35)
CA-I SERPL ISE-MCNC: 1.2 MMOL/L (ref 1.15–1.35)
CA-I SERPL ISE-MCNC: 1.21 MMOL/L (ref 1.15–1.35)
CALCIUM SPEC-SCNC: 8.4 MG/DL (ref 8.6–10.5)
CALCIUM SPEC-SCNC: 8.7 MG/DL (ref 8.6–10.5)
CALCIUM SPEC-SCNC: 8.7 MG/DL (ref 8.6–10.5)
CHLORIDE SERPL-SCNC: 105 MMOL/L (ref 98–107)
CHLORIDE SERPL-SCNC: 109 MMOL/L (ref 98–107)
CHLORIDE SERPL-SCNC: 110 MMOL/L (ref 98–107)
CO2 BLDA-SCNC: 25.4 MMOL/L (ref 23–27)
CO2 BLDA-SCNC: 28.3 MMOL/L (ref 23–27)
CO2 BLDA-SCNC: 28.8 MMOL/L (ref 23–27)
CO2 SERPL-SCNC: 22 MMOL/L (ref 22–29)
CO2 SERPL-SCNC: 24 MMOL/L (ref 22–29)
CO2 SERPL-SCNC: 24.8 MMOL/L (ref 22–29)
CREAT SERPL-MCNC: 1.6 MG/DL (ref 0.76–1.27)
CREAT SERPL-MCNC: 1.63 MG/DL (ref 0.76–1.27)
CREAT SERPL-MCNC: 1.69 MG/DL (ref 0.76–1.27)
CYTO UR: NORMAL
D-LACTATE SERPL-SCNC: 1.3 MMOL/L (ref 0.5–2)
D-LACTATE SERPL-SCNC: 1.6 MMOL/L (ref 0.5–2)
D-LACTATE SERPL-SCNC: 1.6 MMOL/L (ref 0.5–2)
D-LACTATE SERPL-SCNC: 2.6 MMOL/L (ref 0.5–2)
D-LACTATE SERPL-SCNC: 4.1 MMOL/L (ref 0.5–2)
DEPRECATED RDW RBC AUTO: 42.6 FL (ref 37–54)
DEPRECATED RDW RBC AUTO: 42.8 FL (ref 37–54)
EGFRCR SERPLBLD CKD-EPI 2021: 42.3 ML/MIN/1.73
EGFRCR SERPLBLD CKD-EPI 2021: 44.2 ML/MIN/1.73
EGFRCR SERPLBLD CKD-EPI 2021: 45.2 ML/MIN/1.73
EOSINOPHIL # BLD AUTO: 0 10*3/MM3 (ref 0–0.4)
EOSINOPHIL NFR BLD AUTO: 0 % (ref 0.3–6.2)
ERYTHROCYTE [DISTWIDTH] IN BLOOD BY AUTOMATED COUNT: 12.6 % (ref 12.3–15.4)
ERYTHROCYTE [DISTWIDTH] IN BLOOD BY AUTOMATED COUNT: 12.8 % (ref 12.3–15.4)
GAS FLOW AIRWAY: 2 LPM
GAS FLOW AIRWAY: 2 LPM
GAS FLOW AIRWAY: 4 LPM
GAS FLOW AIRWAY: 5 LPM
GLUCOSE BLDC GLUCOMTR-MCNC: 128 MG/DL (ref 70–130)
GLUCOSE BLDC GLUCOMTR-MCNC: 136 MG/DL (ref 70–130)
GLUCOSE BLDC GLUCOMTR-MCNC: 137 MG/DL (ref 70–130)
GLUCOSE BLDC GLUCOMTR-MCNC: 142 MG/DL (ref 70–130)
GLUCOSE BLDC GLUCOMTR-MCNC: 148 MG/DL (ref 70–130)
GLUCOSE BLDC GLUCOMTR-MCNC: 155 MG/DL (ref 70–130)
GLUCOSE BLDC GLUCOMTR-MCNC: 159 MG/DL (ref 70–130)
GLUCOSE BLDC GLUCOMTR-MCNC: 169 MG/DL (ref 70–130)
GLUCOSE BLDC GLUCOMTR-MCNC: 176 MG/DL (ref 70–130)
GLUCOSE SERPL-MCNC: 142 MG/DL (ref 65–99)
GLUCOSE SERPL-MCNC: 154 MG/DL (ref 65–99)
GLUCOSE SERPL-MCNC: 159 MG/DL (ref 65–99)
HCO3 BLDA-SCNC: 10.5 MMOL/L (ref 22–28)
HCO3 BLDA-SCNC: 24.1 MMOL/L (ref 22–28)
HCO3 BLDA-SCNC: 27.1 MMOL/L (ref 22–28)
HCO3 BLDA-SCNC: 27.4 MMOL/L (ref 22–28)
HCT VFR BLD AUTO: 25.8 % (ref 37.5–51)
HCT VFR BLD AUTO: 25.9 % (ref 37.5–51)
HEMODILUTION: NO
HGB BLD-MCNC: 9 G/DL (ref 13–17.7)
HGB BLD-MCNC: 9.2 G/DL (ref 13–17.7)
IMM GRANULOCYTES # BLD AUTO: 0.13 10*3/MM3 (ref 0–0.05)
IMM GRANULOCYTES NFR BLD AUTO: 0.7 % (ref 0–0.5)
INR PPP: 1.51 (ref 0.9–1.1)
LAB AP CASE REPORT: NORMAL
LEFT ATRIUM VOLUME INDEX: 43.1 ML/M2
LV EF BIPLANE MOD: 35.7 %
LYMPHOCYTES # BLD AUTO: 0.39 10*3/MM3 (ref 0.7–3.1)
LYMPHOCYTES NFR BLD AUTO: 2 % (ref 19.6–45.3)
Lab: ABNORMAL
MAGNESIUM SERPL-MCNC: 2.4 MG/DL (ref 1.6–2.4)
MAGNESIUM SERPL-MCNC: 2.7 MG/DL (ref 1.6–2.4)
MAGNESIUM SERPL-MCNC: 2.8 MG/DL (ref 1.6–2.4)
MCH RBC QN AUTO: 32.1 PG (ref 26.6–33)
MCH RBC QN AUTO: 33 PG (ref 26.6–33)
MCHC RBC AUTO-ENTMCNC: 34.7 G/DL (ref 31.5–35.7)
MCHC RBC AUTO-ENTMCNC: 35.7 G/DL (ref 31.5–35.7)
MCV RBC AUTO: 92.5 FL (ref 79–97)
MCV RBC AUTO: 92.5 FL (ref 79–97)
MODALITY: ABNORMAL
MONOCYTES # BLD AUTO: 0.75 10*3/MM3 (ref 0.1–0.9)
MONOCYTES NFR BLD AUTO: 3.8 % (ref 5–12)
NEUTROPHILS NFR BLD AUTO: 18.62 10*3/MM3 (ref 1.7–7)
NEUTROPHILS NFR BLD AUTO: 93.4 % (ref 42.7–76)
NOTIFIED WHO: ABNORMAL
NRBC BLD AUTO-RTO: 0 /100 WBC (ref 0–0.2)
PATH REPORT.FINAL DX SPEC: NORMAL
PATH REPORT.GROSS SPEC: NORMAL
PCO2 BLDA: 39.5 MM HG (ref 35–45)
PCO2 BLDA: 39.6 MM HG (ref 35–45)
PCO2 BLDA: 42.5 MM HG (ref 35–45)
PCO2 BLDA: 45.3 MM HG (ref 35–45)
PH BLDA: 7.03 PH UNITS (ref 7.35–7.45)
PH BLDA: 7.36 PH UNITS (ref 7.35–7.45)
PH BLDA: 7.39 PH UNITS (ref 7.35–7.45)
PH BLDA: 7.44 PH UNITS (ref 7.35–7.45)
PHOSPHATE SERPL-MCNC: 3.8 MG/DL (ref 2.5–4.5)
PHOSPHATE SERPL-MCNC: 3.9 MG/DL (ref 2.5–4.5)
PLATELET # BLD AUTO: 127 10*3/MM3 (ref 140–450)
PLATELET # BLD AUTO: 128 10*3/MM3 (ref 140–450)
PMV BLD AUTO: 10 FL (ref 6–12)
PMV BLD AUTO: 10 FL (ref 6–12)
PO2 BLDA: 126.4 MM HG (ref 80–100)
PO2 BLDA: 135.3 MM HG (ref 80–100)
PO2 BLDA: 149.6 MM HG (ref 80–100)
PO2 BLDA: 158.6 MM HG (ref 80–100)
POTASSIUM SERPL-SCNC: 4.1 MMOL/L (ref 3.5–5.2)
PROTHROMBIN TIME: 18.2 SECONDS (ref 11.7–14.2)
QT INTERVAL: 463 MS
QTC INTERVAL: 534 MS
RBC # BLD AUTO: 2.79 10*6/MM3 (ref 4.14–5.8)
RBC # BLD AUTO: 2.8 10*6/MM3 (ref 4.14–5.8)
READ BACK: YES
SAO2 % BLDCOA: 98 % (ref 92–98.5)
SAO2 % BLDCOA: 98.8 % (ref 92–98.5)
SAO2 % BLDCOA: 99.2 % (ref 92–98.5)
SAO2 % BLDCOA: 99.3 % (ref 92–98.5)
SET MECH RESP RATE: 16
SET MECH RESP RATE: 18
SODIUM SERPL-SCNC: 139 MMOL/L (ref 136–145)
SODIUM SERPL-SCNC: 143 MMOL/L (ref 136–145)
SODIUM SERPL-SCNC: 143 MMOL/L (ref 136–145)
TOTAL RATE: 19 BREATHS/MINUTE
TOTAL RATE: 20 BREATHS/MINUTE
WBC NRBC COR # BLD AUTO: 19.45 10*3/MM3 (ref 3.4–10.8)
WBC NRBC COR # BLD AUTO: 19.91 10*3/MM3 (ref 3.4–10.8)

## 2025-02-06 PROCEDURE — 83605 ASSAY OF LACTIC ACID: CPT

## 2025-02-06 PROCEDURE — 71045 X-RAY EXAM CHEST 1 VIEW: CPT

## 2025-02-06 PROCEDURE — 25010000002 FUROSEMIDE PER 20 MG: Performed by: ANESTHESIOLOGY

## 2025-02-06 PROCEDURE — 94761 N-INVAS EAR/PLS OXIMETRY MLT: CPT

## 2025-02-06 PROCEDURE — 85025 COMPLETE CBC W/AUTO DIFF WBC: CPT | Performed by: NURSE PRACTITIONER

## 2025-02-06 PROCEDURE — 97162 PT EVAL MOD COMPLEX 30 MIN: CPT

## 2025-02-06 PROCEDURE — 99024 POSTOP FOLLOW-UP VISIT: CPT | Performed by: THORACIC SURGERY (CARDIOTHORACIC VASCULAR SURGERY)

## 2025-02-06 PROCEDURE — 83605 ASSAY OF LACTIC ACID: CPT | Performed by: ANESTHESIOLOGY

## 2025-02-06 PROCEDURE — 25010000002 CEFAZOLIN PER 500 MG: Performed by: NURSE PRACTITIONER

## 2025-02-06 PROCEDURE — 80048 BASIC METABOLIC PNL TOTAL CA: CPT | Performed by: ANESTHESIOLOGY

## 2025-02-06 PROCEDURE — 25010000002 ONDANSETRON PER 1 MG: Performed by: NURSE PRACTITIONER

## 2025-02-06 PROCEDURE — 94799 UNLISTED PULMONARY SVC/PX: CPT

## 2025-02-06 PROCEDURE — 80069 RENAL FUNCTION PANEL: CPT | Performed by: NURSE PRACTITIONER

## 2025-02-06 PROCEDURE — 99291 CRITICAL CARE FIRST HOUR: CPT | Performed by: ANESTHESIOLOGY

## 2025-02-06 PROCEDURE — 25010000002 ENOXAPARIN PER 10 MG: Performed by: NURSE PRACTITIONER

## 2025-02-06 PROCEDURE — 82330 ASSAY OF CALCIUM: CPT | Performed by: ANESTHESIOLOGY

## 2025-02-06 PROCEDURE — 93005 ELECTROCARDIOGRAM TRACING: CPT | Performed by: NURSE PRACTITIONER

## 2025-02-06 PROCEDURE — 93010 ELECTROCARDIOGRAM REPORT: CPT | Performed by: INTERNAL MEDICINE

## 2025-02-06 PROCEDURE — 83735 ASSAY OF MAGNESIUM: CPT | Performed by: ANESTHESIOLOGY

## 2025-02-06 PROCEDURE — 99222 1ST HOSP IP/OBS MODERATE 55: CPT | Performed by: INTERNAL MEDICINE

## 2025-02-06 PROCEDURE — 25510000001 PERFLUTREN 6.52 MG/ML SUSPENSION 2 ML VIAL: Performed by: THORACIC SURGERY (CARDIOTHORACIC VASCULAR SURGERY)

## 2025-02-06 PROCEDURE — 82948 REAGENT STRIP/BLOOD GLUCOSE: CPT

## 2025-02-06 PROCEDURE — 84100 ASSAY OF PHOSPHORUS: CPT | Performed by: THORACIC SURGERY (CARDIOTHORACIC VASCULAR SURGERY)

## 2025-02-06 PROCEDURE — 25010000002 ACETAMINOPHEN 10 MG/ML SOLUTION: Performed by: NURSE PRACTITIONER

## 2025-02-06 PROCEDURE — 25010000002 CALCIUM GLUCONATE-NACL 1-0.675 GM/50ML-% SOLUTION: Performed by: ANESTHESIOLOGY

## 2025-02-06 PROCEDURE — 82803 BLOOD GASES ANY COMBINATION: CPT | Performed by: ANESTHESIOLOGY

## 2025-02-06 PROCEDURE — 82330 ASSAY OF CALCIUM: CPT | Performed by: NURSE PRACTITIONER

## 2025-02-06 PROCEDURE — 85610 PROTHROMBIN TIME: CPT | Performed by: NURSE PRACTITIONER

## 2025-02-06 PROCEDURE — 97530 THERAPEUTIC ACTIVITIES: CPT

## 2025-02-06 PROCEDURE — 93306 TTE W/DOPPLER COMPLETE: CPT | Performed by: INTERNAL MEDICINE

## 2025-02-06 PROCEDURE — 84132 ASSAY OF SERUM POTASSIUM: CPT | Performed by: THORACIC SURGERY (CARDIOTHORACIC VASCULAR SURGERY)

## 2025-02-06 PROCEDURE — 93306 TTE W/DOPPLER COMPLETE: CPT

## 2025-02-06 PROCEDURE — 25010000002 METOCLOPRAMIDE PER 10 MG: Performed by: NURSE PRACTITIONER

## 2025-02-06 PROCEDURE — 85027 COMPLETE CBC AUTOMATED: CPT | Performed by: ANESTHESIOLOGY

## 2025-02-06 PROCEDURE — 83735 ASSAY OF MAGNESIUM: CPT | Performed by: NURSE PRACTITIONER

## 2025-02-06 PROCEDURE — 25010000002 FUROSEMIDE PER 20 MG: Performed by: NURSE PRACTITIONER

## 2025-02-06 PROCEDURE — 94640 AIRWAY INHALATION TREATMENT: CPT

## 2025-02-06 PROCEDURE — 82803 BLOOD GASES ANY COMBINATION: CPT

## 2025-02-06 PROCEDURE — 82010 KETONE BODYS QUAN: CPT | Performed by: ANESTHESIOLOGY

## 2025-02-06 RX ORDER — GUAIFENESIN 600 MG/1
1200 TABLET, EXTENDED RELEASE ORAL EVERY 12 HOURS SCHEDULED
Status: DISCONTINUED | OUTPATIENT
Start: 2025-02-06 | End: 2025-02-12 | Stop reason: HOSPADM

## 2025-02-06 RX ORDER — IPRATROPIUM BROMIDE AND ALBUTEROL SULFATE 2.5; .5 MG/3ML; MG/3ML
3 SOLUTION RESPIRATORY (INHALATION) EVERY 4 HOURS PRN
Status: DISCONTINUED | OUTPATIENT
Start: 2025-02-06 | End: 2025-02-12 | Stop reason: HOSPADM

## 2025-02-06 RX ORDER — SODIUM CHLORIDE FOR INHALATION 7 %
4 VIAL, NEBULIZER (ML) INHALATION
Status: DISPENSED | OUTPATIENT
Start: 2025-02-06 | End: 2025-02-08

## 2025-02-06 RX ORDER — IBUPROFEN 600 MG/1
1 TABLET ORAL
Status: DISCONTINUED | OUTPATIENT
Start: 2025-02-06 | End: 2025-02-12 | Stop reason: HOSPADM

## 2025-02-06 RX ORDER — IPRATROPIUM BROMIDE AND ALBUTEROL SULFATE 2.5; .5 MG/3ML; MG/3ML
3 SOLUTION RESPIRATORY (INHALATION) 2 TIMES DAILY
Status: DISPENSED | OUTPATIENT
Start: 2025-02-06 | End: 2025-02-08

## 2025-02-06 RX ORDER — GABAPENTIN 100 MG/1
100 CAPSULE ORAL EVERY 12 HOURS SCHEDULED
Status: DISCONTINUED | OUTPATIENT
Start: 2025-02-06 | End: 2025-02-07

## 2025-02-06 RX ORDER — DEXTROSE MONOHYDRATE 25 G/50ML
25 INJECTION, SOLUTION INTRAVENOUS
Status: DISCONTINUED | OUTPATIENT
Start: 2025-02-06 | End: 2025-02-12 | Stop reason: HOSPADM

## 2025-02-06 RX ORDER — INSULIN LISPRO 100 [IU]/ML
2-7 INJECTION, SOLUTION INTRAVENOUS; SUBCUTANEOUS
Status: DISCONTINUED | OUTPATIENT
Start: 2025-02-06 | End: 2025-02-08

## 2025-02-06 RX ORDER — POTASSIUM CHLORIDE 750 MG/1
20 TABLET, EXTENDED RELEASE ORAL ONCE
Status: COMPLETED | OUTPATIENT
Start: 2025-02-06 | End: 2025-02-06

## 2025-02-06 RX ORDER — FUROSEMIDE 10 MG/ML
40 INJECTION INTRAMUSCULAR; INTRAVENOUS ONCE
Status: COMPLETED | OUTPATIENT
Start: 2025-02-06 | End: 2025-02-06

## 2025-02-06 RX ORDER — CALCIUM GLUCONATE 20 MG/ML
1000 INJECTION, SOLUTION INTRAVENOUS ONCE
Status: COMPLETED | OUTPATIENT
Start: 2025-02-06 | End: 2025-02-06

## 2025-02-06 RX ORDER — NICOTINE POLACRILEX 4 MG
15 LOZENGE BUCCAL
Status: DISCONTINUED | OUTPATIENT
Start: 2025-02-06 | End: 2025-02-12 | Stop reason: HOSPADM

## 2025-02-06 RX ORDER — SIMETHICONE 80 MG
80 TABLET,CHEWABLE ORAL
Status: DISCONTINUED | OUTPATIENT
Start: 2025-02-06 | End: 2025-02-07

## 2025-02-06 RX ORDER — HYDRALAZINE HYDROCHLORIDE 20 MG/ML
10 INJECTION INTRAMUSCULAR; INTRAVENOUS EVERY 4 HOURS PRN
Status: DISCONTINUED | OUTPATIENT
Start: 2025-02-06 | End: 2025-02-12 | Stop reason: HOSPADM

## 2025-02-06 RX ORDER — FUROSEMIDE 10 MG/ML
20 INJECTION INTRAMUSCULAR; INTRAVENOUS EVERY 12 HOURS
Status: COMPLETED | OUTPATIENT
Start: 2025-02-06 | End: 2025-02-07

## 2025-02-06 RX ADMIN — HYDROCODONE BITARTRATE AND ACETAMINOPHEN 2 TABLET: 5; 325 TABLET ORAL at 22:10

## 2025-02-06 RX ADMIN — ATORVASTATIN CALCIUM 40 MG: 20 TABLET, FILM COATED ORAL at 22:10

## 2025-02-06 RX ADMIN — FUROSEMIDE 20 MG: 10 INJECTION, SOLUTION INTRAMUSCULAR; INTRAVENOUS at 18:39

## 2025-02-06 RX ADMIN — MUPIROCIN 1 APPLICATION: 20 OINTMENT TOPICAL at 22:11

## 2025-02-06 RX ADMIN — CEFAZOLIN 2 G: 2 INJECTION, POWDER, FOR SOLUTION INTRAMUSCULAR; INTRAVENOUS at 10:08

## 2025-02-06 RX ADMIN — SIMETHICONE 80 MG: 80 TABLET, CHEWABLE ORAL at 22:10

## 2025-02-06 RX ADMIN — ONDANSETRON 4 MG: 2 INJECTION, SOLUTION INTRAMUSCULAR; INTRAVENOUS at 09:58

## 2025-02-06 RX ADMIN — 0.12% CHLORHEXIDINE GLUCONATE 15 ML: 1.2 RINSE ORAL at 13:56

## 2025-02-06 RX ADMIN — ACETAMINOPHEN 1000 MG: 10 INJECTION INTRAVENOUS at 03:28

## 2025-02-06 RX ADMIN — CALCIUM GLUCONATE 1000 MG: 20 INJECTION, SOLUTION INTRAVENOUS at 15:54

## 2025-02-06 RX ADMIN — ASPIRIN 81 MG: 81 TABLET, COATED ORAL at 08:40

## 2025-02-06 RX ADMIN — Medication 4 ML: at 09:33

## 2025-02-06 RX ADMIN — GABAPENTIN 100 MG: 100 CAPSULE ORAL at 08:39

## 2025-02-06 RX ADMIN — GUAIFENESIN 1200 MG: 600 TABLET, MULTILAYER, EXTENDED RELEASE ORAL at 22:12

## 2025-02-06 RX ADMIN — GABAPENTIN 100 MG: 100 CAPSULE ORAL at 22:12

## 2025-02-06 RX ADMIN — CEFAZOLIN 2 G: 2 INJECTION, POWDER, FOR SOLUTION INTRAMUSCULAR; INTRAVENOUS at 02:59

## 2025-02-06 RX ADMIN — IPRATROPIUM BROMIDE AND ALBUTEROL SULFATE 3 ML: .5; 3 SOLUTION RESPIRATORY (INHALATION) at 09:33

## 2025-02-06 RX ADMIN — FUROSEMIDE 40 MG: 10 INJECTION, SOLUTION INTRAMUSCULAR; INTRAVENOUS at 08:40

## 2025-02-06 RX ADMIN — SIMETHICONE 80 MG: 80 TABLET, CHEWABLE ORAL at 13:56

## 2025-02-06 RX ADMIN — METOCLOPRAMIDE 10 MG: 5 INJECTION, SOLUTION INTRAMUSCULAR; INTRAVENOUS at 05:09

## 2025-02-06 RX ADMIN — POTASSIUM CHLORIDE 20 MEQ: 750 TABLET, EXTENDED RELEASE ORAL at 08:40

## 2025-02-06 RX ADMIN — SENNOSIDES AND DOCUSATE SODIUM 2 TABLET: 50; 8.6 TABLET ORAL at 22:11

## 2025-02-06 RX ADMIN — IPRATROPIUM BROMIDE AND ALBUTEROL SULFATE 3 ML: .5; 3 SOLUTION RESPIRATORY (INHALATION) at 19:52

## 2025-02-06 RX ADMIN — MUPIROCIN 1 APPLICATION: 20 OINTMENT TOPICAL at 08:40

## 2025-02-06 RX ADMIN — HYDROCODONE BITARTRATE AND ACETAMINOPHEN 2 TABLET: 5; 325 TABLET ORAL at 15:38

## 2025-02-06 RX ADMIN — ENOXAPARIN SODIUM 40 MG: 100 INJECTION SUBCUTANEOUS at 17:22

## 2025-02-06 RX ADMIN — CEFAZOLIN 2 G: 2 INJECTION, POWDER, FOR SOLUTION INTRAMUSCULAR; INTRAVENOUS at 18:39

## 2025-02-06 RX ADMIN — HYDROCODONE BITARTRATE AND ACETAMINOPHEN 2 TABLET: 5; 325 TABLET ORAL at 08:39

## 2025-02-06 RX ADMIN — Medication 4 ML: at 19:56

## 2025-02-06 RX ADMIN — PANTOPRAZOLE SODIUM 40 MG: 40 TABLET, DELAYED RELEASE ORAL at 06:34

## 2025-02-06 RX ADMIN — 0.12% CHLORHEXIDINE GLUCONATE 15 ML: 1.2 RINSE ORAL at 23:52

## 2025-02-06 RX ADMIN — PERFLUTREN 2 ML: 6.52 INJECTION, SUSPENSION INTRAVENOUS at 07:37

## 2025-02-06 RX ADMIN — SIMETHICONE 80 MG: 80 TABLET, CHEWABLE ORAL at 17:23

## 2025-02-06 RX ADMIN — GUAIFENESIN 1200 MG: 600 TABLET, MULTILAYER, EXTENDED RELEASE ORAL at 08:39

## 2025-02-06 NOTE — PROGRESS NOTES
CVICU Intensivist Progress Note    HPI/Chief Complaint: 73 yo male with severe MR and coronary disease who presents immediately s/p complex MV repair + PFO closure + MANJULA occlusion + 2V CABG (LIMA - LAD, SVG - marginal)    PMHx: HTN, HL, osteopenia, CKD stage 3 w/ Cr 1.4, anemia, dysphagia, osteopenia, CAD, severe MR    Procedure: 2/5 complex mitral valve repair + PFO closure + LAAO + 2V CABG (LIMA - LAD, SVG - marginal). Intra-op BRIE showed LVEF 55%, severe MR w/ P2 flail/torn chordae --> no MR but occasional LALITO with hypovolemia, thick LV, nml RV. He received 335ml cellsaver only as well as an insulin bolus for hyperkalemia. Brought to CVICU on propofol, precedex, pheynlephrine and DDD paced at 70bpm    Hospital Course:   2/5 operative course. Epi/volume post-op for low CI. Extubated after CI improved    Daily Assessment and Plan 2/6: Difficult course overnight, had a tonic/clonic seizure at which time pacer stopped capturing and underlying rhythm was asystole requiring brief compressions while pacemaker settings were adjusted. Labs checked after the event showed significant lactic acidosis, corrected w/ 4amps bicarb and perfusion. No further seizure activity or mental status changes. Unclear what provoked the seizure? Neuro exam completely normal for me today and he states that he does remember the event. Hemodynamics improved after resumption of pacing. This AM, CVP13 PA 37/16 CI 3 SVR 900s on epi .01. Will continue low dose inotrope for now, even for chronotropic support. Underlying rhythm initially asystole early this morning, now w/ junctional rhythm in the 30s. Continue AAI pacing as he does have appropriate conduction and DDD appears to provoke PVCs despite having appropriate capture and sensing. Oxygenating well on 4L NCO2 despite appearance of pulmonary edema on CXR. Diuresis for edema, however will need to be cautious given his thick LV and ALLITO. ISATU obviously worsened given the events overnight, now w/  downtrending Cr. Will ensure adequate perfusion and hopeful that renal function will continue to improve. Metabolic acidosis post-op related to starvation ketosis, then 2/2 lactic acidosis from seizure and events; now cleared. Follow q6 labs for now, can stop checking lactate once it has reliably cleared. D10/insulin drip d/c'd with resolution of starvation ketosis. Diet as tolerated. Unfortunately confined to the bed for now given lack of underlying perfusing rhythm. Hopeful conduction will return.       Relevant Physical Exam:  Lungs clear bilaterally, barrel chest  Distal extremities w/ varicosities but warm and well perfused, 2+ pulses bilaterally    Neuro:  Pain control w/ morphine vs. Apap vs. Oxy prn. Seizure activity overnight without any neuro deficits today, unclear what provoked the seizure.     CV: CAD and severe MR s/p CABG and mitral repair. ASA + statin, no BB 2/2 rhythm. Maintain euvolemia for hemodynamics in addition to prevention of LALITO   Rhythm: initial post-op rhythm in the 40s, then asystole/ventricular standstill, now junctional escape in the 30s. Maintain pacing for now, will c/s EP if conduction does not return/improve in the coming days    Pulm: IS/flutter to address atelectasis, supplemental O2 as needed. Diuresis for pulmonary edema    Renal: CKD stage 3 with baseline Cr 1.4. ISATU on CKD with Cr up to 1.9, related to both surgery as well as events overnight. Now Cr downtrending with adequate perfusion, continue to follow. Lactate and ketosis now both cleared    GI: start bowel regimen to avoid constipation/ileus. Start clears, advance as tolerated. PPI for prophy and 2/2 GERD    Endo: stress induced hyperglycemia, A1C 5.2. Hypoglycemic out of OR, then w/ starvation ketosis; now both resolved. SSI as needed to maintain euglycemia    ID: periop cefazolin for prophylaxis    Heme: acute blood loss anemia, received 335ml of cellsaver in OR, no other products. Appropriate chest tube output. Start  Lovenox POD#1 for prophy if platelets improve.    aspirin, 81 mg, Oral, Daily  atorvastatin, 40 mg, Oral, Nightly  ceFAZolin, 2 g, Intravenous, Q8H  chlorhexidine, 15 mL, Mouth/Throat, Q12H  enoxaparin, 40 mg, Subcutaneous, Daily  gabapentin, 100 mg, Oral, Q12H  guaiFENesin, 1,200 mg, Oral, Q12H  insulin lispro, 2-7 Units, Subcutaneous, 4x Daily AC & at Bedtime  ipratropium-albuterol, 3 mL, Nebulization, BID  mupirocin, 1 Application, Each Nare, BID  pantoprazole, 40 mg, Oral, QAM  senna-docusate sodium, 2 tablet, Oral, Nightly  simethicone, 80 mg, Oral, 4x Daily AC & at Bedtime  sodium chloride, 4 mL, Nebulization, BID - RT        ------------------------------------------------------------------------------------------------------------------------------------------------------  Prophy: HOB elevated + oral care + scds + comer stat lock + PPI + lovenox  Code Status: full      Critical Care time: 49 mins on 2/6/25 by Abigail Dutta MD for the assessment and treatment of: interpretation of serial cardiac output measurements, evaluation of CXR, interpretation of serial blood gases, junctional bradycardia requiring temporary epicardial pacing, cardiogenic shock requiring inotropes, ISATU on CKD, pulmonary edema

## 2025-02-06 NOTE — CONSULTS
[facility] Consult Note       Encounter Date:25  Patient:Jackson Tariq  :1950  MRN:[id number]    Date of Admission: 2025  Date of Encounter Visit: 25  Encounter Provider: Anoop Coleman MD  Referring Provider: Lee Christiansen PA-C  Place of Service: [facility]  Patient Care Team:  Caroline Medina APRN as PCP - General (Family Medicine)  Konstantin Driscoll MD as Consulting Physician (Cardiology)  Maksim Ortiz MD as Consulting Physician (Urology)      Consulted for: Mitral valve regurgitation    Chief Complaint: Mitral valve regurgitation      History of Presenting Illness:      Mr. Tariq is a 74 y.o. gentleman with past medical history notable for nonrheumatic mitral valve regurgitation, mitral valve prolapse, coronary artery disease, essential hypertension, and chronic kidney disease who presents for elective mitral valve repair and CABG.  Overall patient been doing reasonably well but was found to have new and worsening mitral valve disease.  Further workup also identified concomitant coronary disease.  He was scheduled for an elective mitral valve repair.  He did well with surgery his biggest issue currently is recovery of his conduction system.  He unfortunately has asystole underlying his current pacemaker.  Otherwise making good steady recovery and axes and pressures are improving still on a modest amount of epinephrine which is currently being weaned by our cardiac surgery colleagues.  The patient himself states that he is doing great      Review of Systems:  Review of Systems   Constitutional: Negative.   HENT: Negative.     Eyes: Negative.    Cardiovascular: Negative.    Respiratory: Negative.     Endocrine: Negative.    Hematologic/Lymphatic: Negative.    Skin: Negative.    Musculoskeletal: Negative.    Gastrointestinal: Negative.    Genitourinary: Negative.    Neurological: Negative.    Psychiatric/Behavioral: Negative.      Allergic/Immunologic: Negative.        Medications:    Current Facility-Administered Medications:     acetaminophen (TYLENOL) tablet 650 mg, 650 mg, Oral, Q4H PRN **OR** acetaminophen (TYLENOL) 160 MG/5ML oral solution 650 mg, 650 mg, Oral, Q4H PRN **OR** acetaminophen (TYLENOL) suppository 650 mg, 650 mg, Rectal, Q4H PRN, Ania Jodsay, APRN    albumin human 5 % solution 1,500 mL, 1,500 mL, Intravenous, PRN, Jo, Angela, APRN, 250 mL at 02/05/25 1650    ALPRAZolam (XANAX) tablet 0.25 mg, 0.25 mg, Oral, Q8H PRN, Ania Jodsay, APRN    aspirin EC tablet 81 mg, 81 mg, Oral, Daily, Jo, Angela, APRN, 81 mg at 02/06/25 0840    atorvastatin (LIPITOR) tablet 40 mg, 40 mg, Oral, Nightly, Sunshine Joay, APRN, 40 mg at 02/05/25 2004    bisacodyl (DULCOLAX) EC tablet 10 mg, 10 mg, Oral, Daily PRN, Ania Jodsay, APRN    bisacodyl (DULCOLAX) suppository 10 mg, 10 mg, Rectal, Daily PRN, Jo, Angela, APRN    Calcium Replacement - Follow Nurse / BPA Driven Protocol, , Not Applicable, PRN, Abigail Dutta MD    ceFAZolin 2000 mg IVPB in 100 mL NS (MBP), 2 g, Intravenous, Q8H, Ania Jodsay, APRN, 2 g at 02/06/25 0259    chlorhexidine (PERIDEX) 0.12 % solution 15 mL, 15 mL, Mouth/Throat, Q12H, Angela Jo, APRN, 15 mL at 02/05/25 9036    clevidipine (CLEVIPREX) infusion 0.5 mg/mL, 2-32 mg/hr, Intravenous, Continuous PRN, Sunshine Joay, APRN    cyclobenzaprine (FLEXERIL) tablet 10 mg, 10 mg, Oral, Q8H PRN, Angela Jo APRN    dextrose (D50W) (25 g/50 mL) IV injection 10-50 mL, 10-50 mL, Intravenous, Q15 Min PRN, Angela Jo APRN, 50 mL at 02/05/25 1126    dextrose (GLUTOSE) oral gel 15 g, 15 g, Oral, Q15 Min PRN, Angela Jo APRN    dextrose 10 % infusion, 30 mL/hr, Intravenous, Continuous, Abigail Dutta MD, Last Rate: 30 mL/hr at 02/05/25 1412, 30 mL/hr at 02/05/25 1412    DOPamine 400 mg in 250 mL D5W infusion, 2-20 mcg/kg/min, Intravenous, Continuous  PRN, Angela Jo, APRN    Enoxaparin Sodium (LOVENOX) syringe 40 mg, 40 mg, Subcutaneous, Daily, Angela Jo APRN    EPINEPHrine 5 mg in 250 mL NS infusion, 0.02-0.1 mcg/kg/min, Intravenous, Continuous PRN, Angela Jo APRN, Last Rate: 4.18 mL/hr at 02/05/25 1424, 0.02 mcg/kg/min at 02/05/25 1424    gabapentin (NEURONTIN) capsule 100 mg, 100 mg, Oral, Q12H, Angela Jo, APRN, 100 mg at 02/06/25 0839    glucagon (GLUCAGEN) injection 1 mg, 1 mg, Intramuscular, Q15 Min PRN, Angela Jo APRN    guaiFENesin (MUCINEX) 12 hr tablet 1,200 mg, 1,200 mg, Oral, Q12H, Angela Jo, APRN, 1,200 mg at 02/06/25 0839    HYDROcodone-acetaminophen (NORCO) 5-325 MG per tablet 2 tablet, 2 tablet, Oral, Q4H PRN, Angela Jo, APRN, 2 tablet at 02/06/25 0839    insulin regular 1 unit/mL in 0.9% sodium chloride (Glucommander), 0-100 Units/hr, Intravenous, Titrated, Angela Jo, APRN, Last Rate: 1.9 mL/hr at 02/06/25 0552, 1.9 Units/hr at 02/06/25 0552    ipratropium-albuterol (DUO-NEB) nebulizer solution 3 mL, 3 mL, Nebulization, Q4H PRN, Angela Jo, APRN    ipratropium-albuterol (DUO-NEB) nebulizer solution 3 mL, 3 mL, Nebulization, BID, Angela Jo APRN    magnesium hydroxide (MILK OF MAGNESIA) suspension 10 mL, 10 mL, Oral, Daily PRN, Sunshine Joay, APRN    Magnesium Standard Dose Replacement - Follow Nurse / BPA Driven Protocol, , Not Applicable, PRN, Abigail Dutta MD    magnesium sulfate in D5W 1g/100mL (PREMIX), 1 g, Intravenous, Q8H, Angela Jo APRN, Held at 02/05/25 1234    metoclopramide (REGLAN) injection 10 mg, 10 mg, Intravenous, Q6H, Angela Jo APRN, 10 mg at 02/06/25 0509    [Held by provider] metoprolol tartrate (LOPRESSOR) tablet 12.5 mg, 12.5 mg, Oral, Q12H, Angela Jo APRN    midazolam (VERSED) injection 2 mg, 2 mg, Intravenous, Q1H PRN, Angela Jo APRN    milrinone (PRIMACOR) 20 mg in 100 mL D5W infusion, 0.25-0.375  mcg/kg/min, Intravenous, Continuous PRN, Angela Jo APRN    morphine injection 1 mg, 1 mg, Intravenous, Q4H PRN **AND** naloxone (NARCAN) injection 0.4 mg, 0.4 mg, Intravenous, Q5 Min PRN, Angela Jo APRN    morphine injection 4 mg, 4 mg, Intravenous, Q30 Min PRN, Angela Jo APRN    mupirocin (BACTROBAN) 2 % nasal ointment 1 Application, 1 Application, Each Nare, BID, Angela Jo APRN, 1 Application at 02/06/25 0840    niCARdipine (CARDENE) 20 mg in 200 mL NS infusion, 5-15 mg/hr, Intravenous, Continuous PRN, Angela Jo APRN    nitroglycerin (NITROSTAT) SL tablet 0.4 mg, 0.4 mg, Sublingual, Q5 Min PRN, Angela Jo APRN    nitroglycerin (TRIDIL) 200 mcg/ml infusion, 5-200 mcg/min, Intravenous, Titrated, Angela Jo APRN    norepinephrine (LEVOPHED) 8 mg in 250 mL NS infusion (premix), 0.02-0.2 mcg/kg/min, Intravenous, Continuous PRN, Angela Jo APRN    ondansetron (ZOFRAN) injection 4 mg, 4 mg, Intravenous, Q6H PRN, Angela Jo APRN    oxyCODONE (ROXICODONE) immediate release tablet 10 mg, 10 mg, Oral, Q4H PRN, Angela Jo APRN    [COMPLETED] pantoprazole (PROTONIX) injection 40 mg, 40 mg, Intravenous, Once, 40 mg at 02/05/25 1214 **FOLLOWED BY** pantoprazole (PROTONIX) EC tablet 40 mg, 40 mg, Oral, QAM, Angela Jo APRN, 40 mg at 02/06/25 0634    phenylephrine (STEPHY-SYNEPHRINE) 50 mg in 250 mL NS infusion, 0.2-2 mcg/kg/min, Intravenous, Continuous PRN, Angela Jo APRN, Stopped at 02/05/25 1515    Phosphorus Replacement - Follow Nurse / BPA Driven Protocol, , Not Applicable, PRN, Abigail Dutta MD    polyethylene glycol (MIRALAX) packet 17 g, 17 g, Oral, Daily PRN, Angela Jo APRN    Potassium Replacement - Follow Nurse / BPA Driven Protocol, , Not Applicable, PRN, Angela Jo APRN    propofol (DIPRIVAN) infusion 10 mg/mL 100 mL, 5-50 mcg/kg/min, Intravenous, Continuous PRN, Angela Jo APRN, Stopped at 02/05/25  1430    sennosides-docusate (PERICOLACE) 8.6-50 MG per tablet 2 tablet, 2 tablet, Oral, Nightly, Angela Jo APRN    simethicone (MYLICON) chewable tablet 80 mg, 80 mg, Oral, 4x Daily AC & at Bedtime, Angela Jo APRN    sodium chloride 7 % nebulizer solution nebulizer solution 4 mL, 4 mL, Nebulization, BID - RT, Angela Jo APRN    vasopressin (PITRESSIN) 20 units in 100 mL NS infusion, 0.03 Units/min, Intravenous, Continuous, Farshad Gaitan MD, Last Rate: 9 mL/hr at 02/05/25 2352, 0.03 Units/min at 02/05/25 2352    No Known Allergies    Past Medical History:   Diagnosis Date    Chronic cough     Coronary artery disease     GERD (gastroesophageal reflux disease)     Hyperlipidemia     Hypertension     Mitral valve insufficiency     Osteopenia of multiple sites 07/17/2023    Vitamin D deficiency        Past Surgical History:   Procedure Laterality Date    CARDIAC CATHETERIZATION N/A 7/24/2024    Procedure: Left Heart Cath;  Surgeon: Susan De Jesus MD;  Location: [facility] INVASIVE LOCATION;  Service: Cardiovascular;  Laterality: N/A;  severe mitral regurgitation    CARDIAC CATHETERIZATION N/A 7/24/2024    Procedure: Right Heart Cath;  Surgeon: Susan De Jesus MD;  Location: [facility] INVASIVE LOCATION;  Service: Cardiovascular;  Laterality: N/A;    CARDIAC CATHETERIZATION N/A 7/24/2024    Procedure: Coronary angiography;  Surgeon: Susan De Jesus MD;  Location: [facility] INVASIVE LOCATION;  Service: Cardiovascular;  Laterality: N/A;    COLONOSCOPY      ENDOSCOPY N/A 05/03/2019    Procedure: ESOPHAGOGASTRODUODENOSCOPY with biopsies;  Surgeon: Radha Del Real MD;  Location: [facility] OR;  Service: Gastroenterology    TRIGGER FINGER RELEASE      doesn't remember which side       Social History     Socioeconomic History    Marital status:    Tobacco Use    Smoking status: Never    Smokeless tobacco: Never   Vaping Use    Vaping status: Never Used   Substance and Sexual Activity    Alcohol  use: No    Drug use: Never    Sexual activity: Defer       Family History   Problem Relation Age of Onset    Colon cancer Neg Hx     Colon polyps Neg Hx     Malig Hyperthermia Neg Hx        The following portions of the patient's history were reviewed and updated as appropriate: allergies, current medications, past family history, past medical history, past social history, past surgical history and problem list.         Objective:      Temp:  [95.9 °F (35.5 °C)-97.7 °F (36.5 °C)] 97.2 °F (36.2 °C)  Heart Rate:  [70-91] 76  Resp:  [12-20] 16  BP: ()/() 113/54  Arterial Line BP: ()/() 137/51  FiO2 (%):  [39 %-99 %] 39 %     Intake/Output Summary (Last 24 hours) at 2/6/2025 0920  Last data filed at 2/6/2025 0700  Gross per 24 hour   Intake 4820.5 ml   Output 4665 ml   Net 155.5 ml     Body mass index is 24.07 kg/m².      02/05/25  0520   Weight: 69.7 kg (153 lb 10.6 oz)           Physical Exam:  Constitutional: Well appearing, well developed, no acute distress   HENT: Oropharynx clear and membrane moist  Eyes: Normal conjunctiva, no sclera icterus.  Neck: Supple, no carotid bruit bilaterally.  Cardiovascular: Regular rate and rhythm, No Murmur, No bilateral lower extremity edema.  Pulmonary: Normal respiratory effort, normal lung sounds, no wheezing.  Abdominal: Soft, nontender, no hepatosplenomegaly, liver is non-pulsatile.  Neurological: Alert and orient x 3.   Skin: Warm, dry, no ecchymosis, no rash.  Psych: Appropriate mood and affect. Normal judgment and insight.         Lab Review:   Results from last 7 days   Lab Units 02/06/25  0328 02/05/25  2212 02/05/25  1809 02/05/25  1504 02/05/25  1117 02/03/25  0713   SODIUM mmol/L 143  --   --  139 137 131*   POTASSIUM mmol/L 4.1  --  4.5 3.8 3.7 4.7   CHLORIDE mmol/L 110*  --   --  110* 109* 100   CO2 mmol/L 22.0  --   --  19.4* 21.0* 26.4   BUN mg/dL 17  --   --  17 15 18   CREATININE mg/dL 1.69* 1.92*  --  1.51* 1.43* 1.44*   GLUCOSE mg/dL  159*  --   --  125* 41* 107*   CALCIUM mg/dL 8.7  --   --  8.2* 8.4* 9.4   AST (SGOT) U/L  --   --   --   --   --  25   ALT (SGPT) U/L  --   --   --   --   --  26         Results from last 7 days   Lab Units 02/06/25  0328 02/05/25  2301 02/05/25  2212 02/05/25  1504 02/05/25  1117 02/05/25  1016 02/05/25  0938 02/05/25  0936 02/05/25  0739 02/03/25  0713   WBC 10*3/mm3 19.91* 26.27*  --  14.96* 8.13  --   --   --   --  7.66   HEMOGLOBIN g/dL 9.0* 9.4*  --  10.4* 9.9*  --   --   --   --  12.5*   HEMOGLOBIN, POC g/dL  --   --  9.0*  --   --  7.8*  --  9.2*   < >  --    HEMATOCRIT % 25.9* 26.6*  --  29.7* 30.4*  --   --   --   --  37.9   HEMATOCRIT POC %  --   --  27*  --   --  23*  --  27*   < >  --    PLATELETS 10*3/mm3 128* 146  --  132* 106*  --  179  --   --  235    < > = values in this interval not displayed.     Results from last 7 days   Lab Units 02/06/25 0328 02/05/25  1117 02/05/25  0938 02/03/25  0713   INR  1.51* 1.61* 2.01* 1.11*   APTT seconds  --  37.8*  --  38.6*     Results from last 7 days   Lab Units 02/06/25  0234 02/05/25  1504 02/05/25  1117 02/03/25  0713   MAGNESIUM mg/dL 2.8* 2.7* 3.1* 2.1     Results from last 7 days   Lab Units 02/03/25  0713   CHOLESTEROL mg/dL 118   TRIGLYCERIDES mg/dL 91   HDL CHOL mg/dL 60     The ASCVD Risk score (Brenna DK, et al., 2019) failed to calculate for the following reasons:    The valid total cholesterol range is 130 to 320 mg/dL     Results from last 7 days   Lab Units 02/05/25  2301 02/03/25  0713   PROBNP pg/mL 1,551.0* 326.0           MVr with LAAO and CABG 2/5/2025 Dr. Gaitan:  Complex mitral valve repair with a 38 mm [employer] flexible band annuloplasty and triangular resection of the P2 segment  CABG x 2 with a LIMA to the mid LAD and reverse individual saphenous vein graft to the first marginal  Primary closure of patent foramen ovale  Epicardial closure of the left atrial appendage with a 40 mm atrial clip device    Echocardiogram 2/6/2025 with  images reviewed by myself:  Left ventricular systolic function is moderately decreased. Calculated left ventricular EF = 35.7%  Left atrial volume is moderately increased.  The study is technically difficult for diagnosis. The quality of the study is limited due to patient positioning    Echocardiogram 10/17/2024 with images reviewed by myself:  The mitral valve leaflets are thickened. There is a torn chordae associated with the P2 segment of the posterior mitral valve leaflet. The posterior mitral valve leaflet is partially flail.  There is severe and highly eccentric anteriorly directed mitral regurgitation. There is flow reversal in the left upper pulmonary vein  The left ventricular cavity is moderately dilated  Left ventricular systolic function is normal. Left ventricular ejection fraction appears to be 61 - 65%.  Left ventricular diastolic function was normal.  Normal right ventricular cavity size and systolic function noted.  The left atrial cavity is mildly dilated.  There is a tiny PFO noted during the agitated saline study  Mild tricuspid valve regurgitation is present. Calculated right ventricular systolic pressure from tricuspid regurgitation is 33 mmHg.  There are mild plaques in the descending thoracic aorta.  There is no evidence of pericardial effusion.    Cardiac Catheterization 7/24/2024:  RA: 0-5  RV: 30/5  PA: 30/5, mean 15  PCWP: 10-12, V wave 32  CO/CI: 9.4 L/min, 5.37 L/min/m².  PA saturation 79% on 92% aortic saturation.  Room air.  LV: 125/0  LVEDP: 0-5  Aortic: 120/4  LMCA: Medium caliber left main coronary artery bifurcates give the LAD and circumflex  LAD: Lady is a medium caliber vessel.  Calcified in the proximal segment.  Small caliber diagonal.  The mid segment has a discrete 50% stenosis.  Distal to that there are mild diffuse luminal regularities less than 30%.  Ramus: Absent  Circumflex: Large caliber circumflex.  Normal in the proximal segment.  Mild calcification.  Large  arborizing OM is normal with 3 major branches with minimal plaque at the ostium of the upper branch.  RCA: Caliber, dominant right coronary artery.  Normal       Assessment:           Nonrheumatic mitral valve regurgitation    CAD (coronary artery disease)         Plan:       Mr. Tariq is a 74 y.o. gentleman with past medical history notable for nonrheumatic mitral valve regurgitation, mitral valve prolapse, coronary artery disease, essential hypertension, and chronic kidney disease who presents for elective mitral valve repair and CABG. he is making a good recovery no changes are needed from my and plan is to wean pressor requirements and titrate on good medical therapy for management of his coronary disease and hypertension.  Creatinine has remained relatively stable we will monitor this as we progress and carefully manage volume status.  A limited bedside echocardiogram was performed this morning it does suggest that the ejection fraction is lower than what it was before which was fairly normal might just be related to his pacing wires was calculated at 35.7% looks a little bit better than that in the 40 to 45% range but again somewhat limited study likely would benefit from a repeat study once further recovered from open heart surgery and weaned from pressors and epicardial pacing      Nonrheumatic mitral valve regurgitation:  Status post mitral valve repair with left atrial appendage occlusion  Invasive hemodynamics show fairly good pulmonary pressures  Weaning epinephrine    Coronary artery disease without angina:  Status post bypass  Continue aspirin  Likely titrate on beta-blocker once further recovery of conduction system  Continue statin    Asystole:  Temporary wires are currently in place  Is on epinephrine with a slow wean        Thank you for allowing me to participate in the care of Jackson Tariq. Feel free to contact me directly with any further questions or concerns.    Anoop Coleman,  MD [facility]  02/06/25  09:20 EST

## 2025-02-06 NOTE — NURSING NOTE
RN readjusting patient when pt had tonic clonic seizure at 2200. RN turned pt on side. Seizure lasted until 2201. Pt lost capture on pacemaker and pt was asystole. RN started compressions and code was called at 2201. Pt became responsive at 2202 and code was canceled. Pt answered orientation questions appropriately immediately after regaining consciousness. Dr. Gaitan and neuro paged. ABG showed pt was severely acidotic. Bicarb given per mar. Anesthesia paged at 2218 to emergently intubate pt. Dr. Gaitan at bedside at 2230. Another ABG drawn and Dr. Gaitan decided to hold off on intubation and give bicarb. Lactate, BMP, and CBC drawn. Pt responsive and answer orientation questions appropriately. Care on going.

## 2025-02-06 NOTE — PLAN OF CARE
Goal Outcome Evaluation:              Outcome Evaluation: POD 1 Monitor showing underlying junc rhythm.  AAI pacing appropriately.  VSS.  CT's maintained.  Good diuresis.  Labs monitored.  Activity encouraged.  No distress

## 2025-02-06 NOTE — THERAPY EVALUATION
Patient Name: Jackson Tariq  : 1950    MRN: [id number]                              Today's Date: 2025       Admit Date: 2025    Visit Dx:     ICD-10-CM ICD-9-CM   1. S/P CABG (coronary artery bypass graft)  Z95.1 V45.81   2. Nonrheumatic mitral valve regurgitation  I34.0 424.0   3. S/P MVR (mitral valve repair)  Z98.890 V45.89     Patient Active Problem List   Diagnosis    Dysphagia    Osteopenia of multiple sites    Vitamin D deficiency    Nonrheumatic mitral valve regurgitation    Severe mitral regurgitation    Other secondary hypertension    Stage 3a chronic kidney disease    Mild anemia    Elevated PSA    CAD (coronary artery disease)     Past Medical History:   Diagnosis Date    Chronic cough     Coronary artery disease     GERD (gastroesophageal reflux disease)     Hyperlipidemia     Hypertension     Mitral valve insufficiency     Osteopenia of multiple sites 2023    Vitamin D deficiency      Past Surgical History:   Procedure Laterality Date    CARDIAC CATHETERIZATION N/A 2024    Procedure: Left Heart Cath;  Surgeon: Susan De Jesus MD;  Location:  [facility];  Service: Cardiovascular;  Laterality: N/A;  severe mitral regurgitation    CARDIAC CATHETERIZATION N/A 2024    Procedure: Right Heart Cath;  Surgeon: Susan De Jesus MD;  Location:  [facility];  Service: Cardiovascular;  Laterality: N/A;    CARDIAC CATHETERIZATION N/A 2024    Procedure: Coronary angiography;  Surgeon: Susan De Jesus MD;  Location:  [facility];  Service: Cardiovascular;  Laterality: N/A;    COLONOSCOPY      CORONARY ARTERY BYPASS GRAFT WITH MITRAL VALVE REPAIR/REPLACEMENT N/A 2025    Procedure: CORONARY ARTERY BYPASS GRAFT X2  WITH INTERNAL MAMMARY ARTERY GRAFT AND ENDOSCOPICALLY HARVESTED RIGHT SAPHENOUS VEIN;  STERNOTOMY; PRP; MITRAL VALVE REPAIR; PATENT FORAMEN OVALE CLOSURE; LEFT ATRIAL APPENDAGE CLOSURE WITH ATRICARE; TRANSESOPHAGEAL  ECHOCARDIOGRAM WITH ANESTHESIA;  Surgeon: Farshad Gaitan MD;  Location: [facility];  Service: Cardiothoracic;  Laterality: N/A;    ENDOSCOPY N/A 05/03/2019    Procedure: ESOPHAGOGASTRODUODENOSCOPY with biopsies;  Surgeon: Radha Del Real MD;  Location:  [facility];  Service: Gastroenterology    TRIGGER FINGER RELEASE      doesn't remember which side      General Information       Row Name 02/06/25 1333          Physical Therapy Time and Intention    Document Type evaluation  -EJ     Mode of Treatment physical therapy  -EJ       Row Name 02/06/25 1333          General Information    Patient Profile Reviewed yes  -EJ     Prior Level of Function independent:;all household mobility;community mobility;ADL's  -EJ     Existing Precautions/Restrictions fall;cardiac;sternal  -EJ     Barriers to Rehab none identified  -EJ       Row Name 02/06/25 1333          Living Environment    People in Home spouse  -EJ       Row Name 02/06/25 1333          Home Main Entrance    Number of Stairs, Main Entrance none  -EJ       Row Name 02/06/25 1333          Stairs Within Home, Primary    Number of Stairs, Within Home, Primary none  -EJ       Row Name 02/06/25 1333          Cognition    Orientation Status (Cognition) oriented x 4  -EJ       Row Name 02/06/25 1333          Safety Issues/Impairments Affecting Functional Mobility    Impairments Affecting Function (Mobility) strength;endurance/activity tolerance;pain  -EJ               User Key  (r) = Recorded By, (t) = Taken By, (c) = Cosigned By      Initials Name Provider Type    EJ Olga Lidia De Souza, PT Physical Therapist                   Mobility       Row Name 02/06/25 1337          Bed Mobility    Bed Mobility supine-sit;sit-supine  -EJ     Supine-Sit Martha (Bed Mobility) verbal cues;minimum assist (75% patient effort);2 person assist  -EJ     Sit-Supine Martha (Bed Mobility) verbal cues;minimum assist (75% patient effort);2 person assist  -EJ     Comment, (Bed  Mobility) able to sit EOB > 12 minutes w supervision  -EJ       Row Name 02/06/25 1337          Transfers    Comment, (Transfers) hold transfers and ambulation at this time per RN  -EJ               User Key  (r) = Recorded By, (t) = Taken By, (c) = Cosigned By      Initials Name Provider Type    Olga Lidia Ambrose, PT Physical Therapist                   Obj/Interventions       Row Name 02/06/25 1345          Range of Motion Comprehensive    General Range of Motion no range of motion deficits identified  -EJ       Row Name 02/06/25 1345          Strength Comprehensive (MMT)    Comment, General Manual Muscle Testing (MMT) Assessment post op weakness  -EJ       Row Name 02/06/25 1345          Motor Skills    Therapeutic Exercise --  cardiac protocol x 5 reps  -EJ       Row Name 02/06/25 1345          Balance    Balance Assessment sitting static balance  -EJ     Static Sitting Balance supervision  -EJ     Position, Sitting Balance sitting edge of bed  -EJ     Comment, Balance tolerated sitting EOB > 12 minutes, minor nausea noted but otherwise no complaints  -EJ               User Key  (r) = Recorded By, (t) = Taken By, (c) = Cosigned By      Initials Name Provider Type    Olga Lidia Abmrose, PT Physical Therapist                   Goals/Plan       Row Name 02/06/25 1359          Problem Specific Goal 1 (PT)    Problem Specific Goal 1 (PT) Cardiac Level 3  -EJ     Time Frame (Problem Specific Goal 1, PT) 2 weeks  -EJ       Row Name 02/06/25 1359          Therapy Assessment/Plan (PT)    Planned Therapy Interventions (PT) balance training;bed mobility training;gait training;home exercise program;transfer training;stretching;strengthening;stair training;ROM (range of motion);patient/family education  -EJ               User Key  (r) = Recorded By, (t) = Taken By, (c) = Cosigned By      Initials Name Provider Type    Olga Lidia Ambrose, PT Physical Therapist                   Clinical Impression       Row Name  02/06/25 1346          Pain    Pretreatment Pain Rating 8/10  -EJ     Posttreatment Pain Rating 8/10  -EJ     Pain Location chest  -EJ     Pain Side/Orientation generalized  -EJ     Pain Management Interventions exercise or physical activity utilized  -EJ       Row Name 02/06/25 1346          Plan of Care Review    Plan of Care Reviewed With patient  -EJ     Outcome Evaluation Pt seen for PT eval this AM. He s/p CABG x 2 and presents w expected post op pain and weakness. He has hx of CAD, HTN, HLD, CKD, and anemia. At baseline, pt lives at home w his wife. He reports independence w mobility and ADLs. Pt is ok to work w therapy today, however, relying heavily on external pacemaker at this time and RN requests to limit activity to sitting EOB only. Pt able to transition to EOB w min A x 2. He tolerated sitting for > 12 minutes w supervision. He was able to perform cardiac exercises. Pt did perform one partial stand at EOB to adjust sheets under him. Pt tolerated all activity well. He was assisted back to bed at end of session w all lines intact. Anticipate pt able to return home w family upon DC. He will continue to benefit from skilled PT to maximize safety, strength, and independence w mobility.  -EJ       Row Name 02/06/25 1346          Therapy Assessment/Plan (PT)    Rehab Potential (PT) good  -EJ     Criteria for Skilled Interventions Met (PT) yes  -EJ     Therapy Frequency (PT) daily  -EJ       Row Name 02/06/25 1346          Positioning and Restraints    Pre-Treatment Position in bed  -EJ     Post Treatment Position bed  -EJ     In Bed notified nsg;supine;call light within reach;encouraged to call for assist;exit alarm on;with nsg  -EJ               User Key  (r) = Recorded By, (t) = Taken By, (c) = Cosigned By      Initials Name Provider Type    Olga Lidia Ambrose, PT Physical Therapist                   Outcome Measures       Row Name 02/06/25 1827          How much help from another person do you  currently need...    Turning from your back to your side while in flat bed without using bedrails? 3  -EJ     Moving from lying on back to sitting on the side of a flat bed without bedrails? 3  -EJ     Moving to and from a bed to a chair (including a wheelchair)? 3  -EJ     Standing up from a chair using your arms (e.g., wheelchair, bedside chair)? 3  -EJ     Climbing 3-5 steps with a railing? 2  -EJ     To walk in hospital room? 2  -EJ     AM-PAC 6 Clicks Score (PT) 16  -EJ     Highest Level of Mobility Goal 5 --> Static standing  -EJ       Row Name 02/06/25 2708          Functional Assessment    Outcome Measure Options AM-PAC 6 Clicks Basic Mobility (PT)  -EJ               User Key  (r) = Recorded By, (t) = Taken By, (c) = Cosigned By      Initials Name Provider Type    Olga Lidia Ambrose, PT Physical Therapist                                 Physical Therapy Education        No education to display                  PT Recommendation and Plan  Planned Therapy Interventions (PT): balance training, bed mobility training, gait training, home exercise program, transfer training, stretching, strengthening, stair training, ROM (range of motion), patient/family education  Outcome Evaluation: Pt seen for PT eval this AM. He s/p CABG x 2 and presents w expected post op pain and weakness. He has hx of CAD, HTN, HLD, CKD, and anemia. At baseline, pt lives at home w his wife. He reports independence w mobility and ADLs. Pt is ok to work w therapy today, however, relying heavily on external pacemaker at this time and RN requests to limit activity to sitting EOB only. Pt able to transition to EOB w min A x 2. He tolerated sitting for > 12 minutes w supervision. He was able to perform cardiac exercises. Pt did perform one partial stand at EOB to adjust sheets under him. Pt tolerated all activity well. He was assisted back to bed at end of session w all lines intact. Anticipate pt able to return home w family upon DC. He will  continue to benefit from skilled PT to maximize safety, strength, and independence w mobility.     Time Calculation:         PT Charges       Row Name 02/06/25 1400             Time Calculation    Start Time 1049  -EJ      Stop Time 1114  -EJ      Time Calculation (min) 25 min  -EJ      PT Received On 02/06/25  -EJ      PT - Next Appointment 02/07/25  -EJ      PT Goal Re-Cert Due Date 02/20/25  -EJ         Time Calculation- PT    Total Timed Code Minutes- PT 20 minute(s)  -EJ                User Key  (r) = Recorded By, (t) = Taken By, (c) = Cosigned By      Initials Name Provider Type    EJ lOga Lidia De Souza, PT Physical Therapist                  Therapy Charges for Today       Code Description Service Date Service Provider Modifiers Qty    82862678146 HC PT EVAL MOD COMPLEXITY 3 2/6/2025 Olga Lidia De Souza, PT GP 1    07767843547 HC PT THERAPEUTIC ACT EA 15 MIN 2/6/2025 Olga Lidia De Souza, PT GP 1    09161013985 HC PT THER SUPP EA 15 MIN 2/6/2025 Olga Lidia De Souza, PT GP 1            PT G-Codes  Outcome Measure Options: AM-PAC 6 Clicks Basic Mobility (PT)  AM-PAC 6 Clicks Score (PT): 16  PT Discharge Summary  Anticipated Discharge Disposition (PT): home with assist, home with home health    Olga Lidia De Souza, PT  2/6/2025

## 2025-02-06 NOTE — PLAN OF CARE
Goal Outcome Evaluation:  Plan of Care Reviewed With: patient           Outcome Evaluation: Pt seen for PT severoal this AM. He s/p CABG x 2 and presents w expected post op pain and weakness. He has hx of CAD, HTN, HLD, CKD, and anemia. At baseline, pt lives at home w his wife. He reports independence w mobility and ADLs. Pt is ok to work w therapy today, however, relying heavily on external pacemaker at this time and RN requests to limit activity to sitting EOB only. Pt able to transition to EOB w min A x 2. He tolerated sitting for > 12 minutes w supervision. He was able to perform cardiac exercises. Pt did perform one partial stand at EOB to adjust sheets under him. Pt tolerated all activity well. He was assisted back to bed at end of session w all lines intact. Anticipate pt able to return home w family upon DC. He will continue to benefit from skilled PT to maximize safety, strength, and independence w mobility.    Anticipated Discharge Disposition (PT): home with assist, home with home health

## 2025-02-07 ENCOUNTER — APPOINTMENT (OUTPATIENT)
Dept: GENERAL RADIOLOGY | Facility: HOSPITAL | Age: 75
DRG: 219 | End: 2025-02-07
Payer: MEDICARE

## 2025-02-07 LAB
ANION GAP SERPL CALCULATED.3IONS-SCNC: 7.9 MMOL/L (ref 5–15)
ANION GAP SERPL CALCULATED.3IONS-SCNC: 8.7 MMOL/L (ref 5–15)
ARTERIAL PATENCY WRIST A: ABNORMAL
ATMOSPHERIC PRESS: 753.4 MMHG
BASE EXCESS BLDA CALC-SCNC: 3.3 MMOL/L (ref 0–2)
BDY SITE: ABNORMAL
BUN SERPL-MCNC: 23 MG/DL (ref 8–23)
BUN SERPL-MCNC: 26 MG/DL (ref 8–23)
BUN/CREAT SERPL: 15.5 (ref 7–25)
BUN/CREAT SERPL: 17 (ref 7–25)
CA-I SERPL ISE-MCNC: 1.16 MMOL/L (ref 1.15–1.35)
CA-I SERPL ISE-MCNC: 1.18 MMOL/L (ref 1.15–1.35)
CALCIUM SPEC-SCNC: 8.5 MG/DL (ref 8.6–10.5)
CALCIUM SPEC-SCNC: 8.6 MG/DL (ref 8.6–10.5)
CHLORIDE SERPL-SCNC: 103 MMOL/L (ref 98–107)
CHLORIDE SERPL-SCNC: 104 MMOL/L (ref 98–107)
CO2 BLDA-SCNC: 30.6 MMOL/L (ref 23–27)
CO2 SERPL-SCNC: 25.1 MMOL/L (ref 22–29)
CO2 SERPL-SCNC: 25.3 MMOL/L (ref 22–29)
CREAT SERPL-MCNC: 1.48 MG/DL (ref 0.76–1.27)
CREAT SERPL-MCNC: 1.53 MG/DL (ref 0.76–1.27)
DEPRECATED RDW RBC AUTO: 42.3 FL (ref 37–54)
DEPRECATED RDW RBC AUTO: 43.3 FL (ref 37–54)
DEVICE COMMENT: ABNORMAL
EGFRCR SERPLBLD CKD-EPI 2021: 47.7 ML/MIN/1.73
EGFRCR SERPLBLD CKD-EPI 2021: 49.6 ML/MIN/1.73
ERYTHROCYTE [DISTWIDTH] IN BLOOD BY AUTOMATED COUNT: 12.4 % (ref 12.3–15.4)
ERYTHROCYTE [DISTWIDTH] IN BLOOD BY AUTOMATED COUNT: 12.6 % (ref 12.3–15.4)
GLUCOSE BLDC GLUCOMTR-MCNC: 116 MG/DL (ref 70–130)
GLUCOSE BLDC GLUCOMTR-MCNC: 129 MG/DL (ref 70–130)
GLUCOSE BLDC GLUCOMTR-MCNC: 129 MG/DL (ref 70–130)
GLUCOSE BLDC GLUCOMTR-MCNC: 139 MG/DL (ref 70–130)
GLUCOSE SERPL-MCNC: 128 MG/DL (ref 65–99)
GLUCOSE SERPL-MCNC: 142 MG/DL (ref 65–99)
HCO3 BLDA-SCNC: 29 MMOL/L (ref 22–28)
HCT VFR BLD AUTO: 24.5 % (ref 37.5–51)
HCT VFR BLD AUTO: 24.9 % (ref 37.5–51)
HEMODILUTION: NO
HGB BLD-MCNC: 8.4 G/DL (ref 13–17.7)
HGB BLD-MCNC: 8.4 G/DL (ref 13–17.7)
INHALED O2 CONCENTRATION: 21 %
IRON 24H UR-MRATE: 25 MCG/DL (ref 59–158)
IRON SATN MFR SERPL: 15 % (ref 20–50)
MAGNESIUM SERPL-MCNC: 2.4 MG/DL (ref 1.6–2.4)
MAGNESIUM SERPL-MCNC: 3 MG/DL (ref 1.6–2.4)
MCH RBC QN AUTO: 32.2 PG (ref 26.6–33)
MCH RBC QN AUTO: 32.6 PG (ref 26.6–33)
MCHC RBC AUTO-ENTMCNC: 33.7 G/DL (ref 31.5–35.7)
MCHC RBC AUTO-ENTMCNC: 34.3 G/DL (ref 31.5–35.7)
MCV RBC AUTO: 95 FL (ref 79–97)
MCV RBC AUTO: 95.4 FL (ref 79–97)
MODALITY: ABNORMAL
O2 A-A PPRESDIFF RESPIRATORY: 0.7 MMHG
PCO2 BLDA: 50.3 MM HG (ref 35–45)
PH BLDA: 7.37 PH UNITS (ref 7.35–7.45)
PLATELET # BLD AUTO: 115 10*3/MM3 (ref 140–450)
PLATELET # BLD AUTO: 95 10*3/MM3 (ref 140–450)
PMV BLD AUTO: 10.6 FL (ref 6–12)
PMV BLD AUTO: 10.9 FL (ref 6–12)
PO2 BLD: 295 MM[HG] (ref 0–500)
PO2 BLDA: 62 MM HG (ref 80–100)
POTASSIUM SERPL-SCNC: 4.2 MMOL/L (ref 3.5–5.2)
POTASSIUM SERPL-SCNC: 4.3 MMOL/L (ref 3.5–5.2)
QT INTERVAL: 325 MS
QT INTERVAL: 339 MS
QT INTERVAL: 390 MS
QTC INTERVAL: 395 MS
QTC INTERVAL: 463 MS
QTC INTERVAL: 463 MS
RBC # BLD AUTO: 2.58 10*6/MM3 (ref 4.14–5.8)
RBC # BLD AUTO: 2.61 10*6/MM3 (ref 4.14–5.8)
SAO2 % BLDCOA: 90.3 % (ref 92–98.5)
SODIUM SERPL-SCNC: 136 MMOL/L (ref 136–145)
SODIUM SERPL-SCNC: 138 MMOL/L (ref 136–145)
TIBC SERPL-MCNC: 171 MCG/DL (ref 298–536)
TOTAL RATE: 20 BREATHS/MINUTE
TRANSFERRIN SERPL-MCNC: 115 MG/DL (ref 200–360)
WBC NRBC COR # BLD AUTO: 12.94 10*3/MM3 (ref 3.4–10.8)
WBC NRBC COR # BLD AUTO: 16.38 10*3/MM3 (ref 3.4–10.8)

## 2025-02-07 PROCEDURE — 85027 COMPLETE CBC AUTOMATED: CPT | Performed by: ANESTHESIOLOGY

## 2025-02-07 PROCEDURE — 97530 THERAPEUTIC ACTIVITIES: CPT

## 2025-02-07 PROCEDURE — 94799 UNLISTED PULMONARY SVC/PX: CPT

## 2025-02-07 PROCEDURE — 25010000002 MAGNESIUM SULFATE 2 GM/50ML SOLUTION: Performed by: NURSE PRACTITIONER

## 2025-02-07 PROCEDURE — 71045 X-RAY EXAM CHEST 1 VIEW: CPT

## 2025-02-07 PROCEDURE — 80048 BASIC METABOLIC PNL TOTAL CA: CPT | Performed by: ANESTHESIOLOGY

## 2025-02-07 PROCEDURE — 82803 BLOOD GASES ANY COMBINATION: CPT | Performed by: ANESTHESIOLOGY

## 2025-02-07 PROCEDURE — 93010 ELECTROCARDIOGRAM REPORT: CPT | Performed by: INTERNAL MEDICINE

## 2025-02-07 PROCEDURE — 99232 SBSQ HOSP IP/OBS MODERATE 35: CPT | Performed by: INTERNAL MEDICINE

## 2025-02-07 PROCEDURE — 82330 ASSAY OF CALCIUM: CPT | Performed by: ANESTHESIOLOGY

## 2025-02-07 PROCEDURE — 99291 CRITICAL CARE FIRST HOUR: CPT | Performed by: ANESTHESIOLOGY

## 2025-02-07 PROCEDURE — 83540 ASSAY OF IRON: CPT | Performed by: NURSE PRACTITIONER

## 2025-02-07 PROCEDURE — 83735 ASSAY OF MAGNESIUM: CPT | Performed by: ANESTHESIOLOGY

## 2025-02-07 PROCEDURE — 25010000002 AMIODARONE IN DEXTROSE 5% 360-4.14 MG/200ML-% SOLUTION: Performed by: NURSE PRACTITIONER

## 2025-02-07 PROCEDURE — 84466 ASSAY OF TRANSFERRIN: CPT | Performed by: NURSE PRACTITIONER

## 2025-02-07 PROCEDURE — 93005 ELECTROCARDIOGRAM TRACING: CPT | Performed by: ANESTHESIOLOGY

## 2025-02-07 PROCEDURE — 82948 REAGENT STRIP/BLOOD GLUCOSE: CPT

## 2025-02-07 PROCEDURE — 93005 ELECTROCARDIOGRAM TRACING: CPT | Performed by: NURSE PRACTITIONER

## 2025-02-07 PROCEDURE — 25010000002 AMIODARONE IN DEXTROSE 5% 150-4.21 MG/100ML-% SOLUTION: Performed by: NURSE PRACTITIONER

## 2025-02-07 PROCEDURE — 82803 BLOOD GASES ANY COMBINATION: CPT

## 2025-02-07 PROCEDURE — 25010000002 AMIODARONE IN DEXTROSE 5% 150-4.21 MG/100ML-% SOLUTION: Performed by: ANESTHESIOLOGY

## 2025-02-07 PROCEDURE — 25010000002 FUROSEMIDE PER 20 MG: Performed by: ANESTHESIOLOGY

## 2025-02-07 PROCEDURE — 25010000002 CEFAZOLIN PER 500 MG: Performed by: NURSE PRACTITIONER

## 2025-02-07 PROCEDURE — 99024 POSTOP FOLLOW-UP VISIT: CPT | Performed by: THORACIC SURGERY (CARDIOTHORACIC VASCULAR SURGERY)

## 2025-02-07 PROCEDURE — 25010000002 CALCIUM GLUCONATE 2-0.675 GM/100ML-% SOLUTION: Performed by: NURSE PRACTITIONER

## 2025-02-07 PROCEDURE — 94761 N-INVAS EAR/PLS OXIMETRY MLT: CPT

## 2025-02-07 RX ORDER — POLYETHYLENE GLYCOL 3350 17 G/17G
17 POWDER, FOR SOLUTION ORAL DAILY
Status: DISCONTINUED | OUTPATIENT
Start: 2025-02-07 | End: 2025-02-12

## 2025-02-07 RX ORDER — COLCHICINE 0.6 MG/1
0.6 TABLET ORAL DAILY
Status: DISCONTINUED | OUTPATIENT
Start: 2025-02-07 | End: 2025-02-12

## 2025-02-07 RX ORDER — DOCUSATE SODIUM 100 MG/1
100 CAPSULE, LIQUID FILLED ORAL 2 TIMES DAILY
Status: DISCONTINUED | OUTPATIENT
Start: 2025-02-07 | End: 2025-02-07

## 2025-02-07 RX ORDER — DOCUSATE SODIUM 100 MG/1
100 CAPSULE, LIQUID FILLED ORAL DAILY
Status: DISCONTINUED | OUTPATIENT
Start: 2025-02-07 | End: 2025-02-12

## 2025-02-07 RX ORDER — MAGNESIUM SULFATE HEPTAHYDRATE 40 MG/ML
2 INJECTION, SOLUTION INTRAVENOUS ONCE
Status: COMPLETED | OUTPATIENT
Start: 2025-02-07 | End: 2025-02-07

## 2025-02-07 RX ORDER — METOPROLOL TARTRATE 25 MG/1
12.5 TABLET, FILM COATED ORAL EVERY 12 HOURS SCHEDULED
Status: DISCONTINUED | OUTPATIENT
Start: 2025-02-07 | End: 2025-02-10

## 2025-02-07 RX ORDER — POLYETHYLENE GLYCOL 3350 17 G/17G
17 POWDER, FOR SOLUTION ORAL DAILY
Status: DISCONTINUED | OUTPATIENT
Start: 2025-02-08 | End: 2025-02-07

## 2025-02-07 RX ORDER — CALCIUM GLUCONATE 20 MG/ML
2000 INJECTION, SOLUTION INTRAVENOUS ONCE
Status: COMPLETED | OUTPATIENT
Start: 2025-02-07 | End: 2025-02-07

## 2025-02-07 RX ORDER — FUROSEMIDE 10 MG/ML
20 INJECTION INTRAMUSCULAR; INTRAVENOUS ONCE
Status: COMPLETED | OUTPATIENT
Start: 2025-02-07 | End: 2025-02-07

## 2025-02-07 RX ORDER — GABAPENTIN 100 MG/1
200 CAPSULE ORAL EVERY 12 HOURS SCHEDULED
Status: DISCONTINUED | OUTPATIENT
Start: 2025-02-07 | End: 2025-02-12 | Stop reason: HOSPADM

## 2025-02-07 RX ADMIN — MUPIROCIN 1 APPLICATION: 20 OINTMENT TOPICAL at 09:37

## 2025-02-07 RX ADMIN — GABAPENTIN 100 MG: 100 CAPSULE ORAL at 09:37

## 2025-02-07 RX ADMIN — POLYETHYLENE GLYCOL 3350 17 G: 17 POWDER, FOR SOLUTION ORAL at 10:52

## 2025-02-07 RX ADMIN — PANTOPRAZOLE SODIUM 40 MG: 40 TABLET, DELAYED RELEASE ORAL at 06:20

## 2025-02-07 RX ADMIN — CEFAZOLIN 2 G: 2 INJECTION, POWDER, FOR SOLUTION INTRAMUSCULAR; INTRAVENOUS at 02:19

## 2025-02-07 RX ADMIN — MUPIROCIN 1 APPLICATION: 20 OINTMENT TOPICAL at 21:08

## 2025-02-07 RX ADMIN — 0.12% CHLORHEXIDINE GLUCONATE 15 ML: 1.2 RINSE ORAL at 23:08

## 2025-02-07 RX ADMIN — Medication 5 MG: at 21:06

## 2025-02-07 RX ADMIN — GUAIFENESIN 1200 MG: 600 TABLET, MULTILAYER, EXTENDED RELEASE ORAL at 21:07

## 2025-02-07 RX ADMIN — SIMETHICONE 80 MG: 80 TABLET, CHEWABLE ORAL at 08:00

## 2025-02-07 RX ADMIN — FUROSEMIDE 20 MG: 10 INJECTION, SOLUTION INTRAMUSCULAR; INTRAVENOUS at 06:20

## 2025-02-07 RX ADMIN — AMIODARONE HYDROCHLORIDE 0.5 MG/MIN: 1.8 INJECTION, SOLUTION INTRAVENOUS at 23:08

## 2025-02-07 RX ADMIN — ATORVASTATIN CALCIUM 40 MG: 20 TABLET, FILM COATED ORAL at 21:06

## 2025-02-07 RX ADMIN — ASPIRIN 81 MG: 81 TABLET, COATED ORAL at 09:37

## 2025-02-07 RX ADMIN — GUAIFENESIN 1200 MG: 600 TABLET, MULTILAYER, EXTENDED RELEASE ORAL at 09:37

## 2025-02-07 RX ADMIN — AMIODARONE HYDROCHLORIDE 1 MG/MIN: 1.8 INJECTION, SOLUTION INTRAVENOUS at 10:07

## 2025-02-07 RX ADMIN — IPRATROPIUM BROMIDE AND ALBUTEROL SULFATE 3 ML: .5; 3 SOLUTION RESPIRATORY (INHALATION) at 20:13

## 2025-02-07 RX ADMIN — DOCUSATE SODIUM 100 MG: 100 CAPSULE, LIQUID FILLED ORAL at 09:37

## 2025-02-07 RX ADMIN — AMIODARONE HYDROCHLORIDE 1 MG/MIN: 1.8 INJECTION, SOLUTION INTRAVENOUS at 15:39

## 2025-02-07 RX ADMIN — 0.12% CHLORHEXIDINE GLUCONATE 15 ML: 1.2 RINSE ORAL at 10:53

## 2025-02-07 RX ADMIN — Medication 4 ML: at 20:16

## 2025-02-07 RX ADMIN — MAGNESIUM SULFATE IN WATER FOR 2 G: 40 INJECTION INTRAVENOUS at 09:37

## 2025-02-07 RX ADMIN — DOCUSATE SODIUM 100 MG: 100 CAPSULE, LIQUID FILLED ORAL at 10:53

## 2025-02-07 RX ADMIN — AMIODARONE HYDROCHLORIDE 150 MG: 1.5 INJECTION, SOLUTION INTRAVENOUS at 09:37

## 2025-02-07 RX ADMIN — AMIODARONE HYDROCHLORIDE 150 MG: 1.5 INJECTION, SOLUTION INTRAVENOUS at 12:41

## 2025-02-07 RX ADMIN — HYDROCODONE BITARTRATE AND ACETAMINOPHEN 2 TABLET: 5; 325 TABLET ORAL at 18:58

## 2025-02-07 RX ADMIN — CYCLOBENZAPRINE HYDROCHLORIDE 10 MG: 10 TABLET, FILM COATED ORAL at 00:37

## 2025-02-07 RX ADMIN — HYDROCODONE BITARTRATE AND ACETAMINOPHEN 2 TABLET: 5; 325 TABLET ORAL at 06:19

## 2025-02-07 RX ADMIN — GABAPENTIN 200 MG: 100 CAPSULE ORAL at 21:06

## 2025-02-07 RX ADMIN — CALCIUM GLUCONATE 2000 MG: 20 INJECTION, SOLUTION INTRAVENOUS at 08:00

## 2025-02-07 RX ADMIN — SENNOSIDES AND DOCUSATE SODIUM 2 TABLET: 50; 8.6 TABLET ORAL at 21:07

## 2025-02-07 RX ADMIN — NOREPINEPHRINE BITARTRATE 0.02 MCG/KG/MIN: 0.03 INJECTION, SOLUTION INTRAVENOUS at 13:16

## 2025-02-07 RX ADMIN — FUROSEMIDE 20 MG: 10 INJECTION, SOLUTION INTRAMUSCULAR; INTRAVENOUS at 17:04

## 2025-02-07 NOTE — PROGRESS NOTES
CVICU Intensivist Progress Note    HPI/Chief Complaint: 75 yo male with severe MR and coronary disease who presents immediately s/p complex MV repair + PFO closure + MANJULA occlusion + 2V CABG (LIMA - LAD, SVG - marginal)    PMHx: HTN, HL, osteopenia, CKD stage 3 w/ Cr 1.4, anemia, dysphagia, osteopenia, CAD, severe MR    Procedure: 2/5 complex mitral valve repair + PFO closure + LAAO + 2V CABG (LIMA - LAD, SVG - marginal). Intra-op BRIE showed LVEF 55%, severe MR w/ P2 flail/torn chordae --> no MR but occasional LALITO with hypovolemia, thick LV, nml RV. He received 335ml cellsaver only as well as an insulin bolus for hyperkalemia. Brought to CVICU on propofol, precedex, pheynlephrine and DDD paced at 70bpm    Hospital Course:   2/5 operative course. Epi/volume post-op for low CI. Extubated after CI improved. Late evening seizure + loss of pacemaker capture/asystole/brief compressions. Significant lactic acidosis after events, 4 amps bicarb w/ resolution  2/6 remained paced with junctional escape around 30 under pacer. Epi weaned off with CI >3    Daily Assessment and Plan 2/7: Didn't sleep well overnight, add melatonin for tonight. Surgical site pain worse today, continue norco/flexeril as needed and increase gabapentin to 200mg BID. BP appropriate overnight without epi or pressors, he did remain paced until around 7 am when he appeared to go into Aflutter with a rate of 120. Amio bolus + drip, now in NSR in the 100-115 range. Continue amio drip x 24 hours, then will consider transition to PO vs. BB. ISATU improving with Cr down to 1.48 this morning w/ resolution of acidosis. Still requiring nasal cannula O2 w/ desaturation when removed --> encouraging aggressive IS/flutter, duonebs for wheezing, guaifenesin for expectoration of secretions. Diuresed well yesterday, CVP only 8 around 10am this morning and no peripheral edema so will hold off on further diuresis at this time. May need spot diuretics later today/tomorrow.  Increase bowel regimen for constipation. Able to walk w/ PT/OT today         Relevant Physical Exam:  Barrel chest, wheezing L>R that did improve after duoneb  Distal extremities w/ varicosities but warm and well perfused, 2+ pulses bilaterally. No edema    Neuro:  Pain control w/ morphine vs. Apap vs. Oxy prn. Seizure activity post-op night #1 without any neuro deficits or further seizure activity. Unclear what provoked this. Mobilization with PT/OT now that he does have an underlying rhythmn    CV: CAD and severe MR s/p CABG and mitral repair. ASA + statin, no BB 2/2 rhythm. Maintain euvolemia for hemodynamics in addition to prevention of LALITO   Rhythm: initial post-op rhythm in the 40s, then asystole/ventricular standstill, now junctional escape in the 30s. POD #2 into Aflutter in the 120s, broken with amio bouls + drip    Pulm: IS/flutter to address atelectasis, supplemental O2 as needed. Duonebs for wheezing    Renal: CKD stage 3 with baseline Cr 1.4. ISATU on CKD with Cr up to 1.9, related to both surgery as well as events on post-op night #1. Now Cr downtrending with adequate perfusion, continue to follow. Lactate and ketosis now both cleared. Diuresis as needed    GI: increase bowel regimen for constipation. Tolerating diet without issue, denies nausea. PPI for prophy and 2/2 GERD    Endo: stress induced hyperglycemia, A1C 5.2. Hypoglycemic out of OR, then w/ starvation ketosis; now both resolved. SSI as needed to maintain euglycemia    ID: periop cefazolin for prophylaxis now complete. Leukocytosis resolving, likely 2/2 inflammatory response. Afebile    Heme: acute blood loss anemia, received 335ml of cellsaver in OR, no other products. Appropriate chest tube output. Lovenox for prophy. Hgb generally downtrending and now 8.4, no current indication for transfusion so will follow. Chest tube output more serous than sanguinous.    aspirin, 81 mg, Oral, Daily  atorvastatin, 40 mg, Oral, Nightly  chlorhexidine, 15  mL, Mouth/Throat, Q12H  [Held by provider] colchicine, 0.6 mg, Oral, Daily  docusate sodium, 100 mg, Oral, Daily  enoxaparin, 40 mg, Subcutaneous, Daily  gabapentin, 200 mg, Oral, Q12H  guaiFENesin, 1,200 mg, Oral, Q12H  insulin lispro, 2-7 Units, Subcutaneous, 4x Daily AC & at Bedtime  ipratropium-albuterol, 3 mL, Nebulization, BID  melatonin, 5 mg, Oral, Nightly  [Held by provider] metoprolol tartrate, 12.5 mg, Oral, Q12H  mupirocin, 1 Application, Each Nare, BID  pantoprazole, 40 mg, Oral, QAM  polyethylene glycol, 17 g, Oral, Daily  senna-docusate sodium, 2 tablet, Oral, Nightly  sodium chloride, 4 mL, Nebulization, BID - RT      ------------------------------------------------------------------------------------------------------------------------------------------------------  Prophy: HOB elevated + oral care + scds + comer stat lock + PPI + lovenox  Code Status: full      Critical Care time: 41 mins on 2/7/25 by Abigail Dutta MD for the assessment and treatment of: evaluation of CXR, interpretation of serial blood gases, junctional bradycardia requiring temporary epicardial pacing, ISATU on CKD, atrial flutter, pain management

## 2025-02-07 NOTE — DISCHARGE PLACEMENT REQUEST
"Jeannie Tariq \"Jeannie Tariq\" (74 y.o. Male)       Date of Birth   1950    Social Security Number       Address   [address] [facility] [address]    Home Phone   [phone]    MRN   [id number]       Anglican   Zoroastrian    Marital Status                               Admission Date   2/5/25    Admission Type   Elective    Admitting Provider   Farshad Gaitan MD    Attending Provider   Farshad Gaitan MD    Department, Room/Bed   [facility] CARDIO RECOVERY, CVR3/1       Discharge Date       Discharge Disposition       Discharge Destination                                 Attending Provider: Farshad Gaitan MD    Allergies: No Known Allergies    Isolation: None   Infection: None   Code Status: CPR    Ht: 170.2 cm (67\")   Wt: 69.7 kg (153 lb 10.6 oz)    Admission Cmt: None   Principal Problem: Nonrheumatic mitral valve regurgitation [I34.0]                   Active Insurance as of 2/5/2025       Primary Coverage       Payor Plan Insurance Group Employer/Plan Group    MEDICARE MEDICARE A & B        Payor Plan Address Payor Plan Phone Number Payor Plan Fax Number Effective Dates    [address] [phone]  3/1/2016 - None Entered    [facility] [id number]         Subscriber Name Subscriber Birth Date Member ID       JEANNIE TARIQ 1950 [id number]               Secondary Coverage       Payor Plan Insurance Group Employer/Plan Group    [employer] [place] [employer] [place] [place]       Payor Plan Address Payor Plan Phone Number Payor Plan Fax Number Effective Dates    [address] [place] [address] [phone]  3/1/2019 - None Entered    [place] [address]         Subscriber Name Subscriber Birth Date Member ID       JEANNIE TARIQ 1950 [id number]                     Emergency Contacts        (Rel.) Home Phone Work Phone Mobile Phone    Ysabel Tariq (Spouse) [phone] -- [phone]                "

## 2025-02-07 NOTE — CASE MANAGEMENT/SOCIAL WORK
Discharge Planning Assessment  [facility]     Patient Name: Jackson Tariq  MRN: [id number]  Today's Date: 2/7/2025    Admit Date: 2/5/2025    Plan: Home w/ spouse & [facility]    Discharge Needs Assessment       Row Name 02/07/25 1151       Living Environment    People in Home spouse    Name(s) of People in Home JUSTINE COREY/SPOUSE    Current Living Arrangements home    Potentially Unsafe Housing Conditions none    In the past 12 months has the electric, gas, oil, or water company threatened to shut off services in your home? No    Primary Care Provided by self    Provides Primary Care For pet(s)  1 PUPPY    Family Caregiver if Needed spouse    Family Caregiver Names JUSTINE    Quality of Family Relationships helpful;involved;supportive    Able to Return to Prior Arrangements yes       Resource/Environmental Concerns    Resource/Environmental Concerns none    Transportation Concerns none       Transportation Needs    In the past 12 months, has lack of transportation kept you from medical appointments or from getting medications? no       Food Insecurity    Within the past 12 months, you worried that your food would run out before you got the money to buy more. Never true       Transition Planning    Patient/Family Anticipates Transition to home with family    Patient/Family Anticipated Services at Transition home health care    Transportation Anticipated family or friend will provide       Discharge Needs Assessment    Readmission Within the Last 30 Days no previous admission in last 30 days    Equipment Currently Used at Home bp cuff;other (see comments)  Pt has access to BSC, cane, rolling walker and shower chair if needed.    Concerns to be Addressed discharge planning    Anticipated Changes Related to Illness none    Equipment Needed After Discharge none    Discharge Facility/Level of Care Needs home with home health    Provided Post Acute Provider Quality & Resource List? Yes    Post Acute Provider Quality  and Resource List Home Health    Delivered To Patient    Method of Delivery In person    Patient's Choice of Community Agency(s) [facility]                   Discharge Plan       Row Name 02/07/25 1153       Plan    Plan Home w/ spouse & [facility]    Plan Comments CCP spoke with Pt and his spouse/Ysabel Tariq in CVR3.  Introduced self and role.  Pt is POD 2 status post CABG x2.  Pt IADL's, retired and drives.  Pt and spouse live in single story home with three steps to enter.  Pt reports he uses no home DME but he has access to a cane, rolling walker, bp cuff, BSC and shower chair if needed.  Pt PCP is Caroline Medina and Pharmacy is [facility] [place], [place].  Pt plans to return home at d/c with 24/7 assistance of spouse and daughter April will help out if needed.  [facility].  [facility].  CCP following..............Temi/MIGUEL ÁNGEL                  Continued Care and Services - Admitted Since 2/5/2025       Home Medical Care Coordination complete.      Service Provider Request Status Services Address Phone Fax Patient Preferred    [facility] - [facility] [id number] [facility]  [place] [address] [phone] --                  Expected Discharge Date and Time       Expected Discharge Date Expected Discharge Time    Feb 11, 2025            Demographic Summary       Row Name 02/07/25 1149       General Information    Admission Type inpatient    Arrived From home    Required Notices Provided Important Message from Medicare    Referral Source admission list;physician    Reason for Consult discharge planning    Preferred Language English       Contact Information    Permission Granted to Share Info With                    Functional Status       Row Name 02/07/25 1150       Functional Status    Usual Activity Tolerance good    Current Activity Tolerance fair        Physical Activity    On average, how many days per week do you engage in moderate to strenuous exercise (like a brisk walk)? 4 days    On average, how many minutes do you engage in exercise at this level? 60 min    Number of minutes of exercise per week 240       Assessment of Health Literacy    How often do you have someone help you read hospital materials? Never    How often do you have problems learning about your medical condition because of difficulty understanding written information? Never    Health Literacy Good       Functional Status, IADL    Medications independent    Meal Preparation independent    Housekeeping independent    Laundry independent    Shopping independent    If for any reason you need help with day-to-day activities such as bathing, preparing meals, shopping, managing finances, etc., do you get the help you need? I don't need any help       Mental Status    General Appearance WDL WDL       Mental Status Summary    Recent Changes in Mental Status/Cognitive Functioning no changes       Employment/    Employment Status retired                   Psychosocial    No documentation.                  Abuse/Neglect    No documentation.                  Legal    No documentation.                  Substance Abuse    No documentation.                  Patient Forms    No documentation.                     Temi Acuna RN

## 2025-02-07 NOTE — PROGRESS NOTES
[facility] Progress Note       Encounter Date:25  Patient:Jackson Tariq  :1950  MRN:[id number]      Chief Complaint: Follow up mitral valve regurgitation      Subjective:      Feeling well today although went into atrial flutter this morning heart rates 120      Review of Systems:  Review of Systems   Constitutional: Positive for malaise/fatigue.   Cardiovascular:  Negative for palpitations.   Respiratory:  Negative for shortness of breath.        Medications:  Scheduled Meds:  aspirin, 81 mg, Oral, Daily  atorvastatin, 40 mg, Oral, Nightly  chlorhexidine, 15 mL, Mouth/Throat, Q12H  [Held by provider] colchicine, 0.6 mg, Oral, Daily  docusate sodium, 100 mg, Oral, Daily  enoxaparin, 40 mg, Subcutaneous, Daily  gabapentin, 200 mg, Oral, Q12H  guaiFENesin, 1,200 mg, Oral, Q12H  insulin lispro, 2-7 Units, Subcutaneous, 4x Daily AC & at Bedtime  ipratropium-albuterol, 3 mL, Nebulization, BID  melatonin, 5 mg, Oral, Nightly  [Held by provider] metoprolol tartrate, 12.5 mg, Oral, Q12H  mupirocin, 1 Application, Each Nare, BID  pantoprazole, 40 mg, Oral, QAM  polyethylene glycol, 17 g, Oral, Daily  senna-docusate sodium, 2 tablet, Oral, Nightly  sodium chloride, 4 mL, Nebulization, BID - RT    Continuous Infusions:  amiodarone, 1 mg/min, Last Rate: 1 mg/min (25 1007)   Followed by  amiodarone, 0.5 mg/min  EPINEPHrine, 0.02-0.1 mcg/kg/min, Last Rate: Stopped (25 1550)  milrinone, 0.25-0.375 mcg/kg/min  niCARdipine, 5-15 mg/hr  norepinephrine, 0.02-0.2 mcg/kg/min  phenylephrine, 0.2-2 mcg/kg/min, Last Rate: Stopped (25 1515)  vasopressin, 0.03 Units/min, Last Rate: 0.03 Units/min (25 2602)    PRN Meds:    acetaminophen **OR** acetaminophen **OR** acetaminophen    ALPRAZolam    bisacodyl    bisacodyl    Calcium Replacement - Follow Nurse / BPA Driven Protocol    cyclobenzaprine    dextrose    dextrose    EPINEPHrine    glucagon (human recombinant)    hydrALAZINE     HYDROcodone-acetaminophen    ipratropium-albuterol    magnesium hydroxide    Magnesium Standard Dose Replacement - Follow Nurse / BPA Driven Protocol    milrinone    Morphine **AND** naloxone    Morphine    niCARdipine    nitroglycerin    norepinephrine    ondansetron    phenylephrine    Phosphorus Replacement - Follow Nurse / BPA Driven Protocol    polyethylene glycol    Potassium Replacement - Follow Nurse / BPA Driven Protocol         Objective:       Vitals:    02/07/25 0827 02/07/25 0900 02/07/25 1000 02/07/25 1100   BP:  108/57  95/60   BP Location:  Right arm     Patient Position:       Pulse: (!) 122 (!) 122 111 113   Resp:  16 16 16   Temp: 99.3 °F (37.4 °C) 99.5 °F (37.5 °C) 99.5 °F (37.5 °C) 99.5 °F (37.5 °C)   TempSrc:  Bladder Bladder Bladder   SpO2: 97% 98% 98% 100%   Weight:       Height:               Physical Exam:  Constitutional: Well appearing, well developed, no acute distress   HENT: Oropharynx clear and membrane moist  Eyes: Normal conjunctiva, no sclera icterus.  Neck: Supple, no carotid bruit bilaterally.  Cardiovascular: Regular and Tachycardia rate and rhythm, No Murmur, No bilateral lower extremity edema.  Pulmonary: Normal respiratory effort, normal lung sounds, no wheezing.  Neurological: Alert and orient x 3.   Skin: Warm, dry, no ecchymosis, no rash.  Psych: Appropriate mood and affect. Normal judgment and insight.           Lab Review:   Results from last 7 days   Lab Units 02/07/25  0237 02/06/25  2213 02/06/25  1512 02/06/25  0950 02/06/25  0328 02/05/25  2212 02/05/25  1809 02/05/25  1504 02/05/25  1117 02/03/25  0713   SODIUM mmol/L 138  --  139 143 143  --   --  139 137 131*   POTASSIUM mmol/L 4.2 4.1 4.1 4.1 4.1  --  4.5 3.8 3.7 4.7   CHLORIDE mmol/L 104  --  105 109* 110*  --   --  110* 109* 100   CO2 mmol/L 25.3  --  24.8 24.0 22.0  --   --  19.4* 21.0* 26.4   BUN mg/dL 23  --  18 18 17  --   --  17 15 18   CREATININE mg/dL 1.48*  --  1.63* 1.60* 1.69* 1.92*  --  1.51* 1.43*  1.44*   GLUCOSE mg/dL 128*  --  154* 142* 159*  --   --  125* 41* 107*   CALCIUM mg/dL 8.5*  --  8.4* 8.7 8.7  --   --  8.2* 8.4* 9.4   AST (SGOT) U/L  --   --   --   --   --   --   --   --   --  25   ALT (SGPT) U/L  --   --   --   --   --   --   --   --   --  26         Results from last 7 days   Lab Units 02/07/25 0237 02/06/25  0950 02/06/25 0328 02/05/25  2301 02/05/25  2212 02/05/25  1504 02/05/25  1117 02/05/25  1016 02/05/25  0938 02/05/25  0739 02/03/25  0713   WBC 10*3/mm3 16.38* 19.45* 19.91* 26.27*  --  14.96* 8.13  --   --   --  7.66   HEMOGLOBIN g/dL 8.4* 9.2* 9.0* 9.4*  --  10.4* 9.9*  --   --   --  12.5*   HEMOGLOBIN, POC g/dL  --   --   --   --  9.0*  --   --    < >  --    < >  --    HEMATOCRIT % 24.9* 25.8* 25.9* 26.6*  --  29.7* 30.4*  --   --   --  37.9   HEMATOCRIT POC %  --   --   --   --  27*  --   --    < >  --    < >  --    PLATELETS 10*3/mm3 115* 127* 128* 146  --  132* 106*  --  179  --  235    < > = values in this interval not displayed.     Results from last 7 days   Lab Units 02/06/25 0328 02/05/25  1117 02/05/25  0938 02/03/25  0713   INR  1.51* 1.61* 2.01* 1.11*   APTT seconds  --  37.8*  --  38.6*     Results from last 7 days   Lab Units 02/07/25 0237 02/06/25  1512 02/06/25  0950 02/06/25  0234 02/05/25  1504 02/05/25  1117 02/03/25  0713   MAGNESIUM mg/dL 2.4 2.4 2.7* 2.8* 2.7* 3.1* 2.1     Results from last 7 days   Lab Units 02/03/25  0713   CHOLESTEROL mg/dL 118   TRIGLYCERIDES mg/dL 91   HDL CHOL mg/dL 60     Results from last 7 days   Lab Units 02/05/25  2301 02/03/25  0713   PROBNP pg/mL 1,551.0* 326.0           Echocardiogram 2/6/2025:  Left ventricular systolic function is moderately decreased. Calculated left ventricular EF = 35.7%  Left atrial volume is moderately increased.  The study is technically difficult for diagnosis. The quality of the study is limited due to patient positioning    MVr with LAAO and CABG 2/5/2025 Dr. Gaitan:  Complex mitral valve repair with a  38 mm Medtronic flexible band annuloplasty and triangular resection of the P2 segment  CABG x 2 with a LIMA to the mid LAD and reverse individual saphenous vein graft to the first marginal  Primary closure of patent foramen ovale  Epicardial closure of the left atrial appendage with a 40 mm atrial clip device     Echocardiogram 10/17/2024:  The mitral valve leaflets are thickened. There is a torn chordae associated with the P2 segment of the posterior mitral valve leaflet. The posterior mitral valve leaflet is partially flail.  There is severe and highly eccentric anteriorly directed mitral regurgitation. There is flow reversal in the left upper pulmonary vein  The left ventricular cavity is moderately dilated  Left ventricular systolic function is normal. Left ventricular ejection fraction appears to be 61 - 65%.  Left ventricular diastolic function was normal.  Normal right ventricular cavity size and systolic function noted.  The left atrial cavity is mildly dilated.  There is a tiny PFO noted during the agitated saline study  Mild tricuspid valve regurgitation is present. Calculated right ventricular systolic pressure from tricuspid regurgitation is 33 mmHg.  There are mild plaques in the descending thoracic aorta.  There is no evidence of pericardial effusion.     Cardiac Catheterization 7/24/2024:  RA: 0-5  RV: 30/5  PA: 30/5, mean 15  PCWP: 10-12, V wave 32  CO/CI: 9.4 L/min, 5.37 L/min/m².  PA saturation 79% on 92% aortic saturation.  Room air.  LV: 125/0  LVEDP: 0-5  Aortic: 120/4  LMCA: Medium caliber left main coronary artery bifurcates give the LAD and circumflex  LAD: Lady is a medium caliber vessel.  Calcified in the proximal segment.  Small caliber diagonal.  The mid segment has a discrete 50% stenosis.  Distal to that there are mild diffuse luminal regularities less than 30%.  Ramus: Absent  Circumflex: Large caliber circumflex.  Normal in the proximal segment.  Mild calcification.  Large arborizing  OM is normal with 3 major branches with minimal plaque at the ostium of the upper branch.  RCA: Caliber, dominant right coronary artery.  Normal          Assessment:          Diagnosis Plan   1. S/P CABG (coronary artery bypass graft)  Ambulatory Referral to Cardiac Rehab      2. Nonrheumatic mitral valve regurgitation  Tissue Pathology Exam    Tissue Pathology Exam    Platelet Count    Platelet Count    Fibrinogen    Fibrinogen      3. S/P MVR (mitral valve repair)  Ambulatory Referral to Cardiac Rehab             Plan:       Mr. Tariq is a 74 y.o. gentleman with past medical history notable for nonrheumatic mitral valve regurgitation, mitral valve prolapse, coronary artery disease, essential hypertension, and chronic kidney disease who presents for elective mitral valve repair and CABG. He is making a good recovery but was having some underlying asystole yesterday is rhythm unfortunately converted to atrial flutter this morning with a rapid rate at this point I would recommend being aggressive try to get and restore sinus rhythm specially since he still has functioning pacing wires will load with amiodarone today in the hopes of restoring sinus rhythm likely could also try and titrate back on beta-blocker as tolerated.  Likely seems to be doing well from a hemodynamic standpoint with this and will hopefully convert back to sinus at least get better rate control with the amiodarone and beta-blocker        Cardiomyopathy:  Ejection fraction on bedside echocardiogram 2/6 somewhat limited some concerns for cardiomyopathy may be related to pacing will repeat limited echo at a later date once conduction and arrhythmia settled down    Nonrheumatic mitral valve regurgitation:  Status post mitral valve repair with left atrial appendage occlusion  Invasive hemodynamics show fairly good pulmonary pressures [place] removed 2/6  Weaned off of epinephrine 2/6     Coronary artery disease without angina:  Status post  bypass  Continue aspirin  Titrating on beta-blocker  Continue statin     Asystole:  Conduction has returned unfortunately now in atrial flutter     Atrial flutter with rapid rate:  Will bolus and start amiodarone infusion in the hopes of restoring sinus rhythm  Titrate beta-blocker             Anoop Coleman MD  [facility]  02/07/25  11:20 EST

## 2025-02-07 NOTE — THERAPY TREATMENT NOTE
Patient Name: Jackson Tariq  : 1950    MRN: [id number]                              Today's Date: 2025       Admit Date: 2025    Visit Dx:     ICD-10-CM ICD-9-CM   1. S/P CABG (coronary artery bypass graft)  Z95.1 V45.81   2. Nonrheumatic mitral valve regurgitation  I34.0 424.0   3. S/P MVR (mitral valve repair)  Z98.890 V45.89     Patient Active Problem List   Diagnosis    Dysphagia    Osteopenia of multiple sites    Vitamin D deficiency    Nonrheumatic mitral valve regurgitation    Severe mitral regurgitation    Other secondary hypertension    Stage 3a chronic kidney disease    Mild anemia    Elevated PSA    CAD (coronary artery disease)     Past Medical History:   Diagnosis Date    Chronic cough     Coronary artery disease     GERD (gastroesophageal reflux disease)     Hyperlipidemia     Hypertension     Mitral valve insufficiency     Osteopenia of multiple sites 2023    Vitamin D deficiency      Past Surgical History:   Procedure Laterality Date    CARDIAC CATHETERIZATION N/A 2024    Procedure: Left Heart Cath;  Surgeon: Susan De Jesus MD;  Location:  [facility];  Service: Cardiovascular;  Laterality: N/A;  severe mitral regurgitation    CARDIAC CATHETERIZATION N/A 2024    Procedure: Right Heart Cath;  Surgeon: Susan De Jesus MD;  Location:  [facility];  Service: Cardiovascular;  Laterality: N/A;    CARDIAC CATHETERIZATION N/A 2024    Procedure: Coronary angiography;  Surgeon: Susan De Jesus MD;  Location:  [facility];  Service: Cardiovascular;  Laterality: N/A;    COLONOSCOPY      CORONARY ARTERY BYPASS GRAFT WITH MITRAL VALVE REPAIR/REPLACEMENT N/A 2025    Procedure: CORONARY ARTERY BYPASS GRAFT X2  WITH INTERNAL MAMMARY ARTERY GRAFT AND ENDOSCOPICALLY HARVESTED RIGHT SAPHENOUS VEIN;  STERNOTOMY; PRP; MITRAL VALVE REPAIR; PATENT FORAMEN OVALE CLOSURE; LEFT ATRIAL APPENDAGE CLOSURE WITH ATRICARE; TRANSESOPHAGEAL  ECHOCARDIOGRAM WITH ANESTHESIA;  Surgeon: Farshad Gaitan MD;  Location: [facility];  Service: Cardiothoracic;  Laterality: N/A;    ENDOSCOPY N/A 05/03/2019    Procedure: ESOPHAGOGASTRODUODENOSCOPY with biopsies;  Surgeon: Radha Del Real MD;  Location:  [facility];  Service: Gastroenterology    TRIGGER FINGER RELEASE      doesn't remember which side      General Information       Row Name 02/07/25 1044          Physical Therapy Time and Intention    Document Type therapy note (daily note)  -EJ     Mode of Treatment physical therapy  -EJ       Row Name 02/07/25 1044          General Information    Existing Precautions/Restrictions fall;cardiac;sternal  -EJ               User Key  (r) = Recorded By, (t) = Taken By, (c) = Cosigned By      Initials Name Provider Type    EJ Olga Lidia De Souza, PT Physical Therapist                   Mobility       Row Name 02/07/25 1044          Bed Mobility    Sit-Supine [place] (Bed Mobility) verbal cues;minimum assist (75% patient effort);2 person assist;1 person to manage equipment  -EJ     Assistive Device (Bed Mobility) head of bed elevated  -EJ     Comment, (Bed Mobility) cues for sequencing and positioning in bed  -EJ       Row Name 02/07/25 1044          Sit-Stand Transfer    Sit-Stand [place] (Transfers) verbal cues;minimum assist (75% patient effort);2 person assist;1 person to manage equipment  -EJ     Assistive Device (Sit-Stand Transfers) walker, rolling platform  -EJ       Row Name 02/07/25 1044          Gait/Stairs (Locomotion)    [place] Level (Gait) verbal cues;nonverbal cues (demo/gesture);minimum assist (75% patient effort);2 person assist;1 person to manage equipment  -EJ     Assistive Device (Gait) walker, rolling platform  -EJ     Distance in Feet (Gait) 30  -EJ     Deviations/Abnormal Patterns (Gait) alhaji decreased;stride length decreased;weight shifting decreased  -EJ     Bilateral Gait Deviations forward flexed posture;heel strike  decreased;decreased arm swing  -EJ     Comment, (Gait/Stairs) very slow pace, guarded w pain, no overt LOB noted  -EJ               User Key  (r) = Recorded By, (t) = Taken By, (c) = Cosigned By      Initials Name Provider Type    Olga Lidia Ambrose, PT Physical Therapist                   Obj/Interventions       Row Name 02/07/25 1046          Balance    Balance Assessment standing static balance;standing dynamic balance  -EJ     Static Sitting Balance supervision  -EJ     Position, Sitting Balance sitting edge of bed  -EJ     Static Standing Balance contact guard;minimal assist;2-person assist  -EJ     Dynamic Standing Balance minimal assist;2-person assist  -EJ     Position/Device Used, Standing Balance walker, rolling  -EJ               User Key  (r) = Recorded By, (t) = Taken By, (c) = Cosigned By      Initials Name Provider Type    Olga Lidia Ambrose, PT Physical Therapist                   Goals/Plan    No documentation.                  Clinical Impression       Row Name 02/07/25 1048          Pain    Pain Location chest  -EJ     Pain Side/Orientation generalized  -EJ     Pre/Posttreatment Pain Comment pt did not provide number  -       Row Name 02/07/25 1048          Plan of Care Review    Plan of Care Reviewed With patient;family  -     Progress improving  -EJ     Outcome Evaluation Pt seen for PT this AM. He is doing well today and progressing with mobility. Pt is up in chair upon entry to room. Pt able to stand w min A x 2 w assist for line mangement as well. He ambulated approx 30 ft w min A x 2 using rolling platform walker. Pt demos exremely slow pace, but no LOB noted. Pt w minimal complaints, but limited by pain. Pt returend to bed at end of session w all lines intact. He requires verbal cues for sequencing and positioning w sit to supine. Pt progressing well. Will continue to advance activity as tolerated.  -       Row Name 02/07/25 1049          Positioning and Restraints     Pre-Treatment Position sitting in chair/recliner  -EJ     Post Treatment Position bed  -EJ     In Bed notified nsg;supine;call light within reach;encouraged to call for assist;with family/caregiver;with nsg  -EJ               User Key  (r) = Recorded By, (t) = Taken By, (c) = Cosigned By      Initials Name Provider Type    Olga Lidia Ambrose, PT Physical Therapist                   Outcome Measures       Row Name 02/07/25 1056          How much help from another person do you currently need...    Turning from your back to your side while in flat bed without using bedrails? 3  -EJ     Moving from lying on back to sitting on the side of a flat bed without bedrails? 3  -EJ     Moving to and from a bed to a chair (including a wheelchair)? 3  -EJ     Standing up from a chair using your arms (e.g., wheelchair, bedside chair)? 3  -EJ     Climbing 3-5 steps with a railing? 2  -EJ     To walk in hospital room? 3  -EJ     AM-PAC 6 Clicks Score (PT) 17  -EJ     Highest Level of Mobility Goal 5 --> Static standing  -EJ               User Key  (r) = Recorded By, (t) = Taken By, (c) = Cosigned By      Initials Name Provider Type    Olga Lidia Ambrose, PT Physical Therapist                                 Physical Therapy Education        No education to display                  PT Recommendation and Plan  Planned Therapy Interventions (PT): balance training, bed mobility training, gait training, home exercise program, transfer training, stretching, strengthening, stair training, ROM (range of motion), patient/family education  Progress: improving  Outcome Evaluation: Pt seen for PT this AM. He is doing well today and progressing with mobility. Pt is up in chair upon entry to room. Pt able to stand w min A x 2 w assist for line mangement as well. He ambulated approx 30 ft w min A x 2 using rolling platform walker. Pt demos exremely slow pace, but no LOB noted. Pt w minimal complaints, but limited by pain. Pt returend to bed  at end of session w all lines intact. He requires verbal cues for sequencing and positioning w sit to supine. Pt progressing well. Will continue to advance activity as tolerated.     Time Calculation:         PT Charges       Row Name 02/07/25 1053             Time Calculation    Start Time 0944  -EJ      Stop Time 1022  -EJ      Time Calculation (min) 38 min  -EJ      PT Received On 02/07/25  -EJ      PT - Next Appointment 02/08/25  -EJ         Timed Charges    74145 - PT Therapeutic Activity Minutes 38  -EJ         Total Minutes    Timed Charges Total Minutes 38  -EJ       Total Minutes 38  -EJ                User Key  (r) = Recorded By, (t) = Taken By, (c) = Cosigned By      Initials Name Provider Type    EJ Olga Lidia De Souza, PT Physical Therapist                  Therapy Charges for Today       Code Description Service Date Service Provider Modifiers Qty    57490795944 HC PT EVAL MOD COMPLEXITY 3 2/6/2025 Olga Lidia De Souza, PT GP 1    57038598544  PT THERAPEUTIC ACT EA 15 MIN 2/6/2025 Olga Lidia De Souza, PT GP 1    95408170899 HC PT THER SUPP EA 15 MIN 2/6/2025 Olga Lidia De Souza, PT GP 1    54247537688 HC PT THERAPEUTIC ACT EA 15 MIN 2/7/2025 Olga Lidia De Souza, PT GP 3    47359661300 HC PT THER SUPP EA 15 MIN 2/7/2025 Olga Lidia De Souza, PT GP 2            PT G-Codes  Outcome Measure Options: AM-PAC 6 Clicks Basic Mobility (PT)  AM-PAC 6 Clicks Score (PT): 17  PT Discharge Summary  Anticipated Discharge Disposition (PT): home with assist, home with home health    Olga Lidia De Souza, PT  2/7/2025

## 2025-02-07 NOTE — PLAN OF CARE
Goal Outcome Evaluation:  Plan of Care Reviewed With: patient, family        Progress: improving  Outcome Evaluation: Pt seen for PT this AM. He is doing well today and progressing with mobility. Pt is up in chair upon entry to room. Pt able to stand w min A x 2 w assist for line mangement as well. He ambulated approx 30 ft w min A x 2 using rolling platform walker. Pt demos exremely slow pace, but no LOB noted. Pt w minimal complaints, but limited by pain. Pt returend to bed at end of session w all lines intact. He requires verbal cues for sequencing and positioning w sit to supine. Pt progressing well. Will continue to advance activity as tolerated.    Anticipated Discharge Disposition (PT): home with assist, home with home health

## 2025-02-07 NOTE — PROGRESS NOTES
" LOS: 2 days   Patient Care Team:  Caroline Medina APRN as PCP - General (Family Medicine)  Konstantin Driscoll MD as Consulting Physician (Cardiology)  Maksim Ortiz MD as Consulting Physician (Urology)    Chief Complaint: post op    Subjective  Feeling better. Eating some breakfast.       Vital Signs  Temp:  [97.2 °F (36.2 °C)-99.1 °F (37.3 °C)] 98.4 °F (36.9 °C)  Heart Rate:  [70-80] 80  Resp:  [14-18] 16  BP: ()/(48-64) 102/52  Arterial Line BP: (132-150)/(33-51) 138/43  Body mass index is 24.07 kg/m².    Intake/Output Summary (Last 24 hours) at 2/7/2025 0720  Last data filed at 2/7/2025 0000  Gross per 24 hour   Intake 1695 ml   Output 2855 ml   Net -1160 ml     No intake/output data recorded.    Chest tube drainage last 8 hours         02/05/25  0520   Weight: 69.7 kg (153 lb 10.6 oz)         Objective:  Vital signs: (most recent): Blood pressure 117/70, pulse (!) 122, temperature 99.3 °F (37.4 °C), resp. rate 16, height 170.2 cm (67\"), weight 69.7 kg (153 lb 10.6 oz), SpO2 97%.                    Results Review:        WBC WBC   Date Value Ref Range Status   02/07/2025 16.38 (H) 3.40 - 10.80 10*3/mm3 Final   02/06/2025 19.45 (H) 3.40 - 10.80 10*3/mm3 Final   02/06/2025 19.91 (H) 3.40 - 10.80 10*3/mm3 Final   02/05/2025 26.27 (H) 3.40 - 10.80 10*3/mm3 Final   02/05/2025 14.96 (H) 3.40 - 10.80 10*3/mm3 Final   02/05/2025 8.13 3.40 - 10.80 10*3/mm3 Final      HGB Hemoglobin   Date Value Ref Range Status   02/07/2025 8.4 (L) 13.0 - 17.7 g/dL Final   02/06/2025 9.2 (L) 13.0 - 17.7 g/dL Final   02/06/2025 9.0 (L) 13.0 - 17.7 g/dL Final   02/05/2025 9.4 (L) 13.0 - 17.7 g/dL Final   02/05/2025 9.0 (L) 12.0 - 17.0 g/dL Final     Comment:     Serial Number: [id number]:  [id number]   02/05/2025 10.4 (L) 13.0 - 17.7 g/dL Final   02/05/2025 9.9 (L) 13.0 - 17.7 g/dL Final   02/05/2025 7.8 (C) 12.0 - 17.0 g/dL Final   02/05/2025 9.2 (L) 12.0 - 17.0 g/dL Final   02/05/2025 8.8 (L) 12.0 - 17.0 g/dL Final "   02/05/2025 8.5 (L) 12.0 - 17.0 g/dL Final   02/05/2025 8.5 (L) 12.0 - 17.0 g/dL Final   02/05/2025 9.5 (L) 12.0 - 17.0 g/dL Final      HCT Hematocrit   Date Value Ref Range Status   02/07/2025 24.9 (L) 37.5 - 51.0 % Final   02/06/2025 25.8 (L) 37.5 - 51.0 % Final   02/06/2025 25.9 (L) 37.5 - 51.0 % Final   02/05/2025 26.6 (L) 37.5 - 51.0 % Final   02/05/2025 27 (L) 38 - 51 % Final   02/05/2025 29.7 (L) 37.5 - 51.0 % Final   02/05/2025 30.4 (L) 37.5 - 51.0 % Final   02/05/2025 23 (L) 38 - 51 % Final   02/05/2025 27 (L) 38 - 51 % Final   02/05/2025 26 (L) 38 - 51 % Final   02/05/2025 25 (L) 38 - 51 % Final   02/05/2025 25 (L) 38 - 51 % Final   02/05/2025 28 (L) 38 - 51 % Final      Platelets Platelets   Date Value Ref Range Status   02/07/2025 115 (L) 140 - 450 10*3/mm3 Final   02/06/2025 127 (L) 140 - 450 10*3/mm3 Final   02/06/2025 128 (L) 140 - 450 10*3/mm3 Final   02/05/2025 146 140 - 450 10*3/mm3 Final   02/05/2025 132 (L) 140 - 450 10*3/mm3 Final   02/05/2025 106 (L) 140 - 450 10*3/mm3 Final   02/05/2025 179 140 - 450 10*3/mm3 Final        PT/INR:    Protime   Date Value Ref Range Status   02/06/2025 18.2 (H) 11.7 - 14.2 Seconds Final   02/05/2025 19.2 (H) 11.7 - 14.2 Seconds Final   02/05/2025 22.9 (H) 11.7 - 14.2 Seconds Final   /  INR   Date Value Ref Range Status   02/06/2025 1.51 (H) 0.90 - 1.10 Final   02/05/2025 1.61 (H) 0.90 - 1.10 Final   02/05/2025 2.01 (H) 0.90 - 1.10 Final       Sodium Sodium   Date Value Ref Range Status   02/07/2025 138 136 - 145 mmol/L Final   02/06/2025 139 136 - 145 mmol/L Final   02/06/2025 143 136 - 145 mmol/L Final   02/06/2025 143 136 - 145 mmol/L Final   02/05/2025 139 136 - 145 mmol/L Final   02/05/2025 137 136 - 145 mmol/L Final      Potassium Potassium   Date Value Ref Range Status   02/07/2025 4.2 3.5 - 5.2 mmol/L Final   02/06/2025 4.1 3.5 - 5.2 mmol/L Final   02/06/2025 4.1 3.5 - 5.2 mmol/L Final   02/06/2025 4.1 3.5 - 5.2 mmol/L Final   02/06/2025 4.1 3.5 - 5.2  mmol/L Final   02/05/2025 4.5 3.5 - 5.2 mmol/L Final   02/05/2025 3.8 3.5 - 5.2 mmol/L Final   02/05/2025 3.7 3.5 - 5.2 mmol/L Final      Chloride Chloride   Date Value Ref Range Status   02/07/2025 104 98 - 107 mmol/L Final   02/06/2025 105 98 - 107 mmol/L Final   02/06/2025 109 (H) 98 - 107 mmol/L Final   02/06/2025 110 (H) 98 - 107 mmol/L Final   02/05/2025 110 (H) 98 - 107 mmol/L Final   02/05/2025 109 (H) 98 - 107 mmol/L Final      Bicarbonate CO2   Date Value Ref Range Status   02/07/2025 25.3 22.0 - 29.0 mmol/L Final   02/06/2025 24.8 22.0 - 29.0 mmol/L Final   02/06/2025 24.0 22.0 - 29.0 mmol/L Final   02/06/2025 22.0 22.0 - 29.0 mmol/L Final   02/05/2025 19.4 (L) 22.0 - 29.0 mmol/L Final   02/05/2025 21.0 (L) 22.0 - 29.0 mmol/L Final      BUN BUN   Date Value Ref Range Status   02/07/2025 23 8 - 23 mg/dL Final   02/06/2025 18 8 - 23 mg/dL Final   02/06/2025 18 8 - 23 mg/dL Final   02/06/2025 17 8 - 23 mg/dL Final   02/05/2025 17 8 - 23 mg/dL Final   02/05/2025 15 8 - 23 mg/dL Final      Creatinine Creatinine   Date Value Ref Range Status   02/07/2025 1.48 (H) 0.76 - 1.27 mg/dL Final   02/06/2025 1.63 (H) 0.76 - 1.27 mg/dL Final   02/06/2025 1.60 (H) 0.76 - 1.27 mg/dL Final   02/06/2025 1.69 (H) 0.76 - 1.27 mg/dL Final   02/05/2025 1.92 (H) 0.60 - 1.30 mg/dL Final   02/05/2025 1.51 (H) 0.76 - 1.27 mg/dL Final   02/05/2025 1.43 (H) 0.76 - 1.27 mg/dL Final      Calcium Calcium   Date Value Ref Range Status   02/07/2025 8.5 (L) 8.6 - 10.5 mg/dL Final   02/06/2025 8.4 (L) 8.6 - 10.5 mg/dL Final   02/06/2025 8.7 8.6 - 10.5 mg/dL Final   02/06/2025 8.7 8.6 - 10.5 mg/dL Final   02/05/2025 8.2 (L) 8.6 - 10.5 mg/dL Final   02/05/2025 8.4 (L) 8.6 - 10.5 mg/dL Final      Magnesium Magnesium   Date Value Ref Range Status   02/07/2025 2.4 1.6 - 2.4 mg/dL Final   02/06/2025 2.4 1.6 - 2.4 mg/dL Final   02/06/2025 2.7 (H) 1.6 - 2.4 mg/dL Final   02/06/2025 2.8 (H) 1.6 - 2.4 mg/dL Final   02/05/2025 2.7 (H) 1.6 - 2.4 mg/dL  Final   02/05/2025 3.1 (H) 1.6 - 2.4 mg/dL Final          aspirin, 81 mg, Oral, Daily  atorvastatin, 40 mg, Oral, Nightly  chlorhexidine, 15 mL, Mouth/Throat, Q12H  enoxaparin, 40 mg, Subcutaneous, Daily  gabapentin, 100 mg, Oral, Q12H  guaiFENesin, 1,200 mg, Oral, Q12H  insulin lispro, 2-7 Units, Subcutaneous, 4x Daily AC & at Bedtime  ipratropium-albuterol, 3 mL, Nebulization, BID  mupirocin, 1 Application, Each Nare, BID  pantoprazole, 40 mg, Oral, QAM  senna-docusate sodium, 2 tablet, Oral, Nightly  simethicone, 80 mg, Oral, 4x Daily AC & at Bedtime  sodium chloride, 4 mL, Nebulization, BID - RT      clevidipine, 2-32 mg/hr  DOPamine, 2-20 mcg/kg/min  EPINEPHrine, 0.02-0.1 mcg/kg/min, Last Rate: Stopped (02/06/25 1550)  milrinone, 0.25-0.375 mcg/kg/min  niCARdipine, 5-15 mg/hr  norepinephrine, 0.02-0.2 mcg/kg/min  phenylephrine, 0.2-2 mcg/kg/min, Last Rate: Stopped (02/05/25 1515)  propofol, 5-50 mcg/kg/min, Last Rate: Stopped (02/05/25 1430)  vasopressin, 0.03 Units/min, Last Rate: 0.03 Units/min (02/05/25 5702)              Nonrheumatic mitral valve regurgitation    CAD (coronary artery disease)      Assessment & Plan    -Severe Mitral Regurgitation- s/p Complex MV repair, CABGx2 LIMA/RSVG, PFO, MANJULA- Pagni 2/5/2025  -PFO  -CAD  -Hypertension  -Hyperlipidemia  -CKD stage III-- Baseline Cr 1.4  -Hx esophagitis/dysphagia  -Anemia  -Osteopenia  -GERD  -Vitamin D deficiency  -post op anemia- expected acute blood loss     POD#2  Looks good today.  Up in the chair.  I think he is atrial flutter in the 120s- looks like he has some pericarditis-- has a rub will add some colchicine--blood pressure seems good this morning may be able to tolerate beta blocker today or amiodarone-- will discuss with cardiology. He has wires as a back up and his pressure is solid  Creatinine stable. Diuresed well yesterday.  Pleural tube draining quite a bit-- will leave today.  Likely MS tubes can come out today-- will discuss with   Arnie  Continue routine care     Angela Lerma, ENRIQUE  02/07/25  07:20 EST

## 2025-02-08 ENCOUNTER — APPOINTMENT (OUTPATIENT)
Dept: GENERAL RADIOLOGY | Facility: HOSPITAL | Age: 75
DRG: 219 | End: 2025-02-08
Payer: MEDICARE

## 2025-02-08 LAB
25(OH)D3 SERPL-MCNC: 29.4 NG/ML (ref 30–100)
ANION GAP SERPL CALCULATED.3IONS-SCNC: 10.4 MMOL/L (ref 5–15)
ARTERIAL PATENCY WRIST A: NORMAL
ATMOSPHERIC PRESS: 751.5 MMHG
BASE EXCESS BLDA CALC-SCNC: 1.3 MMOL/L (ref 0–2)
BDY SITE: NORMAL
BUN SERPL-MCNC: 27 MG/DL (ref 8–23)
BUN/CREAT SERPL: 17.5 (ref 7–25)
CA-I SERPL ISE-MCNC: 1.15 MMOL/L (ref 1.15–1.35)
CALCIUM SPEC-SCNC: 8.3 MG/DL (ref 8.6–10.5)
CHLORIDE SERPL-SCNC: 99 MMOL/L (ref 98–107)
CO2 BLDA-SCNC: 27 MMOL/L (ref 23–27)
CO2 SERPL-SCNC: 24.6 MMOL/L (ref 22–29)
CREAT SERPL-MCNC: 1.54 MG/DL (ref 0.76–1.27)
DEPRECATED RDW RBC AUTO: 41.7 FL (ref 37–54)
DEVICE COMMENT: NORMAL
EGFRCR SERPLBLD CKD-EPI 2021: 47.3 ML/MIN/1.73
ERYTHROCYTE [DISTWIDTH] IN BLOOD BY AUTOMATED COUNT: 12.2 % (ref 12.3–15.4)
GAS FLOW AIRWAY: 2 LPM
GLUCOSE BLDC GLUCOMTR-MCNC: 105 MG/DL (ref 70–130)
GLUCOSE BLDC GLUCOMTR-MCNC: 92 MG/DL (ref 70–130)
GLUCOSE SERPL-MCNC: 110 MG/DL (ref 65–99)
HCO3 BLDA-SCNC: 25.8 MMOL/L (ref 22–28)
HCT VFR BLD AUTO: 23.6 % (ref 37.5–51)
HEMODILUTION: NO
HGB BLD-MCNC: 8.1 G/DL (ref 13–17.7)
MAGNESIUM SERPL-MCNC: 2.4 MG/DL (ref 1.6–2.4)
MCH RBC QN AUTO: 32.1 PG (ref 26.6–33)
MCHC RBC AUTO-ENTMCNC: 34.3 G/DL (ref 31.5–35.7)
MCV RBC AUTO: 93.7 FL (ref 79–97)
MODALITY: NORMAL
PCO2 BLDA: 39.4 MM HG (ref 35–45)
PH BLDA: 7.42 PH UNITS (ref 7.35–7.45)
PLATELET # BLD AUTO: 102 10*3/MM3 (ref 140–450)
PMV BLD AUTO: 11.2 FL (ref 6–12)
PO2 BLDA: 89 MM HG (ref 80–100)
POTASSIUM SERPL-SCNC: 3.9 MMOL/L (ref 3.5–5.2)
POTASSIUM SERPL-SCNC: 4.1 MMOL/L (ref 3.5–5.2)
RBC # BLD AUTO: 2.52 10*6/MM3 (ref 4.14–5.8)
SAO2 % BLDCOA: 97.1 % (ref 92–98.5)
SODIUM SERPL-SCNC: 134 MMOL/L (ref 136–145)
TOTAL RATE: 17 BREATHS/MINUTE
WBC NRBC COR # BLD AUTO: 12.02 10*3/MM3 (ref 3.4–10.8)

## 2025-02-08 PROCEDURE — 97530 THERAPEUTIC ACTIVITIES: CPT

## 2025-02-08 PROCEDURE — 99232 SBSQ HOSP IP/OBS MODERATE 35: CPT | Performed by: STUDENT IN AN ORGANIZED HEALTH CARE EDUCATION/TRAINING PROGRAM

## 2025-02-08 PROCEDURE — 99024 POSTOP FOLLOW-UP VISIT: CPT | Performed by: THORACIC SURGERY (CARDIOTHORACIC VASCULAR SURGERY)

## 2025-02-08 PROCEDURE — 94799 UNLISTED PULMONARY SVC/PX: CPT

## 2025-02-08 PROCEDURE — 83735 ASSAY OF MAGNESIUM: CPT | Performed by: ANESTHESIOLOGY

## 2025-02-08 PROCEDURE — 85027 COMPLETE CBC AUTOMATED: CPT | Performed by: ANESTHESIOLOGY

## 2025-02-08 PROCEDURE — 80048 BASIC METABOLIC PNL TOTAL CA: CPT | Performed by: ANESTHESIOLOGY

## 2025-02-08 PROCEDURE — 82330 ASSAY OF CALCIUM: CPT | Performed by: ANESTHESIOLOGY

## 2025-02-08 PROCEDURE — 25010000002 ENOXAPARIN PER 10 MG: Performed by: NURSE PRACTITIONER

## 2025-02-08 PROCEDURE — 71045 X-RAY EXAM CHEST 1 VIEW: CPT

## 2025-02-08 PROCEDURE — 82306 VITAMIN D 25 HYDROXY: CPT

## 2025-02-08 PROCEDURE — 82803 BLOOD GASES ANY COMBINATION: CPT | Performed by: ANESTHESIOLOGY

## 2025-02-08 PROCEDURE — 82948 REAGENT STRIP/BLOOD GLUCOSE: CPT

## 2025-02-08 PROCEDURE — 25010000002 FUROSEMIDE PER 20 MG: Performed by: ANESTHESIOLOGY

## 2025-02-08 PROCEDURE — 94760 N-INVAS EAR/PLS OXIMETRY 1: CPT

## 2025-02-08 PROCEDURE — 94761 N-INVAS EAR/PLS OXIMETRY MLT: CPT

## 2025-02-08 PROCEDURE — 84132 ASSAY OF SERUM POTASSIUM: CPT | Performed by: THORACIC SURGERY (CARDIOTHORACIC VASCULAR SURGERY)

## 2025-02-08 PROCEDURE — 25010000002 AMIODARONE IN DEXTROSE 5% 360-4.14 MG/200ML-% SOLUTION: Performed by: NURSE PRACTITIONER

## 2025-02-08 RX ORDER — FUROSEMIDE 10 MG/ML
20 INJECTION INTRAMUSCULAR; INTRAVENOUS 2 TIMES DAILY
Status: DISCONTINUED | OUTPATIENT
Start: 2025-02-08 | End: 2025-02-09

## 2025-02-08 RX ORDER — AMIODARONE HYDROCHLORIDE 200 MG/1
400 TABLET ORAL EVERY 12 HOURS SCHEDULED
Status: DISCONTINUED | OUTPATIENT
Start: 2025-02-08 | End: 2025-02-10

## 2025-02-08 RX ORDER — FERROUS SULFATE 325(65) MG
325 TABLET ORAL
Status: DISCONTINUED | OUTPATIENT
Start: 2025-02-09 | End: 2025-02-12 | Stop reason: HOSPADM

## 2025-02-08 RX ORDER — BISACODYL 5 MG/1
10 TABLET, DELAYED RELEASE ORAL DAILY PRN
Status: DISCONTINUED | OUTPATIENT
Start: 2025-02-08 | End: 2025-02-12 | Stop reason: HOSPADM

## 2025-02-08 RX ORDER — FUROSEMIDE 10 MG/ML
20 INJECTION INTRAMUSCULAR; INTRAVENOUS ONCE
Status: COMPLETED | OUTPATIENT
Start: 2025-02-08 | End: 2025-02-08

## 2025-02-08 RX ORDER — POTASSIUM CHLORIDE 750 MG/1
20 TABLET, FILM COATED, EXTENDED RELEASE ORAL ONCE
Status: COMPLETED | OUTPATIENT
Start: 2025-02-08 | End: 2025-02-08

## 2025-02-08 RX ADMIN — 0.12% CHLORHEXIDINE GLUCONATE 15 ML: 1.2 RINSE ORAL at 12:33

## 2025-02-08 RX ADMIN — MUPIROCIN 1 APPLICATION: 20 OINTMENT TOPICAL at 08:13

## 2025-02-08 RX ADMIN — GABAPENTIN 200 MG: 100 CAPSULE ORAL at 08:12

## 2025-02-08 RX ADMIN — AMIODARONE HYDROCHLORIDE 0.5 MG/MIN: 1.8 INJECTION, SOLUTION INTRAVENOUS at 09:34

## 2025-02-08 RX ADMIN — AMIODARONE HYDROCHLORIDE 400 MG: 200 TABLET ORAL at 20:39

## 2025-02-08 RX ADMIN — GABAPENTIN 200 MG: 100 CAPSULE ORAL at 20:38

## 2025-02-08 RX ADMIN — FUROSEMIDE 20 MG: 10 INJECTION, SOLUTION INTRAMUSCULAR; INTRAVENOUS at 12:33

## 2025-02-08 RX ADMIN — GUAIFENESIN 1200 MG: 600 TABLET, MULTILAYER, EXTENDED RELEASE ORAL at 20:39

## 2025-02-08 RX ADMIN — Medication 5 MG: at 20:39

## 2025-02-08 RX ADMIN — POTASSIUM CHLORIDE 20 MEQ: 750 TABLET, EXTENDED RELEASE ORAL at 06:13

## 2025-02-08 RX ADMIN — POLYETHYLENE GLYCOL 3350 17 G: 17 POWDER, FOR SOLUTION ORAL at 08:12

## 2025-02-08 RX ADMIN — SENNOSIDES AND DOCUSATE SODIUM 2 TABLET: 50; 8.6 TABLET ORAL at 20:39

## 2025-02-08 RX ADMIN — DOCUSATE SODIUM 100 MG: 100 CAPSULE, LIQUID FILLED ORAL at 08:12

## 2025-02-08 RX ADMIN — FUROSEMIDE 20 MG: 10 INJECTION, SOLUTION INTRAMUSCULAR; INTRAVENOUS at 15:34

## 2025-02-08 RX ADMIN — MUPIROCIN 1 APPLICATION: 20 OINTMENT TOPICAL at 20:39

## 2025-02-08 RX ADMIN — ENOXAPARIN SODIUM 40 MG: 100 INJECTION SUBCUTANEOUS at 17:14

## 2025-02-08 RX ADMIN — GUAIFENESIN 1200 MG: 600 TABLET, MULTILAYER, EXTENDED RELEASE ORAL at 08:12

## 2025-02-08 RX ADMIN — ACETAMINOPHEN 650 MG: 325 TABLET, FILM COATED ORAL at 11:10

## 2025-02-08 RX ADMIN — ASPIRIN 81 MG: 81 TABLET, COATED ORAL at 08:13

## 2025-02-08 RX ADMIN — AMIODARONE HYDROCHLORIDE 400 MG: 200 TABLET ORAL at 11:10

## 2025-02-08 RX ADMIN — PANTOPRAZOLE SODIUM 40 MG: 40 TABLET, DELAYED RELEASE ORAL at 06:13

## 2025-02-08 RX ADMIN — ATORVASTATIN CALCIUM 40 MG: 20 TABLET, FILM COATED ORAL at 20:39

## 2025-02-08 RX ADMIN — BISACODYL 10 MG: 5 TABLET, COATED ORAL at 20:39

## 2025-02-08 NOTE — PLAN OF CARE
Goal Outcome Evaluation: pt transferred to floor from CVR, Post op day 3.  Sternal dressing removed, betadine applied.  Lasix administered.  Santizo removed per order, 625 urine out.  POC d/c'd per protocol, all blood sugars WNL.

## 2025-02-08 NOTE — THERAPY TREATMENT NOTE
Patient Name: Jackson Tariq  : 1950    MRN: [id number]                              Today's Date: 2025       Admit Date: 2025    Visit Dx:     ICD-10-CM ICD-9-CM   1. S/P CABG (coronary artery bypass graft)  Z95.1 V45.81   2. Nonrheumatic mitral valve regurgitation  I34.0 424.0   3. S/P MVR (mitral valve repair)  Z98.890 V45.89     Patient Active Problem List   Diagnosis    Dysphagia    Osteopenia of multiple sites    Vitamin D deficiency    Nonrheumatic mitral valve regurgitation    Severe mitral regurgitation    Other secondary hypertension    Stage 3a chronic kidney disease    Mild anemia    Elevated PSA    CAD (coronary artery disease)     Past Medical History:   Diagnosis Date    Chronic cough     Coronary artery disease     GERD (gastroesophageal reflux disease)     Hyperlipidemia     Hypertension     Mitral valve insufficiency     Osteopenia of multiple sites 2023    Vitamin D deficiency      Past Surgical History:   Procedure Laterality Date    CARDIAC CATHETERIZATION N/A 2024    Procedure: Left Heart Cath;  Surgeon: Susan D eJesus MD;  Location:  [facility];  Service: Cardiovascular;  Laterality: N/A;  severe mitral regurgitation    CARDIAC CATHETERIZATION N/A 2024    Procedure: Right Heart Cath;  Surgeon: Susan De Jesus MD;  Location:  [facility];  Service: Cardiovascular;  Laterality: N/A;    CARDIAC CATHETERIZATION N/A 2024    Procedure: Coronary angiography;  Surgeon: Susan De Jesus MD;  Location:  [facility];  Service: Cardiovascular;  Laterality: N/A;    COLONOSCOPY      CORONARY ARTERY BYPASS GRAFT WITH MITRAL VALVE REPAIR/REPLACEMENT N/A 2025    Procedure: CORONARY ARTERY BYPASS GRAFT X2  WITH INTERNAL MAMMARY ARTERY GRAFT AND ENDOSCOPICALLY HARVESTED RIGHT SAPHENOUS VEIN;  STERNOTOMY; PRP; MITRAL VALVE REPAIR; PATENT FORAMEN OVALE CLOSURE; LEFT ATRIAL APPENDAGE CLOSURE WITH ATRICARE; TRANSESOPHAGEAL  ECHOCARDIOGRAM WITH ANESTHESIA;  Surgeon: Farshad Gaitan MD;  Location: [facility];  Service: Cardiothoracic;  Laterality: N/A;    ENDOSCOPY N/A 05/03/2019    Procedure: ESOPHAGOGASTRODUODENOSCOPY with biopsies;  Surgeon: Radha Del Real MD;  Location:  [facility];  Service: Gastroenterology    TRIGGER FINGER RELEASE      doesn't remember which side      General Information       Row Name 02/08/25 1142          Physical Therapy Time and Intention    Document Type therapy note (daily note)  -DB     Mode of Treatment physical therapy;individual therapy  -DB       Row Name 02/08/25 1142          General Information    Patient Profile Reviewed yes  -DB               User Key  (r) = Recorded By, (t) = Taken By, (c) = Cosigned By      Initials Name Provider Type    DB Nyasia Mason PT Physical Therapist                   Mobility       Row Name 02/08/25 1142          Bed Mobility    Comment, (Bed Mobility) NT, pt UIC  -DB       Row Name 02/08/25 1142          Sit-Stand Transfer    Sit-Stand [place] (Transfers) verbal cues;minimum assist (75% patient effort);2 person assist;1 person to manage equipment  -DB     Assistive Device (Sit-Stand Transfers) walker, front-wheeled  -DB     Comment, (Sit-Stand Transfer) post lean upon standing  -DB       Row Name 02/08/25 1142          Gait/Stairs (Locomotion)    [place] Level (Gait) verbal cues;nonverbal cues (demo/gesture);minimum assist (75% patient effort);2 person assist;1 person to manage equipment  -DB     Assistive Device (Gait) walker, front-wheeled  -DB     Distance in Feet (Gait) 40  -DB     Deviations/Abnormal Patterns (Gait) alhaji decreased;stride length decreased;weight shifting decreased  -DB     Bilateral Gait Deviations forward flexed posture;heel strike decreased;decreased arm swing  -DB     Comment, (Gait/Stairs) dec'd safety with performing stand to sit upon return to room, inc'd distance from the chair and initating sit without reaching back for  chair to guide hips. modA from therapist to assist pt with performing safety and VC's for pt to take steps back towards the chair d/t unsafe distance.  -DB               User Key  (r) = Recorded By, (t) = Taken By, (c) = Cosigned By      Initials Name Provider Type    Nyasia Norwood PT Physical Therapist                   Obj/Interventions       Row Name 02/08/25 1144          Motor Skills    Therapeutic Exercise other (see comments)  cardiac ther ex x 5  -DB       Row Name 02/08/25 1144          Balance    Balance Assessment sitting static balance;sitting dynamic balance;standing static balance;standing dynamic balance  -DB     Static Sitting Balance contact guard  -DB     Dynamic Sitting Balance contact guard  -DB     Position, Sitting Balance sitting in chair  -DB     Static Standing Balance minimal assist;1-person assist  -DB     Dynamic Standing Balance minimal assist;2-person assist  -DB     Position/Device Used, Standing Balance supported;walker, front-wheeled  -DB     Balance Interventions sitting;standing;sit to stand  -DB               User Key  (r) = Recorded By, (t) = Taken By, (c) = Cosigned By      Initials Name Provider Type    Nyasia Norwood PT Physical Therapist                   Goals/Plan    No documentation.                  Clinical Impression       Row Name 02/08/25 1144          Plan of Care Review    Plan of Care Reviewed With patient  -DB     Progress improving  -DB     Outcome Evaluation Pt seated UIC and agreeable to work with PT today. Performs seated ther ex prior to ambulation. TS present to assist with line management. Pt performs STS to RW with Nathanael and Nathanael throughout gait with RW; able to ambulate 40' with VC for safety with use of RW. Upon return to room, pt demo's dec'd safety with performing stand to sit, inc'd distance from the chair and initating sit without reaching back for chair to guide hips. modA from therapist to assist pt with performing safety and VC's for  pt to take steps back towards the chair d/t unsafe distance. Pt seated UIC and end of the session. Continues to benefit from skilled PT to improve functional mobility.  -DB       Row Name 02/08/25 1145          Vital Signs    Pre SpO2 (%) 100  -DB     O2 Delivery Pre Treatment supplemental O2  -DB     Intra SpO2 (%) 99  -DB     O2 Delivery Intra Treatment room air  -DB     Post SpO2 (%) 100  -DB     O2 Delivery Post Treatment supplemental O2  -DB     Pre Patient Position Sitting  -DB     Intra Patient Position Standing  -DB     Post Patient Position Sitting  -DB       Row Name 02/08/25 1145          Positioning and Restraints    Pre-Treatment Position sitting in chair/recliner  -DB     Post Treatment Position chair  -DB     In Chair reclined;call light within reach;encouraged to call for assist;with nsg  -DB               User Key  (r) = Recorded By, (t) = Taken By, (c) = Cosigned By      Initials Name Provider Type    Nyasia Norwood, PT Physical Therapist                   Outcome Measures       Row Name 02/08/25 1148          How much help from another person do you currently need...    Turning from your back to your side while in flat bed without using bedrails? 3  -DB     Moving from lying on back to sitting on the side of a flat bed without bedrails? 3  -DB     Moving to and from a bed to a chair (including a wheelchair)? 3  -DB     Standing up from a chair using your arms (e.g., wheelchair, bedside chair)? 3  -DB     Climbing 3-5 steps with a railing? 2  -DB     To walk in hospital room? 3  -DB     AM-PAC 6 Clicks Score (PT) 17  -DB     Highest Level of Mobility Goal 5 --> Static standing  -DB       Row Name 02/08/25 1148          Functional Assessment    Outcome Measure Options AM-PAC 6 Clicks Basic Mobility (PT)  -DB               User Key  (r) = Recorded By, (t) = Taken By, (c) = Cosigned By      Initials Name Provider Type    Nyasia Norwood PT Physical Therapist                                  Physical Therapy Education       Title: PT OT SLP Therapies (Done)       Topic: Physical Therapy (Done)       Point: Mobility training (Done)       Learning Progress Summary            Patient Acceptance, E, VU by DB at 2/8/2025 1148                      Point: Home exercise program (Done)       Learning Progress Summary            Patient Acceptance, E, VU by DB at 2/8/2025 1148                      Point: Body mechanics (Done)       Learning Progress Summary            Patient Acceptance, E, VU by DB at 2/8/2025 1148                      Point: Precautions (Done)       Learning Progress Summary            Patient Acceptance, E, VU by DB at 2/8/2025 1148                                      User Key       Initials Effective Dates Name Provider Type Discipline     06/16/21 -  Nyasia Mason, FRIDA Physical Therapist PT                  PT Recommendation and Plan     Progress: improving  Outcome Evaluation: Pt seated UIC and agreeable to work with PT today. Performs seated ther ex prior to ambulation. TS present to assist with line management. Pt performs STS to RW with Nathanael and Nathanael throughout gait with RW; able to ambulate 40' with VC for safety with use of RW. Upon return to room, pt demo's dec'd safety with performing stand to sit, inc'd distance from the chair and initating sit without reaching back for chair to guide hips. modA from therapist to assist pt with performing safety and VC's for pt to take steps back towards the chair d/t unsafe distance. Pt seated UIC and end of the session. Continues to benefit from skilled PT to improve functional mobility.     Time Calculation:         PT Charges       Row Name 02/08/25 1149             Time Calculation    Start Time 0931  -DB      Stop Time 0949  -DB      Time Calculation (min) 18 min  -DB      PT Received On 02/08/25  -DB      PT - Next Appointment 02/09/25  -DB         Time Calculation- PT    Total Timed Code Minutes- PT 18 minute(s)  -DB                 User Key  (r) = Recorded By, (t) = Taken By, (c) = Cosigned By      Initials Name Provider Type    DB Nyasia Mason, PT Physical Therapist                  Therapy Charges for Today       Code Description Service Date Service Provider Modifiers Qty    43191458351 HC PT THERAPEUTIC ACT EA 15 MIN 2/8/2025 Nyasia Mason, PT GP 1    52976097528 HC PT THER SUPP EA 15 MIN 2/8/2025 Nyasia Mason, PT GP 1            PT G-Codes  Outcome Measure Options: AM-PAC 6 Clicks Basic Mobility (PT)  AM-PAC 6 Clicks Score (PT): 17  PT Discharge Summary  Anticipated Discharge Disposition (PT): home with assist, home with home health, skilled nursing facility (pending progress)    Nyasia Mason PT  2/8/2025

## 2025-02-08 NOTE — PLAN OF CARE
Goal Outcome Evaluation:  Plan of Care Reviewed With: patient        Progress: improving  Outcome Evaluation: Pt seated UIC and agreeable to work with PT today. Performs seated ther ex prior to ambulation. TS present to assist with line management. Pt performs STS to RW with Nathanael and Nathanael throughout gait with RW; able to ambulate 40' with VC for safety with use of RW. Upon return to room, pt demo's dec'd safety with performing stand to sit, inc'd distance from the chair and initating sit without reaching back for chair to guide hips. modA from therapist to assist pt with performing safety and VC's for pt to take steps back towards the chair d/t unsafe distance. Pt seated UIC and end of the session. Continues to benefit from skilled PT to improve functional mobility.    Anticipated Discharge Disposition (PT): home with assist, home with home health, skilled nursing facility (pending progress)

## 2025-02-08 NOTE — PROGRESS NOTES
LOS: 3 days   Patient Care Team:  Caroline Medina APRN as PCP - General (Family Medicine)  Konstantin Driscoll MD as Consulting Physician (Cardiology)  Maksim Ortiz MD as Consulting Physician (Urology)    Chief Complaint:  Following post mitral valve repair, CABG    Interval History:   Had atrial flutter yesterday, started on amiodarone.  He remains in sinus with APCs today.  He has no other complaints.    Objective   Vital Signs  Temp:  [99.1 °F (37.3 °C)-99.5 °F (37.5 °C)] 99.5 °F (37.5 °C)  Heart Rate:  [] 74  Resp:  [14-16] 16  BP: ()/(44-71) 99/60  Arterial Line BP: ()/(40-54) 137/46    Intake/Output Summary (Last 24 hours) at 2/8/2025 0825  Last data filed at 2/8/2025 0500  Gross per 24 hour   Intake 1640.68 ml   Output 1450 ml   Net 190.68 ml       Comfortable NAD, resting in bed  PERRL, conjunctivae clear  Neck supple, no JVD or thyromegaly appreciated  S1/S2 RRR with occasional ectopy,, friction rub noted, faint  Lungs CTA B, normal effort  Abdomen S/NT/ND (+) BS, no HSM appreciated  Extremities warm, no clubbing, cyanosis, no significant edema   No visible or palpable skin lesions  A/O x 3, mood and affect appropriate    Results Review:      Results from last 7 days   Lab Units 02/08/25 0311 02/07/25  1421 02/07/25  0237   SODIUM mmol/L 134* 136 138   POTASSIUM mmol/L 3.9 4.3 4.2   CHLORIDE mmol/L 99 103 104   CO2 mmol/L 24.6 25.1 25.3   BUN mg/dL 27* 26* 23   CREATININE mg/dL 1.54* 1.53* 1.48*   GLUCOSE mg/dL 110* 142* 128*   CALCIUM mg/dL 8.3* 8.6 8.5*         Results from last 7 days   Lab Units 02/08/25 0311 02/07/25  1421 02/07/25  0237   WBC 10*3/mm3 12.02* 12.94* 16.38*   HEMOGLOBIN g/dL 8.1* 8.4* 8.4*   HEMATOCRIT % 23.6* 24.5* 24.9*   PLATELETS 10*3/mm3 102* 95* 115*     Results from last 7 days   Lab Units 02/06/25  0328 02/05/25  1117 02/05/25  0938 02/03/25  0713   INR  1.51* 1.61* 2.01* 1.11*   APTT seconds  --  37.8*  --  38.6*     Results from last 7 days   Lab  Units 02/03/25  0713   CHOLESTEROL mg/dL 118     Results from last 7 days   Lab Units 02/08/25  0311   MAGNESIUM mg/dL 2.4     Results from last 7 days   Lab Units 02/03/25  0713   CHOLESTEROL mg/dL 118   TRIGLYCERIDES mg/dL 91   HDL CHOL mg/dL 60   LDL CHOL mg/dL 41       I reviewed the patient's new clinical results.  I personally viewed and interpreted the patient's EKG/Telemetry data.  EKG shows some residual diffuse ST elevations likely small component pericardial irritation.        Medication Review:   aspirin, 81 mg, Oral, Daily  atorvastatin, 40 mg, Oral, Nightly  chlorhexidine, 15 mL, Mouth/Throat, Q12H  [Held by provider] colchicine, 0.6 mg, Oral, Daily  docusate sodium, 100 mg, Oral, Daily  enoxaparin, 40 mg, Subcutaneous, Daily  gabapentin, 200 mg, Oral, Q12H  guaiFENesin, 1,200 mg, Oral, Q12H  insulin lispro, 2-7 Units, Subcutaneous, 4x Daily AC & at Bedtime  ipratropium-albuterol, 3 mL, Nebulization, BID  melatonin, 5 mg, Oral, Nightly  [Held by provider] metoprolol tartrate, 12.5 mg, Oral, Q12H  mupirocin, 1 Application, Each Nare, BID  pantoprazole, 40 mg, Oral, QAM  polyethylene glycol, 17 g, Oral, Daily  senna-docusate sodium, 2 tablet, Oral, Nightly  sodium chloride, 4 mL, Nebulization, BID - RT        amiodarone, 0.5 mg/min, Last Rate: 0.5 mg/min (02/07/25 2308)  EPINEPHrine, 0.02-0.1 mcg/kg/min, Last Rate: Stopped (02/06/25 1550)  milrinone, 0.25-0.375 mcg/kg/min  niCARdipine, 5-15 mg/hr  norepinephrine, 0.02-0.2 mcg/kg/min, Last Rate: Stopped (02/07/25 1400)  phenylephrine, 0.2-2 mcg/kg/min, Last Rate: Stopped (02/05/25 1515)  vasopressin, 0.03 Units/min, Last Rate: 0.03 Units/min (02/05/25 0522)        Assessment & Plan       Nonrheumatic mitral valve regurgitation    CAD (coronary artery disease)    Severe mitral regurgitation due to prolapse/flail P2  Status post MVR, CABG X2 with Dr. Gaitan 2/5/25  EKG with some mild ST elevations and no reciprocal changes, likely pericardial inflammation.   Will discuss with cardiac surgery regarding colchicine use  Cardiomyopathy.  Mild.  EF about 35% but this is postoperative from MVR.  Expect this will recover.  GDMT as able, recently had pressors discontinued.  Marginal hemodynamics right now unable to tolerate GDMT.  Probably trial beta-blocker tomorrow  Atrial flutter, new.  Postoperative.  Continue amiodarone  Left atrial appendage clipped  Defer to CV surgery need for anticoagulation  Pacing dependence.  Underlying rhythm is ventricular standstill after surgery.  Continue periodic assessments  CKD.  Creatinine stable.  Hypertension.  Currently holding his home antihypertensives    Konstantin Driscoll MD  02/08/25  08:25 EST      Part of this note may be an electronic transcription/translation of spoken language to printed text using the Dragon Dictation System.

## 2025-02-08 NOTE — PROGRESS NOTES
" LOS: 3 days   Patient Care Team:  Caroline Medina APRN as PCP - General (Family Medicine)  Konstantin Driscoll MD as Consulting Physician (Cardiology)  Maksim Ortiz MD as Consulting Physician (Urology)    Chief Complaint: post op MVR, CABG, PFO closure, MANJULA ligation     Subjective  Sitting in bedside chair, no new complaints     Vital Signs  Temp:  [99.1 °F (37.3 °C)-99.5 °F (37.5 °C)] 99.5 °F (37.5 °C)  Heart Rate:  [] 74  Resp:  [14-16] 16  BP: ()/(44-71) 99/60  Arterial Line BP: ()/(40-57) 137/46  Body mass index is 24.07 kg/m².    Intake/Output Summary (Last 24 hours) at 2/8/2025 0738  Last data filed at 2/8/2025 0500  Gross per 24 hour   Intake 1651.68 ml   Output 1670 ml   Net -18.32 ml     No intake/output data recorded.    Chest tube drainage last 8 hours: 60/20ml          02/05/25  0520   Weight: 69.7 kg (153 lb 10.6 oz)         Objective:  General Appearance:  Comfortable and in no acute distress.    Vital signs: (most recent): Blood pressure 105/54, pulse 86, temperature 98.7 °F (37.1 °C), temperature source Temporal, resp. rate 16, height 170.2 cm (67\"), weight 69.7 kg (153 lb 10.6 oz), SpO2 100%.  Vital signs are normal.  No fever.    Output: Producing urine and no stool output.    Lungs:  Normal effort and normal respiratory rate.  There are decreased breath sounds.    Heart: Normal rate.  Regular rhythm.    Abdomen: Abdomen is soft.  Bowel sounds are normal.     Extremities: Normal range of motion.  There is no dependent edema.    Neurological: Patient is alert and oriented to person, place and time.    Skin:  Warm and dry.                    Results Review:        WBC WBC   Date Value Ref Range Status   02/08/2025 12.02 (H) 3.40 - 10.80 10*3/mm3 Final   02/07/2025 12.94 (H) 3.40 - 10.80 10*3/mm3 Final   02/07/2025 16.38 (H) 3.40 - 10.80 10*3/mm3 Final   02/06/2025 19.45 (H) 3.40 - 10.80 10*3/mm3 Final   02/06/2025 19.91 (H) 3.40 - 10.80 10*3/mm3 Final   02/05/2025 26.27 (H) " 3.40 - 10.80 10*3/mm3 Final   02/05/2025 14.96 (H) 3.40 - 10.80 10*3/mm3 Final   02/05/2025 8.13 3.40 - 10.80 10*3/mm3 Final      HGB Hemoglobin   Date Value Ref Range Status   02/08/2025 8.1 (L) 13.0 - 17.7 g/dL Final   02/07/2025 8.4 (L) 13.0 - 17.7 g/dL Final   02/07/2025 8.4 (L) 13.0 - 17.7 g/dL Final   02/06/2025 9.2 (L) 13.0 - 17.7 g/dL Final   02/06/2025 9.0 (L) 13.0 - 17.7 g/dL Final   02/05/2025 9.4 (L) 13.0 - 17.7 g/dL Final   02/05/2025 9.0 (L) 12.0 - 17.0 g/dL Final     Comment:     Serial Number: [id number]:  [id number]   02/05/2025 10.4 (L) 13.0 - 17.7 g/dL Final   02/05/2025 9.9 (L) 13.0 - 17.7 g/dL Final   02/05/2025 7.8 (C) 12.0 - 17.0 g/dL Final   02/05/2025 9.2 (L) 12.0 - 17.0 g/dL Final   02/05/2025 8.8 (L) 12.0 - 17.0 g/dL Final   02/05/2025 8.5 (L) 12.0 - 17.0 g/dL Final   02/05/2025 8.5 (L) 12.0 - 17.0 g/dL Final   02/05/2025 9.5 (L) 12.0 - 17.0 g/dL Final      HCT Hematocrit   Date Value Ref Range Status   02/08/2025 23.6 (L) 37.5 - 51.0 % Final   02/07/2025 24.5 (L) 37.5 - 51.0 % Final   02/07/2025 24.9 (L) 37.5 - 51.0 % Final   02/06/2025 25.8 (L) 37.5 - 51.0 % Final   02/06/2025 25.9 (L) 37.5 - 51.0 % Final   02/05/2025 26.6 (L) 37.5 - 51.0 % Final   02/05/2025 27 (L) 38 - 51 % Final   02/05/2025 29.7 (L) 37.5 - 51.0 % Final   02/05/2025 30.4 (L) 37.5 - 51.0 % Final   02/05/2025 23 (L) 38 - 51 % Final   02/05/2025 27 (L) 38 - 51 % Final   02/05/2025 26 (L) 38 - 51 % Final   02/05/2025 25 (L) 38 - 51 % Final   02/05/2025 25 (L) 38 - 51 % Final   02/05/2025 28 (L) 38 - 51 % Final      Platelets Platelets   Date Value Ref Range Status   02/08/2025 102 (L) 140 - 450 10*3/mm3 Final   02/07/2025 95 (L) 140 - 450 10*3/mm3 Final   02/07/2025 115 (L) 140 - 450 10*3/mm3 Final   02/06/2025 127 (L) 140 - 450 10*3/mm3 Final   02/06/2025 128 (L) 140 - 450 10*3/mm3 Final   02/05/2025 146 140 - 450 10*3/mm3 Final   02/05/2025 132 (L) 140 - 450 10*3/mm3 Final   02/05/2025 106 (L) 140 - 450 10*3/mm3 Final    02/05/2025 179 140 - 450 10*3/mm3 Final        PT/INR:    Protime   Date Value Ref Range Status   02/06/2025 18.2 (H) 11.7 - 14.2 Seconds Final   02/05/2025 19.2 (H) 11.7 - 14.2 Seconds Final   02/05/2025 22.9 (H) 11.7 - 14.2 Seconds Final   /  INR   Date Value Ref Range Status   02/06/2025 1.51 (H) 0.90 - 1.10 Final   02/05/2025 1.61 (H) 0.90 - 1.10 Final   02/05/2025 2.01 (H) 0.90 - 1.10 Final       Sodium Sodium   Date Value Ref Range Status   02/08/2025 134 (L) 136 - 145 mmol/L Final   02/07/2025 136 136 - 145 mmol/L Final   02/07/2025 138 136 - 145 mmol/L Final   02/06/2025 139 136 - 145 mmol/L Final   02/06/2025 143 136 - 145 mmol/L Final   02/06/2025 143 136 - 145 mmol/L Final   02/05/2025 139 136 - 145 mmol/L Final   02/05/2025 137 136 - 145 mmol/L Final      Potassium Potassium   Date Value Ref Range Status   02/08/2025 3.9 3.5 - 5.2 mmol/L Final   02/07/2025 4.3 3.5 - 5.2 mmol/L Final   02/07/2025 4.2 3.5 - 5.2 mmol/L Final   02/06/2025 4.1 3.5 - 5.2 mmol/L Final   02/06/2025 4.1 3.5 - 5.2 mmol/L Final   02/06/2025 4.1 3.5 - 5.2 mmol/L Final   02/06/2025 4.1 3.5 - 5.2 mmol/L Final   02/05/2025 4.5 3.5 - 5.2 mmol/L Final   02/05/2025 3.8 3.5 - 5.2 mmol/L Final   02/05/2025 3.7 3.5 - 5.2 mmol/L Final      Chloride Chloride   Date Value Ref Range Status   02/08/2025 99 98 - 107 mmol/L Final   02/07/2025 103 98 - 107 mmol/L Final   02/07/2025 104 98 - 107 mmol/L Final   02/06/2025 105 98 - 107 mmol/L Final   02/06/2025 109 (H) 98 - 107 mmol/L Final   02/06/2025 110 (H) 98 - 107 mmol/L Final   02/05/2025 110 (H) 98 - 107 mmol/L Final   02/05/2025 109 (H) 98 - 107 mmol/L Final      Bicarbonate CO2   Date Value Ref Range Status   02/08/2025 24.6 22.0 - 29.0 mmol/L Final   02/07/2025 25.1 22.0 - 29.0 mmol/L Final   02/07/2025 25.3 22.0 - 29.0 mmol/L Final   02/06/2025 24.8 22.0 - 29.0 mmol/L Final   02/06/2025 24.0 22.0 - 29.0 mmol/L Final   02/06/2025 22.0 22.0 - 29.0 mmol/L Final   02/05/2025 19.4 (L) 22.0 -  29.0 mmol/L Final   02/05/2025 21.0 (L) 22.0 - 29.0 mmol/L Final      BUN BUN   Date Value Ref Range Status   02/08/2025 27 (H) 8 - 23 mg/dL Final   02/07/2025 26 (H) 8 - 23 mg/dL Final   02/07/2025 23 8 - 23 mg/dL Final   02/06/2025 18 8 - 23 mg/dL Final   02/06/2025 18 8 - 23 mg/dL Final   02/06/2025 17 8 - 23 mg/dL Final   02/05/2025 17 8 - 23 mg/dL Final   02/05/2025 15 8 - 23 mg/dL Final      Creatinine Creatinine   Date Value Ref Range Status   02/08/2025 1.54 (H) 0.76 - 1.27 mg/dL Final   02/07/2025 1.53 (H) 0.76 - 1.27 mg/dL Final   02/07/2025 1.48 (H) 0.76 - 1.27 mg/dL Final   02/06/2025 1.63 (H) 0.76 - 1.27 mg/dL Final   02/06/2025 1.60 (H) 0.76 - 1.27 mg/dL Final   02/06/2025 1.69 (H) 0.76 - 1.27 mg/dL Final   02/05/2025 1.92 (H) 0.60 - 1.30 mg/dL Final   02/05/2025 1.51 (H) 0.76 - 1.27 mg/dL Final   02/05/2025 1.43 (H) 0.76 - 1.27 mg/dL Final      Calcium Calcium   Date Value Ref Range Status   02/08/2025 8.3 (L) 8.6 - 10.5 mg/dL Final   02/07/2025 8.6 8.6 - 10.5 mg/dL Final   02/07/2025 8.5 (L) 8.6 - 10.5 mg/dL Final   02/06/2025 8.4 (L) 8.6 - 10.5 mg/dL Final   02/06/2025 8.7 8.6 - 10.5 mg/dL Final   02/06/2025 8.7 8.6 - 10.5 mg/dL Final   02/05/2025 8.2 (L) 8.6 - 10.5 mg/dL Final   02/05/2025 8.4 (L) 8.6 - 10.5 mg/dL Final      Magnesium Magnesium   Date Value Ref Range Status   02/08/2025 2.4 1.6 - 2.4 mg/dL Final   02/07/2025 3.0 (H) 1.6 - 2.4 mg/dL Final   02/07/2025 2.4 1.6 - 2.4 mg/dL Final   02/06/2025 2.4 1.6 - 2.4 mg/dL Final   02/06/2025 2.7 (H) 1.6 - 2.4 mg/dL Final   02/06/2025 2.8 (H) 1.6 - 2.4 mg/dL Final   02/05/2025 2.7 (H) 1.6 - 2.4 mg/dL Final   02/05/2025 3.1 (H) 1.6 - 2.4 mg/dL Final          aspirin, 81 mg, Oral, Daily  atorvastatin, 40 mg, Oral, Nightly  chlorhexidine, 15 mL, Mouth/Throat, Q12H  [Held by provider] colchicine, 0.6 mg, Oral, Daily  docusate sodium, 100 mg, Oral, Daily  enoxaparin, 40 mg, Subcutaneous, Daily  gabapentin, 200 mg, Oral, Q12H  guaiFENesin, 1,200 mg,  Oral, Q12H  insulin lispro, 2-7 Units, Subcutaneous, 4x Daily AC & at Bedtime  ipratropium-albuterol, 3 mL, Nebulization, BID  melatonin, 5 mg, Oral, Nightly  [Held by provider] metoprolol tartrate, 12.5 mg, Oral, Q12H  mupirocin, 1 Application, Each Nare, BID  pantoprazole, 40 mg, Oral, QAM  polyethylene glycol, 17 g, Oral, Daily  senna-docusate sodium, 2 tablet, Oral, Nightly  sodium chloride, 4 mL, Nebulization, BID - RT      amiodarone, 0.5 mg/min, Last Rate: 0.5 mg/min (02/07/25 2308)  EPINEPHrine, 0.02-0.1 mcg/kg/min, Last Rate: Stopped (02/06/25 1550)  milrinone, 0.25-0.375 mcg/kg/min  niCARdipine, 5-15 mg/hr  norepinephrine, 0.02-0.2 mcg/kg/min, Last Rate: Stopped (02/07/25 1400)  phenylephrine, 0.2-2 mcg/kg/min, Last Rate: Stopped (02/05/25 1515)  vasopressin, 0.03 Units/min, Last Rate: 0.03 Units/min (02/05/25 2352)              Nonrheumatic mitral valve regurgitation    CAD (coronary artery disease)      Assessment & Plan    -Severe Mitral Regurgitation- s/p Complex MV repair, CABGx2 LIMA/RSVG, PFO, MANJULA- Pagni 2/5/2025  -PFO  -CAD  -Hypertension  -Hyperlipidemia  -CKD stage III-- Baseline Cr 1.4  -Hx esophagitis/dysphagia  -Anemia  -Osteopenia  -GERD  -Vitamin D deficiency  -Post op anemia- expected acute blood loss   -Ventricular standstill immediately after OR     POD3:     Patient looks great, vitals remain stable, a.flutter with rate 80's, likely start beta blocker tomorrow if BP/HR tolerates   On 1LNC, wean as tolerated, encourage IS/flutter  Pericardial rub, will resume colchicine   Discontinue amiodarone gtt, place on oral amiodarone   Platelet ct improving, will start Lovenox   IV diuresis, creatinine returning to normal   Discontinue comer and chest tubes, keep wires secured in place   Continue routine care, mobilize, transfer to stepdown      Geovanny Lopez PA-C  02/08/25  07:38 EST

## 2025-02-09 ENCOUNTER — APPOINTMENT (OUTPATIENT)
Dept: GENERAL RADIOLOGY | Facility: HOSPITAL | Age: 75
DRG: 219 | End: 2025-02-09
Payer: MEDICARE

## 2025-02-09 LAB
ANION GAP SERPL CALCULATED.3IONS-SCNC: 10.8 MMOL/L (ref 5–15)
ARTERIAL PATENCY WRIST A: ABNORMAL
ATMOSPHERIC PRESS: 754.3 MMHG
BASE EXCESS BLDA CALC-SCNC: 1.4 MMOL/L (ref 0–2)
BDY SITE: ABNORMAL
BUN SERPL-MCNC: 28 MG/DL (ref 8–23)
BUN/CREAT SERPL: 16.9 (ref 7–25)
CALCIUM SPEC-SCNC: 7.8 MG/DL (ref 8.6–10.5)
CHLORIDE SERPL-SCNC: 100 MMOL/L (ref 98–107)
CO2 BLDA-SCNC: 28.1 MMOL/L (ref 23–27)
CO2 SERPL-SCNC: 24.2 MMOL/L (ref 22–29)
CREAT SERPL-MCNC: 1.66 MG/DL (ref 0.76–1.27)
DEPRECATED RDW RBC AUTO: 43.9 FL (ref 37–54)
EGFRCR SERPLBLD CKD-EPI 2021: 43.3 ML/MIN/1.73
ERYTHROCYTE [DISTWIDTH] IN BLOOD BY AUTOMATED COUNT: 12.6 % (ref 12.3–15.4)
GAS FLOW AIRWAY: 1 LPM
GLUCOSE SERPL-MCNC: 100 MG/DL (ref 65–99)
HCO3 BLDA-SCNC: 26.7 MMOL/L (ref 22–28)
HCT VFR BLD AUTO: 23.2 % (ref 37.5–51)
HCT VFR BLD AUTO: 25.9 % (ref 37.5–51)
HEMODILUTION: NO
HGB BLD-MCNC: 7.5 G/DL (ref 13–17.7)
HGB BLD-MCNC: 8.9 G/DL (ref 13–17.7)
MAGNESIUM SERPL-MCNC: 2.3 MG/DL (ref 1.6–2.4)
MCH RBC QN AUTO: 31.3 PG (ref 26.6–33)
MCHC RBC AUTO-ENTMCNC: 32.3 G/DL (ref 31.5–35.7)
MCV RBC AUTO: 96.7 FL (ref 79–97)
MODALITY: ABNORMAL
PCO2 BLDA: 44.8 MM HG (ref 35–45)
PH BLDA: 7.38 PH UNITS (ref 7.35–7.45)
PLATELET # BLD AUTO: 115 10*3/MM3 (ref 140–450)
PMV BLD AUTO: 10.6 FL (ref 6–12)
PO2 BLDA: 89.2 MM HG (ref 80–100)
POTASSIUM SERPL-SCNC: 3.9 MMOL/L (ref 3.5–5.2)
POTASSIUM SERPL-SCNC: 4.1 MMOL/L (ref 3.5–5.2)
RBC # BLD AUTO: 2.4 10*6/MM3 (ref 4.14–5.8)
SAO2 % BLDCOA: 96.7 % (ref 92–98.5)
SET MECH RESP RATE: 18
SODIUM SERPL-SCNC: 135 MMOL/L (ref 136–145)
WBC NRBC COR # BLD AUTO: 7.44 10*3/MM3 (ref 3.4–10.8)

## 2025-02-09 PROCEDURE — 97530 THERAPEUTIC ACTIVITIES: CPT

## 2025-02-09 PROCEDURE — 83735 ASSAY OF MAGNESIUM: CPT

## 2025-02-09 PROCEDURE — 99232 SBSQ HOSP IP/OBS MODERATE 35: CPT | Performed by: STUDENT IN AN ORGANIZED HEALTH CARE EDUCATION/TRAINING PROGRAM

## 2025-02-09 PROCEDURE — 85018 HEMOGLOBIN: CPT | Performed by: THORACIC SURGERY (CARDIOTHORACIC VASCULAR SURGERY)

## 2025-02-09 PROCEDURE — 85014 HEMATOCRIT: CPT | Performed by: THORACIC SURGERY (CARDIOTHORACIC VASCULAR SURGERY)

## 2025-02-09 PROCEDURE — 25010000002 ENOXAPARIN PER 10 MG: Performed by: NURSE PRACTITIONER

## 2025-02-09 PROCEDURE — 25010000002 MAGNESIUM SULFATE 2 GM/50ML SOLUTION

## 2025-02-09 PROCEDURE — P9016 RBC LEUKOCYTES REDUCED: HCPCS

## 2025-02-09 PROCEDURE — 71045 X-RAY EXAM CHEST 1 VIEW: CPT

## 2025-02-09 PROCEDURE — 86900 BLOOD TYPING SEROLOGIC ABO: CPT

## 2025-02-09 PROCEDURE — 84132 ASSAY OF SERUM POTASSIUM: CPT | Performed by: THORACIC SURGERY (CARDIOTHORACIC VASCULAR SURGERY)

## 2025-02-09 PROCEDURE — 25010000002 FUROSEMIDE PER 20 MG: Performed by: ANESTHESIOLOGY

## 2025-02-09 PROCEDURE — 85027 COMPLETE CBC AUTOMATED: CPT

## 2025-02-09 PROCEDURE — 36430 TRANSFUSION BLD/BLD COMPNT: CPT

## 2025-02-09 PROCEDURE — 25010000002 CALCIUM GLUCONATE 2-0.675 GM/100ML-% SOLUTION

## 2025-02-09 PROCEDURE — 99024 POSTOP FOLLOW-UP VISIT: CPT | Performed by: THORACIC SURGERY (CARDIOTHORACIC VASCULAR SURGERY)

## 2025-02-09 PROCEDURE — 80048 BASIC METABOLIC PNL TOTAL CA: CPT

## 2025-02-09 RX ORDER — MAGNESIUM SULFATE HEPTAHYDRATE 40 MG/ML
2 INJECTION, SOLUTION INTRAVENOUS ONCE
Status: COMPLETED | OUTPATIENT
Start: 2025-02-09 | End: 2025-02-09

## 2025-02-09 RX ORDER — POTASSIUM CHLORIDE 750 MG/1
20 TABLET, FILM COATED, EXTENDED RELEASE ORAL ONCE
Status: COMPLETED | OUTPATIENT
Start: 2025-02-09 | End: 2025-02-09

## 2025-02-09 RX ORDER — CALCIUM GLUCONATE 20 MG/ML
2000 INJECTION, SOLUTION INTRAVENOUS ONCE
Status: COMPLETED | OUTPATIENT
Start: 2025-02-09 | End: 2025-02-09

## 2025-02-09 RX ADMIN — ASPIRIN 81 MG: 81 TABLET, COATED ORAL at 09:26

## 2025-02-09 RX ADMIN — CALCIUM GLUCONATE 2000 MG: 20 INJECTION, SOLUTION INTRAVENOUS at 09:24

## 2025-02-09 RX ADMIN — MAGNESIUM SULFATE IN WATER FOR 2 G: 40 INJECTION INTRAVENOUS at 09:25

## 2025-02-09 RX ADMIN — Medication 2 TABLET: at 09:26

## 2025-02-09 RX ADMIN — GUAIFENESIN 1200 MG: 600 TABLET, MULTILAYER, EXTENDED RELEASE ORAL at 09:26

## 2025-02-09 RX ADMIN — PANTOPRAZOLE SODIUM 40 MG: 40 TABLET, DELAYED RELEASE ORAL at 06:04

## 2025-02-09 RX ADMIN — GABAPENTIN 200 MG: 100 CAPSULE ORAL at 09:25

## 2025-02-09 RX ADMIN — DOCUSATE SODIUM 100 MG: 100 CAPSULE, LIQUID FILLED ORAL at 09:26

## 2025-02-09 RX ADMIN — AMIODARONE HYDROCHLORIDE 400 MG: 200 TABLET ORAL at 09:26

## 2025-02-09 RX ADMIN — POLYETHYLENE GLYCOL 3350 17 G: 17 POWDER, FOR SOLUTION ORAL at 09:25

## 2025-02-09 RX ADMIN — POTASSIUM CHLORIDE 20 MEQ: 750 TABLET, EXTENDED RELEASE ORAL at 06:04

## 2025-02-09 RX ADMIN — Medication 5 MG: at 21:07

## 2025-02-09 RX ADMIN — ATORVASTATIN CALCIUM 40 MG: 20 TABLET, FILM COATED ORAL at 21:07

## 2025-02-09 RX ADMIN — GABAPENTIN 200 MG: 100 CAPSULE ORAL at 21:07

## 2025-02-09 RX ADMIN — FUROSEMIDE 20 MG: 10 INJECTION, SOLUTION INTRAMUSCULAR; INTRAVENOUS at 03:26

## 2025-02-09 RX ADMIN — FERROUS SULFATE TAB 325 MG (65 MG ELEMENTAL FE) 325 MG: 325 (65 FE) TAB at 09:26

## 2025-02-09 RX ADMIN — COLCHICINE 0.6 MG: 0.6 TABLET ORAL at 09:25

## 2025-02-09 RX ADMIN — AMIODARONE HYDROCHLORIDE 400 MG: 200 TABLET ORAL at 21:07

## 2025-02-09 RX ADMIN — GUAIFENESIN 1200 MG: 600 TABLET, MULTILAYER, EXTENDED RELEASE ORAL at 21:08

## 2025-02-09 RX ADMIN — MUPIROCIN 1 APPLICATION: 20 OINTMENT TOPICAL at 21:08

## 2025-02-09 RX ADMIN — ENOXAPARIN SODIUM 40 MG: 100 INJECTION SUBCUTANEOUS at 18:47

## 2025-02-09 RX ADMIN — ACETAMINOPHEN 650 MG: 325 TABLET, FILM COATED ORAL at 09:25

## 2025-02-09 NOTE — THERAPY TREATMENT NOTE
Patient Name: Jackson Tariq  : 1950    MRN: [id number]                              Today's Date: 2025       Admit Date: 2025    Visit Dx:     ICD-10-CM ICD-9-CM   1. S/P CABG (coronary artery bypass graft)  Z95.1 V45.81   2. Nonrheumatic mitral valve regurgitation  I34.0 424.0   3. S/P MVR (mitral valve repair)  Z98.890 V45.89     Patient Active Problem List   Diagnosis    Dysphagia    Osteopenia of multiple sites    Vitamin D deficiency    Nonrheumatic mitral valve regurgitation    Severe mitral regurgitation    Other secondary hypertension    Stage 3a chronic kidney disease    Mild anemia    Elevated PSA    CAD (coronary artery disease)     Past Medical History:   Diagnosis Date    Chronic cough     Coronary artery disease     GERD (gastroesophageal reflux disease)     Hyperlipidemia     Hypertension     Mitral valve insufficiency     Osteopenia of multiple sites 2023    Vitamin D deficiency      Past Surgical History:   Procedure Laterality Date    CARDIAC CATHETERIZATION N/A 2024    Procedure: Left Heart Cath;  Surgeon: Susan De Jesus MD;  Location:  [facility];  Service: Cardiovascular;  Laterality: N/A;  severe mitral regurgitation    CARDIAC CATHETERIZATION N/A 2024    Procedure: Right Heart Cath;  Surgeon: Susan De Jesus MD;  Location:  [facility];  Service: Cardiovascular;  Laterality: N/A;    CARDIAC CATHETERIZATION N/A 2024    Procedure: Coronary angiography;  Surgeon: Susan De Jesus MD;  Location:  [facility];  Service: Cardiovascular;  Laterality: N/A;    COLONOSCOPY      CORONARY ARTERY BYPASS GRAFT WITH MITRAL VALVE REPAIR/REPLACEMENT N/A 2025    Procedure: CORONARY ARTERY BYPASS GRAFT X2  WITH INTERNAL MAMMARY ARTERY GRAFT AND ENDOSCOPICALLY HARVESTED RIGHT SAPHENOUS VEIN;  STERNOTOMY; PRP; MITRAL VALVE REPAIR; PATENT FORAMEN OVALE CLOSURE; LEFT ATRIAL APPENDAGE CLOSURE WITH ATRICARE; TRANSESOPHAGEAL  ECHOCARDIOGRAM WITH ANESTHESIA;  Surgeon: Farshad Gaitan MD;  Location: [facility];  Service: Cardiothoracic;  Laterality: N/A;    ENDOSCOPY N/A 05/03/2019    Procedure: ESOPHAGOGASTRODUODENOSCOPY with biopsies;  Surgeon: Radha Del Real MD;  Location:  [facility];  Service: Gastroenterology    TRIGGER FINGER RELEASE      doesn't remember which side      General Information       Row Name 02/09/25 1114          Physical Therapy Time and Intention    Document Type therapy note (daily note)  -DB     Mode of Treatment physical therapy;individual therapy  -DB       Row Name 02/09/25 1114          General Information    Patient Profile Reviewed yes  -DB               User Key  (r) = Recorded By, (t) = Taken By, (c) = Cosigned By      Initials Name Provider Type    DB Nyasia aMson PT Physical Therapist                   Mobility       Row Name 02/09/25 1114          Bed Mobility    Comment, (Bed Mobility) NT, pt UIC  -DB       Row Name 02/09/25 1114          Sit-Stand Transfer    Sit-Stand [place] (Transfers) verbal cues;minimum assist (75% patient effort);2 person assist;1 person to manage equipment  -DB     Assistive Device (Sit-Stand Transfers) walker, front-wheeled  -DB       Row Name 02/09/25 1114          Gait/Stairs (Locomotion)    [place]field Level (Gait) verbal cues;nonverbal cues (demo/gesture);minimum assist (75% patient effort);2 person assist;1 person to manage equipment  -DB     Assistive Device (Gait) walker, front-wheeled  -DB     Distance in Feet (Gait) 100  -DB     Deviations/Abnormal Patterns (Gait) alhaji decreased;stride length decreased  -DB     Bilateral Gait Deviations forward flexed posture;heel strike decreased;decreased arm swing  -DB     Right Sided Gait Deviations leans right  -DB               User Key  (r) = Recorded By, (t) = Taken By, (c) = Cosigned By      Initials Name Provider Type    Nyasia Norwood PT Physical Therapist                   Obj/Interventions       Row  Name 02/09/25 1115          Motor Skills    Therapeutic Exercise other (see comments)  cardiac ther ex x 5  -DB       Row Name 02/09/25 1115          Balance    Balance Assessment sitting static balance;sitting dynamic balance;standing static balance;standing dynamic balance  -DB     Static Sitting Balance supervision  -DB     Dynamic Sitting Balance supervision  -DB     Position, Sitting Balance sitting in chair  -DB     Static Standing Balance contact guard  -DB     Dynamic Standing Balance minimal assist  -DB     Position/Device Used, Standing Balance supported;walker, front-wheeled  -DB     Balance Interventions sitting;standing;sit to stand  -DB               User Key  (r) = Recorded By, (t) = Taken By, (c) = Cosigned By      Initials Name Provider Type    DB Nyasia Mason, PT Physical Therapist                   Goals/Plan    No documentation.                  Clinical Impression       Row Name 02/09/25 1115          Plan of Care Review    Plan of Care Reviewed With patient  -DB     Progress improving  -DB     Outcome Evaluation Pt UIC at start of session and agreeable to work with PT. Pt was able to inc ambulation distance to 100' with RW and Nathanael + 1 for assist with equipment management. Pt was reminded x2 during session to call for assist with getting up. He was seated UIC at end of the session. He continues to benefit from skilled PT.  -DB       Row Name 02/09/25 1115          Therapy Assessment/Plan (PT)    Therapy Frequency (PT) 6 times/wk  -DB       Row Name 02/09/25 1115          Vital Signs    O2 Delivery Pre Treatment room air  -DB     O2 Delivery Intra Treatment room air  -DB     O2 Delivery Post Treatment supplemental O2  -DB     Pre Patient Position Sitting  -DB     Intra Patient Position Standing  -DB     Post Patient Position Sitting  -DB       Row Name 02/09/25 1115          Positioning and Restraints    Pre-Treatment Position sitting in chair/recliner  -DB     Post Treatment Position chair   -DB     In Chair reclined;call light within reach;encouraged to call for assist;notified nsg  -DB               User Key  (r) = Recorded By, (t) = Taken By, (c) = Cosigned By      Initials Name Provider Type    Nyasia Norwood PT Physical Therapist                   Outcome Measures       Row Name 02/09/25 1117          How much help from another person do you currently need...    Turning from your back to your side while in flat bed without using bedrails? 3  -DB     Moving from lying on back to sitting on the side of a flat bed without bedrails? 3  -DB     Moving to and from a bed to a chair (including a wheelchair)? 3  -DB     Standing up from a chair using your arms (e.g., wheelchair, bedside chair)? 3  -DB     Climbing 3-5 steps with a railing? 2  -DB     To walk in hospital room? 3  -DB     AM-PAC 6 Clicks Score (PT) 17  -DB     Highest Level of Mobility Goal 5 --> Static standing  -DB       Row Name 02/09/25 1117          Functional Assessment    Outcome Measure Options AM-PAC 6 Clicks Basic Mobility (PT)  -DB               User Key  (r) = Recorded By, (t) = Taken By, (c) = Cosigned By      Initials Name Provider Type    Nyasia Norwood PT Physical Therapist                                 Physical Therapy Education       Title: PT OT SLP Therapies (Done)       Topic: Physical Therapy (Done)       Point: Mobility training (Done)       Learning Progress Summary            Patient Acceptance, E, VU by DB at 2/9/2025 1118    Acceptance, E, VU by DB at 2/8/2025 1148                      Point: Home exercise program (Done)       Learning Progress Summary            Patient Acceptance, E, VU by DB at 2/9/2025 1118    Acceptance, E, VU by DB at 2/8/2025 1148                      Point: Body mechanics (Done)       Learning Progress Summary            Patient Acceptance, E, VU by DB at 2/9/2025 1118    Acceptance, E, VU by DB at 2/8/2025 1148                      Point: Precautions (Done)       Learning  Progress Summary            Patient Acceptance, E, VU by DB at 2/9/2025 1118    Acceptance, E, VU by DB at 2/8/2025 1148                                      User Key       Initials Effective Dates Name Provider Type Discipline    DB 06/16/21 -  Nyasia Mason PT Physical Therapist PT                  PT Recommendation and Plan     Progress: improving  Outcome Evaluation: Pt UIC at start of session and agreeable to work with PT. Pt was able to inc ambulation distance to 100' with RW and Nathanael + 1 for assist with equipment management. Pt was reminded x2 during session to call for assist with getting up. He was seated UIC at end of the session. He continues to benefit from skilled PT.     Time Calculation:         PT Charges       Row Name 02/09/25 1118             Time Calculation    Start Time 0947  -DB      Stop Time 1001  -DB      Time Calculation (min) 14 min  -DB      PT Received On 02/09/25  -DB      PT - Next Appointment 02/10/25  -DB         Time Calculation- PT    Total Timed Code Minutes- PT 14 minute(s)  -DB                User Key  (r) = Recorded By, (t) = Taken By, (c) = Cosigned By      Initials Name Provider Type    DB Nyasia Mason, PT Physical Therapist                  Therapy Charges for Today       Code Description Service Date Service Provider Modifiers Qty    87164713434  PT THERAPEUTIC ACT EA 15 MIN 2/8/2025 Nyasia Mason, PT GP 1    94798597921 HC PT THER SUPP EA 15 MIN 2/8/2025 Nyasia Mason, PT GP 1    17785098010 HC PT THERAPEUTIC ACT EA 15 MIN 2/9/2025 Nyasia Mason, PT GP 1    29827615627 HC PT THER SUPP EA 15 MIN 2/9/2025 Nyasia Mason, PT GP 1            PT G-Codes  Outcome Measure Options: AM-PAC 6 Clicks Basic Mobility (PT)  AM-PAC 6 Clicks Score (PT): 17  PT Discharge Summary  Anticipated Discharge Disposition (PT): home with assist, home with home health, skilled nursing facility (pending progress)    Nyasia Mason PT  2/9/2025

## 2025-02-09 NOTE — PLAN OF CARE
Goal Outcome Evaluation:  Plan of Care Reviewed With: patient        Progress: improving            Problem: Adult Inpatient Plan of Care  Goal: Plan of Care Review  Outcome: Progressing  Flowsheets (Taken 2/9/2025 2446)  Progress: improving  Plan of Care Reviewed With: patient      Pt a&ox 4. Remains in controlled afib 60-70s. AV wires isolated. On RA. No c/o pain. 1 unit of PRBC given. IJ remains, infusing KVO. Ambulated with staff and PT. IS self administer hourly. Plan to d/c in 2-3 days. Will continue with plan of care.

## 2025-02-09 NOTE — PROGRESS NOTES
LOS: 4 days   Patient Care Team:  Caroline Medina APRN as PCP - General (Family Medicine)  Konstantin Driscoll MD as Consulting Physician (Cardiology)  Maksim Ortiz MD as Consulting Physician (Urology)    Chief Complaint:  Following post mitral valve repair, CABG    Interval History:   Remains on amiodarone to suppress arrhythmia.  He does remain in atrial flutter with controlled rate.  He has not required any pacing.  He reports that he is feeling better but still slightly winded with exertion.    Objective   Vital Signs  Temp:  [97.1 °F (36.2 °C)-100.8 °F (38.2 °C)] 98.2 °F (36.8 °C)  Heart Rate:  [] 66  Resp:  [16-18] 18  BP: ()/(49-68) 94/51  Arterial Line BP: ()/() 118/47    Intake/Output Summary (Last 24 hours) at 2/9/2025 0914  Last data filed at 2/9/2025 0851  Gross per 24 hour   Intake 1335 ml   Output 1430 ml   Net -95 ml       Comfortable NAD, resting in bed  PERRL, conjunctivae clear  Neck supple, no JVD or thyromegaly appreciated  S1/S2 irregularly irregular with controlled rate, friction rub noted, faint  Lungs CTA B, normal effort  Abdomen S/NT/ND (+) BS, no HSM appreciated  Extremities warm, no clubbing, cyanosis, no significant edema   No visible or palpable skin lesions  A/O x 3, mood and affect appropriate    Results Review:      Results from last 7 days   Lab Units 02/09/25 0259 02/08/25  1454 02/08/25  0311 02/07/25  1421   SODIUM mmol/L 135*  --  134* 136   POTASSIUM mmol/L 3.9 4.1 3.9 4.3   CHLORIDE mmol/L 100  --  99 103   CO2 mmol/L 24.2  --  24.6 25.1   BUN mg/dL 28*  --  27* 26*   CREATININE mg/dL 1.66*  --  1.54* 1.53*   GLUCOSE mg/dL 100*  --  110* 142*   CALCIUM mg/dL 7.8*  --  8.3* 8.6         Results from last 7 days   Lab Units 02/09/25  0259 02/08/25  0311 02/07/25  1421   WBC 10*3/mm3 7.44 12.02* 12.94*   HEMOGLOBIN g/dL 7.5* 8.1* 8.4*   HEMATOCRIT % 23.2* 23.6* 24.5*   PLATELETS 10*3/mm3 115* 102* 95*     Results from last 7 days   Lab Units  02/06/25  0328 02/05/25  1117 02/05/25  0938 02/03/25  0713   INR  1.51* 1.61* 2.01* 1.11*   APTT seconds  --  37.8*  --  38.6*     Results from last 7 days   Lab Units 02/03/25  0713   CHOLESTEROL mg/dL 118     Results from last 7 days   Lab Units 02/09/25  0259   MAGNESIUM mg/dL 2.3     Results from last 7 days   Lab Units 02/03/25  0713   CHOLESTEROL mg/dL 118   TRIGLYCERIDES mg/dL 91   HDL CHOL mg/dL 60   LDL CHOL mg/dL 41       I reviewed the patient's new clinical results.  I personally viewed and interpreted the patient's EKG/Telemetry data.  EKG shows some residual diffuse ST elevations likely small component pericardial irritation.        Medication Review:   amiodarone, 400 mg, Oral, Q12H  aspirin, 81 mg, Oral, Daily  atorvastatin, 40 mg, Oral, Nightly  calcium 500 mg vitamin D 5 mcg (200 UT), 2 tablet, Oral, Daily  calcium gluconate, 2,000 mg, Intravenous, Once  chlorhexidine, 15 mL, Mouth/Throat, Q12H  colchicine, 0.6 mg, Oral, Daily  docusate sodium, 100 mg, Oral, Daily  enoxaparin, 40 mg, Subcutaneous, Daily  ferrous sulfate, 325 mg, Oral, Daily With Breakfast  gabapentin, 200 mg, Oral, Q12H  guaiFENesin, 1,200 mg, Oral, Q12H  magnesium sulfate, 2 g, Intravenous, Once  melatonin, 5 mg, Oral, Nightly  [Held by provider] metoprolol tartrate, 12.5 mg, Oral, Q12H  mupirocin, 1 Application, Each Nare, BID  pantoprazole, 40 mg, Oral, QAM  polyethylene glycol, 17 g, Oral, Daily  senna-docusate sodium, 2 tablet, Oral, Nightly               Assessment & Plan       Nonrheumatic mitral valve regurgitation    CAD (coronary artery disease)    Severe mitral regurgitation due to prolapse/flail P2  Status post MVR, CABG X2 with Dr. Gaitan 2/5/25  EKG with some mild ST elevations and no reciprocal changes, likely pericardial inflammation.  CV surgery started colchicine  Cardiomyopathy.  Mild.  EF about 35% but this is postoperative from MVR.  Expect this will recover.  GDMT as able, recently had pressors discontinued.   Marginal hemodynamics right now unable to tolerate GDMT.  Rates currently controlled on amiodarone, metoprolol per below  Atrial flutter, new.  Postoperative.  Continue amiodarone  Left atrial appendage clipped  Atrial flutter certainly complicating his recovery.  He did have a mitral valve repair and his EF is down.  I do think atrial flutter is going to complicate his recovery.  He did have a episode of ventricular standstill that occurred in the postoperative setting after an episode of seizure-like activity on postop day 1.  Asystole was noted requiring pacemaker setting adjustments.  I will have cardiac EP evaluate tomorrow given his persistent flutter.  Normally, would consider cardioversion in a few weeks but he currently has his epicardial pacing wires, and given the postop asystole episode I do worry about underlying sick sinus syndrome after surgery.  I would be hesitant to cardiovert in a few weeks without epicardial wires, will seek their opinion.  He will probably require a short course of anticoagulation if a cardioversion is pursued despite a appendage clipping  Pacing dependence.  Underlying rhythm is ventricular standstill after surgery.  Continue periodic assessments.  Not currently requiring pacing in the setting of flutter  CKD.  Creatinine pretty close to his baseline.  Hypertension.  Currently holding his home antihypertensives  Anemia, postoperative.  Receiving transfusion today.    Konstantin Driscoll MD  02/09/25  09:14 EST      Part of this note may be an electronic transcription/translation of spoken language to printed text using the Dragon Dictation System.

## 2025-02-09 NOTE — PLAN OF CARE
Goal Outcome Evaluation:  Plan of Care Reviewed With: patient        Progress: improving  Outcome Evaluation: Pt UIC at start of session and agreeable to work with PT. Pt was able to inc ambulation distance to 100' with RW and Nathanael + 1 for assist with equipment management. Pt was reminded x2 during session to call for assist with getting up. He was seated UIC at end of the session. He continues to benefit from skilled PT.    Anticipated Discharge Disposition (PT): home with assist, home with home health, skilled nursing facility (pending progress)

## 2025-02-09 NOTE — PROGRESS NOTES
" LOS: 4 days   Patient Care Team:  Caroline Medina APRN as PCP - General (Family Medicine)  Konstantin Driscoll MD as Consulting Physician (Cardiology)  Maksim Ortiz MD as Consulting Physician (Urology)    Chief Complaint: post op MVR, CABG, PFO closure, MANJULA ligation     Subjective  Sitting in bedside chair, no new complaints     Vital Signs  Temp:  [97.1 °F (36.2 °C)-100.8 °F (38.2 °C)] 98.2 °F (36.8 °C)  Heart Rate:  [] 66  Resp:  [16-18] 18  BP: ()/(49-68) 94/51  Arterial Line BP: ()/() 118/47  Body mass index is 25.36 kg/m².    Intake/Output Summary (Last 24 hours) at 2/9/2025 0802  Last data filed at 2/9/2025 0617  Gross per 24 hour   Intake 1095 ml   Output 1430 ml   Net -335 ml     No intake/output data recorded.        02/05/25  0520 02/09/25  0354   Weight: 69.7 kg (153 lb 10.6 oz) 73.4 kg (161 lb 14.4 oz)         Objective:  General Appearance:  Comfortable and in no acute distress.    Vital signs: (most recent): Blood pressure 94/51, pulse 66, temperature 98.2 °F (36.8 °C), temperature source Oral, resp. rate 18, height 170.2 cm (67\"), weight 73.4 kg (161 lb 14.4 oz), SpO2 100%.  Vital signs are normal.  No fever.    Output: Producing urine.    Lungs:  Normal effort and normal respiratory rate.  There are decreased breath sounds.    Heart: Normal rate.  Regular rhythm.    Abdomen: Abdomen is soft.  Bowel sounds are normal.     Extremities: Normal range of motion.  There is no dependent edema.    Neurological: Patient is alert and oriented to person, place and time.    Skin:  Warm and dry.  (Sternal incision without erythema, edema, or drainage )                  Results Review:        WBC WBC   Date Value Ref Range Status   02/09/2025 7.44 3.40 - 10.80 10*3/mm3 Final   02/08/2025 12.02 (H) 3.40 - 10.80 10*3/mm3 Final   02/07/2025 12.94 (H) 3.40 - 10.80 10*3/mm3 Final   02/07/2025 16.38 (H) 3.40 - 10.80 10*3/mm3 Final   02/06/2025 19.45 (H) 3.40 - 10.80 10*3/mm3 Final    " "  HGB Hemoglobin   Date Value Ref Range Status   02/09/2025 7.5 (L) 13.0 - 17.7 g/dL Final   02/08/2025 8.1 (L) 13.0 - 17.7 g/dL Final   02/07/2025 8.4 (L) 13.0 - 17.7 g/dL Final   02/07/2025 8.4 (L) 13.0 - 17.7 g/dL Final   02/06/2025 9.2 (L) 13.0 - 17.7 g/dL Final      HCT Hematocrit   Date Value Ref Range Status   02/09/2025 23.2 (L) 37.5 - 51.0 % Final   02/08/2025 23.6 (L) 37.5 - 51.0 % Final   02/07/2025 24.5 (L) 37.5 - 51.0 % Final   02/07/2025 24.9 (L) 37.5 - 51.0 % Final   02/06/2025 25.8 (L) 37.5 - 51.0 % Final      Platelets Platelets   Date Value Ref Range Status   02/09/2025 115 (L) 140 - 450 10*3/mm3 Final   02/08/2025 102 (L) 140 - 450 10*3/mm3 Final   02/07/2025 95 (L) 140 - 450 10*3/mm3 Final   02/07/2025 115 (L) 140 - 450 10*3/mm3 Final   02/06/2025 127 (L) 140 - 450 10*3/mm3 Final        PT/INR:    No results found for: \"PROTIME\"  /  No results found for: \"INR\"      Sodium Sodium   Date Value Ref Range Status   02/09/2025 135 (L) 136 - 145 mmol/L Final   02/08/2025 134 (L) 136 - 145 mmol/L Final   02/07/2025 136 136 - 145 mmol/L Final   02/07/2025 138 136 - 145 mmol/L Final   02/06/2025 139 136 - 145 mmol/L Final   02/06/2025 143 136 - 145 mmol/L Final      Potassium Potassium   Date Value Ref Range Status   02/09/2025 3.9 3.5 - 5.2 mmol/L Final   02/08/2025 4.1 3.5 - 5.2 mmol/L Final   02/08/2025 3.9 3.5 - 5.2 mmol/L Final   02/07/2025 4.3 3.5 - 5.2 mmol/L Final   02/07/2025 4.2 3.5 - 5.2 mmol/L Final   02/06/2025 4.1 3.5 - 5.2 mmol/L Final   02/06/2025 4.1 3.5 - 5.2 mmol/L Final   02/06/2025 4.1 3.5 - 5.2 mmol/L Final      Chloride Chloride   Date Value Ref Range Status   02/09/2025 100 98 - 107 mmol/L Final   02/08/2025 99 98 - 107 mmol/L Final   02/07/2025 103 98 - 107 mmol/L Final   02/07/2025 104 98 - 107 mmol/L Final   02/06/2025 105 98 - 107 mmol/L Final   02/06/2025 109 (H) 98 - 107 mmol/L Final      Bicarbonate CO2   Date Value Ref Range Status   02/09/2025 24.2 22.0 - 29.0 mmol/L Final "   02/08/2025 24.6 22.0 - 29.0 mmol/L Final   02/07/2025 25.1 22.0 - 29.0 mmol/L Final   02/07/2025 25.3 22.0 - 29.0 mmol/L Final   02/06/2025 24.8 22.0 - 29.0 mmol/L Final   02/06/2025 24.0 22.0 - 29.0 mmol/L Final      BUN BUN   Date Value Ref Range Status   02/09/2025 28 (H) 8 - 23 mg/dL Final   02/08/2025 27 (H) 8 - 23 mg/dL Final   02/07/2025 26 (H) 8 - 23 mg/dL Final   02/07/2025 23 8 - 23 mg/dL Final   02/06/2025 18 8 - 23 mg/dL Final   02/06/2025 18 8 - 23 mg/dL Final      Creatinine Creatinine   Date Value Ref Range Status   02/09/2025 1.66 (H) 0.76 - 1.27 mg/dL Final   02/08/2025 1.54 (H) 0.76 - 1.27 mg/dL Final   02/07/2025 1.53 (H) 0.76 - 1.27 mg/dL Final   02/07/2025 1.48 (H) 0.76 - 1.27 mg/dL Final   02/06/2025 1.63 (H) 0.76 - 1.27 mg/dL Final   02/06/2025 1.60 (H) 0.76 - 1.27 mg/dL Final      Calcium Calcium   Date Value Ref Range Status   02/09/2025 7.8 (L) 8.6 - 10.5 mg/dL Final   02/08/2025 8.3 (L) 8.6 - 10.5 mg/dL Final   02/07/2025 8.6 8.6 - 10.5 mg/dL Final   02/07/2025 8.5 (L) 8.6 - 10.5 mg/dL Final   02/06/2025 8.4 (L) 8.6 - 10.5 mg/dL Final   02/06/2025 8.7 8.6 - 10.5 mg/dL Final      Magnesium Magnesium   Date Value Ref Range Status   02/09/2025 2.3 1.6 - 2.4 mg/dL Final   02/08/2025 2.4 1.6 - 2.4 mg/dL Final   02/07/2025 3.0 (H) 1.6 - 2.4 mg/dL Final   02/07/2025 2.4 1.6 - 2.4 mg/dL Final   02/06/2025 2.4 1.6 - 2.4 mg/dL Final   02/06/2025 2.7 (H) 1.6 - 2.4 mg/dL Final          amiodarone, 400 mg, Oral, Q12H  aspirin, 81 mg, Oral, Daily  atorvastatin, 40 mg, Oral, Nightly  chlorhexidine, 15 mL, Mouth/Throat, Q12H  colchicine, 0.6 mg, Oral, Daily  docusate sodium, 100 mg, Oral, Daily  enoxaparin, 40 mg, Subcutaneous, Daily  ferrous sulfate, 325 mg, Oral, Daily With Breakfast  furosemide, 20 mg, Intravenous, BID  gabapentin, 200 mg, Oral, Q12H  guaiFENesin, 1,200 mg, Oral, Q12H  melatonin, 5 mg, Oral, Nightly  [Held by provider] metoprolol tartrate, 12.5 mg, Oral, Q12H  mupirocin, 1  Application, Each Nare, BID  pantoprazole, 40 mg, Oral, QAM  polyethylene glycol, 17 g, Oral, Daily  senna-docusate sodium, 2 tablet, Oral, Nightly                   Nonrheumatic mitral valve regurgitation    CAD (coronary artery disease)      Assessment & Plan    -Severe Mitral Regurgitation- s/p Complex MV repair, CABGx2 LIMA/RSVG, PFO, MANJULA- Pagni 2/5/2025  -PFO  -CAD  -Hypertension  -Hyperlipidemia  -CKD stage III-- Baseline Cr 1.4  -Hx esophagitis/dysphagia  -Anemia  -Osteopenia  -GERD  -Vitamin D deficiency  -Post op anemia- expected acute blood loss   -Ventricular standstill immediately after OR     POD4:     Patient reports occasional unsteadiness when standing up, BP soft, hgb of 7.5 this morning -- transfuse 1 unit PRBC's, recheck H&H after transfusion   Unable to start low dose beta blocker due to low BP, patient in a.fib with rate int he 60's, on oral amiodarone   Talked with Dr. Driscoll, he is consulting EP for possible cardioversion tomorrow -- keep AV wires secured in place   On 2LNC, wean as tolerated, encourage IS/flutter  Pericardial rub, continue colchicine   Creatinine of 1.66 this morning, at baseline, will re-assess need for diuresis after transfusion  Keep central line for now, discontinue after transfusion   Continue routine care, mobilize    Geovanny Lopez PA-C  02/09/25  08:02 EST

## 2025-02-10 LAB
ANION GAP SERPL CALCULATED.3IONS-SCNC: 7.6 MMOL/L (ref 5–15)
BUN SERPL-MCNC: 26 MG/DL (ref 8–23)
BUN/CREAT SERPL: 18.2 (ref 7–25)
CALCIUM SPEC-SCNC: 8 MG/DL (ref 8.6–10.5)
CHLORIDE SERPL-SCNC: 102 MMOL/L (ref 98–107)
CO2 SERPL-SCNC: 23.4 MMOL/L (ref 22–29)
CREAT SERPL-MCNC: 1.43 MG/DL (ref 0.76–1.27)
DEPRECATED RDW RBC AUTO: 41.6 FL (ref 37–54)
EGFRCR SERPLBLD CKD-EPI 2021: 51.7 ML/MIN/1.73
ERYTHROCYTE [DISTWIDTH] IN BLOOD BY AUTOMATED COUNT: 12.4 % (ref 12.3–15.4)
GLUCOSE SERPL-MCNC: 102 MG/DL (ref 65–99)
HCT VFR BLD AUTO: 23.3 % (ref 37.5–51)
HGB BLD-MCNC: 8.1 G/DL (ref 13–17.7)
MAGNESIUM SERPL-MCNC: 2.5 MG/DL (ref 1.6–2.4)
MCH RBC QN AUTO: 32 PG (ref 26.6–33)
MCHC RBC AUTO-ENTMCNC: 34.8 G/DL (ref 31.5–35.7)
MCV RBC AUTO: 92.1 FL (ref 79–97)
PLATELET # BLD AUTO: 146 10*3/MM3 (ref 140–450)
PMV BLD AUTO: 10.7 FL (ref 6–12)
POTASSIUM SERPL-SCNC: 4.1 MMOL/L (ref 3.5–5.2)
RBC # BLD AUTO: 2.53 10*6/MM3 (ref 4.14–5.8)
SODIUM SERPL-SCNC: 133 MMOL/L (ref 136–145)
WBC NRBC COR # BLD AUTO: 5.94 10*3/MM3 (ref 3.4–10.8)

## 2025-02-10 PROCEDURE — 97530 THERAPEUTIC ACTIVITIES: CPT

## 2025-02-10 PROCEDURE — 99232 SBSQ HOSP IP/OBS MODERATE 35: CPT | Performed by: INTERNAL MEDICINE

## 2025-02-10 PROCEDURE — 93005 ELECTROCARDIOGRAM TRACING: CPT | Performed by: PHYSICIAN ASSISTANT

## 2025-02-10 PROCEDURE — 93010 ELECTROCARDIOGRAM REPORT: CPT | Performed by: INTERNAL MEDICINE

## 2025-02-10 PROCEDURE — 25010000002 ENOXAPARIN PER 10 MG: Performed by: NURSE PRACTITIONER

## 2025-02-10 PROCEDURE — 80048 BASIC METABOLIC PNL TOTAL CA: CPT

## 2025-02-10 PROCEDURE — 83735 ASSAY OF MAGNESIUM: CPT

## 2025-02-10 PROCEDURE — 85027 COMPLETE CBC AUTOMATED: CPT

## 2025-02-10 PROCEDURE — 99024 POSTOP FOLLOW-UP VISIT: CPT | Performed by: THORACIC SURGERY (CARDIOTHORACIC VASCULAR SURGERY)

## 2025-02-10 PROCEDURE — 99222 1ST HOSP IP/OBS MODERATE 55: CPT

## 2025-02-10 RX ORDER — FUROSEMIDE 40 MG/1
40 TABLET ORAL DAILY
Status: DISCONTINUED | OUTPATIENT
Start: 2025-02-10 | End: 2025-02-12 | Stop reason: HOSPADM

## 2025-02-10 RX ORDER — METOPROLOL TARTRATE 25 MG/1
25 TABLET, FILM COATED ORAL EVERY 12 HOURS SCHEDULED
Status: DISCONTINUED | OUTPATIENT
Start: 2025-02-10 | End: 2025-02-12

## 2025-02-10 RX ORDER — AMIODARONE HYDROCHLORIDE 200 MG/1
200 TABLET ORAL EVERY 12 HOURS SCHEDULED
Status: DISCONTINUED | OUTPATIENT
Start: 2025-02-10 | End: 2025-02-12 | Stop reason: HOSPADM

## 2025-02-10 RX ORDER — POTASSIUM CHLORIDE 750 MG/1
20 TABLET, EXTENDED RELEASE ORAL DAILY
Status: DISCONTINUED | OUTPATIENT
Start: 2025-02-10 | End: 2025-02-12 | Stop reason: HOSPADM

## 2025-02-10 RX ADMIN — Medication 5 MG: at 20:40

## 2025-02-10 RX ADMIN — ATORVASTATIN CALCIUM 40 MG: 20 TABLET, FILM COATED ORAL at 20:40

## 2025-02-10 RX ADMIN — AMIODARONE HYDROCHLORIDE 200 MG: 200 TABLET ORAL at 20:40

## 2025-02-10 RX ADMIN — CYCLOBENZAPRINE HYDROCHLORIDE 10 MG: 10 TABLET, FILM COATED ORAL at 20:39

## 2025-02-10 RX ADMIN — Medication 2 TABLET: at 08:05

## 2025-02-10 RX ADMIN — GABAPENTIN 200 MG: 100 CAPSULE ORAL at 08:06

## 2025-02-10 RX ADMIN — METOPROLOL TARTRATE 25 MG: 25 TABLET, FILM COATED ORAL at 20:39

## 2025-02-10 RX ADMIN — POTASSIUM CHLORIDE 20 MEQ: 750 TABLET, EXTENDED RELEASE ORAL at 10:02

## 2025-02-10 RX ADMIN — PANTOPRAZOLE SODIUM 40 MG: 40 TABLET, DELAYED RELEASE ORAL at 06:15

## 2025-02-10 RX ADMIN — GUAIFENESIN 1200 MG: 600 TABLET, MULTILAYER, EXTENDED RELEASE ORAL at 20:39

## 2025-02-10 RX ADMIN — COLCHICINE 0.6 MG: 0.6 TABLET ORAL at 08:06

## 2025-02-10 RX ADMIN — HYDROCODONE BITARTRATE AND ACETAMINOPHEN 2 TABLET: 5; 325 TABLET ORAL at 06:18

## 2025-02-10 RX ADMIN — FERROUS SULFATE TAB 325 MG (65 MG ELEMENTAL FE) 325 MG: 325 (65 FE) TAB at 08:05

## 2025-02-10 RX ADMIN — GUAIFENESIN 1200 MG: 600 TABLET, MULTILAYER, EXTENDED RELEASE ORAL at 08:07

## 2025-02-10 RX ADMIN — ENOXAPARIN SODIUM 40 MG: 100 INJECTION SUBCUTANEOUS at 17:32

## 2025-02-10 RX ADMIN — GABAPENTIN 200 MG: 100 CAPSULE ORAL at 20:39

## 2025-02-10 RX ADMIN — ASPIRIN 81 MG: 81 TABLET, COATED ORAL at 08:08

## 2025-02-10 RX ADMIN — AMIODARONE HYDROCHLORIDE 400 MG: 200 TABLET ORAL at 08:08

## 2025-02-10 RX ADMIN — SENNOSIDES AND DOCUSATE SODIUM 2 TABLET: 50; 8.6 TABLET ORAL at 20:39

## 2025-02-10 RX ADMIN — FUROSEMIDE 40 MG: 40 TABLET ORAL at 10:02

## 2025-02-10 NOTE — PROGRESS NOTES
Hospital Follow Up    LOS: 5  Patient Name: Jackson Tariq  Age/Sex: 74 y.o. male  : 1950  MRN: [id number]    Day of Service: 02/10/25   Length of Stay: 5  Encounter Provider: David Villatoro MD  Place of Service: [facility] CARDIOLOGY  Patient Care Team:  Caroline Medina APRN as PCP - General (Family Medicine)  Konstantin Driscoll MD as Consulting Physician (Cardiology)  Maksim Ortiz MD as Consulting Physician (Urology)    Subjective:     Chief Complaint: Coronary artery disease, mitral regurgitation, atrial flutter    Interval History: Patient looks good overall he is doing well.  This is the first time of seeing him.    Objective:     Objective:  Temp:  [93.3 °F (34.1 °C)-98.9 °F (37.2 °C)] 98.2 °F (36.8 °C)  Heart Rate:  [62-81] 69  Resp:  [18] 18  BP: (108-123)/(45-74) 119/48     Intake/Output Summary (Last 24 hours) at 2/10/2025 1150  Last data filed at 2/10/2025 0856  Gross per 24 hour   Intake 627.5 ml   Output 1100 ml   Net -472.5 ml     Body mass index is 25.31 kg/m².      25  0520 25  0354 02/10/25  0504   Weight: 69.7 kg (153 lb 10.6 oz) 73.4 kg (161 lb 14.4 oz) 73.3 kg (161 lb 9.6 oz)     Weight change: -0.136 kg (-4.8 oz)      Physical Exam:   General :  Alert, cooperative, in no acute distress.  Neuro:   Alert,cooperative and oriented.  Lungs:  CTAB. Normal respiratory effort and rate.  CV:  irregular rate and rhythm, normal S1 and S2, no murmurs, gallops or rubs.   ABD:  Soft, nontender, nondistended. Positive bowel sounds.  Extr:  No edema or cyanosis, moves all extremities.    Lab Review:   Results from last 7 days   Lab Units 02/10/25  0356 25  1222 25  0259   SODIUM mmol/L 133*  --  135*   POTASSIUM mmol/L 4.1 4.1 3.9   CHLORIDE mmol/L 102  --  100   CO2 mmol/L 23.4  --  24.2   BUN mg/dL 26*  --  28*   CREATININE mg/dL 1.43*  --  1.66*   GLUCOSE mg/dL 102*  --  100*   CALCIUM mg/dL 8.0*  --  7.8*         Results from  "last 7 days   Lab Units 02/10/25  0356 02/09/25  1847 02/09/25  0259   WBC 10*3/mm3 5.94  --  7.44   HEMOGLOBIN g/dL 8.1* 8.9* 7.5*   HEMATOCRIT % 23.3* 25.9* 23.2*   PLATELETS 10*3/mm3 146  --  115*     Results from last 7 days   Lab Units 02/06/25  0328 02/05/25  1117   INR  1.51* 1.61*   APTT seconds  --  37.8*     Results from last 7 days   Lab Units 02/10/25  0356 02/09/25  0259   MAGNESIUM mg/dL 2.5* 2.3           Invalid input(s): \"LDLCALC\"  Results from last 7 days   Lab Units 02/05/25  2301   PROBNP pg/mL 1,551.0*           Current Medications:   Scheduled Meds:amiodarone, 200 mg, Oral, Q12H  aspirin, 81 mg, Oral, Daily  atorvastatin, 40 mg, Oral, Nightly  calcium 500 mg vitamin D 5 mcg (200 UT), 2 tablet, Oral, Daily  colchicine, 0.6 mg, Oral, Daily  docusate sodium, 100 mg, Oral, Daily  enoxaparin, 40 mg, Subcutaneous, Daily  ferrous sulfate, 325 mg, Oral, Daily With Breakfast  furosemide, 40 mg, Oral, Daily  gabapentin, 200 mg, Oral, Q12H  guaiFENesin, 1,200 mg, Oral, Q12H  melatonin, 5 mg, Oral, Nightly  metoprolol tartrate, 25 mg, Oral, Q12H  pantoprazole, 40 mg, Oral, QAM  polyethylene glycol, 17 g, Oral, Daily  potassium chloride, 20 mEq, Oral, Daily  senna-docusate sodium, 2 tablet, Oral, Nightly      Continuous Infusions:     Allergies:  No Known Allergies    Assessment:       Nonrheumatic mitral valve regurgitation    CAD (coronary artery disease)        Plan:   1.  Coronary artery disease.  Status post coronary artery bypass grafting.  2.  Regurgitation status post repair  3.  Atrial fibrillation/flutter.  Patient was on amiodarone QTc interval was markedly elevated.  Dosage was decreased.  Heart rate controlled would continue the same.  Patient did have appendage ligated.  4.  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  Patient looks really good.  Ambulate discharge soon.        David Villatoro MD  02/10/25  11:50 EST      "

## 2025-02-10 NOTE — THERAPY TREATMENT NOTE
Patient Name: Jackson Tariq  : 1950    MRN: [id number]                              Today's Date: 2/10/2025       Admit Date: 2025    Visit Dx:     ICD-10-CM ICD-9-CM   1. S/P CABG (coronary artery bypass graft)  Z95.1 V45.81   2. Nonrheumatic mitral valve regurgitation  I34.0 424.0   3. S/P MVR (mitral valve repair)  Z98.890 V45.89     Patient Active Problem List   Diagnosis    Dysphagia    Osteopenia of multiple sites    Vitamin D deficiency    Nonrheumatic mitral valve regurgitation    Severe mitral regurgitation    Other secondary hypertension    Stage 3a chronic kidney disease    Mild anemia    Elevated PSA    CAD (coronary artery disease)     Past Medical History:   Diagnosis Date    Chronic cough     Coronary artery disease     GERD (gastroesophageal reflux disease)     Hyperlipidemia     Hypertension     Mitral valve insufficiency     Osteopenia of multiple sites 2023    Vitamin D deficiency      Past Surgical History:   Procedure Laterality Date    CARDIAC CATHETERIZATION N/A 2024    Procedure: Left Heart Cath;  Surgeon: Susan De Jesus MD;  Location:  [facility];  Service: Cardiovascular;  Laterality: N/A;  severe mitral regurgitation    CARDIAC CATHETERIZATION N/A 2024    Procedure: Right Heart Cath;  Surgeon: Susan De Jesus MD;  Location:  [facility];  Service: Cardiovascular;  Laterality: N/A;    CARDIAC CATHETERIZATION N/A 2024    Procedure: Coronary angiography;  Surgeon: Susan De Jesus MD;  Location:  [facility];  Service: Cardiovascular;  Laterality: N/A;    COLONOSCOPY      CORONARY ARTERY BYPASS GRAFT WITH MITRAL VALVE REPAIR/REPLACEMENT N/A 2025    Procedure: CORONARY ARTERY BYPASS GRAFT X2  WITH INTERNAL MAMMARY ARTERY GRAFT AND ENDOSCOPICALLY HARVESTED RIGHT SAPHENOUS VEIN;  STERNOTOMY; PRP; MITRAL VALVE REPAIR; PATENT FORAMEN OVALE CLOSURE; LEFT ATRIAL APPENDAGE CLOSURE WITH ATRICARE; TRANSESOPHAGEAL  ECHOCARDIOGRAM WITH ANESTHESIA;  Surgeon: Farshad Gaitan MD;  Location: [facility];  Service: Cardiothoracic;  Laterality: N/A;    ENDOSCOPY N/A 05/03/2019    Procedure: ESOPHAGOGASTRODUODENOSCOPY with biopsies;  Surgeon: Radha Del Real MD;  Location:  [facility];  Service: Gastroenterology    TRIGGER FINGER RELEASE      doesn't remember which side      General Information       Row Name 02/10/25 1010          Physical Therapy Time and Intention    Document Type therapy note (daily note)  -PH     Mode of Treatment physical therapy  -PH       Row Name 02/10/25 1010          General Information    Existing Precautions/Restrictions fall;sternal;cardiac  -PH       Row Name 02/10/25 1010          Cognition    Orientation Status (Cognition) oriented x 4  -PH       Row Name 02/10/25 1010          Safety Issues/Impairments Affecting Functional Mobility    Impairments Affecting Function (Mobility) endurance/activity tolerance;strength  -PH     Comment, Safety Issues/Impairments (Mobility) non skid socks donned  -PH               User Key  (r) = Recorded By, (t) = Taken By, (c) = Cosigned By      Initials Name Provider Type    PH Marine Odom PTA Physical Therapist Assistant                   Mobility       Row Name 02/10/25 1010          Bed Mobility    Bed Mobility sit-supine  -PH     Sit-Supine [place] (Bed Mobility) modified independence  -PH     Comment, (Bed Mobility) [facility] at beg of session; returned to bed per RN request  -PH       Row Name 02/10/25 1010          Sit-Stand Transfer    Sit-Stand [place] (Transfers) standby assist  -PH     Assistive Device (Sit-Stand Transfers) other (see comments)  no AD  -PH     Comment, (Sit-Stand Transfer) from EOB  -PH       Row Name 02/10/25 1010          Gait/Stairs (Locomotion)    [place] Level (Gait) contact guard;verbal cues  -PH     Assistive Device (Gait) walker, front-wheeled;other (see comments)  IV pole w/ L hand  -PH     Distance in Feet (Gait)  300  -PH     Deviations/Abnormal Patterns (Gait) alhaji decreased;gait speed decreased  -PH     Bilateral Gait Deviations forward flexed posture;decreased arm swing  -PH     Right Sided Gait Deviations leans right  -PH     [place] Level (Stairs) not tested  -PH     Comment, (Gait/Stairs) pt amb around unit w/ fww 1x and was steady; pt trialed no fww holding IV pole w/ L hand and was mildly slow although fairly steady w/ no overt LOB  -PH               User Key  (r) = Recorded By, (t) = Taken By, (c) = Cosigned By      Initials Name Provider Type     Marine Odom PTA Physical Therapist Assistant                   Obj/Interventions       Row Name 02/10/25 1012          Motor Skills    Therapeutic Exercise other (see comments)  cardiac program x 15 reps  -PH       Row Name 02/10/25 1012          Balance    Balance Assessment sitting static balance;sitting dynamic balance;standing static balance  -PH     Static Sitting Balance independent  -PH     Dynamic Sitting Balance independent  -PH     Static Standing Balance contact guard;standby assist  -PH     Position/Device Used, Standing Balance other (see comments)  no AD  -PH               User Key  (r) = Recorded By, (t) = Taken By, (c) = Cosigned By      Initials Name Provider Type     Marine Odom PTA Physical Therapist Assistant                   Goals/Plan    No documentation.                  Clinical Impression       Row Name 02/10/25 1013          Pain    Pretreatment Pain Rating 0/10 - no pain  -PH     Posttreatment Pain Rating 0/10 - no pain  -PH     Pre/Posttreatment Pain Comment denied pain when asked  -PH       Row Name 02/10/25 1013          Plan of Care Review    Plan of Care Reviewed With patient;spouse  -PH     Progress improving  -PH     Outcome Evaluation Pt was seen for PT tx this AM. Pt was [facility] w/ spouse in room. Pt performed cardiac program x 15 reps each. Pt then stood w/ SBA. Pt amb around unit 2x first w/ use of fww  then trialed pulling IV pole w/ L hand. Pt was steady w/ no overt LOB. Pt returned to bed w/ mod I (by RN request). Educ pt on imp of continued amb w/ staff in hallway as able.  -PH       Row Name 02/10/25 1013          Vital Signs    O2 Delivery Pre Treatment room air  -PH     O2 Delivery Intra Treatment room air  -PH     O2 Delivery Post Treatment room air  -PH       Row Name 02/10/25 1013          Positioning and Restraints    Pre-Treatment Position sitting in chair/recliner  -PH     Post Treatment Position bed  -PH     In Bed notified nsg;fowlers;call light within reach;encouraged to call for assist;exit alarm on;with family/caregiver  -PH               User Key  (r) = Recorded By, (t) = Taken By, (c) = Cosigned By      Initials Name Provider Type    Marine Lacey PTA Physical Therapist Assistant                   Outcome Measures       Row Name 02/10/25 1015 02/10/25 0800       How much help from another person do you currently need...    Turning from your back to your side while in flat bed without using bedrails? 4  -PH 4  -AH    Moving from lying on back to sitting on the side of a flat bed without bedrails? 4  -PH 4  -AH    Moving to and from a bed to a chair (including a wheelchair)? 3  -PH 3  -AH    Standing up from a chair using your arms (e.g., wheelchair, bedside chair)? 3  -PH 3  -AH    Climbing 3-5 steps with a railing? 3  -PH 3  -AH    To walk in hospital room? 3  -PH 3  -AH    AM-PAC 6 Clicks Score (PT) 20  -PH 20  -AH    Highest Level of Mobility Goal 6 --> Walk 10 steps or more  -PH 6 --> Walk 10 steps or more  -AH      Row Name 02/10/25 1015          Functional Assessment    Outcome Measure Options AM-PAC 6 Clicks Basic Mobility (PT)  -PH               User Key  (r) = Recorded By, (t) = Taken By, (c) = Cosigned By      Initials Name Provider Type    Marine Lacey PTA Physical Therapist Assistant    Tamia Hernandez, RN Registered Nurse                                  Physical Therapy Education       Title: PT OT SLP Therapies (Done)       Topic: Physical Therapy (Done)       Point: Mobility training (Done)       Learning Progress Summary            Patient Acceptance, E,TB,D, VU,DU,NR by  at 2/10/2025 1015    Acceptance, E, VU by DB at 2/9/2025 1118    Acceptance, E, VU by DB at 2/8/2025 1148                      Point: Home exercise program (Done)       Learning Progress Summary            Patient Acceptance, E,TB,D, VU,DU,NR by  at 2/10/2025 1015    Acceptance, E, VU by DB at 2/9/2025 1118    Acceptance, E, VU by DB at 2/8/2025 1148                      Point: Body mechanics (Done)       Learning Progress Summary            Patient Acceptance, E,TB,D, VU,DU,NR by  at 2/10/2025 1015    Acceptance, E, VU by DB at 2/9/2025 1118    Acceptance, E, VU by DB at 2/8/2025 1148                      Point: Precautions (Done)       Learning Progress Summary            Patient Acceptance, E,TB,D, VU,DU,NR by  at 2/10/2025 1015    Acceptance, E, VU by DB at 2/9/2025 1118    Acceptance, E, VU by DB at 2/8/2025 1148                                      User Key       Initials Effective Dates Name Provider Type Discipline     06/16/21 -  Nyasia Mason, PT Physical Therapist PT     06/16/21 -  Marine Odom PTA Physical Therapist Assistant PT                  PT Recommendation and Plan     Progress: improving  Outcome Evaluation: Pt was seen for PT tx this AM. Pt was [facility] w/ spouse in room. Pt performed cardiac program x 15 reps each. Pt then stood w/ SBA. Pt amb around unit 2x first w/ use of fww then trialed pulling IV pole w/ L hand. Pt was steady w/ no overt LOB. Pt returned to bed w/ mod I (by RN request). Educ pt on imp of continued amb w/ staff in hallway as able.     Time Calculation:         PT Charges       Row Name 02/10/25 1016             Time Calculation    Start Time 0936  -PH      Stop Time 0950  -PH      Time Calculation (min) 14 min  -PH      PT  Received On 02/10/25  -PH      PT - Next Appointment 02/11/25  -PH         Timed Charges    71631 - PT Therapeutic Exercise Minutes 3  -PH      20998 - PT Therapeutic Activity Minutes 11  -PH         Total Minutes    Timed Charges Total Minutes 14  -PH       Total Minutes 14  -PH                User Key  (r) = Recorded By, (t) = Taken By, (c) = Cosigned By      Initials Name Provider Type    PH Marine Odom PTA Physical Therapist Assistant                  Therapy Charges for Today       Code Description Service Date Service Provider Modifiers Qty    [id number]  PT THERAPEUTIC ACT EA 15 MIN 2/10/2025 Marine Odom PTA GP 1            PT G-Codes  Outcome Measure Options: AM-PAC 6 Clicks Basic Mobility (PT)  AM-PAC 6 Clicks Score (PT): 20  PT Discharge Summary  Anticipated Discharge Disposition (PT): home with home health, home with assist    Marine Odom PTA  2/10/2025

## 2025-02-10 NOTE — PLAN OF CARE
Goal Outcome Evaluation:              Outcome Evaluation: VSS, AOx4. Afib on monitor. IJ and pacer wires removed today. Tolerated well. Decent appetite and fluid intake. No new issues at this time. Will continue with POC.

## 2025-02-10 NOTE — CONSULTS
Met with patient to discuss the benefits of cardiac rehab. Provided phase II information along with the contact information for cardiac rehab at [facility]. Requested ofr referral to be sent.

## 2025-02-10 NOTE — CASE MANAGEMENT/SOCIAL WORK
Continued Stay Note  [facility]     Patient Name: Jackson Tariq  MRN: [id number]  Today's Date: 2/10/2025    Admit Date: 2/5/2025    Plan: Home w/ spouse & AMEDISYS    Discharge Plan       Row Name 02/10/25 1725       Plan    Plan Home w/ spouse & AMEDISYS     Plan Comments Patient is POD 5 and ambulating 300feet CGA with therapy.  Plan is dc home with assistance of spouse and daughter April.  [facility]  has accepted.............Temi WANG/MIGUEL ÁNGEL                   Discharge Codes    No documentation.                 Expected Discharge Date and Time       Expected Discharge Date Expected Discharge Time    Feb 11, 2025               Temi Acuna RN

## 2025-02-10 NOTE — PLAN OF CARE
Goal Outcome Evaluation:  Plan of Care Reviewed With: patient        Progress: no change  Outcome Evaluation: S/P CABG, POD#5, A&O, no complaints of pain, EPM, RIJ, received one unit PRBCs yesterday, SBA, afib on the monitor, continue plan of care.

## 2025-02-10 NOTE — PLAN OF CARE
Goal Outcome Evaluation:  Plan of Care Reviewed With: patient, spouse        Progress: improving  Outcome Evaluation: Pt was seen for PT tx this AM. Pt was [facility] w/ spouse in room. Pt performed cardiac program x 15 reps each. Pt then stood w/ SBA. Pt amb around unit 2x first w/ use of fww then trialed pulling IV pole w/ L hand. Pt was steady w/ no overt LOB. Pt returned to bed w/ mod I (by RN request). Educ pt on imp of continued amb w/ staff in hallway as able.    Anticipated Discharge Disposition (PT): home with home health, home with assist

## 2025-02-10 NOTE — CONSULTS
Electrophysiology Hospital Consult            Patient Name: Jackson Tariq  Age/Sex: 74 y.o. male  : 1950  MRN: [id number]    Date of Admission: 2025  Date of Encounter Visit: 02/10/25  Encounter Provider: ENRIQUE Strong  Referring Provider: Lee Christiansen PA-C  Place of Service: [facility] CARDIOLOGY  Patient Care Team:  Caroline Medina APRN as PCP - General (Family Medicine)  Konstantin Driscoll MD as Consulting Physician (Cardiology)  Maksim Ortiz MD as Consulting Physician (Urology)      Subjective:   EP Consultation for: atrial flutter     Chief Complaint: post op     History of Present Illness:  Jackson Tariq is a 74 y.o. male who follows with Dr. Driscoll. He has mitral valve regurgitation, mitral valve prolapse, CABG, HTN, and CKD.     He has mitral valve regurgitation that has been followed.    He underwent BRIE showing severe MR and bileaflet prolapse.       He was admitted  for scheduled surgery with Dr. Gaitan.      he underwent mitral valve repair, CABGx2. And MANJULA closure.     Postoperatively he required temporary pacing    He has also been in atrial flutter with controlled rates.     Dr. Driscoll saw this weekend and asked EP to see for AFL.           Dr. Desir and I saw him this afternoon.     He has no awareness of his atrial flutter.    He denies any palpitations, shortness of breath, dizziness.    His heart rate is well-controlled.    Heart rate will increase with ambulation but decreases appropriately with rest.    He is currently on metoprolol and amiodarone.        Past Medical History:  Past Medical History:   Diagnosis Date    Chronic cough     Coronary artery disease     GERD (gastroesophageal reflux disease)     Hyperlipidemia     Hypertension     Mitral valve insufficiency     Osteopenia of multiple sites 2023    Vitamin D deficiency        Past Surgical History:   Procedure Laterality Date    CARDIAC CATHETERIZATION N/A  7/24/2024    Procedure: Left Heart Cath;  Surgeon: Susan De Jesus MD;  Location: [facility] CATH INVASIVE LOCATION;  Service: Cardiovascular;  Laterality: N/A;  severe mitral regurgitation    CARDIAC CATHETERIZATION N/A 7/24/2024    Procedure: Right Heart Cath;  Surgeon: Susan De Jesus MD;  [facility];  Service: Cardiovascular;  Laterality: N/A;    CARDIAC CATHETERIZATION N/A 7/24/2024    Procedure: Coronary angiography;  Surgeon: Susan De Jesus MD;  Location: [facility] CATH INVASIVE LOCATION;  Service: Cardiovascular;  Laterality: N/A;    COLONOSCOPY      CORONARY ARTERY BYPASS GRAFT WITH MITRAL VALVE REPAIR/REPLACEMENT N/A 2/5/2025    Procedure: CORONARY ARTERY BYPASS GRAFT X2  WITH INTERNAL MAMMARY ARTERY GRAFT AND ENDOSCOPICALLY HARVESTED RIGHT SAPHENOUS VEIN;  STERNOTOMY; PRP; MITRAL VALVE REPAIR; PATENT FORAMEN OVALE CLOSURE; LEFT ATRIAL APPENDAGE CLOSURE WITH ATRICARE; TRANSESOPHAGEAL ECHOCARDIOGRAM WITH ANESTHESIA;  Surgeon: Farshad Gaitan MD;  Location: [facility];  Service: Cardiothoracic;  Laterality: N/A;    ENDOSCOPY N/A 05/03/2019    Procedure: ESOPHAGOGASTRODUODENOSCOPY with biopsies;  Surgeon: Radha Del Real MD;  Location: [facility];  Service: Gastroenterology    TRIGGER FINGER RELEASE      doesn't remember which side       Home Medications:   Medications Prior to Admission   Medication Sig Dispense Refill Last Dose/Taking    atorvastatin (LIPITOR) 20 MG tablet Take 1 tablet by mouth Daily. 30 tablet 11 2/4/2025 at  7:00 AM    Calcium Carb-Cholecalciferol (CALCIUM 500 +D PO) Take 500 Int'l Units by mouth 2 (Two) Times a Day.   2/4/2025 at  5:00 PM    carvedilol (COREG) 3.125 MG tablet Take 1 tablet by mouth twice daily 60 tablet 0 2/4/2025 at  5:00 PM    clotrimazole-betamethasone (LOTRISONE) 1-0.05 % cream Apply 1 Application topically to the appropriate area as directed 2 (Two) Times a Day. (Patient taking differently: Apply 1 Application topically to the appropriate area  as directed As Needed.) 15 g 0 Taking Differently    omeprazole (priLOSEC) 20 MG capsule Take 1 capsule by mouth twice daily 180 capsule 3 2/4/2025 at  5:00 PM    valsartan (DIOVAN) 160 MG tablet Take 1 tablet by mouth 2 (Two) Times a Day. 180 tablet 3 2/4/2025 at  7:00 AM       Allergies:  No Known Allergies    Past Social History:  Social History     Socioeconomic History    Marital status:    Tobacco Use    Smoking status: Never    Smokeless tobacco: Never   Vaping Use    Vaping status: Never Used   Substance and Sexual Activity    Alcohol use: No    Drug use: Never    Sexual activity: Defer       Past Family History:  Family History   Problem Relation Age of Onset    Colon cancer Neg Hx     Colon polyps Neg Hx     [place] Hyperthermia Neg Hx        Review of Systems: All systems reviewed. Pertinent positives identified in HPI. All other systems are negative.     14 point ROS was performed and is negative except as outlined in HPI.     Objective:     Objective:  Vital Signs (last 24 hours)         02/09 0700  02/10 0659 02/10 0700  02/10 1300   Most Recent      Temp (°F) 93.3 -  98.9    98.2 -  98.3     98.3 (36.8) 02/10 1249    Heart Rate 53 -  81    69 -  82     82 02/10 1249    Resp   18      18     18 02/10 1249    BP 94/51 -  123/45    119/48 -  121/54     121/54 02/10 1249    SpO2 (%) 90 -  100      100     100 02/10 0731    Flow (L/min) (Oxygen Therapy)   2                  Temp:  [93.3 °F (34.1 °C)-98.9 °F (37.2 °C)] 98.3 °F (36.8 °C)  Heart Rate:  [62-82] 82  Resp:  [18] 18  BP: (108-122)/(48-74) 121/54  Body mass index is 25.31 kg/m².        Physical Exam:     General Appearance: No acute distress, well developed and well nourished.   Eyes: Conjunctiva and lids: No erythema, swelling, or discharge. Sclera non-icteric.   HENT: Atraumatic, normocephalic. External eyes, ears, and nose normal.   Respiratory: No signs of respiratory distress. Respiration rhythm and depth normal.   Clear to auscultation.  No rales, crackles, rhonchi, or wheezing auscultated.   Cardiovascular:  Heart Rate and Rhythm: regularly irregular, Heart Sounds: Normal S1 and S2. No S3 or S4 noted.  Gastrointestinal:  Abdomen soft, non-distended, non-tender.  Musculoskeletal: Normal movement of extremities  Skin: Warm and dry.   Psychiatric: Patient alert and oriented to person, place, and time. Speech and behavior appropriate. Normal mood and affect.    Labs:   Lab Review:     Results from last 7 days   Lab Units 02/10/25  0356 02/09/25  1222 02/09/25  0259 02/08/25  1454 02/08/25  0311 02/07/25  1421 02/07/25  0237 02/06/25  2213 02/06/25  1512 02/06/25  0950   SODIUM mmol/L 133*  --  135*  --  134* 136 138  --  139 143   POTASSIUM mmol/L 4.1 4.1 3.9 4.1 3.9 4.3 4.2   < > 4.1 4.1   CHLORIDE mmol/L 102  --  100  --  99 103 104  --  105 109*   CO2 mmol/L 23.4  --  24.2  --  24.6 25.1 25.3  --  24.8 24.0   BUN mg/dL 26*  --  28*  --  27* 26* 23  --  18 18   CREATININE mg/dL 1.43*  --  1.66*  --  1.54* 1.53* 1.48*  --  1.63* 1.60*   GLUCOSE mg/dL 102*  --  100*  --  110* 142* 128*  --  154* 142*   CALCIUM mg/dL 8.0*  --  7.8*  --  8.3* 8.6 8.5*  --  8.4* 8.7    < > = values in this interval not displayed.         Results from last 7 days   Lab Units 02/10/25  0356   WBC 10*3/mm3 5.94   HEMOGLOBIN g/dL 8.1*   HEMATOCRIT % 23.3*   PLATELETS 10*3/mm3 146     Results from last 7 days   Lab Units 02/06/25  0328 02/05/25  1117 02/05/25  0938   INR  1.51* 1.61* 2.01*   APTT seconds  --  37.8*  --          Results from last 7 days   Lab Units 02/10/25  0356   MAGNESIUM mg/dL 2.5*         Results from last 7 days   Lab Units 02/05/25  2301   PROBNP pg/mL 1,551.0*             EKG:         I personally viewed and interpreted the patient's EKG/Telemetry tracings.    Assessment:       Nonrheumatic mitral valve regurgitation    CAD (coronary artery disease)        Plan:   Dr. Zamora and I saw this patient.        He has developed postop atrial flutter, suspect  this is a typical atrial flutter.  He has no current symptoms and his heart rate is well-controlled.    Recommend focusing on rate control.  Can continue metoprolol and amiodarone.      When he is felt safe from a surgical standpoint I would recommend starting apixaban and he can follow-up in the office in 3 to 4 weeks to discuss if any intervention is warranted, it is possible he could convert on his own.    I will get him a follow-up in the EP clinic to discuss.      Thank you for allowing me to participate in the care of Jackson Tariq. Feel free to contact me directly with any further questions or concerns.    ENRIQUE Strong  [facility]  02/10/25  13:09 EST

## 2025-02-10 NOTE — PROGRESS NOTES
" LOS: 5 days   Patient Care Team:  Caroline Medina APRN as PCP - General (Family Medicine)  Konstantin Driscoll MD as Consulting Physician (Cardiology)  Maksim Ortiz MD as Consulting Physician (Urology)    Chief Complaint: post op MVR, CABG, PFO closure, MANJULA ligation     Subjective  Sitting in bedside chair, no new complaints     Vital Signs  Temp:  [93.3 °F (34.1 °C)-98.9 °F (37.2 °C)] 98.2 °F (36.8 °C)  Heart Rate:  [53-81] 69  Resp:  [18] 18  BP: (108-123)/(45-74) 119/48  Body mass index is 25.31 kg/m².    Intake/Output Summary (Last 24 hours) at 2/10/2025 0916  Last data filed at 2/10/2025 0856  Gross per 24 hour   Intake 627.5 ml   Output 1100 ml   Net -472.5 ml     I/O this shift:  In: 240 [P.O.:240]  Out: -         02/05/25  0520 02/09/25  0354 02/10/25  0504   Weight: 69.7 kg (153 lb 10.6 oz) 73.4 kg (161 lb 14.4 oz) 73.3 kg (161 lb 9.6 oz)         Objective:  General Appearance:  Comfortable and in no acute distress.    Vital signs: (most recent): Blood pressure 119/48, pulse 69, temperature 98.2 °F (36.8 °C), temperature source Oral, resp. rate 18, height 170.2 cm (67\"), weight 73.3 kg (161 lb 9.6 oz), SpO2 100%.  Vital signs are normal.  No fever.    Output: Producing urine.    Lungs:  Normal effort and normal respiratory rate.  There are decreased breath sounds.    Heart: Normal rate.  Regular rhythm.    Abdomen: Abdomen is soft.  Bowel sounds are normal.     Extremities: Normal range of motion.  There is no dependent edema.    Neurological: Patient is alert and oriented to person, place and time.    Skin:  Warm and dry.  (Sternal incision without erythema, edema, or drainage )                  Results Review:        WBC WBC   Date Value Ref Range Status   02/10/2025 5.94 3.40 - 10.80 10*3/mm3 Final   02/09/2025 7.44 3.40 - 10.80 10*3/mm3 Final   02/08/2025 12.02 (H) 3.40 - 10.80 10*3/mm3 Final   02/07/2025 12.94 (H) 3.40 - 10.80 10*3/mm3 Final      HGB Hemoglobin   Date Value Ref Range Status " "  02/10/2025 8.1 (L) 13.0 - 17.7 g/dL Final   02/09/2025 8.9 (L) 13.0 - 17.7 g/dL Final   02/09/2025 7.5 (L) 13.0 - 17.7 g/dL Final   02/08/2025 8.1 (L) 13.0 - 17.7 g/dL Final   02/07/2025 8.4 (L) 13.0 - 17.7 g/dL Final      HCT Hematocrit   Date Value Ref Range Status   02/10/2025 23.3 (L) 37.5 - 51.0 % Final   02/09/2025 25.9 (L) 37.5 - 51.0 % Final   02/09/2025 23.2 (L) 37.5 - 51.0 % Final   02/08/2025 23.6 (L) 37.5 - 51.0 % Final   02/07/2025 24.5 (L) 37.5 - 51.0 % Final      Platelets Platelets   Date Value Ref Range Status   02/10/2025 146 140 - 450 10*3/mm3 Final   02/09/2025 115 (L) 140 - 450 10*3/mm3 Final   02/08/2025 102 (L) 140 - 450 10*3/mm3 Final   02/07/2025 95 (L) 140 - 450 10*3/mm3 Final        PT/INR:    No results found for: \"PROTIME\"  /  No results found for: \"INR\"      Sodium Sodium   Date Value Ref Range Status   02/10/2025 133 (L) 136 - 145 mmol/L Final   02/09/2025 135 (L) 136 - 145 mmol/L Final   02/08/2025 134 (L) 136 - 145 mmol/L Final   02/07/2025 136 136 - 145 mmol/L Final      Potassium Potassium   Date Value Ref Range Status   02/10/2025 4.1 3.5 - 5.2 mmol/L Final   02/09/2025 4.1 3.5 - 5.2 mmol/L Final   02/09/2025 3.9 3.5 - 5.2 mmol/L Final   02/08/2025 4.1 3.5 - 5.2 mmol/L Final   02/08/2025 3.9 3.5 - 5.2 mmol/L Final   02/07/2025 4.3 3.5 - 5.2 mmol/L Final      Chloride Chloride   Date Value Ref Range Status   02/10/2025 102 98 - 107 mmol/L Final   02/09/2025 100 98 - 107 mmol/L Final   02/08/2025 99 98 - 107 mmol/L Final   02/07/2025 103 98 - 107 mmol/L Final      Bicarbonate CO2   Date Value Ref Range Status   02/10/2025 23.4 22.0 - 29.0 mmol/L Final   02/09/2025 24.2 22.0 - 29.0 mmol/L Final   02/08/2025 24.6 22.0 - 29.0 mmol/L Final   02/07/2025 25.1 22.0 - 29.0 mmol/L Final      BUN BUN   Date Value Ref Range Status   02/10/2025 26 (H) 8 - 23 mg/dL Final   02/09/2025 28 (H) 8 - 23 mg/dL Final   02/08/2025 27 (H) 8 - 23 mg/dL Final   02/07/2025 26 (H) 8 - 23 mg/dL Final    "   Creatinine Creatinine   Date Value Ref Range Status   02/10/2025 1.43 (H) 0.76 - 1.27 mg/dL Final   02/09/2025 1.66 (H) 0.76 - 1.27 mg/dL Final   02/08/2025 1.54 (H) 0.76 - 1.27 mg/dL Final   02/07/2025 1.53 (H) 0.76 - 1.27 mg/dL Final      Calcium Calcium   Date Value Ref Range Status   02/10/2025 8.0 (L) 8.6 - 10.5 mg/dL Final   02/09/2025 7.8 (L) 8.6 - 10.5 mg/dL Final   02/08/2025 8.3 (L) 8.6 - 10.5 mg/dL Final   02/07/2025 8.6 8.6 - 10.5 mg/dL Final      Magnesium Magnesium   Date Value Ref Range Status   02/10/2025 2.5 (H) 1.6 - 2.4 mg/dL Final   02/09/2025 2.3 1.6 - 2.4 mg/dL Final   02/08/2025 2.4 1.6 - 2.4 mg/dL Final   02/07/2025 3.0 (H) 1.6 - 2.4 mg/dL Final          amiodarone, 400 mg, Oral, Q12H  aspirin, 81 mg, Oral, Daily  atorvastatin, 40 mg, Oral, Nightly  calcium 500 mg vitamin D 5 mcg (200 UT), 2 tablet, Oral, Daily  colchicine, 0.6 mg, Oral, Daily  docusate sodium, 100 mg, Oral, Daily  enoxaparin, 40 mg, Subcutaneous, Daily  ferrous sulfate, 325 mg, Oral, Daily With Breakfast  gabapentin, 200 mg, Oral, Q12H  guaiFENesin, 1,200 mg, Oral, Q12H  melatonin, 5 mg, Oral, Nightly  [Held by provider] metoprolol tartrate, 12.5 mg, Oral, Q12H  pantoprazole, 40 mg, Oral, QAM  polyethylene glycol, 17 g, Oral, Daily  senna-docusate sodium, 2 tablet, Oral, Nightly                   Nonrheumatic mitral valve regurgitation    CAD (coronary artery disease)      Assessment & Plan    -Severe Mitral Regurgitation- s/p Complex MV repair, CABGx2 LIMA/RSVG, PFO, MANJULAShaun Gaitan 2/5/2025  -PFO  -CAD  -Hypertension  -Hyperlipidemia  -CKD stage III-- Baseline Cr 1.4  -Hx esophagitis/dysphagia  -Anemia  -Osteopenia  -GERD  -Vitamin D deficiency  -Post op anemia- expected acute blood loss   -Ventricular standstill immediately after OR     POD#5  Looks good this morning.  Ambulating in the hallway.  On 2LNC, wean as tolerated, encourage IS/flutter  Pericardial rub, continue colchicine   Remains in atrial flutter--- blood  pressure improved will start metoprolol today.  Qtc 520 this morning-- on PO amiodarone 400mg BID-- will decrease   Creatinine of 1.43-- will switch to PO diuresis  Discontinue central line  Leave AV wires another day  Continue routine care, mobilize    ENRIQUE Diamond  02/10/25  09:16 EST

## 2025-02-11 LAB
25(OH)D3 SERPL-MCNC: 31.2 NG/ML (ref 30–100)
ANION GAP SERPL CALCULATED.3IONS-SCNC: 9.4 MMOL/L (ref 5–15)
BUN SERPL-MCNC: 22 MG/DL (ref 8–23)
BUN/CREAT SERPL: 14.8 (ref 7–25)
CALCIUM SPEC-SCNC: 8.3 MG/DL (ref 8.6–10.5)
CHLORIDE SERPL-SCNC: 102 MMOL/L (ref 98–107)
CO2 SERPL-SCNC: 24.6 MMOL/L (ref 22–29)
CREAT SERPL-MCNC: 1.49 MG/DL (ref 0.76–1.27)
DEPRECATED RDW RBC AUTO: 43.5 FL (ref 37–54)
EGFRCR SERPLBLD CKD-EPI 2021: 49.2 ML/MIN/1.73
ERYTHROCYTE [DISTWIDTH] IN BLOOD BY AUTOMATED COUNT: 12.6 % (ref 12.3–15.4)
GLUCOSE SERPL-MCNC: 97 MG/DL (ref 65–99)
HCT VFR BLD AUTO: 25.5 % (ref 37.5–51)
HGB BLD-MCNC: 8.8 G/DL (ref 13–17.7)
MAGNESIUM SERPL-MCNC: 2.3 MG/DL (ref 1.6–2.4)
MCH RBC QN AUTO: 32.5 PG (ref 26.6–33)
MCHC RBC AUTO-ENTMCNC: 34.5 G/DL (ref 31.5–35.7)
MCV RBC AUTO: 94.1 FL (ref 79–97)
PLATELET # BLD AUTO: 170 10*3/MM3 (ref 140–450)
PMV BLD AUTO: 10.6 FL (ref 6–12)
POTASSIUM SERPL-SCNC: 4.2 MMOL/L (ref 3.5–5.2)
QT INTERVAL: 463 MS
QTC INTERVAL: 520 MS
RBC # BLD AUTO: 2.71 10*6/MM3 (ref 4.14–5.8)
SODIUM SERPL-SCNC: 136 MMOL/L (ref 136–145)
WBC NRBC COR # BLD AUTO: 6.14 10*3/MM3 (ref 3.4–10.8)

## 2025-02-11 PROCEDURE — 99232 SBSQ HOSP IP/OBS MODERATE 35: CPT | Performed by: INTERNAL MEDICINE

## 2025-02-11 PROCEDURE — 99024 POSTOP FOLLOW-UP VISIT: CPT | Performed by: THORACIC SURGERY (CARDIOTHORACIC VASCULAR SURGERY)

## 2025-02-11 PROCEDURE — 25010000002 MAGNESIUM SULFATE 2 GM/50ML SOLUTION: Performed by: NURSE PRACTITIONER

## 2025-02-11 PROCEDURE — 82306 VITAMIN D 25 HYDROXY: CPT | Performed by: NURSE PRACTITIONER

## 2025-02-11 PROCEDURE — 85027 COMPLETE CBC AUTOMATED: CPT

## 2025-02-11 PROCEDURE — 80048 BASIC METABOLIC PNL TOTAL CA: CPT

## 2025-02-11 PROCEDURE — 83735 ASSAY OF MAGNESIUM: CPT

## 2025-02-11 RX ORDER — MAGNESIUM SULFATE HEPTAHYDRATE 40 MG/ML
2 INJECTION, SOLUTION INTRAVENOUS ONCE
Status: COMPLETED | OUTPATIENT
Start: 2025-02-11 | End: 2025-02-11

## 2025-02-11 RX ORDER — ALUMINA, MAGNESIA, AND SIMETHICONE 2400; 2400; 240 MG/30ML; MG/30ML; MG/30ML
15 SUSPENSION ORAL EVERY 6 HOURS PRN
Status: DISCONTINUED | OUTPATIENT
Start: 2025-02-11 | End: 2025-02-12 | Stop reason: HOSPADM

## 2025-02-11 RX ADMIN — COLCHICINE 0.6 MG: 0.6 TABLET ORAL at 08:15

## 2025-02-11 RX ADMIN — METOPROLOL TARTRATE 25 MG: 25 TABLET, FILM COATED ORAL at 21:02

## 2025-02-11 RX ADMIN — AMIODARONE HYDROCHLORIDE 200 MG: 200 TABLET ORAL at 21:02

## 2025-02-11 RX ADMIN — GUAIFENESIN 1200 MG: 600 TABLET, MULTILAYER, EXTENDED RELEASE ORAL at 08:15

## 2025-02-11 RX ADMIN — FUROSEMIDE 40 MG: 40 TABLET ORAL at 08:16

## 2025-02-11 RX ADMIN — GABAPENTIN 200 MG: 100 CAPSULE ORAL at 08:15

## 2025-02-11 RX ADMIN — PANTOPRAZOLE SODIUM 40 MG: 40 TABLET, DELAYED RELEASE ORAL at 06:30

## 2025-02-11 RX ADMIN — MAGNESIUM SULFATE HEPTAHYDRATE 2 G: 40 INJECTION, SOLUTION INTRAVENOUS at 10:52

## 2025-02-11 RX ADMIN — AMIODARONE HYDROCHLORIDE 200 MG: 200 TABLET ORAL at 08:15

## 2025-02-11 RX ADMIN — METOPROLOL TARTRATE 25 MG: 25 TABLET, FILM COATED ORAL at 08:16

## 2025-02-11 RX ADMIN — APIXABAN 5 MG: 5 TABLET, FILM COATED ORAL at 21:02

## 2025-02-11 RX ADMIN — POLYETHYLENE GLYCOL 3350 17 G: 17 POWDER, FOR SOLUTION ORAL at 08:16

## 2025-02-11 RX ADMIN — ASPIRIN 81 MG: 81 TABLET, COATED ORAL at 08:16

## 2025-02-11 RX ADMIN — DOCUSATE SODIUM 100 MG: 100 CAPSULE, LIQUID FILLED ORAL at 08:15

## 2025-02-11 RX ADMIN — ALUMINUM HYDROXIDE, MAGNESIUM HYDROXIDE, AND DIMETHICONE 15 ML: 400; 400; 40 SUSPENSION ORAL at 21:02

## 2025-02-11 RX ADMIN — ATORVASTATIN CALCIUM 20 MG: 20 TABLET, FILM COATED ORAL at 21:02

## 2025-02-11 RX ADMIN — Medication 2 TABLET: at 08:15

## 2025-02-11 RX ADMIN — POTASSIUM CHLORIDE 20 MEQ: 750 TABLET, EXTENDED RELEASE ORAL at 08:15

## 2025-02-11 RX ADMIN — FERROUS SULFATE TAB 325 MG (65 MG ELEMENTAL FE) 325 MG: 325 (65 FE) TAB at 08:15

## 2025-02-11 NOTE — PLAN OF CARE
Goal Outcome Evaluation:  Plan of Care Reviewed With: patient        Progress: no change  Outcome Evaluation: VSS, A+Ox4, afib on monitor. Pt having moderate sputumn production f/coughing. Pt complaints this morning of some upper stomach pain/discomfort, states it gets worse with coughing but sometimes goes away completely. Discussed possible feelings of nerves/anxious on possibly going home today... pt doesn't seem to think that is related, pt has still been using walker but is stby assist- I believe may benefit from encouraging self/independence, build up confidence. Will continue with POC.

## 2025-02-11 NOTE — PROGRESS NOTES
Hospital Follow Up    LOS: 6  Patient Name: Jackson Tariq  Age/Sex: 74 y.o. male  : 1950  MRN: [id number]    Day of Service: 25   Length of Stay: 6  Encounter Provider: David Villatoro MD  Place of Service: [facility] CARDIOLOGY  Patient Care Team:  Caroline Medina APRN as PCP - General (Family Medicine)  Konstantin Driscoll MD as Consulting Physician (Cardiology)  Maksim Ortiz MD as Consulting Physician (Urology)    Subjective:     Chief Complaint: Coronary artery disease/atrial flutter    Interval History: Patient still feels poorly after his medicines he gets every day.  Besides that he says he is getting better.    Objective:     Objective:  Temp:  [98.2 °F (36.8 °C)-98.5 °F (36.9 °C)] 98.4 °F (36.9 °C)  Heart Rate:  [] 67  Resp:  [18-19] 18  BP: (111-128)/(53-75) 115/54     Intake/Output Summary (Last 24 hours) at 2025 1159  Last data filed at 2025 0410  Gross per 24 hour   Intake 480 ml   Output 1150 ml   Net -670 ml     Body mass index is 25.03 kg/m².      25  0354 02/10/25  0504 25  0533   Weight: 73.4 kg (161 lb 14.4 oz) 73.3 kg (161 lb 9.6 oz) 72.5 kg (159 lb 13.3 oz)     Weight change: -0.801 kg (-1 lb 12.3 oz)      Physical Exam:   General :  Alert, cooperative, in no acute distress.  Neuro:   Alert,cooperative and oriented.  Lungs:  CTAB. Normal respiratory effort and rate.  CV:  irregular rate and rhythm, normal S1 and S2, no murmurs, gallops or rubs.   ABD:  Soft, nontender, nondistended. Positive bowel sounds.  Extr:  No edema or cyanosis, moves all extremities.    Lab Review:   Results from last 7 days   Lab Units 25  0245 02/10/25  0356   SODIUM mmol/L 136 133*   POTASSIUM mmol/L 4.2 4.1   CHLORIDE mmol/L 102 102   CO2 mmol/L 24.6 23.4   BUN mg/dL 22 26*   CREATININE mg/dL 1.49* 1.43*   GLUCOSE mg/dL 97 102*   CALCIUM mg/dL 8.3* 8.0*         Results from last 7 days   Lab Units 25  0249  "02/10/25  0356   WBC 10*3/mm3 6.14 5.94   HEMOGLOBIN g/dL 8.8* 8.1*   HEMATOCRIT % 25.5* 23.3*   PLATELETS 10*3/mm3 170 146     Results from last 7 days   Lab Units 02/06/25  0328 02/05/25  1117   INR  1.51* 1.61*   APTT seconds  --  37.8*     Results from last 7 days   Lab Units 02/11/25  0245 02/10/25  0356   MAGNESIUM mg/dL 2.3 2.5*           Invalid input(s): \"LDLCALC\"  Results from last 7 days   Lab Units 02/05/25  2301   PROBNP pg/mL 1,551.0*           Current Medications:   Scheduled Meds:amiodarone, 200 mg, Oral, Q12H  apixaban, 5 mg, Oral, Q12H  aspirin, 81 mg, Oral, Daily  atorvastatin, 40 mg, Oral, Nightly  calcium 500 mg vitamin D 5 mcg (200 UT), 2 tablet, Oral, Daily  colchicine, 0.6 mg, Oral, Daily  docusate sodium, 100 mg, Oral, Daily  ferrous sulfate, 325 mg, Oral, Daily With Breakfast  furosemide, 40 mg, Oral, Daily  gabapentin, 200 mg, Oral, Q12H  guaiFENesin, 1,200 mg, Oral, Q12H  melatonin, 5 mg, Oral, Nightly  metoprolol tartrate, 25 mg, Oral, Q12H  pantoprazole, 40 mg, Oral, QAM  polyethylene glycol, 17 g, Oral, Daily  potassium chloride, 20 mEq, Oral, Daily  senna-docusate sodium, 2 tablet, Oral, Nightly      Continuous Infusions:     Allergies:  No Known Allergies    Assessment:       Nonrheumatic mitral valve regurgitation    CAD (coronary artery disease)        Plan:   1.  Coronary artery disease.  Status post coronary artery bypass grafting.  2.  Regurgitation status post repair  3.  Atrial fibrillation/flutter.  Patient was on amiodarone QTc interval was markedly elevated.  Dosage was decreased.  Heart rate controlled would continue the same.  Patient did have appendage ligated.  4.  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  Patient looks really good.        David Villatoro MD  02/11/25  11:59 EST      "

## 2025-02-11 NOTE — PROGRESS NOTES
" LOS: 6 days   Patient Care Team:  Caroline Medina APRN as PCP - General (Family Medicine)  Konstantin Driscoll MD as Consulting Physician (Cardiology)  Maksim Ortiz MD as Consulting Physician (Urology)    Chief Complaint: post op MVR, CABG, PFO closure, MANJULA ligation     Subjective  Sitting in bedside chair, no new complaints   Really wanting to go home.  Vital Signs  Temp:  [98.2 °F (36.8 °C)-98.5 °F (36.9 °C)] 98.5 °F (36.9 °C)  Heart Rate:  [] 77  Resp:  [18-19] 18  BP: (111-128)/(53-75) 111/56  Body mass index is 25.03 kg/m².    Intake/Output Summary (Last 24 hours) at 2/11/2025 0816  Last data filed at 2/11/2025 0410  Gross per 24 hour   Intake 720 ml   Output 1150 ml   Net -430 ml     No intake/output data recorded.        02/09/25  0354 02/10/25  0504 02/11/25  0533   Weight: 73.4 kg (161 lb 14.4 oz) 73.3 kg (161 lb 9.6 oz) 72.5 kg (159 lb 13.3 oz)         Objective:  General Appearance:  Comfortable and in no acute distress.    Vital signs: (most recent): Blood pressure 122/74, pulse 112, temperature 98.4 °F (36.9 °C), temperature source Oral, resp. rate 18, height 170.2 cm (67\"), weight 72.5 kg (159 lb 13.3 oz), SpO2 99%.  Vital signs are normal.  No fever.    Output: Producing urine.    Lungs:  Normal effort and normal respiratory rate.  There are decreased breath sounds.    Heart: Normal rate.  Regular rhythm.    Abdomen: Abdomen is soft.  Bowel sounds are normal.     Extremities: Normal range of motion.  There is no dependent edema.    Neurological: Patient is alert and oriented to person, place and time.    Skin:  Warm and dry.  (Sternal incision without erythema, edema, or drainage )                  Results Review:        WBC WBC   Date Value Ref Range Status   02/11/2025 6.14 3.40 - 10.80 10*3/mm3 Final   02/10/2025 5.94 3.40 - 10.80 10*3/mm3 Final   02/09/2025 7.44 3.40 - 10.80 10*3/mm3 Final      HGB Hemoglobin   Date Value Ref Range Status   02/11/2025 8.8 (L) 13.0 - 17.7 g/dL Final " "  02/10/2025 8.1 (L) 13.0 - 17.7 g/dL Final   02/09/2025 8.9 (L) 13.0 - 17.7 g/dL Final   02/09/2025 7.5 (L) 13.0 - 17.7 g/dL Final      HCT Hematocrit   Date Value Ref Range Status   02/11/2025 25.5 (L) 37.5 - 51.0 % Final   02/10/2025 23.3 (L) 37.5 - 51.0 % Final   02/09/2025 25.9 (L) 37.5 - 51.0 % Final   02/09/2025 23.2 (L) 37.5 - 51.0 % Final      Platelets Platelets   Date Value Ref Range Status   02/11/2025 170 140 - 450 10*3/mm3 Final   02/10/2025 146 140 - 450 10*3/mm3 Final   02/09/2025 115 (L) 140 - 450 10*3/mm3 Final        PT/INR:    No results found for: \"PROTIME\"  /  No results found for: \"INR\"      Sodium Sodium   Date Value Ref Range Status   02/11/2025 136 136 - 145 mmol/L Final   02/10/2025 133 (L) 136 - 145 mmol/L Final   02/09/2025 135 (L) 136 - 145 mmol/L Final      Potassium Potassium   Date Value Ref Range Status   02/11/2025 4.2 3.5 - 5.2 mmol/L Final   02/10/2025 4.1 3.5 - 5.2 mmol/L Final   02/09/2025 4.1 3.5 - 5.2 mmol/L Final   02/09/2025 3.9 3.5 - 5.2 mmol/L Final   02/08/2025 4.1 3.5 - 5.2 mmol/L Final      Chloride Chloride   Date Value Ref Range Status   02/11/2025 102 98 - 107 mmol/L Final   02/10/2025 102 98 - 107 mmol/L Final   02/09/2025 100 98 - 107 mmol/L Final      Bicarbonate CO2   Date Value Ref Range Status   02/11/2025 24.6 22.0 - 29.0 mmol/L Final   02/10/2025 23.4 22.0 - 29.0 mmol/L Final   02/09/2025 24.2 22.0 - 29.0 mmol/L Final      BUN BUN   Date Value Ref Range Status   02/11/2025 22 8 - 23 mg/dL Final   02/10/2025 26 (H) 8 - 23 mg/dL Final   02/09/2025 28 (H) 8 - 23 mg/dL Final      Creatinine Creatinine   Date Value Ref Range Status   02/11/2025 1.49 (H) 0.76 - 1.27 mg/dL Final   02/10/2025 1.43 (H) 0.76 - 1.27 mg/dL Final   02/09/2025 1.66 (H) 0.76 - 1.27 mg/dL Final      Calcium Calcium   Date Value Ref Range Status   02/11/2025 8.3 (L) 8.6 - 10.5 mg/dL Final   02/10/2025 8.0 (L) 8.6 - 10.5 mg/dL Final   02/09/2025 7.8 (L) 8.6 - 10.5 mg/dL Final      Magnesium " Magnesium   Date Value Ref Range Status   02/11/2025 2.3 1.6 - 2.4 mg/dL Final   02/10/2025 2.5 (H) 1.6 - 2.4 mg/dL Final   02/09/2025 2.3 1.6 - 2.4 mg/dL Final          amiodarone, 200 mg, Oral, Q12H  apixaban, 2.5 mg, Oral, Q12H  aspirin, 81 mg, Oral, Daily  atorvastatin, 40 mg, Oral, Nightly  calcium 500 mg vitamin D 5 mcg (200 UT), 2 tablet, Oral, Daily  colchicine, 0.6 mg, Oral, Daily  docusate sodium, 100 mg, Oral, Daily  ferrous sulfate, 325 mg, Oral, Daily With Breakfast  furosemide, 40 mg, Oral, Daily  gabapentin, 200 mg, Oral, Q12H  guaiFENesin, 1,200 mg, Oral, Q12H  melatonin, 5 mg, Oral, Nightly  metoprolol tartrate, 25 mg, Oral, Q12H  pantoprazole, 40 mg, Oral, QAM  polyethylene glycol, 17 g, Oral, Daily  potassium chloride, 20 mEq, Oral, Daily  senna-docusate sodium, 2 tablet, Oral, Nightly                   Nonrheumatic mitral valve regurgitation    CAD (coronary artery disease)      Assessment & Plan    -Severe Mitral Regurgitation- s/p Complex MV repair, CABGx2 LIMA/RSVG, PFO, MANJULA- Pagni 2/5/2025  -PFO  -CAD  -Hypertension  -Hyperlipidemia  -CKD stage III-- Baseline Cr 1.4  -Hx esophagitis/dysphagia  -Anemia  -Osteopenia  -GERD  -Vitamin D deficiency  -Post op anemia- expected acute blood loss   -Ventricular standstill immediately after OR     POD#6  Looks good this morning.  Ambulating in the hallway.  On 2LNC, wean as tolerated, encourage IS/flutter  Remains in atrial flutter-rapid rate this morning converted after PO metoprolol.  Creatinine of 1.49   Will start eliquis today  Continue routine care, mobilize    ENRIQUE Diamond  02/11/25  08:16 EST

## 2025-02-12 ENCOUNTER — READMISSION MANAGEMENT (OUTPATIENT)
Dept: CALL CENTER | Facility: HOSPITAL | Age: 75
End: 2025-02-12
Payer: MEDICARE

## 2025-02-12 VITALS
DIASTOLIC BLOOD PRESSURE: 76 MMHG | RESPIRATION RATE: 18 BRPM | HEART RATE: 122 BPM | SYSTOLIC BLOOD PRESSURE: 122 MMHG | HEIGHT: 67 IN | WEIGHT: 156.1 LBS | OXYGEN SATURATION: 90 % | BODY MASS INDEX: 24.5 KG/M2 | TEMPERATURE: 98.6 F

## 2025-02-12 LAB
ANION GAP SERPL CALCULATED.3IONS-SCNC: 10.8 MMOL/L (ref 5–15)
BUN SERPL-MCNC: 19 MG/DL (ref 8–23)
BUN/CREAT SERPL: 14.5 (ref 7–25)
CALCIUM SPEC-SCNC: 8.5 MG/DL (ref 8.6–10.5)
CHLORIDE SERPL-SCNC: 102 MMOL/L (ref 98–107)
CO2 SERPL-SCNC: 23.2 MMOL/L (ref 22–29)
CREAT SERPL-MCNC: 1.31 MG/DL (ref 0.76–1.27)
DEPRECATED RDW RBC AUTO: 42.2 FL (ref 37–54)
EGFRCR SERPLBLD CKD-EPI 2021: 57.5 ML/MIN/1.73
ERYTHROCYTE [DISTWIDTH] IN BLOOD BY AUTOMATED COUNT: 12.3 % (ref 12.3–15.4)
GLUCOSE SERPL-MCNC: 104 MG/DL (ref 65–99)
HCT VFR BLD AUTO: 26.5 % (ref 37.5–51)
HGB BLD-MCNC: 9 G/DL (ref 13–17.7)
MAGNESIUM SERPL-MCNC: 2.4 MG/DL (ref 1.6–2.4)
MCH RBC QN AUTO: 31.7 PG (ref 26.6–33)
MCHC RBC AUTO-ENTMCNC: 34 G/DL (ref 31.5–35.7)
MCV RBC AUTO: 93.3 FL (ref 79–97)
PLATELET # BLD AUTO: 238 10*3/MM3 (ref 140–450)
PMV BLD AUTO: 10.7 FL (ref 6–12)
POTASSIUM SERPL-SCNC: 4.3 MMOL/L (ref 3.5–5.2)
QT INTERVAL: 302 MS
QTC INTERVAL: 436 MS
RBC # BLD AUTO: 2.84 10*6/MM3 (ref 4.14–5.8)
SODIUM SERPL-SCNC: 136 MMOL/L (ref 136–145)
WBC NRBC COR # BLD AUTO: 8.21 10*3/MM3 (ref 3.4–10.8)

## 2025-02-12 PROCEDURE — 99024 POSTOP FOLLOW-UP VISIT: CPT | Performed by: THORACIC SURGERY (CARDIOTHORACIC VASCULAR SURGERY)

## 2025-02-12 PROCEDURE — 80048 BASIC METABOLIC PNL TOTAL CA: CPT

## 2025-02-12 PROCEDURE — 93005 ELECTROCARDIOGRAM TRACING: CPT | Performed by: NURSE PRACTITIONER

## 2025-02-12 PROCEDURE — 85027 COMPLETE CBC AUTOMATED: CPT

## 2025-02-12 PROCEDURE — 99232 SBSQ HOSP IP/OBS MODERATE 35: CPT | Performed by: NURSE PRACTITIONER

## 2025-02-12 PROCEDURE — 93010 ELECTROCARDIOGRAM REPORT: CPT | Performed by: STUDENT IN AN ORGANIZED HEALTH CARE EDUCATION/TRAINING PROGRAM

## 2025-02-12 PROCEDURE — 97530 THERAPEUTIC ACTIVITIES: CPT

## 2025-02-12 PROCEDURE — 83735 ASSAY OF MAGNESIUM: CPT

## 2025-02-12 RX ORDER — HYDROCODONE BITARTRATE AND ACETAMINOPHEN 5; 325 MG/1; MG/1
1 TABLET ORAL EVERY 4 HOURS PRN
Qty: 42 TABLET | Refills: 0 | Status: SHIPPED | OUTPATIENT
Start: 2025-02-12 | End: 2025-02-19

## 2025-02-12 RX ORDER — ATENOLOL 25 MG/1
25 TABLET ORAL EVERY 12 HOURS SCHEDULED
Status: DISCONTINUED | OUTPATIENT
Start: 2025-02-12 | End: 2025-02-12 | Stop reason: HOSPADM

## 2025-02-12 RX ORDER — FUROSEMIDE 40 MG/1
40 TABLET ORAL DAILY
Qty: 30 TABLET | Refills: 0 | Status: SHIPPED | OUTPATIENT
Start: 2025-02-13 | End: 2025-02-21 | Stop reason: SDUPTHER

## 2025-02-12 RX ORDER — ASPIRIN 81 MG/1
81 TABLET ORAL DAILY
Qty: 90 TABLET | Refills: 0 | Status: SHIPPED | OUTPATIENT
Start: 2025-02-13 | End: 2025-02-21 | Stop reason: SDUPTHER

## 2025-02-12 RX ORDER — ATENOLOL 25 MG/1
25 TABLET ORAL EVERY 12 HOURS SCHEDULED
Qty: 180 TABLET | Refills: 0 | Status: SHIPPED | OUTPATIENT
Start: 2025-02-12 | End: 2025-02-21 | Stop reason: SDUPTHER

## 2025-02-12 RX ORDER — AMIODARONE HYDROCHLORIDE 200 MG/1
200 TABLET ORAL EVERY 12 HOURS SCHEDULED
Qty: 5 TABLET | Refills: 0 | Status: SHIPPED | OUTPATIENT
Start: 2025-02-12 | End: 2025-02-21 | Stop reason: SDUPTHER

## 2025-02-12 RX ORDER — ATORVASTATIN CALCIUM 40 MG/1
40 TABLET, FILM COATED ORAL NIGHTLY
Qty: 90 TABLET | Refills: 0 | Status: SHIPPED | OUTPATIENT
Start: 2025-02-12 | End: 2025-05-13

## 2025-02-12 RX ORDER — POTASSIUM CHLORIDE 1500 MG/1
20 TABLET, EXTENDED RELEASE ORAL DAILY
Qty: 30 TABLET | Refills: 0 | Status: SHIPPED | OUTPATIENT
Start: 2025-02-13 | End: 2025-03-15

## 2025-02-12 RX ADMIN — FUROSEMIDE 40 MG: 40 TABLET ORAL at 09:11

## 2025-02-12 RX ADMIN — ATENOLOL 25 MG: 25 TABLET ORAL at 09:20

## 2025-02-12 RX ADMIN — AMIODARONE HYDROCHLORIDE 200 MG: 200 TABLET ORAL at 09:11

## 2025-02-12 RX ADMIN — PANTOPRAZOLE SODIUM 40 MG: 40 TABLET, DELAYED RELEASE ORAL at 06:32

## 2025-02-12 RX ADMIN — ALUMINUM HYDROXIDE, MAGNESIUM HYDROXIDE, AND DIMETHICONE 15 ML: 400; 400; 40 SUSPENSION ORAL at 06:32

## 2025-02-12 RX ADMIN — APIXABAN 5 MG: 5 TABLET, FILM COATED ORAL at 09:11

## 2025-02-12 NOTE — THERAPY TREATMENT NOTE
Patient Name: Jackson Tariq  : 1950    MRN: [id number]                              Today's Date: 2025       Admit Date: 2025    Visit Dx:     ICD-10-CM ICD-9-CM   1. S/P CABG (coronary artery bypass graft)  Z95.1 V45.81   2. Nonrheumatic mitral valve regurgitation  I34.0 424.0   3. S/P MVR (mitral valve repair)  Z98.890 V45.89     Patient Active Problem List   Diagnosis    Dysphagia    Osteopenia of multiple sites    Vitamin D deficiency    Nonrheumatic mitral valve regurgitation    Severe mitral regurgitation    Other secondary hypertension    Stage 3a chronic kidney disease    Mild anemia    Elevated PSA    CAD (coronary artery disease)     Past Medical History:   Diagnosis Date    Chronic cough     Coronary artery disease     GERD (gastroesophageal reflux disease)     Hyperlipidemia     Hypertension     Mitral valve insufficiency     Osteopenia of multiple sites 2023    Vitamin D deficiency      Past Surgical History:   Procedure Laterality Date    CARDIAC CATHETERIZATION N/A 2024    Procedure: Left Heart Cath;  Surgeon: Susan De Jesus MD;  Location:  [facility];  Service: Cardiovascular;  Laterality: N/A;  severe mitral regurgitation    CARDIAC CATHETERIZATION N/A 2024    Procedure: Right Heart Cath;  Surgeon: Susan De Jesus MD;  Location:  [facility];  Service: Cardiovascular;  Laterality: N/A;    CARDIAC CATHETERIZATION N/A 2024    Procedure: Coronary angiography;  Surgeon: Susan De Jesus MD;  Location:  [facility];  Service: Cardiovascular;  Laterality: N/A;    COLONOSCOPY      CORONARY ARTERY BYPASS GRAFT WITH MITRAL VALVE REPAIR/REPLACEMENT N/A 2025    Procedure: CORONARY ARTERY BYPASS GRAFT X2  WITH INTERNAL MAMMARY ARTERY GRAFT AND ENDOSCOPICALLY HARVESTED RIGHT SAPHENOUS VEIN;  STERNOTOMY; PRP; MITRAL VALVE REPAIR; PATENT FORAMEN OVALE CLOSURE; LEFT ATRIAL APPENDAGE CLOSURE WITH ATRICARE; TRANSESOPHAGEAL  ECHOCARDIOGRAM WITH ANESTHESIA;  Surgeon: Farshad Gaitan MD;  Location: [facility];  Service: Cardiothoracic;  Laterality: N/A;    ENDOSCOPY N/A 05/03/2019    Procedure: ESOPHAGOGASTRODUODENOSCOPY with biopsies;  Surgeon: Radha Del Real MD;  Location:  [facility];  Service: Gastroenterology    TRIGGER FINGER RELEASE      doesn't remember which side      General Information       Row Name 02/12/25 1102          Physical Therapy Time and Intention    Document Type therapy note (daily note)  -PH     Mode of Treatment physical therapy  -PH       Row Name 02/12/25 1102          General Information    Existing Precautions/Restrictions fall;sternal;cardiac  -PH       Row Name 02/12/25 1102          Cognition    Orientation Status (Cognition) oriented x 4  -PH       Row Name 02/12/25 1102          Safety Issues/Impairments Affecting Functional Mobility    Impairments Affecting Function (Mobility) endurance/activity tolerance;strength;balance  -PH     Comment, Safety Issues/Impairments (Mobility) gt belt and non skid socks donned  -PH               User Key  (r) = Recorded By, (t) = Taken By, (c) = Cosigned By      Initials Name Provider Type    PH Marine Odom, PTA Physical Therapist Assistant                   Mobility       Row Name 02/12/25 1103          Bed Mobility    Comment, (Bed Mobility) UIC  -PH       Row Name 02/12/25 1103          Sit-Stand Transfer    Sit-Stand [place] (Transfers) modified independence  -PH     Assistive Device (Sit-Stand Transfers) other (see comments)  no AD  -PH     Comment, (Sit-Stand Transfer) pt impulsively stoot then amb to PTA at door w/ no AD  -PH       Row Name 02/12/25 1103          Gait/Stairs (Locomotion)    [place] Level (Gait) standby assist  -PH     Assistive Device (Gait) other (see comments)  no AD  -PH     Distance in Feet (Gait) 450  -PH     Bilateral Gait Deviations forward flexed posture;decreased arm swing  -PH     Comment, (Gait/Stairs) fairly  steady w/ no overt LOB; good pace  -PH               User Key  (r) = Recorded By, (t) = Taken By, (c) = Cosigned By      Initials Name Provider Type    PH Marine Odom PTA Physical Therapist Assistant                   Obj/Interventions       Row Name 02/12/25 1104          Motor Skills    Therapeutic Exercise other (see comments)  cardiac program x 10  reps  -PH               User Key  (r) = Recorded By, (t) = Taken By, (c) = Cosigned By      Initials Name Provider Type     Marine Odom PTA Physical Therapist Assistant                   Goals/Plan    No documentation.                  Clinical Impression       Row Name 02/12/25 1104          Pain    Pretreatment Pain Rating 4/10  -PH     Posttreatment Pain Rating 4/10  -PH     Pain Location chest  -PH     Pain Management Interventions exercise or physical activity utilized  -PH     Response to Pain Interventions activity level improved;mobility function improved;functional ability improved;activity participation with tolerable pain  -PH       Row Name 02/12/25 1104          Plan of Care Review    Plan of Care Reviewed With patient  -PH     Progress improving  -PH     Outcome Evaluation Pt was seen for PT tx this AM. Pt was C w/ family in room. Pt impulsively stood then amb to PTA by door. Pt amb around unit 3x w/ SBA and no AD. Pt's gait speed w/ good. No overt LOB occurred. Pt returned to chair at end of session. Pt states he will likely dc today.  -PH       Row Name 02/12/25 1104          Vital Signs    O2 Delivery Pre Treatment room air  -PH     O2 Delivery Intra Treatment room air  -PH     O2 Delivery Post Treatment room air  -PH       Row Name 02/12/25 1104          Positioning and Restraints    Pre-Treatment Position sitting in chair/recliner  -PH     Post Treatment Position chair  -PH     In Chair notified nsg;sitting;call light within reach;encouraged to call for assist  -PH               User Key  (r) = Recorded By, (t) = Taken  By, (c) = Cosigned By      Initials Name Provider Type    Marine Lacey PTA Physical Therapist Assistant                   Outcome Measures       Row Name 02/12/25 1106 02/12/25 0923       How much help from another person do you currently need...    Turning from your back to your side while in flat bed without using bedrails? 4  -PH 4  -KM    Moving from lying on back to sitting on the side of a flat bed without bedrails? 4  -PH 4  -KM    Moving to and from a bed to a chair (including a wheelchair)? 4  -PH 3  -KM    Standing up from a chair using your arms (e.g., wheelchair, bedside chair)? 4  -PH 3  -KM    Climbing 3-5 steps with a railing? 3  -PH 3  -KM    To walk in hospital room? 3  -PH 3  -KM    AM-PAC 6 Clicks Score (PT) 22  -PH 20  -KM    Highest Level of Mobility Goal 7 --> Walk 25 feet or more  -PH 6 --> Walk 10 steps or more  -KM      Row Name 02/12/25 0240          How much help from another person do you currently need...    Turning from your back to your side while in flat bed without using bedrails? 4  -SN     Moving from lying on back to sitting on the side of a flat bed without bedrails? 4  -SN     Moving to and from a bed to a chair (including a wheelchair)? 3  -SN     Standing up from a chair using your arms (e.g., wheelchair, bedside chair)? 3  -SN     Climbing 3-5 steps with a railing? 3  -SN     To walk in hospital room? 3  -SN     AM-PAC 6 Clicks Score (PT) 20  -SN     Highest Level of Mobility Goal 6 --> Walk 10 steps or more  -SN       Row Name 02/12/25 1106          Functional Assessment    Outcome Measure Options AM-PAC 6 Clicks Basic Mobility (PT)  -PH               User Key  (r) = Recorded By, (t) = Taken By, (c) = Cosigned By      Initials Name Provider Type    Marine Lacey PTA Physical Therapist Assistant    Radha Merchant, RN Registered Nurse    Andreas Espinoza RN Registered Nurse                                 Physical Therapy Education       Title:  PT OT SLP Therapies (Done)       Topic: Physical Therapy (Done)       Point: Mobility training (Done)       Learning Progress Summary            Patient Acceptance, E,TB, VU by  at 2/12/2025 1107    Acceptance, E,TB,D, VU,DU,NR by PH at 2/10/2025 1015    Acceptance, E, VU by DB at 2/9/2025 1118    Acceptance, E, VU by DB at 2/8/2025 1148                      Point: Home exercise program (Done)       Learning Progress Summary            Patient Acceptance, E,TB, VU by PH at 2/12/2025 1107    Acceptance, E,TB,D, VU,DU,NR by PH at 2/10/2025 1015    Acceptance, E, VU by DB at 2/9/2025 1118    Acceptance, E, VU by DB at 2/8/2025 1148                      Point: Body mechanics (Done)       Learning Progress Summary            Patient Acceptance, E,TB, VU by PH at 2/12/2025 1107    Acceptance, E,TB,D, VU,DU,NR by  at 2/10/2025 1015    Acceptance, E, VU by DB at 2/9/2025 1118    Acceptance, E, VU by DB at 2/8/2025 1148                      Point: Precautions (Done)       Learning Progress Summary            Patient Acceptance, E,TB, VU by  at 2/12/2025 1107    Acceptance, E,TB,D, VU,DU,NR by  at 2/10/2025 1015    Acceptance, E, VU by DB at 2/9/2025 1118    Acceptance, E, VU by DB at 2/8/2025 1148                                      User Key       Initials Effective Dates Name Provider Type Discipline    DB 06/16/21 -  Nyasia Mason, PT Physical Therapist PT     06/16/21 -  Marine Odom PTA Physical Therapist Assistant PT                  PT Recommendation and Plan     Progress: improving  Outcome Evaluation: Pt was seen for PT tx this AM. Pt was UIC w/ family in room. Pt impulsively stood then amb to PTA by door. Pt amb around unit 3x w/ SBA and no AD. Pt's gait speed w/ good. No overt LOB occurred. Pt returned to chair at end of session. Pt states he will likely dc today.     Time Calculation:         PT Charges       Row Name 02/12/25 1107             Time Calculation    Start Time 1048  -       Stop Time 1056  -PH      Time Calculation (min) 8 min  -PH      PT Received On 02/12/25  -PH      PT - Next Appointment 02/13/25  -PH         Timed Charges    99143 - PT Therapeutic Activity Minutes 8  -PH         Total Minutes    Timed Charges Total Minutes 8  -PH       Total Minutes 8  -PH                User Key  (r) = Recorded By, (t) = Taken By, (c) = Cosigned By      Initials Name Provider Type    PH Marine Odom PTA Physical Therapist Assistant                  Therapy Charges for Today       Code Description Service Date Service Provider Modifiers Qty    [id number]  PT THERAPEUTIC ACT EA 15 MIN 2/12/2025 Marine Odom PTA GP 1            PT G-Codes  Outcome Measure Options: AM-PAC 6 Clicks Basic Mobility (PT)  AM-PAC 6 Clicks Score (PT): 22  PT Discharge Summary  Anticipated Discharge Disposition (PT): home with home health, home with assist    Marine Odom PTA  2/12/2025

## 2025-02-12 NOTE — OUTREACH NOTE
Prep Survey      Flowsheet Row Responses   [facility] facility patient discharged from? [facility]   Is LACE score < 7 ? No   Eligibility [facility]   Date of Admission 02/05/25   Date of Discharge 02/12/25   Discharge Disposition Home-Health Care Svc   Discharge diagnosis CABGx2   Does the patient have one of the following disease processes/diagnoses(primary or secondary)? Cardiothoracic surgery   Does the patient have Home health ordered? Yes   What is the Home health agency?  [facility]    Is there a DME ordered? No   Prep survey completed? Yes            VARGAS LOPEZ - Registered Nurse

## 2025-02-12 NOTE — PLAN OF CARE
Goal Outcome Evaluation:  Plan of Care Reviewed With: patient        Progress: improving  Outcome Evaluation: VSS, A+Ox4, afib rate typically 90s-110s, sometimes goes to 120s. Pt still having moderate sputum production w/coughing. Pt denied complaints/issues when rounded but around 0500 this Rn went into room to discuss POC and pt states he vomitted last night. Pt would really like to go home today, pt seems to have some concerns w/ medications. Pt states he will probably not take majority including eliquis, encouraged discussions with Doctors today so he gets an appropriate plan in place, will discuss with dayshift RN.

## 2025-02-12 NOTE — PLAN OF CARE
Goal Outcome Evaluation:  Plan of Care Reviewed With: patient        Progress: improving  Outcome Evaluation: Pt was seen for PT tx this AM. Pt was UIC w/ family in room. Pt impulsively stood then amb to PTA by door. Pt amb around unit 3x w/ SBA and no AD. Pt's gait speed w/ good. No overt LOB occurred. Pt returned to chair at end of session. Pt states he will likely dc today.    Anticipated Discharge Disposition (PT): home with home health, home with assist

## 2025-02-12 NOTE — PROGRESS NOTES
"[facility]    Patient Name: Jackson Tariq  :1950  74 y.o.  LOS: 7  Encounter Provider: ENRIQUE Givens      Patient Care Team:  Caroline Medina APRN as PCP - General (Family Medicine)  Konstantin Driscoll MD as Consulting Physician (Cardiology)  Maksim Ortiz MD as Consulting Physician (Urology)    Chief Complaint: Follow-up postop CABG    Interval History: Doing well.  Largest complaint is GI issues after taking medications.  I suspect that colchicine is contributing       Objective   Vital Signs  Temp:  [98.2 °F (36.8 °C)-98.7 °F (37.1 °C)] 98.6 °F (37 °C)  Heart Rate:  [] 125  Resp:  [18] 18  BP: (111-122)/(54-81) 122/76    Intake/Output Summary (Last 24 hours) at 2025 0837  Last data filed at 2025 0430  Gross per 24 hour   Intake 300 ml   Output 725 ml   Net -425 ml     Flowsheet Rows      Flowsheet Row First Filed Value   Admission Height 170.2 cm (67\") Documented at 2025 0520   Admission Weight 69.7 kg (153 lb 10.6 oz) Documented at 2025 0520              Constitutional:       Appearance: Normal appearance. Well-developed.   Eyes:      Conjunctiva/sclera: Conjunctivae normal.   Neck:      Vascular: No carotid bruit.   Pulmonary:      Effort: Pulmonary effort is normal.      Breath sounds: Normal breath sounds.   Cardiovascular:      Normal rate. Regular rhythm. Normal S1. Normal S2.       Murmurs: There is no murmur.      No gallop.  No click. No rub.   Edema:     Peripheral edema absent.   Musculoskeletal: Normal range of motion. Skin:     General: Skin is warm and dry.   Neurological:      Mental Status: Alert and oriented to person, place, and time.      GCS: GCS eye subscore is 4. GCS verbal subscore is 5. GCS motor subscore is 6.   Psychiatric:         Speech: Speech normal.         Behavior: Behavior normal.         Thought Content: Thought content normal.         Judgment: Judgment normal.           Pertinent Test " "Results:  Results from last 7 days   Lab Units 02/12/25 0252 02/11/25  0245 02/10/25  0356 02/09/25  1222 02/09/25  0259 02/08/25  1454 02/08/25  0311 02/07/25  1421 02/07/25  0237   SODIUM mmol/L 136 136 133*  --  135*  --  134* 136 138   POTASSIUM mmol/L 4.3 4.2 4.1 4.1 3.9 4.1 3.9 4.3 4.2   CHLORIDE mmol/L 102 102 102  --  100  --  99 103 104   CO2 mmol/L 23.2 24.6 23.4  --  24.2  --  24.6 25.1 25.3   BUN mg/dL 19 22 26*  --  28*  --  27* 26* 23   CREATININE mg/dL 1.31* 1.49* 1.43*  --  1.66*  --  1.54* 1.53* 1.48*   GLUCOSE mg/dL 104* 97 102*  --  100*  --  110* 142* 128*   CALCIUM mg/dL 8.5* 8.3* 8.0*  --  7.8*  --  8.3* 8.6 8.5*         Results from last 7 days   Lab Units 02/12/25  0252 02/11/25  0246 02/10/25  0356 02/09/25  1847 02/09/25  0259 02/08/25  0311 02/07/25  1421 02/07/25  0237   WBC 10*3/mm3 8.21 6.14 5.94  --  7.44 12.02* 12.94* 16.38*   HEMOGLOBIN g/dL 9.0* 8.8* 8.1* 8.9* 7.5* 8.1* 8.4* 8.4*   HEMATOCRIT % 26.5* 25.5* 23.3* 25.9* 23.2* 23.6* 24.5* 24.9*   PLATELETS 10*3/mm3 238 170 146  --  115* 102* 95* 115*     Results from last 7 days   Lab Units 02/06/25  0328 02/05/25  1117 02/05/25  0938   INR  1.51* 1.61* 2.01*   APTT seconds  --  37.8*  --      Results from last 7 days   Lab Units 02/12/25  0252 02/11/25  0245 02/10/25  0356 02/09/25  0259 02/08/25  0311 02/07/25  1421 02/07/25  0237   MAGNESIUM mg/dL 2.4 2.3 2.5* 2.3 2.4 3.0* 2.4           Invalid input(s): \"LDLCALC\"  Results from last 7 days   Lab Units 02/05/25  2301   PROBNP pg/mL 1,551.0*               Medication Review:   amiodarone, 200 mg, Oral, Q12H  apixaban, 5 mg, Oral, Q12H  aspirin, 81 mg, Oral, Daily  atorvastatin, 40 mg, Oral, Nightly  calcium 500 mg vitamin D 5 mcg (200 UT), 2 tablet, Oral, Daily  colchicine, 0.6 mg, Oral, Daily  docusate sodium, 100 mg, Oral, Daily  ferrous sulfate, 325 mg, Oral, Daily With Breakfast  furosemide, 40 mg, Oral, Daily  gabapentin, 200 mg, Oral, Q12H  guaiFENesin, 1,200 mg, Oral, " Q12H  melatonin, 5 mg, Oral, Nightly  metoprolol tartrate, 25 mg, Oral, Q12H  pantoprazole, 40 mg, Oral, QAM  polyethylene glycol, 17 g, Oral, Daily  potassium chloride, 20 mEq, Oral, Daily  senna-docusate sodium, 2 tablet, Oral, Nightly              Assessment & Plan     Active Hospital Problems    Diagnosis  POA    **Nonrheumatic mitral valve regurgitation [I34.0]  Unknown    CAD (coronary artery disease) [I25.10]  Yes      Resolved Hospital Problems   No resolved problems to display.        Multivessel coronary artery disease  Status post CABG x 2 vessel, LIMA to mid LAD and reverse SVG to first marginal  Encouraged incentive spirometer/ambulation  Ventricular standstill immediately after OR  Mitral valve regurgitation  Status post mitral valve repair which was complex  Hypertension  BP stable  Hyperlipidemia  Continue atorvastatin  Stage III renal disease  Baseline creatinine 1.4  Creatinine 1.3 today  History of esophagitis/dysphagia  Postop anemia  Atrial fibrillation/flutter  EP has consulted  Continue amiodarone, beta-blocker  On apixaban for anticoagulation    Largest complaint is diarrhea, I suspect that colchicine is the culprit.  Have discussed with CT surgery and we will discontinue this as he has had 5 days course.  Will also transition from metoprolol to atenolol for better heart rate control.    Okay for discharge from cardiovascular standpoint.  Will make arrangements for a hospital follow-up with Jg Islas PA-C in 1 week.  EP services should also be arranging for a follow-up.           ENRIQUE Givens  [facility]   [facility]  [address] [place] [address]  Office: [phone]    02/12/25  08:37 EST

## 2025-02-12 NOTE — CASE MANAGEMENT/SOCIAL WORK
Case Management Discharge Note      Final Note: Pt discharged home with [facility] TREVI[facility].  [facility] notified Ileana/Merle TREVIZO that pt d/c home.....Temi S/RN CM    Provided Post Acute Provider Quality & Resource List?: Yes  Post Acute Provider Quality and Resource List: Home Health  Delivered To: Patient  Method of Delivery: In person    Selected Continued Care - Discharged on 2/12/2025 Admission date: 2/5/2025 - Discharge disposition: Home-Health Care Svc      Destination    No services have been selected for the patient.                Durable Medical Equipment    No services have been selected for the patient.                Dialysis/Infusion    No services have been selected for the patient.                Home Medical Care Coordination complete.      Service Provider Services Address Phone Fax Patient Preferred    [facility] - [facility] [address] [place] [address] [place] [address] [phone] --              Therapy    No services have been selected for the patient.                Community Resources    No services have been selected for the patient.                Community & DME    No services have been selected for the patient.                         Final Discharge Disposition Code: 06 - home with home health care

## 2025-02-12 NOTE — PROGRESS NOTES
" LOS: 7 days   Patient Care Team:  Caroline Medina APRN as PCP - General (Family Medicine)  Konstantin Driscoll MD as Consulting Physician (Cardiology)  Maksim Ortiz MD as Consulting Physician (Urology)    Chief Complaint: post op MVR, CABG, PFO closure, MANJULA ligation     Subjective  Sitting in bedside chair, no new complaints   Really wanting to go home.  Vital Signs  Temp:  [98.2 °F (36.8 °C)-98.7 °F (37.1 °C)] 98.6 °F (37 °C)  Heart Rate:  [] 122  Resp:  [18] 18  BP: (111-122)/(54-81) 122/76  Body mass index is 24.45 kg/m².    Intake/Output Summary (Last 24 hours) at 2/12/2025 0947  Last data filed at 2/12/2025 0430  Gross per 24 hour   Intake 300 ml   Output 725 ml   Net -425 ml     No intake/output data recorded.        02/10/25  0504 02/11/25  0533 02/12/25  0500   Weight: 73.3 kg (161 lb 9.6 oz) 72.5 kg (159 lb 13.3 oz) 70.8 kg (156 lb 1.6 oz)         Objective:  General Appearance:  Comfortable and in no acute distress.    Vital signs: (most recent): Blood pressure 122/76, pulse (!) 122, temperature 98.6 °F (37 °C), temperature source Oral, resp. rate 18, height 170.2 cm (67\"), weight 70.8 kg (156 lb 1.6 oz), SpO2 90%.  Vital signs are normal.  No fever.    Output: Producing urine.    Lungs:  Normal effort and normal respiratory rate.  There are decreased breath sounds.    Heart: Normal rate.  Regular rhythm.    Abdomen: Abdomen is soft.  Bowel sounds are normal.     Extremities: Normal range of motion.  There is no dependent edema.    Neurological: Patient is alert and oriented to person, place and time.    Skin:  Warm and dry.  (Sternal incision without erythema, edema, or drainage )                  Results Review:        WBC WBC   Date Value Ref Range Status   02/12/2025 8.21 3.40 - 10.80 10*3/mm3 Final   02/11/2025 6.14 3.40 - 10.80 10*3/mm3 Final   02/10/2025 5.94 3.40 - 10.80 10*3/mm3 Final      HGB Hemoglobin   Date Value Ref Range Status   02/12/2025 9.0 (L) 13.0 - 17.7 g/dL Final " "  02/11/2025 8.8 (L) 13.0 - 17.7 g/dL Final   02/10/2025 8.1 (L) 13.0 - 17.7 g/dL Final   02/09/2025 8.9 (L) 13.0 - 17.7 g/dL Final      HCT Hematocrit   Date Value Ref Range Status   02/12/2025 26.5 (L) 37.5 - 51.0 % Final   02/11/2025 25.5 (L) 37.5 - 51.0 % Final   02/10/2025 23.3 (L) 37.5 - 51.0 % Final   02/09/2025 25.9 (L) 37.5 - 51.0 % Final      Platelets Platelets   Date Value Ref Range Status   02/12/2025 238 140 - 450 10*3/mm3 Final   02/11/2025 170 140 - 450 10*3/mm3 Final   02/10/2025 146 140 - 450 10*3/mm3 Final        PT/INR:    No results found for: \"PROTIME\"  /  No results found for: \"INR\"      Sodium Sodium   Date Value Ref Range Status   02/12/2025 136 136 - 145 mmol/L Final   02/11/2025 136 136 - 145 mmol/L Final   02/10/2025 133 (L) 136 - 145 mmol/L Final      Potassium Potassium   Date Value Ref Range Status   02/12/2025 4.3 3.5 - 5.2 mmol/L Final     Comment:     Slight hemolysis detected by analyzer. Result may be falsely elevated.   02/11/2025 4.2 3.5 - 5.2 mmol/L Final   02/10/2025 4.1 3.5 - 5.2 mmol/L Final   02/09/2025 4.1 3.5 - 5.2 mmol/L Final      Chloride Chloride   Date Value Ref Range Status   02/12/2025 102 98 - 107 mmol/L Final   02/11/2025 102 98 - 107 mmol/L Final   02/10/2025 102 98 - 107 mmol/L Final      Bicarbonate CO2   Date Value Ref Range Status   02/12/2025 23.2 22.0 - 29.0 mmol/L Final   02/11/2025 24.6 22.0 - 29.0 mmol/L Final   02/10/2025 23.4 22.0 - 29.0 mmol/L Final      BUN BUN   Date Value Ref Range Status   02/12/2025 19 8 - 23 mg/dL Final   02/11/2025 22 8 - 23 mg/dL Final   02/10/2025 26 (H) 8 - 23 mg/dL Final      Creatinine Creatinine   Date Value Ref Range Status   02/12/2025 1.31 (H) 0.76 - 1.27 mg/dL Final   02/11/2025 1.49 (H) 0.76 - 1.27 mg/dL Final   02/10/2025 1.43 (H) 0.76 - 1.27 mg/dL Final      Calcium Calcium   Date Value Ref Range Status   02/12/2025 8.5 (L) 8.6 - 10.5 mg/dL Final   02/11/2025 8.3 (L) 8.6 - 10.5 mg/dL Final   02/10/2025 8.0 (L) 8.6 " - 10.5 mg/dL Final      Magnesium Magnesium   Date Value Ref Range Status   02/12/2025 2.4 1.6 - 2.4 mg/dL Final   02/11/2025 2.3 1.6 - 2.4 mg/dL Final   02/10/2025 2.5 (H) 1.6 - 2.4 mg/dL Final          amiodarone, 200 mg, Oral, Q12H  apixaban, 5 mg, Oral, Q12H  aspirin, 81 mg, Oral, Daily  atenolol, 25 mg, Oral, Q12H  atorvastatin, 40 mg, Oral, Nightly  calcium 500 mg vitamin D 5 mcg (200 UT), 2 tablet, Oral, Daily  colchicine, 0.6 mg, Oral, Daily  docusate sodium, 100 mg, Oral, Daily  ferrous sulfate, 325 mg, Oral, Daily With Breakfast  furosemide, 40 mg, Oral, Daily  gabapentin, 200 mg, Oral, Q12H  guaiFENesin, 1,200 mg, Oral, Q12H  melatonin, 5 mg, Oral, Nightly  pantoprazole, 40 mg, Oral, QAM  polyethylene glycol, 17 g, Oral, Daily  potassium chloride, 20 mEq, Oral, Daily  senna-docusate sodium, 2 tablet, Oral, Nightly                   Nonrheumatic mitral valve regurgitation    CAD (coronary artery disease)      Assessment & Plan    -Severe Mitral Regurgitation- s/p Complex MV repair, CABGx2 LIMA/RSVG, PFO, MANJULA- Pagni 2/5/2025  -PFO  -CAD  -Hypertension  -Hyperlipidemia  -CKD stage III-- Baseline Cr 1.4  -Hx esophagitis/dysphagia  -Anemia  -Osteopenia  -GERD  -Vitamin D deficiency  -Post op anemia- expected acute blood loss   -Ventricular standstill immediately after OR     POD#7  Up in the chair.  On room air.  Ready to go home. Complaints of nausea and diarrhea. Will discontinue colchicine and his stool softeners.   His heart rate is up again-- will switch to atenolol   Will see how his heart rate tolerates beta blocker switch.  Anticipate home with home health later this afternoon.  Continue routine care, mobilize    ENRIQUE Diamond  02/12/25  09:47 EST

## 2025-02-12 NOTE — DISCHARGE SUMMARY
[facility] Cardiac Surgery Discharge Summary    Date of Admission: 2/5/2025  Date of Discharge:  2/12/2025    Discharge Diagnosis:   -Severe Mitral Regurgitation- s/p Complex MV repair, CABGx2 LIMA/RSVG, PFO, MANJULAShaun Gaitan 2/5/2025  -PFO  -CAD  -Hypertension  -Hyperlipidemia  -CKD stage III-- Baseline Cr 1.4  -Hx esophagitis/dysphagia  -Anemia  -Osteopenia  -GERD  -Vitamin D deficiency  -Post op anemia- expected acute blood loss, stable  -Ventricular standstill immediately after OR--- resolved     Presenting Problem/History of Present Illness  Nonrheumatic mitral valve regurgitation [I34.0]  CAD (coronary artery disease) [I25.10]     Hospital Course  Patient is a 74 y.o. male presented with above medical history and worsening symptoms from his valvular heart disease. After having appropriate pre-operative workup he was scheduled forsurgery. He was admitted the morning of the procedure. On 2/5/2025 he underwent CABGx2(LIMA to LAD, SVG to OM1)/Mitral valve repair/PFO closure/MANJULA closure with AtriClip by Dr. Gaitan. He tolerated the procedure and was transferred to open heart recovery. He remained stable and was able to be extubated later on the day of the procedure. He did have some junctional rhythm and required pacing. Also overnight on POD0 he had what appeared to be a seizure and brief episode of asystole needing compressions. Labs showed acidosis which was corrected and symptoms improved.  On POD1 he was stable on low dose epi that was weaned throughout the day. On POD2 colchicine was added for likely pericarditis and he had converted into aflutter. BP unable to tolerate BB at that time. He did develop ISATU on CKD was was not unexpected with surgery and hypotension. On POD3 comer and chest tubes were removed. Amio changed to PO as he was rate controlled. BB titrated as BP allowed. He was transferred to the stepdown unit. On POD4 his hgb was 7.5 and he was symptomatic. He was transfused 1 unit PRBC. He remained in  afib  On POD5 central line was removed. EP consulted to assist with rhythm management and plans to follow-up. He continued to progress with increased activity, diuresis, and weaning of supplemental oxygen.  On POD6 eliquis was started for afib/flutter. On POD7 he was ready for discharge. Plan discharge home with home health. At time of discharge he is HD stable, ambulating without issue, and on room air. Patient was provided with appropriate discharge education. He was instructed to call office with any questions or concerns.      Procedures Performed  Procedure(s):  CORONARY ARTERY BYPASS GRAFT X2  WITH INTERNAL MAMMARY ARTERY GRAFT AND ENDOSCOPICALLY HARVESTED RIGHT SAPHENOUS VEIN;  STERNOTOMY; PRP; MITRAL VALVE REPAIR; PATENT FORAMEN OVALE CLOSURE; LEFT ATRIAL APPENDAGE CLOSURE WITH ATRICARE; TRANSESOPHAGEAL ECHOCARDIOGRAM WITH ANESTHESIA    Operative Note - Dr. Gaitan     Date of Dictation: 02/05/25     Date of Procedure: Same     Referring Physician: Konstantin Driscoll MD     Indications:   Severe mitral regurgitation, symptomatic     Preoperative diagnosis:  1.  Severe degenerative mitral valve regurgitation  2.  Mitral valve prolapse with P2 segment affectation  3.  Patent foramen ovale  4.  Two-vessel coronary artery disease  5.  CKD 3     Postoperative diagnosis:  Same     Procedure:  1.  Complex mitral valve repair with a 38 mm Medtronic flexible band annuloplasty and triangular resection of the P2 segment  2.  CABG x 2 with a LIMA to the mid LAD and reverse individual saphenous vein graft to the first marginal  3.  Primary closure of patent foramen ovale  4.  Endoscopic vein harvest of the right lower extremity  5.  Epicardial closure of the left atrial appendage with a 40 mm atrial clip device       Consults:   Consults       Date and Time Order Name Status Description    2/5/2025 10:59 AM Inpatient Cardiology Consult Completed     2/5/2025 10:59 AM Inpatient Intensivist Consult          Cardiology -   [place]  Intensivist - Dr. Dutta  EP - Dr. Zamora    Pertinent Test Results:    Lab Results   Component Value Date    WBC 8.21 02/12/2025    HGB 9.0 (L) 02/12/2025    HCT 26.5 (L) 02/12/2025    MCV 93.3 02/12/2025     02/12/2025      Lab Results   Component Value Date    GLUCOSE 104 (H) 02/12/2025    CALCIUM 8.5 (L) 02/12/2025     02/12/2025    K 4.3 02/12/2025    CO2 23.2 02/12/2025     02/12/2025    BUN 19 02/12/2025    CREATININE 1.31 (H) 02/12/2025    BCR 14.5 02/12/2025    ANIONGAP 10.8 02/12/2025     Lab Results   Component Value Date    INR 1.51 (H) 02/06/2025    PROTIME 18.2 (H) 02/06/2025         Condition on Discharge:    stable    Vital Signs  Temp:  [98.2 °F (36.8 °C)-98.7 °F (37.1 °C)] 98.6 °F (37 °C)  Heart Rate:  [] 122  Resp:  [18] 18  BP: (111-122)/(54-81) 122/76      Discharge Disposition  Home-Health Care [facility]    Discharge Medications     Discharge Medications        New Medications        Instructions Start Date   amiodarone 200 MG tablet  Commonly known as: PACERONE   200 mg, Oral, Every 12 Hours Scheduled      apixaban 5 MG tablet tablet  Commonly known as: ELIQUIS   5 mg, Oral, Every 12 Hours Scheduled      aspirin 81 MG EC tablet   81 mg, Oral, Daily   Start Date: February 13, 2025     atenolol 25 MG tablet  Commonly known as: TENORMIN   25 mg, Oral, Every 12 Hours Scheduled      furosemide 40 MG tablet  Commonly known as: LASIX   40 mg, Oral, Daily   Start Date: February 13, 2025     HYDROcodone-acetaminophen 5-325 MG per tablet  Commonly known as: NORCO   1 tablet, Oral, Every 4 Hours PRN      potassium chloride 20 MEQ CR tablet  Commonly known as: KLOR-CON M20   20 mEq, Oral, Daily   Start Date: February 13, 2025            Changes to Medications        Instructions Start Date   atorvastatin 40 MG tablet  Commonly known as: LIPITOR  What changed:   medication strength  how much to take  when to take this   40 mg, Oral, Nightly             Continue These Medications         Instructions Start Date   CALCIUM 500 +D PO   500 Int'l Units, 2 Times Daily      clotrimazole-betamethasone 1-0.05 % cream  Commonly known as: LOTRISONE   1 Application, Topical, 2 Times Daily      omeprazole 20 MG capsule  Commonly known as: priLOSEC   20 mg, Oral, 2 Times Daily             Stop These Medications      carvedilol 3.125 MG tablet  Commonly known as: COREG     valsartan 160 MG tablet  Commonly known as: DIOVAN              Discharge Diet: heart healthy    Activity at Discharge:     1. No driving for 2 weeks and off narcotic pain medications.  2. Shower daily. Clean incisions with warm water and antibacterial soap only. Do not put any lotion or ointments on incisions.  3. Ambulate for 10 minutes at least 3 times a day.  4. No heavy lifting > 10lbs until seen in office.   5. Take all medications as prescribed.     Follow-up Appointments:  Future Appointments   Date Time Provider Department Center   2/21/2025  1:30 PM Jg Zuluaga PA-C [facility]   2/25/2025  8:30 AM [facility]   3/4/2025  8:00 AM Caroline Medina APRN [facility]   3/13/2025  1:00 PM Dian Hilario APRN [facility]   4/30/2025  9:30 AM Jg Zuluaga PA-C [facility]   8/29/2025  8:30 AM Caroline Medina APRN [facility]     Additional Instructions for the Follow-ups that You Need to Schedule       Ambulatory Referral to Cardiac Rehab   As directed      Ambulatory Referral to Home Health   As directed      Face to Face Visit Date: 2/12/2025   Follow-up provider for Plan of Care?: I will be treating the patient on an ongoing basis.  Please send me the Plan of Care for signature.   Follow-up provider: MILES MACIAS [[id number]]   Reason/Clinical Findings: post op open heart   Describe mobility limitations that make leaving home difficult: weakness   Nursing/Therapeutic Services Requested: Skilled Nursing   Skilled nursing orders: Post CABG care   Frequency: 1 Week  1        Call MD With Problems / Concerns   As directed      Instructions:  Call office at [phone] for any drainage, increased redness, or fever over 100.5    Order Comments: Instructions:  Call office at [phone] for any drainage, increased redness, or fever over 100.5         Discharge Follow-up with PCP   As directed       Currently Documented PCP:    Caroline Medina APRN    PCP Phone Number:    [phone]     Follow Up Details: in 1 week        Discharge Follow-up with Specialty: Cardiologist APRN/PA; 1 Week   As directed      Specialty: Cardiologist APRN/PA   Follow Up: 1 Week   Follow Up Details: bring all prescription bottles to appointment, call for appointment        Discharge Follow-up with Specified Provider: Cardiologist; 1 Month   As directed      To: Cardiologist   Follow Up: 1 Month   Follow Up Details: call for appointment, bring all medication bottles to appointment        Discharge Follow-up with Specified Provider: Angela Lerma CT surgery APRN; 1 Month   As directed      To: Angela Lerma CT surgery APRN   Follow Up: 1 Month   Follow Up Details: 4-6 weeks, bring all current medications to appointment                STS info: ASA/statin/B/eliquis    Test Results Pending at Discharge:  Pending Results       None               ENRIQUE Diamond  02/12/25  11:27 EST

## 2025-02-12 NOTE — PROGRESS NOTES
[facility] Clinical Pharmacy Services: National Cardiology Data Registry (NCDR) Medication Review    Jackson Tariq is s/p CABG x2 . Pharmacy to review discharge medications to make sure appropriate medications have been prescribed.    Patient has been discharged on the following:  Aspirin: 81 mg once a day  High Intensity Statin: atorvastatin 40 mg once a night  Beta-blocker: atenolol 25 mg twice a day  P2Y12 Inhibitor: none- risk of bleed too high with eliquis for aflutter  LVEF <40: yes, but unable to add ACE/ARB/ARNI due to CKD    These medications meet the requirements for NCDR discharge medication for chest pain and MI.    Ronny Hernandez [facility]  Clinical Pharmacist

## 2025-02-12 NOTE — NURSING NOTE
"At shift change (1900) this RN went into room with dayshift Rn and pt complaining of still feeling poorly, that his medicines are \"tearing\" his stomach up. Patient apparently vomited during the day and is also noted by cardiology to be feeling poorly after taking medicine. Pt had EGD in 2019 esophagitis/ulc, gets protonix at 0900. Discussed all above with Dr Gaitan, per MD pt can have mylantis as directed/ordered by Pharmacy. Called pharmacy, they are to enter order.  "

## 2025-02-13 ENCOUNTER — TELEPHONE (OUTPATIENT)
Dept: CARDIAC SURGERY | Facility: CLINIC | Age: 75
End: 2025-02-13
Payer: MEDICARE

## 2025-02-13 ENCOUNTER — TRANSITIONAL CARE MANAGEMENT TELEPHONE ENCOUNTER (OUTPATIENT)
Dept: CALL CENTER | Facility: HOSPITAL | Age: 75
End: 2025-02-13
Payer: MEDICARE

## 2025-02-13 LAB
BH BB BLOOD EXPIRATION DATE: NORMAL
BH BB BLOOD EXPIRATION DATE: NORMAL
BH BB BLOOD TYPE BARCODE: 5100
BH BB BLOOD TYPE BARCODE: 5100
BH BB DISPENSE STATUS: NORMAL
BH BB DISPENSE STATUS: NORMAL
BH BB PRODUCT CODE: NORMAL
BH BB PRODUCT CODE: NORMAL
BH BB UNIT NUMBER: NORMAL
BH BB UNIT NUMBER: NORMAL
CROSSMATCH INTERPRETATION: NORMAL
CROSSMATCH INTERPRETATION: NORMAL
UNIT  ABO: NORMAL
UNIT  ABO: NORMAL
UNIT  RH: NORMAL
UNIT  RH: NORMAL

## 2025-02-13 RX ORDER — POTASSIUM CHLORIDE 750 MG/1
20 TABLET, EXTENDED RELEASE ORAL DAILY
Qty: 60 TABLET | Refills: 0 | Status: SHIPPED | OUTPATIENT
Start: 2025-02-13 | End: 2025-03-15

## 2025-02-13 NOTE — OUTREACH NOTE
Call Center TCM Note      Flowsheet Row Responses   [facility] patient discharged from? [place]   Does the patient have one of the following disease processes/diagnoses(primary or secondary)? Cardiothoracic surgery   TCM attempt successful? Yes   Call start time 0823   Call end time 0826   Discharge diagnosis CABGx2   Meds reviewed with patient/caregiver? Yes   Is the patient having any side effects they believe may be caused by any medication additions or changes? No   Does the patient have all medications related to this admission filled (includes all antibiotics, pain medications, cardiac medications, etc.) Yes   Is the patient taking all medications as directed (includes completed medication regime)? Yes   Comments HOSP DC FU appt 2/18/25 1030 am   Does the patient have an appointment with their PCP within 7-14 days of discharge? Yes   What is the Home health agency?  [facility]    Has home health visited the patient within 72 hours of discharge? Call prior to 72 hours   Psychosocial issues? No   Did the patient receive a copy of their discharge instructions? Yes   Nursing interventions Reviewed instructions with patient   What is the patient's perception of their health status since discharge? Improving   Nursing interventions Nurse provided patient education   Is the patient /caregiver able to teach back basic post-op care? Lifting as instructed by MD in discharge instructions, Drive as instructed by MD in discharge instructions, Shower daily, No tub bath, swimming, or hot tub until instructed by MD, Use a clean wash cloth and antibacterial bar or liquid soap to clean incisions   Is the patient/caregiver able to teach back signs and symptoms of incisional infection? Fever, Pus or odor from incision, Incisional warmth, Increased drainage or bleeding, Increased redness, swelling or pain at the incisonal site   Is the patient/caregiver able to teach back steps to recovery at home? Eat a well-balance  diet, Set small, achievable goals for return to baseline health   If the patient is a current smoker, are they able to teach back resources for cessation? Not a smoker   Is the patient/caregiver able to teach back the hierarchy of who to call/visit for symptoms/problems? PCP, Specialist, Home health nurse, Urgent Care, ED, 911 Yes   TCM call completed? Yes   Wrap up additional comments Wife reports Pt is doing well. No issues. Wife did report that Pt was talking in his sleep last night, which can be normal with pain meds. Wife is aware that if Pt becomes confused when awake to seek treatment.   Call end time [id number]            Maegan Moreno RN    2/13/2025, 08:27 EST

## 2025-02-13 NOTE — TELEPHONE ENCOUNTER
"  Caller: Jackson Tariq \"Jackson Tariq\"    Relationship: Self    Best call back number: [phone]    What is the best time to reach you: ANY    Who are you requesting to speak with (clinical staff, provider,  specific staff member): CLINICAL    Do you know the name of the person who called: PT     What was the call regarding: PT IS HAVING ON 20 MEQ OF POTASSIUM ONCE DAILY AND IS HAVING A HARD TIME SWALLOWING THOSE. HE IS NEEDING 10 MEQ OF POTASSIUM 2 PILLS ONCE DAILY.    Is it okay if the provider responds through MyChart: NO        "

## 2025-02-18 ENCOUNTER — OFFICE VISIT (OUTPATIENT)
Dept: INTERNAL MEDICINE | Facility: CLINIC | Age: 75
End: 2025-02-18
Payer: MEDICARE

## 2025-02-18 VITALS
OXYGEN SATURATION: 97 % | SYSTOLIC BLOOD PRESSURE: 104 MMHG | BODY MASS INDEX: 24.67 KG/M2 | DIASTOLIC BLOOD PRESSURE: 52 MMHG | WEIGHT: 157.2 LBS | TEMPERATURE: 98 F | HEART RATE: 80 BPM | HEIGHT: 67 IN

## 2025-02-18 DIAGNOSIS — I25.10 CORONARY ARTERY DISEASE INVOLVING NATIVE CORONARY ARTERY OF NATIVE HEART WITHOUT ANGINA PECTORIS: ICD-10-CM

## 2025-02-18 DIAGNOSIS — I10 PRIMARY HYPERTENSION: ICD-10-CM

## 2025-02-18 DIAGNOSIS — Z09 ENCOUNTER FOR EXAMINATION FOLLOWING TREATMENT AT HOSPITAL: Primary | ICD-10-CM

## 2025-02-18 DIAGNOSIS — Z95.1 STATUS POST CORONARY ARTERY BYPASS GRAFTING: ICD-10-CM

## 2025-02-18 DIAGNOSIS — E78.2 MIXED HYPERLIPIDEMIA: ICD-10-CM

## 2025-02-18 DIAGNOSIS — Z98.890 STATUS POST MITRAL VALVE REPAIR: ICD-10-CM

## 2025-02-18 DIAGNOSIS — I34.0 SEVERE MITRAL REGURGITATION: ICD-10-CM

## 2025-02-18 DIAGNOSIS — N18.31 STAGE 3A CHRONIC KIDNEY DISEASE: ICD-10-CM

## 2025-02-18 DIAGNOSIS — Z87.74 STATUS POST PATENT FORAMEN OVALE CLOSURE: ICD-10-CM

## 2025-02-18 DIAGNOSIS — Q21.12 PFO (PATENT FORAMEN OVALE): ICD-10-CM

## 2025-02-18 PROCEDURE — 1160F RVW MEDS BY RX/DR IN RCRD: CPT | Performed by: NURSE PRACTITIONER

## 2025-02-18 PROCEDURE — 1111F DSCHRG MED/CURRENT MED MERGE: CPT | Performed by: NURSE PRACTITIONER

## 2025-02-18 PROCEDURE — 1159F MED LIST DOCD IN RCRD: CPT | Performed by: NURSE PRACTITIONER

## 2025-02-18 PROCEDURE — 99495 TRANSJ CARE MGMT MOD F2F 14D: CPT | Performed by: NURSE PRACTITIONER

## 2025-02-18 NOTE — PROGRESS NOTES
Chief Complaint   Patient presents with    Hospital Follow Up Visit       SUBJECTIVE  Jackson Tariq is a 74 y.o. male presenting today for follow up after hospitalization.    Date of Admission: 2/5/2025  Date of Discharge:  2/12/2025     Discharge Diagnosis:   -Severe Mitral Regurgitation- s/p Complex MV repair, CABGx2 LIMA/RSVG, PFO, MANJULAShaun Gaitan 2/5/2025  -PFO  -CAD  -Hypertension  -Hyperlipidemia  -CKD stage III-- Baseline Cr 1.4  -Hx esophagitis/dysphagia  -Anemia  -Osteopenia  -GERD  -Vitamin D deficiency  -Post op anemia- expected acute blood loss, stable  -Ventricular standstill immediately after OR--- resolved      Presenting Problem/History of Present Illness  Nonrheumatic mitral valve regurgitation [I34.0]  CAD (coronary artery disease) [I25.10]               Hospital Course  Patient is a 74 y.o. male presented with above medical history and worsening symptoms from his valvular heart disease. After having appropriate pre-operative workup he was scheduled forsurgery. He was admitted the morning of the procedure. On 2/5/2025 he underwent CABGx2(LIMA to LAD, SVG to OM1)/Mitral valve repair/PFO closure/MANJULA closure with AtriClip by Dr. Gaitan. He tolerated the procedure and was transferred to open heart recovery. He remained stable and was able to be extubated later on the day of the procedure. He did have some junctional rhythm and required pacing. Also overnight on POD0 he had what appeared to be a seizure and brief episode of asystole needing compressions. Labs showed acidosis which was corrected and symptoms improved.  On POD1 he was stable on low dose epi that was weaned throughout the day. On POD2 colchicine was added for likely pericarditis and he had converted into aflutter. BP unable to tolerate BB at that time. He did develop ISATU on CKD was was not unexpected with surgery and hypotension. On POD3 comer and chest tubes were removed. Amio changed to PO as he was rate controlled. BB titrated as BP  allowed. He was transferred to the stepdown unit. On POD4 his hgb was 7.5 and he was symptomatic. He was transfused 1 unit PRBC. He remained in afib  On POD5 central line was removed. EP consulted to assist with rhythm management and plans to follow-up. He continued to progress with increased activity, diuresis, and weaning of supplemental oxygen.  On POD6 eliquis was started for afib/flutter. On POD7 he was ready for discharge. Plan discharge home with home health. At time of discharge he is HD stable, ambulating without issue, and on room air. Patient was provided with appropriate discharge education. He was instructed to call office with any questions or concerns.       Today he rates his pain as 0/10. He is taking #1 Norco overnight if he wakes with pain.      Outpatient Medications Marked as Taking for the 2/18/25 encounter (Office Visit) with Caroline Medina APRN   Medication Sig Dispense Refill    amiodarone (PACERONE) 200 MG tablet Take 1 tablet by mouth Every 12 (Twelve) Hours. 5 tablet 0    apixaban (ELIQUIS) 5 MG tablet tablet Take 1 tablet by mouth Every 12 (Twelve) Hours. Indications: Atrial Fibrillation 60 tablet 1    aspirin 81 MG EC tablet Take 1 tablet by mouth Daily for 90 days. 90 tablet 0    atenolol (TENORMIN) 25 MG tablet Take 1 tablet by mouth Every 12 (Twelve) Hours for 90 days. 180 tablet 0    atorvastatin (LIPITOR) 40 MG tablet Take 1 tablet by mouth Every Night for 90 days. 90 tablet 0    Calcium Carb-Cholecalciferol (CALCIUM 500 +D PO) Take 500 Int'l Units by mouth 2 (Two) Times a Day.      furosemide (LASIX) 40 MG tablet Take 1 tablet by mouth Daily for 30 days. 30 tablet 0    HYDROcodone-acetaminophen (NORCO) 5-325 MG per tablet Take 1 tablet by mouth Every 4 (Four) Hours As Needed for Moderate Pain for up to 7 days. 42 tablet 0    omeprazole (priLOSEC) 20 MG capsule Take 1 capsule by mouth twice daily (Patient taking differently: Take 1 capsule by mouth Daily.) 180 capsule 3     "potassium chloride (KLOR-CON M20) 20 MEQ CR tablet Take 1 tablet by mouth Daily for 30 days. 30 tablet 0         The following portions of the patient's history were reviewed and updated as appropriate: allergies, current medications, past family history, past medical history, past social history, past surgical history and problem list.    Review of Systems   Constitutional:  Negative for appetite change, chills and fever.   Respiratory:  Positive for cough (\"a little bit, not much\" He does not occas production of sputum. This is clear. He notes this to be gradually improving.) and shortness of breath (\"a little bit, not much\").    Cardiovascular:  Negative for chest pain and palpitations.   Gastrointestinal:  Negative for constipation and diarrhea.   Genitourinary:  Negative for difficulty urinating.   Neurological:  Negative for dizziness, syncope and headache.       Objective   Vitals:    02/18/25 1006   BP: 104/52   BP Location: Left arm   Patient Position: Sitting   Cuff Size: Adult   Pulse: 80   Temp: 98 °F (36.7 °C)   TempSrc: Infrared   SpO2: 97%   Weight: 71.3 kg (157 lb 3.2 oz)   Height: 170.2 cm (67\")       BP Readings from Last 3 Encounters:   02/18/25 104/52   02/12/25 122/76   02/03/25 158/68        Wt Readings from Last 3 Encounters:   02/18/25 71.3 kg (157 lb 3.2 oz)   02/12/25 70.8 kg (156 lb 1.6 oz)   02/03/25 69.7 kg (153 lb 11.2 oz)        Body mass index is 24.62 kg/m².  Nursing notes and vitals reviewed.    Physical Exam  Constitutional:       General: He is not in acute distress.     Appearance: He is well-developed.   Cardiovascular:      Rate and Rhythm: Regular rhythm.      Pulses: Normal pulses.      Heart sounds: Normal heart sounds.   Pulmonary:      Effort: Pulmonary effort is normal.      Breath sounds: Normal breath sounds.   Musculoskeletal:      Right lower leg: No edema.      Left lower leg: No edema.   Skin:            Comments: Sternotomy incision is healing well w/o erythema, " edema, or d/c.   Neurological:      Mental Status: He is alert and oriented to person, place, and time.   Psychiatric:         Attention and Perception: He is attentive.         Mood and Affect: Mood and affect normal.         Speech: Speech normal.         Behavior: Behavior normal.         Thought Content: Thought content normal.           Data Reviewed:  Recent Results (from the past 4 weeks)   Comprehensive Metabolic Panel    Collection Time: 02/03/25  7:13 AM    Specimen: Blood   Result Value Ref Range    Glucose 107 (H) 65 - 99 mg/dL    BUN 18 8 - 23 mg/dL    Creatinine 1.44 (H) 0.76 - 1.27 mg/dL    Sodium 131 (L) 136 - 145 mmol/L    Potassium 4.7 3.5 - 5.2 mmol/L    Chloride 100 98 - 107 mmol/L    CO2 26.4 22.0 - 29.0 mmol/L    Calcium 9.4 8.6 - 10.5 mg/dL    Total Protein 7.2 6.0 - 8.5 g/dL    Albumin 4.4 3.5 - 5.2 g/dL    ALT (SGPT) 26 1 - 41 U/L    AST (SGOT) 25 1 - 40 U/L    Alkaline Phosphatase 124 (H) 39 - 117 U/L    Total Bilirubin 1.0 0.0 - 1.2 mg/dL    Globulin 2.8 gm/dL    A/G Ratio 1.6 g/dL    BUN/Creatinine Ratio 12.5 7.0 - 25.0    Anion Gap 4.6 (L) 5.0 - 15.0 mmol/L    eGFR 51.3 (L) >60.0 mL/min/1.73   Magnesium    Collection Time: 02/03/25  7:13 AM    Specimen: Blood   Result Value Ref Range    Magnesium 2.1 1.6 - 2.4 mg/dL   Hemoglobin A1c    Collection Time: 02/03/25  7:13 AM    Specimen: Blood   Result Value Ref Range    Hemoglobin A1C 5.20 4.80 - 5.60 %   BNP    Collection Time: 02/03/25  7:13 AM    Specimen: Blood   Result Value Ref Range    proBNP 326.0 0.0 - 900.0 pg/mL   Lipid Panel    Collection Time: 02/03/25  7:13 AM    Specimen: Blood   Result Value Ref Range    Total Cholesterol 118 0 - 200 mg/dL    Triglycerides 91 0 - 150 mg/dL    HDL Cholesterol 60 40 - 60 mg/dL    LDL Cholesterol  41 0 - 100 mg/dL    VLDL Cholesterol 17 5 - 40 mg/dL    LDL/HDL Ratio 0.66    PTT    Collection Time: 02/03/25  7:13 AM    Specimen: Blood   Result Value Ref Range    PTT 38.6 (H) 22.7 - 35.4 seconds    Protime-INR    Collection Time: 02/03/25  7:13 AM    Specimen: Blood   Result Value Ref Range    Protime 14.3 (H) 11.7 - 14.2 Seconds    INR 1.11 (H) 0.90 - 1.10   Urinalysis With Culture If Indicated - Urine, Clean Catch    Collection Time: 02/03/25  7:13 AM    Specimen: Urine, Clean Catch   Result Value Ref Range    Color, UA Yellow Yellow, Straw    Appearance, UA Clear Clear    pH, UA 8.5 (H) 5.0 - 8.0    Specific Gravity, UA 1.011 1.005 - 1.030    Glucose, UA Negative Negative    Ketones, UA Negative Negative    Bilirubin, UA Negative Negative    Blood, UA Negative Negative    Protein, UA Negative Negative    Leuk Esterase, UA Negative Negative    Nitrite, UA Negative Negative    Urobilinogen, UA 0.2 E.U./dL 0.2 - 1.0 E.U./dL   Type & Screen    Collection Time: 02/03/25  7:13 AM    Specimen: Blood   Result Value Ref Range    ABO Type O     RH type Positive     Antibody Screen Negative     T&S Expiration Date 2/17/2025 11:59:00 PM    CBC Auto Differential    Collection Time: 02/03/25  7:13 AM    Specimen: Blood   Result Value Ref Range    WBC 7.66 3.40 - 10.80 10*3/mm3    RBC 3.99 (L) 4.14 - 5.80 10*6/mm3    Hemoglobin 12.5 (L) 13.0 - 17.7 g/dL    Hematocrit 37.9 37.5 - 51.0 %    MCV 95.0 79.0 - 97.0 fL    MCH 31.3 26.6 - 33.0 pg    MCHC 33.0 31.5 - 35.7 g/dL    RDW 12.5 12.3 - 15.4 %    RDW-SD 43.5 37.0 - 54.0 fl    MPV 10.0 6.0 - 12.0 fL    Platelets 235 140 - 450 10*3/mm3    Neutrophil % 73.1 42.7 - 76.0 %    Lymphocyte % 17.0 (L) 19.6 - 45.3 %    Monocyte % 7.7 5.0 - 12.0 %    Eosinophil % 1.0 0.3 - 6.2 %    Basophil % 0.8 0.0 - 1.5 %    Immature Grans % 0.4 0.0 - 0.5 %    Neutrophils, Absolute 5.60 1.70 - 7.00 10*3/mm3    Lymphocytes, Absolute 1.30 0.70 - 3.10 10*3/mm3    Monocytes, Absolute 0.59 0.10 - 0.90 10*3/mm3    Eosinophils, Absolute 0.08 0.00 - 0.40 10*3/mm3    Basophils, Absolute 0.06 0.00 - 0.20 10*3/mm3    Immature Grans, Absolute 0.03 0.00 - 0.05 10*3/mm3    nRBC 0.0 0.0 - 0.2 /100 WBC    Platelet Function ADP    Collection Time: 02/03/25  7:15 AM    Specimen: Blood   Result Value Ref Range    ADP Aggregation, % Platelet 97 86 - 100 %   Blood Gas, Arterial -    Collection Time: 02/03/25  7:23 AM    Specimen: Arterial Blood   Result Value Ref Range    Site Right Radial     Cedric's Test Positive     pH, Arterial 7.455 (H) 7.350 - 7.450 pH units    pCO2, Arterial 33.3 (L) 35.0 - 45.0 mm Hg    pO2, Arterial 102.7 (H) 80.0 - 100.0 mm Hg    HCO3, Arterial 23.4 22.0 - 28.0 mmol/L    Base Excess, Arterial 0.0 0.0 - 2.0 mmol/L    O2 Saturation, Arterial 98.3 92.0 - 98.5 %    CO2 Content 24.4 23 - 27 mmol/L    Barometric Pressure for Blood Gas 749.2000 mmHg    Modality Room Air     Rate 18 Breaths/minute    Hemodilution No    Bilateral Carotid Duplex    Collection Time: 02/03/25  9:19 AM   Result Value Ref Range    Prox CCA .0 cm/sec    Prox CCA EDV 14.7 cm/sec    Dist CCA PSV 83.3 cm/sec    Dist CCA EDV 13.9 cm/sec    Prox ICA PSV -44.1 cm/sec    Prox ICA EDV -14.2 cm/sec    Mid ICA PSV -73.0 cm/sec    Mid ICA EDV -20.2 cm/sec    Dist ICA PSV -103.4 cm/sec    Dist ICA EDV -24.4 cm/sec    Prox ECA PSV -87.6 cm/sec    Vertebral A PSV -54.1 cm/sec    Prox SCLA PSV -91.5 cm/sec    Prox CCA .4 cm/sec    Prox CCA EDV 13.9 cm/sec    Dist CCA .6 cm/sec    Dist CCA EDV 19.1 cm/sec    Prox ICA PSV -66.1 cm/sec    Prox ICA EDV -18.9 cm/sec    Mid ICA PSV -80.9 cm/sec    Mid ICA EDV -22.2 cm/sec    Dist ICA PSV -84.9 cm/sec    Dist ICA EDV -22.2 cm/sec    Prox ECA PSV -108.5 cm/sec    Vertebral A PSV -38.4 cm/sec    Prox SCLA .9 cm/sec    ICA/CCA ratio 1.24     ICA/CCA ratio 0.75    Bilateral Lower Extremity Vein Mapping    Collection Time: 02/03/25  9:26 AM   Result Value Ref Range    GSV ORIGIN DIST RIGHT 0.54 cm    PROX GSV THIGH  RIGHT 0.23 cm    MID GSV THIGH  RIGHT 0.30 cm    DIST GSV THIGH DIST RIGHT 0.16 cm    GSV KNEE DIST RIGHT 0.13 cm    PROX GSV CALF DIST RIGHT 0.29 cm    MID GSV  CALF RIGHT 0.21 cm    DIST GSV CALF DIST RIGHT 0.14 cm    GSV ANKLE DIST RIGHT 0.18 cm    PROX LSV CALF DIST RIGHT 0.27 cm    MID LSV CALF DIST RIGHT 0.24 cm    DIST LSV CALF DIST RIGHT 0.25 cm    GSV ORIGIN DIST LEFT 0.53 cm    PROX GSV THIGH  LEFT 0.19 cm    MID GSV THIGH  LEFT 0.19 cm    DIST GSV THIGH DIST LEFT 0.12 cm    GSV KNEE DIST LEFT 0.12 cm    PROX GSV CALF DIST LEFT 0.14 cm    MID GSV CALF LEFT 0.10 cm    DIST GSV CALF DIST LEFT 0.07 cm    GSV ANKLE DIST LEFT 0.18 cm    PROX LSV CALF DIST LEFT 0.12 cm    MID LSV CALF DIST LEFT 0.20 cm    DIST LSV CALF DIST LEFT 0.08 cm   ECG 12 Lead    Collection Time: 02/03/25  9:28 AM   Result Value Ref Range    QT Interval 417 ms    QTC Interval 417 ms   ABO RH Specimen Verification    Collection Time: 02/05/25  5:40 AM    Specimen: Blood   Result Value Ref Range    ABO Type O     RH type Positive    POC OR Panel, ISTAT    Collection Time: 02/05/25  7:39 AM    Specimen: Arterial Blood   Result Value Ref Range    POC Potassium 3.7 3.5 - 4.9 mmol/L    Total CO2 23 (L) 24 - 29 mmol/L    Hemoglobin 9.5 (L) 12.0 - 17.0 g/dL    Hematocrit 28 (L) 38 - 51 %    pH, Arterial 7.4140 7.35 - 7.6 pH units    HCO3, Arterial 21.8 (L) 22 - 26 mmol/L    Base Excess -3.0000 -5 - 5 mmol/L    O2 Saturation, Arterial 100 (H) 95 - 98 %    pO2, Arterial 248 (H) 80 - 105 mmHg    pCO2, Arterial 35.3 35 - 45 mm Hg    Glucose 100 70 - 130 mg/dL   POC Activated Clotting Time    Collection Time: 02/05/25  7:40 AM    Specimen: Arterial Blood   Result Value Ref Range    Activated Clotting Time  153 (H) 82 - 152 Seconds   POC Activated Clotting Time    Collection Time: 02/05/25  8:35 AM    Specimen: Arterial Blood   Result Value Ref Range    Activated Clotting Time  533 (H) 82 - 152 Seconds   POC OR Panel, ISTAT    Collection Time: 02/05/25  8:55 AM    Specimen: Arterial Blood   Result Value Ref Range    POC Potassium 4.7 3.5 - 4.9 mmol/L    Total CO2 23 (L) 24 - 29 mmol/L    Hemoglobin 8.5 (L) 12.0  - 17.0 g/dL    Hematocrit 25 (L) 38 - 51 %    pH, Arterial 7.4050 7.35 - 7.6 pH units    HCO3, Arterial 22.0 22 - 26 mmol/L    Base Excess -4.0000 -5 - 5 mmol/L    O2 Saturation, Arterial 100 (H) 95 - 98 %    pO2, Arterial 542 (H) 80 - 105 mmHg    pCO2, Arterial 39.3 35 - 45 mm Hg    Glucose 114 70 - 130 mg/dL   POC Activated Clotting Time    Collection Time: 02/05/25  8:59 AM    Specimen: Blood   Result Value Ref Range    Activated Clotting Time  417 (H) 82 - 152 Seconds   POC OR Panel, ISTAT    Collection Time: 02/05/25  9:02 AM    Specimen: Venous Blood   Result Value Ref Range    POC Potassium 5.0 (H) 3.5 - 4.9 mmol/L    Total CO2 29 24 - 29 mmol/L    Hemoglobin 8.5 (L) 12.0 - 17.0 g/dL    Hematocrit 25 (L) 38 - 51 %    pH, Arterial 7.4380 7.35 - 7.6 pH units    HCO3, Arterial 27.6 (H) 22 - 26 mmol/L    Base Excess 3.0000 -5 - 5 mmol/L    O2 Saturation, Arterial 90 (L) 95 - 98 %    pO2, Arterial 59 (L) 80 - 105 mmHg    pCO2, Arterial 45.1 (H) 35 - 45 mm Hg    Glucose 116 70 - 130 mg/dL   POC OR Panel, ISTAT    Collection Time: 02/05/25  9:14 AM    Specimen: Arterial Blood   Result Value Ref Range    POC Potassium 5.6 (H) 3.5 - 4.9 mmol/L    Total CO2 27 24 - 29 mmol/L    Hemoglobin 8.8 (L) 12.0 - 17.0 g/dL    Hematocrit 26 (L) 38 - 51 %    pH, Arterial 7.4290 7.35 - 7.6 pH units    HCO3, Arterial 25.5 22 - 26 mmol/L    Base Excess 1.0000 -5 - 5 mmol/L    O2 Saturation, Arterial 100 (H) 95 - 98 %    pO2, Arterial 494 (H) 80 - 105 mmHg    pCO2, Arterial 41.1 35 - 45 mm Hg    Glucose 118 70 - 130 mg/dL   Tissue Pathology Exam    Collection Time: 02/05/25  9:17 AM    Specimen: Heart; Tissue   Result Value Ref Range    Case Report       Surgical Pathology Report                         Case: [id number]                                  Authorizing Provider:  Farshad Gaitan MD       Collected:           02/05/2025 09:17 AM          Ordering Location:     [facility]  Received:            02/05/2025  "12:26 PM                                 [facility]ARDI[facility]ASC[facility]PER[facility]                                                                            ROOM                                                                         Pathologist:           Sweetie Gay MD                                                        Specimen:    Heart, POSTERIOR LEAFLET TISSUE OF MITRAL VALVE                                            Final Diagnosis       1.  Mitral valve, posterior leaflet, excision:         A.  Fibrotic  valve tissue with myxomatous degeneration.      Gross Description       1. Heart.  Received in formalin labeled \"posterior leaflet tissue of mitral valve\" is a 1.0 x 1.0 x 0.2 cm irregular fragment of tan-white rubbery tissue with an attached 1.5 x 0.1 cm chordae tendineae.  The specimen is trisected and entirely submitted as 1A.    [email or web address]        Microscopic Description       Unless \"gross only\" is specified, the final diagnosis for each specimen is based on microscopic examination of tissue.     POC Activated Clotting Time    Collection Time: 02/05/25  9:18 AM    Specimen: Arterial Blood   Result Value Ref Range    Activated Clotting Time  400 (H) 82 - 152 Seconds   POC OR Panel, ISTAT    Collection Time: 02/05/25  9:36 AM    Specimen: Arterial Blood   Result Value Ref Range    POC Potassium 5.7 (H) 3.5 - 4.9 mmol/L    Total CO2 25 24 - 29 mmol/L    Hemoglobin 9.2 (L) 12.0 - 17.0 g/dL    Hematocrit 27 (L) 38 - 51 %    pH, Arterial 7.3970 7.35 - 7.6 pH units    HCO3, Arterial 23.6 22 - 26 mmol/L    Base Excess -2.0000 -5 - 5 mmol/L    O2 Saturation, Arterial 100 (H) 95 - 98 %    pO2, Arterial 436 (H) 80 - 105 mmHg    pCO2, Arterial 40.2 35 - 45 mm Hg    Glucose 118 70 - 130 mg/dL   Platelet Count    Collection Time: 02/05/25  9:38 AM    Specimen: Blood, Arterial Line   Result Value Ref Range    Platelets 179 140 - 450 10*3/mm3   Fibrinogen    Collection Time: 02/05/25  9:38 AM    Specimen: Blood, Arterial " Line   Result Value Ref Range    Fibrinogen 219 219 - 464 mg/dL   Protime-INR    Collection Time: 02/05/25  9:38 AM    Specimen: Blood, Arterial Line   Result Value Ref Range    Protime 22.9 (H) 11.7 - 14.2 Seconds    INR 2.01 (H) 0.90 - 1.10   POC Activated Clotting Time    Collection Time: 02/05/25  9:40 AM    Specimen: Blood   Result Value Ref Range    Activated Clotting Time  371 (H) 82 - 152 Seconds   POC OR Panel, ISTAT    Collection Time: 02/05/25 10:16 AM    Specimen: Arterial Blood   Result Value Ref Range    POC Potassium 3.8 3.5 - 4.9 mmol/L    Total CO2 25 24 - 29 mmol/L    Hemoglobin 7.8 (C) 12.0 - 17.0 g/dL    Hematocrit 23 (L) 38 - 51 %    pH, Arterial 7.3600 7.35 - 7.6 pH units    HCO3, Arterial 23.5 22 - 26 mmol/L    Base Excess -2.0000 -5 - 5 mmol/L    O2 Saturation, Arterial 100 (H) 95 - 98 %    pO2, Arterial 538 (H) 80 - 105 mmHg    pCO2, Arterial 41.6 35 - 45 mm Hg    Glucose 75 70 - 130 mg/dL   POC Activated Clotting Time    Collection Time: 02/05/25 10:17 AM    Specimen: Blood   Result Value Ref Range    Activated Clotting Time  141 82 - 152 Seconds   Protime-INR    Collection Time: 02/05/25 11:17 AM    Specimen: Blood   Result Value Ref Range    Protime 19.2 (H) 11.7 - 14.2 Seconds    INR 1.61 (H) 0.90 - 1.10   aPTT    Collection Time: 02/05/25 11:17 AM    Specimen: Blood   Result Value Ref Range    PTT 37.8 (H) 22.7 - 35.4 seconds   Calcium, Ionized    Collection Time: 02/05/25 11:17 AM    Specimen: Blood   Result Value Ref Range    Ionized Calcium 1.21 1.15 - 1.35 mmol/L   Fibrinogen    Collection Time: 02/05/25 11:17 AM    Specimen: Blood   Result Value Ref Range    Fibrinogen 197 (L) 219 - 464 mg/dL   Renal Function Panel    Collection Time: 02/05/25 11:17 AM    Specimen: Blood   Result Value Ref Range    Glucose 41 (C) 65 - 99 mg/dL    BUN 15 8 - 23 mg/dL    Creatinine 1.43 (H) 0.76 - 1.27 mg/dL    Sodium 137 136 - 145 mmol/L    Potassium 3.7 3.5 - 5.2 mmol/L    Chloride 109 (H) 98 - 107  mmol/L    CO2 21.0 (L) 22.0 - 29.0 mmol/L    Calcium 8.4 (L) 8.6 - 10.5 mg/dL    Albumin 3.4 (L) 3.5 - 5.2 g/dL    Phosphorus 1.5 (C) 2.5 - 4.5 mg/dL    Anion Gap 7.0 5.0 - 15.0 mmol/L    BUN/Creatinine Ratio 10.5 7.0 - 25.0    eGFR 51.7 (L) >60.0 mL/min/1.73   Magnesium    Collection Time: 02/05/25 11:17 AM    Specimen: Blood   Result Value Ref Range    Magnesium 3.1 (H) 1.6 - 2.4 mg/dL   CBC Auto Differential    Collection Time: 02/05/25 11:17 AM    Specimen: Blood   Result Value Ref Range    WBC 8.13 3.40 - 10.80 10*3/mm3    RBC 3.15 (L) 4.14 - 5.80 10*6/mm3    Hemoglobin 9.9 (L) 13.0 - 17.7 g/dL    Hematocrit 30.4 (L) 37.5 - 51.0 %    MCV 96.5 79.0 - 97.0 fL    MCH 31.4 26.6 - 33.0 pg    MCHC 32.6 31.5 - 35.7 g/dL    RDW 12.6 12.3 - 15.4 %    RDW-SD 44.1 37.0 - 54.0 fl    MPV 9.7 6.0 - 12.0 fL    Platelets 106 (L) 140 - 450 10*3/mm3    Neutrophil % 93.4 (H) 42.7 - 76.0 %    Lymphocyte % 5.4 (L) 19.6 - 45.3 %    Monocyte % 0.4 (L) 5.0 - 12.0 %    Eosinophil % 0.4 0.3 - 6.2 %    Basophil % 0.2 0.0 - 1.5 %    Immature Grans % 0.2 0.0 - 0.5 %    Neutrophils, Absolute 7.59 (H) 1.70 - 7.00 10*3/mm3    Lymphocytes, Absolute 0.44 (L) 0.70 - 3.10 10*3/mm3    Monocytes, Absolute 0.03 (L) 0.10 - 0.90 10*3/mm3    Eosinophils, Absolute 0.03 0.00 - 0.40 10*3/mm3    Basophils, Absolute 0.02 0.00 - 0.20 10*3/mm3    Immature Grans, Absolute 0.02 0.00 - 0.05 10*3/mm3    nRBC 0.0 0.0 - 0.2 /100 WBC   POC Glucose Once    Collection Time: 02/05/25 11:19 AM    Specimen: Blood   Result Value Ref Range    Glucose 41 (C) 70 - 130 mg/dL   ECG 12 Lead Pre-Op / Pre-Procedure    Collection Time: 02/05/25 11:23 AM   Result Value Ref Range    QT Interval 429 ms    QTC Interval 383 ms   Blood Gas, Arterial -    Collection Time: 02/05/25 11:48 AM    Specimen: Arterial Blood   Result Value Ref Range    Site Arterial Line     Cedric's Test N/A     pH, Arterial 7.400 7.350 - 7.450 pH units    pCO2, Arterial 36.3 35.0 - 45.0 mm Hg    pO2, Arterial  530.6 (H) 80.0 - 100.0 mm Hg    HCO3, Arterial 22.5 22.0 - 28.0 mmol/L    Base Excess, Arterial -2.0 (L) 0.0 - 2.0 mmol/L    O2 Saturation, Arterial 100.0 (H) 92.0 - 98.5 %    A-a DO2 0.7 mmHg    CO2 Content 23.6 23 - 27 mmol/L    Barometric Pressure for Blood Gas 755.6000 mmHg    Modality Adult Vent     FIO2 100 %    Ventilator Mode AC     Set Tidal Volume 650     Set Mech Resp Rate 15     Rate 15 Breaths/minute    PEEP 5     Hemodilution No     PO2/FIO2 531 (H) 0 - 500   POC Glucose Once    Collection Time: 02/05/25 11:53 AM    Specimen: Blood   Result Value Ref Range    Glucose 194 (H) 70 - 130 mg/dL   POC Glucose Once    Collection Time: 02/05/25 12:32 PM    Specimen: Blood   Result Value Ref Range    Glucose 108 70 - 130 mg/dL   POC Glucose Once    Collection Time: 02/05/25  1:04 PM    Specimen: Blood   Result Value Ref Range    Glucose 92 70 - 130 mg/dL   POC Glucose Once    Collection Time: 02/05/25  2:00 PM    Specimen: Blood   Result Value Ref Range    Glucose 101 70 - 130 mg/dL   Blood Gas, Venous -    Collection Time: 02/05/25  2:19 PM    Specimen: Venous Blood   Result Value Ref Range    Site PA     pH, Venous 7.393 7.310 - 7.410 pH Units    pCO2, Venous 35.6 (L) 41.0 - 51.0 mm Hg    pO2, Venous 31.3 (L) 35.0 - 45.0 mm Hg    HCO3, Venous 21.7 (L) 22.0 - 28.0 mmol/L    Base Excess, Venous -2.9 (L) -2.0 - 2.0 mmol/L    O2 Saturation, Venous 60.3 45.0 - 75.0 %    CO2 Content 22.8 (L) 23 - 27 mmol/L    Barometric Pressure for Blood Gas 753.3000 mmHg    Modality Adult Vent     FIO2 40 %    Ventilator Mode AC     Set Tidal Volume 650     Set Mech Resp Rate 15     Rate 15 Breaths/minute    PEEP 5     Device Comment [id number]    POC Glucose Once    Collection Time: 02/05/25  3:03 PM    Specimen: Blood   Result Value Ref Range    Glucose 126 70 - 130 mg/dL   CBC (No Diff)    Collection Time: 02/05/25  3:04 PM    Specimen: Blood   Result Value Ref Range    WBC 14.96 (H) 3.40 - 10.80 10*3/mm3    RBC 3.21 (L) 4.14 -  5.80 10*6/mm3    Hemoglobin 10.4 (L) 13.0 - 17.7 g/dL    Hematocrit 29.7 (L) 37.5 - 51.0 %    MCV 92.5 79.0 - 97.0 fL    MCH 32.4 26.6 - 33.0 pg    MCHC 35.0 31.5 - 35.7 g/dL    RDW 12.1 (L) 12.3 - 15.4 %    RDW-SD 40.6 37.0 - 54.0 fl    MPV 10.5 6.0 - 12.0 fL    Platelets 132 (L) 140 - 450 10*3/mm3   Renal Function Panel    Collection Time: 02/05/25  3:04 PM    Specimen: Blood   Result Value Ref Range    Glucose 125 (H) 65 - 99 mg/dL    BUN 17 8 - 23 mg/dL    Creatinine 1.51 (H) 0.76 - 1.27 mg/dL    Sodium 139 136 - 145 mmol/L    Potassium 3.8 3.5 - 5.2 mmol/L    Chloride 110 (H) 98 - 107 mmol/L    CO2 19.4 (L) 22.0 - 29.0 mmol/L    Calcium 8.2 (L) 8.6 - 10.5 mg/dL    Albumin 3.7 3.5 - 5.2 g/dL    Phosphorus 0.4 (C) 2.5 - 4.5 mg/dL    Anion Gap 9.6 5.0 - 15.0 mmol/L    BUN/Creatinine Ratio 11.3 7.0 - 25.0    eGFR 48.5 (L) >60.0 mL/min/1.73   Magnesium    Collection Time: 02/05/25  3:04 PM    Specimen: Blood   Result Value Ref Range    Magnesium 2.7 (H) 1.6 - 2.4 mg/dL   Fibrinogen    Collection Time: 02/05/25  3:04 PM    Specimen: Blood   Result Value Ref Range    Fibrinogen 185 (L) 219 - 464 mg/dL   Beta Hydroxybutyrate Quantitative    Collection Time: 02/05/25  3:04 PM    Specimen: Blood   Result Value Ref Range    Beta-Hydroxybutyrate Quant 0.414 (H) 0.020 - 0.270 mmol/L   Blood Gas, Arterial -    Collection Time: 02/05/25  3:50 PM    Specimen: Arterial Blood   Result Value Ref Range    Site Arterial Line     Cedric's Test N/A     pH, Arterial 7.388 7.350 - 7.450 pH units    pCO2, Arterial 36.2 35.0 - 45.0 mm Hg    pO2, Arterial 168.8 (H) 80.0 - 100.0 mm Hg    HCO3, Arterial 21.8 (L) 22.0 - 28.0 mmol/L    Base Excess, Arterial -2.9 (L) 0.0 - 2.0 mmol/L    O2 Saturation, Arterial 99.5 (H) 92.0 - 98.5 %    A-a DO2 0.7 mmHg    CO2 Content 22.9 (L) 23 - 27 mmol/L    Barometric Pressure for Blood Gas 750.7000 mmHg    Modality Adult Vent     FIO2 40 %    Ventilator Mode AC     Set Tidal Volume 650     Set Mech Resp  Rate 15     Rate 15 Breaths/minute    PEEP 5     Hemodilution No     PO2/FIO2 422 0 - 500   POC Glucose Once    Collection Time: 02/05/25  3:51 PM    Specimen: Blood   Result Value Ref Range    Glucose 140 (H) 70 - 130 mg/dL   POC Glucose Once    Collection Time: 02/05/25  5:04 PM    Specimen: Blood   Result Value Ref Range    Glucose 134 (H) 70 - 130 mg/dL   Blood Gas, Arterial -    Collection Time: 02/05/25  5:29 PM    Specimen: Arterial Blood   Result Value Ref Range    Site Arterial Line     Cedric's Test N/A     pH, Arterial 7.313 (L) 7.350 - 7.450 pH units    pCO2, Arterial 44.2 35.0 - 45.0 mm Hg    pO2, Arterial 169.8 (H) 80.0 - 100.0 mm Hg    HCO3, Arterial 22.4 22.0 - 28.0 mmol/L    Base Excess, Arterial -3.6 (L) 0.0 - 2.0 mmol/L    O2 Saturation, Arterial 99.4 (H) 92.0 - 98.5 %    A-a DO2 0.7 mmHg    CO2 Content 23.8 23 - 27 mmol/L    Barometric Pressure for Blood Gas 750.2000 mmHg    Modality Adult Vent     FIO2 40 %    Ventilator Mode AC     Set Tidal Volume 650     Set Mech Resp Rate 12     PEEP 5     Hemodilution No     PO2/FIO2 425 0 - 500   POC Glucose Once    Collection Time: 02/05/25  6:01 PM    Specimen: Blood   Result Value Ref Range    Glucose 136 (H) 70 - 130 mg/dL   Potassium    Collection Time: 02/05/25  6:09 PM    Specimen: Blood   Result Value Ref Range    Potassium 4.5 3.5 - 5.2 mmol/L   POC Glucose Once    Collection Time: 02/05/25  8:16 PM    Specimen: Blood   Result Value Ref Range    Glucose 167 (H) 70 - 130 mg/dL   POC Glucose Once    Collection Time: 02/05/25  9:11 PM    Specimen: Blood   Result Value Ref Range    Glucose 172 (H) 70 - 130 mg/dL   POC Glucose Once    Collection Time: 02/05/25 10:09 PM    Specimen: Blood   Result Value Ref Range    Glucose 178 (H) 70 - 130 mg/dL   POC Lactate    Collection Time: 02/05/25 10:12 PM    Specimen: Arterial Blood   Result Value Ref Range    Lactate 13.3 (C) <=2.0 mmol/L    Notified Time      Notified Who yoan gregory     Read Back Yes     POC Chem Panel    Collection Time: 02/05/25 10:12 PM    Specimen: Arterial Blood   Result Value Ref Range    Glucose 199 (H) 65 - 99 mg/dL    Sodium 145 136 - 145 mmol/L    POC Potassium 4.5 3.5 - 5.2 mmol/L    Ionized Calcium 1.81 (L) 2.20 - 2.55 mmol/L    Chloride 116 (H) 98 - 107 mmol/L    Creatinine 1.92 (H) 0.60 - 1.30 mg/dL    BUN 19 8 - 26 mg/dL    CO2 Content 10.4 (L) 23 - 27 mmol/L    Notified Who yoan gergory     Read Back Yes    POC H&H    Collection Time: 02/05/25 10:12 PM    Specimen: Arterial Blood   Result Value Ref Range    Hemoglobin 9.0 (L) 12.0 - 17.0 g/dL    Hematocrit 27 (L) 38 - 51 %   Blood Gas, Arterial -    Collection Time: 02/05/25 10:12 PM    Specimen: Arterial Blood   Result Value Ref Range    Site Arterial Line     Cedric's Test N/A     pH, Arterial 7.030 (C) 7.350 - 7.450 pH units    pCO2, Arterial 39.6 35.0 - 45.0 mm Hg    pO2, Arterial 149.6 (H) 80.0 - 100.0 mm Hg    HCO3, Arterial 10.5 (L) 22.0 - 28.0 mmol/L    Base Excess, Arterial -19.2 (L) 0.0 - 2.0 mmol/L    O2 Saturation, Arterial 98.0 92.0 - 98.5 %    Barometric Pressure for Blood Gas 746.8000 mmHg    Modality Cannula     Flow Rate 4.0000 lpm    Rate 19 Breaths/minute    Notified Who yoan gregory     Read Back Yes     Notified Time      Hemodilution No    Blood Gas, Arterial -    Collection Time: 02/05/25 10:34 PM    Specimen: Arterial Blood   Result Value Ref Range    Site Arterial Line     Cedric's Test N/A     pH, Arterial 7.200 (C) 7.350 - 7.450 pH units    pCO2, Arterial 37.6 35.0 - 45.0 mm Hg    pO2, Arterial 191.7 (H) 80.0 - 100.0 mm Hg    HCO3, Arterial 14.7 (L) 22.0 - 28.0 mmol/L    Base Excess, Arterial -12.4 (L) 0.0 - 2.0 mmol/L    O2 Saturation, Arterial 99.4 (H) 92.0 - 98.5 %    CO2 Content 15.8 (L) 23 - 27 mmol/L    Barometric Pressure for Blood Gas 746.3000 mmHg    Modality Cannula     Flow Rate 5.0000 lpm    Rate 16 Breaths/minute    Notified Who dr johnson     Read Back Yes     Notified Time       Hemodilution No    STAT Lactic Acid, Reflex    Collection Time: 02/05/25 11:01 PM    Specimen: Blood   Result Value Ref Range    Lactate 12.0 (C) 0.5 - 2.0 mmol/L   CBC (No Diff)    Collection Time: 02/05/25 11:01 PM    Specimen: Blood   Result Value Ref Range    WBC 26.27 (H) 3.40 - 10.80 10*3/mm3    RBC 2.89 (L) 4.14 - 5.80 10*6/mm3    Hemoglobin 9.4 (L) 13.0 - 17.7 g/dL    Hematocrit 26.6 (L) 37.5 - 51.0 %    MCV 92.0 79.0 - 97.0 fL    MCH 32.5 26.6 - 33.0 pg    MCHC 35.3 31.5 - 35.7 g/dL    RDW 12.2 (L) 12.3 - 15.4 %    RDW-SD 40.1 37.0 - 54.0 fl    MPV 10.0 6.0 - 12.0 fL    Platelets 146 140 - 450 10*3/mm3   BNP    Collection Time: 02/05/25 11:01 PM    Specimen: Blood   Result Value Ref Range    proBNP 1,551.0 (H) 0.0 - 900.0 pg/mL   POC Glucose Once    Collection Time: 02/05/25 11:02 PM    Specimen: Blood   Result Value Ref Range    Glucose 177 (H) 70 - 130 mg/dL   Blood Gas, Arterial -    Collection Time: 02/05/25 11:02 PM    Specimen: Arterial Blood   Result Value Ref Range    Site Arterial Line     Cedric's Test N/A     pH, Arterial 7.251 (C) 7.350 - 7.450 pH units    pCO2, Arterial 39.2 35.0 - 45.0 mm Hg    pO2, Arterial 166.0 (H) 80.0 - 100.0 mm Hg    HCO3, Arterial 17.2 (L) 22.0 - 28.0 mmol/L    Base Excess, Arterial -9.3 (L) 0.0 - 2.0 mmol/L    O2 Saturation, Arterial 99.2 (H) 92.0 - 98.5 %    CO2 Content 18.4 (L) 23 - 27 mmol/L    Barometric Pressure for Blood Gas 746.8000 mmHg    Modality Cannula     Flow Rate 4.0000 lpm    Rate 16 Breaths/minute    Notified Who dr johnson     Read Back Yes     Notified Time      Hemodilution No    POC Glucose Once    Collection Time: 02/06/25 12:02 AM    Specimen: Blood   Result Value Ref Range    Glucose 176 (H) 70 - 130 mg/dL   Blood Gas, Arterial -    Collection Time: 02/06/25 12:08 AM    Specimen: Arterial Blood   Result Value Ref Range    Site Arterial Line     Cedric's Test N/A     pH, Arterial 7.362 7.350 - 7.450 pH units    pCO2, Arterial 42.5 35.0 - 45.0 mm Hg     pO2, Arterial 158.6 (H) 80.0 - 100.0 mm Hg    HCO3, Arterial 24.1 22.0 - 28.0 mmol/L    Base Excess, Arterial -1.3 (L) 0.0 - 2.0 mmol/L    O2 Saturation, Arterial 99.3 (H) 92.0 - 98.5 %    CO2 Content 25.4 23 - 27 mmol/L    Barometric Pressure for Blood Gas 745.2000 mmHg    Modality Cannula     Flow Rate 5.0000 lpm    Rate 20 Breaths/minute    Hemodilution No    POC Glucose Once    Collection Time: 02/06/25  2:05 AM    Specimen: Blood   Result Value Ref Range    Glucose 169 (H) 70 - 130 mg/dL   Phosphorus    Collection Time: 02/06/25  2:34 AM    Specimen: Blood   Result Value Ref Range    Phosphorus 3.8 2.5 - 4.5 mg/dL   Magnesium    Collection Time: 02/06/25  2:34 AM    Specimen: Blood   Result Value Ref Range    Magnesium 2.8 (H) 1.6 - 2.4 mg/dL   STAT Lactic Acid, Reflex    Collection Time: 02/06/25  2:34 AM    Specimen: Blood   Result Value Ref Range    Lactate 4.1 (C) 0.5 - 2.0 mmol/L   Calcium, Ionized    Collection Time: 02/06/25  3:28 AM    Specimen: Blood   Result Value Ref Range    Ionized Calcium 1.21 1.15 - 1.35 mmol/L   Renal Function Panel    Collection Time: 02/06/25  3:28 AM    Specimen: Blood   Result Value Ref Range    Glucose 159 (H) 65 - 99 mg/dL    BUN 17 8 - 23 mg/dL    Creatinine 1.69 (H) 0.76 - 1.27 mg/dL    Sodium 143 136 - 145 mmol/L    Potassium 4.1 3.5 - 5.2 mmol/L    Chloride 110 (H) 98 - 107 mmol/L    CO2 22.0 22.0 - 29.0 mmol/L    Calcium 8.7 8.6 - 10.5 mg/dL    Albumin 3.8 3.5 - 5.2 g/dL    Phosphorus 3.9 2.5 - 4.5 mg/dL    Anion Gap 11.0 5.0 - 15.0 mmol/L    BUN/Creatinine Ratio 10.1 7.0 - 25.0    eGFR 42.3 (L) >60.0 mL/min/1.73   Protime-INR    Collection Time: 02/06/25  3:28 AM    Specimen: Blood   Result Value Ref Range    Protime 18.2 (H) 11.7 - 14.2 Seconds    INR 1.51 (H) 0.90 - 1.10   CBC Auto Differential    Collection Time: 02/06/25  3:28 AM    Specimen: Blood   Result Value Ref Range    WBC 19.91 (H) 3.40 - 10.80 10*3/mm3    RBC 2.80 (L) 4.14 - 5.80 10*6/mm3     Hemoglobin 9.0 (L) 13.0 - 17.7 g/dL    Hematocrit 25.9 (L) 37.5 - 51.0 %    MCV 92.5 79.0 - 97.0 fL    MCH 32.1 26.6 - 33.0 pg    MCHC 34.7 31.5 - 35.7 g/dL    RDW 12.6 12.3 - 15.4 %    RDW-SD 42.6 37.0 - 54.0 fl    MPV 10.0 6.0 - 12.0 fL    Platelets 128 (L) 140 - 450 10*3/mm3    Neutrophil % 93.4 (H) 42.7 - 76.0 %    Lymphocyte % 2.0 (L) 19.6 - 45.3 %    Monocyte % 3.8 (L) 5.0 - 12.0 %    Eosinophil % 0.0 (L) 0.3 - 6.2 %    Basophil % 0.1 0.0 - 1.5 %    Immature Grans % 0.7 (H) 0.0 - 0.5 %    Neutrophils, Absolute 18.62 (H) 1.70 - 7.00 10*3/mm3    Lymphocytes, Absolute 0.39 (L) 0.70 - 3.10 10*3/mm3    Monocytes, Absolute 0.75 0.10 - 0.90 10*3/mm3    Eosinophils, Absolute 0.00 0.00 - 0.40 10*3/mm3    Basophils, Absolute 0.02 0.00 - 0.20 10*3/mm3    Immature Grans, Absolute 0.13 (H) 0.00 - 0.05 10*3/mm3    nRBC 0.0 0.0 - 0.2 /100 WBC   POC Glucose Once    Collection Time: 02/06/25  3:45 AM    Specimen: Blood   Result Value Ref Range    Glucose 148 (H) 70 - 130 mg/dL   STAT Lactic Acid, Reflex    Collection Time: 02/06/25  5:27 AM    Specimen: Blood   Result Value Ref Range    Lactate 2.6 (C) 0.5 - 2.0 mmol/L   ECG 12 Lead Chest Pain    Collection Time: 02/06/25  5:35 AM   Result Value Ref Range    QT Interval 463 ms    QTC Interval 534 ms   POC Glucose Once    Collection Time: 02/06/25  5:39 AM    Specimen: Blood   Result Value Ref Range    Glucose 155 (H) 70 - 130 mg/dL   Adult Transthoracic Echo Complete W/ Cont if Necessary Per Protocol    Collection Time: 02/06/25  7:36 AM   Result Value Ref Range    EF(MOD-bp) 35.7 %    LV Sys Vol (BSA corrected) 35.5 cm2    LV Paez Vol (BSA corrected) 53.8 cm2    EDV(MOD-sp2) 90.0 ml    EDV(MOD-sp4) 97.0 ml    ESV(MOD-sp2) 57.0 ml    ESV(MOD-sp4) 64.0 ml    SV(MOD-sp2) 33.0 ml    SV(MOD-sp4) 33.0 ml    SVi(MOD-SP2) 18.3 ml/m2    SVi(MOD-SP4) 18.3 ml/m2    EF(MOD-sp2) 36.7 %    EF(MOD-sp4) 34.0 %    MV E max hoang 114.0 cm/sec    MV A max hoang 48.4 cm/sec    MV dec time 0.23 sec     MV E/A 2.36     LA ESV Index (BP) 43.1 ml/m2    Med Peak E' Mitch 5.2 cm/sec    Lat Peak E' Mitch 9.8 cm/sec    Avg E/e' ratio 15.20     RV Base 2.6 cm    RV Mid 1.94 cm    RV Length 5.1 cm    Pulm Sys Mitch 43.8 cm/sec    Pulm Paez Mitch 68.6 cm/sec    Pulm S/D 0.64     LV V1 max 77.7 cm/sec    LV V1 max PG 2.42 mmHg    LV V1 mean PG 1.28 mmHg    LV V1 VTI 14.6 cm    Ao pk mitch 98.9 cm/sec    Ao max PG 3.9 mmHg    Ao mean PG 2.21 mmHg    Ao V2 VTI 18.7 cm    AI P1/2t 636.8 msec    MV max PG 6.2 mmHg    MV mean PG 2.27 mmHg    MV V2 VTI 24.3 cm    MV P1/2t 53.8 msec    MVA(P1/2t) 4.1 cm2    MV dec slope 732.1 cm/sec2    RV V1 max PG 0.61 mmHg    RV V1 max 39.0 cm/sec    RV V1 VTI 7.6 cm    PA acc time 0.06 sec    LVOT area 3.8 cm2    LVOT diam 2.19 cm    SVi (LVOT) 30.3 ml/m2    SV(LVOT) 54.6 ml    KATRINA(I,D) 2.9 cm2    MVA(VTI) 2.25 cm2    Dimensionless Index 0.77 (DI)   POC Glucose Once    Collection Time: 02/06/25  8:36 AM    Specimen: Blood   Result Value Ref Range    Glucose 128 70 - 130 mg/dL   Blood Gas, Arterial -    Collection Time: 02/06/25  9:20 AM    Specimen: Arterial Blood   Result Value Ref Range    Site Arterial Line     Cedric's Test N/A     pH, Arterial 7.389 7.350 - 7.450 pH units    pCO2, Arterial 45.3 (H) 35.0 - 45.0 mm Hg    pO2, Arterial 126.4 (H) 80.0 - 100.0 mm Hg    HCO3, Arterial 27.4 22.0 - 28.0 mmol/L    Base Excess, Arterial 2.1 (H) 0.0 - 2.0 mmol/L    O2 Saturation, Arterial 98.8 (H) 92.0 - 98.5 %    CO2 Content 28.8 (H) 23 - 27 mmol/L    Barometric Pressure for Blood Gas 747.1000 mmHg    Modality Cannula     Flow Rate 2.0000 lpm    Set Mech Resp Rate 16     Hemodilution No    POC Glucose Once    Collection Time: 02/06/25  9:49 AM    Specimen: Blood   Result Value Ref Range    Glucose 142 (H) 70 - 130 mg/dL   STAT Lactic Acid, Reflex    Collection Time: 02/06/25  9:50 AM    Specimen: Blood   Result Value Ref Range    Lactate 1.6 0.5 - 2.0 mmol/L   Calcium, Ionized    Collection Time: 02/06/25   9:50 AM    Specimen: Blood   Result Value Ref Range    Ionized Calcium 1.20 1.15 - 1.35 mmol/L   Magnesium    Collection Time: 02/06/25  9:50 AM    Specimen: Blood   Result Value Ref Range    Magnesium 2.7 (H) 1.6 - 2.4 mg/dL   Basic Metabolic Panel    Collection Time: 02/06/25  9:50 AM    Specimen: Blood   Result Value Ref Range    Glucose 142 (H) 65 - 99 mg/dL    BUN 18 8 - 23 mg/dL    Creatinine 1.60 (H) 0.76 - 1.27 mg/dL    Sodium 143 136 - 145 mmol/L    Potassium 4.1 3.5 - 5.2 mmol/L    Chloride 109 (H) 98 - 107 mmol/L    CO2 24.0 22.0 - 29.0 mmol/L    Calcium 8.7 8.6 - 10.5 mg/dL    BUN/Creatinine Ratio 11.3 7.0 - 25.0    Anion Gap 10.0 5.0 - 15.0 mmol/L    eGFR 45.2 (L) >60.0 mL/min/1.73   CBC (No Diff)    Collection Time: 02/06/25  9:50 AM    Specimen: Blood   Result Value Ref Range    WBC 19.45 (H) 3.40 - 10.80 10*3/mm3    RBC 2.79 (L) 4.14 - 5.80 10*6/mm3    Hemoglobin 9.2 (L) 13.0 - 17.7 g/dL    Hematocrit 25.8 (L) 37.5 - 51.0 %    MCV 92.5 79.0 - 97.0 fL    MCH 33.0 26.6 - 33.0 pg    MCHC 35.7 31.5 - 35.7 g/dL    RDW 12.8 12.3 - 15.4 %    RDW-SD 42.8 37.0 - 54.0 fl    MPV 10.0 6.0 - 12.0 fL    Platelets 127 (L) 140 - 450 10*3/mm3   Lactic Acid, Plasma    Collection Time: 02/06/25  9:50 AM    Specimen: Blood   Result Value Ref Range    Lactate 1.6 0.5 - 2.0 mmol/L   Beta Hydroxybutyrate Quantitative    Collection Time: 02/06/25  9:50 AM    Specimen: Blood   Result Value Ref Range    Beta-Hydroxybutyrate Quant 0.160 0.020 - 0.270 mmol/L   POC Glucose Once    Collection Time: 02/06/25 12:05 PM    Specimen: Blood   Result Value Ref Range    Glucose 159 (H) 70 - 130 mg/dL   Calcium, Ionized    Collection Time: 02/06/25  3:12 PM    Specimen: Blood   Result Value Ref Range    Ionized Calcium 1.12 (L) 1.15 - 1.35 mmol/L   Magnesium    Collection Time: 02/06/25  3:12 PM    Specimen: Blood   Result Value Ref Range    Magnesium 2.4 1.6 - 2.4 mg/dL   Basic Metabolic Panel    Collection Time: 02/06/25  3:12 PM     Specimen: Blood   Result Value Ref Range    Glucose 154 (H) 65 - 99 mg/dL    BUN 18 8 - 23 mg/dL    Creatinine 1.63 (H) 0.76 - 1.27 mg/dL    Sodium 139 136 - 145 mmol/L    Potassium 4.1 3.5 - 5.2 mmol/L    Chloride 105 98 - 107 mmol/L    CO2 24.8 22.0 - 29.0 mmol/L    Calcium 8.4 (L) 8.6 - 10.5 mg/dL    BUN/Creatinine Ratio 11.0 7.0 - 25.0    Anion Gap 9.2 5.0 - 15.0 mmol/L    eGFR 44.2 (L) >60.0 mL/min/1.73   Lactic Acid, Plasma    Collection Time: 02/06/25  3:12 PM    Specimen: Blood   Result Value Ref Range    Lactate 1.3 0.5 - 2.0 mmol/L   Blood Gas, Arterial -    Collection Time: 02/06/25  3:40 PM    Specimen: Arterial Blood   Result Value Ref Range    Site Arterial Line     Cedric's Test N/A     pH, Arterial 7.444 7.350 - 7.450 pH units    pCO2, Arterial 39.5 35.0 - 45.0 mm Hg    pO2, Arterial 135.3 (H) 80.0 - 100.0 mm Hg    HCO3, Arterial 27.1 22.0 - 28.0 mmol/L    Base Excess, Arterial 2.8 (H) 0.0 - 2.0 mmol/L    O2 Saturation, Arterial 99.2 (H) 92.0 - 98.5 %    CO2 Content 28.3 (H) 23 - 27 mmol/L    Barometric Pressure for Blood Gas 747.4000 mmHg    Modality Cannula     Flow Rate 2.0000 lpm    Set [facility] Resp Rate 18     Hemodilution No    POC Glucose Once    Collection Time: 02/06/25  5:18 PM    Specimen: Blood   Result Value Ref Range    Glucose 137 (H) 70 - 130 mg/dL   POC Glucose Once    Collection Time: 02/06/25 10:08 PM    Specimen: Blood   Result Value Ref Range    Glucose 136 (H) 70 - 130 mg/dL   Potassium    Collection Time: 02/06/25 10:13 PM    Specimen: Blood   Result Value Ref Range    Potassium 4.1 3.5 - 5.2 mmol/L   CBC (No Diff)    Collection Time: 02/07/25  2:37 AM    Specimen: Blood   Result Value Ref Range    WBC 16.38 (H) 3.40 - 10.80 10*3/mm3    RBC 2.61 (L) 4.14 - 5.80 10*6/mm3    Hemoglobin 8.4 (L) 13.0 - 17.7 g/dL    Hematocrit 24.9 (L) 37.5 - 51.0 %    MCV 95.4 79.0 - 97.0 fL    MCH 32.2 26.6 - 33.0 pg    MCHC 33.7 31.5 - 35.7 g/dL    RDW 12.6 12.3 - 15.4 %    RDW-SD 43.3 37.0 - 54.0 fl     MPV 10.6 6.0 - 12.0 fL    Platelets 115 (L) 140 - 450 10*3/mm3   Basic Metabolic Panel    Collection Time: 02/07/25  2:37 AM    Specimen: Blood   Result Value Ref Range    Glucose 128 (H) 65 - 99 mg/dL    BUN 23 8 - 23 mg/dL    Creatinine 1.48 (H) 0.76 - 1.27 mg/dL    Sodium 138 136 - 145 mmol/L    Potassium 4.2 3.5 - 5.2 mmol/L    Chloride 104 98 - 107 mmol/L    CO2 25.3 22.0 - 29.0 mmol/L    Calcium 8.5 (L) 8.6 - 10.5 mg/dL    BUN/Creatinine Ratio 15.5 7.0 - 25.0    Anion Gap 8.7 5.0 - 15.0 mmol/L    eGFR 49.6 (L) >60.0 mL/min/1.73   Magnesium    Collection Time: 02/07/25  2:37 AM    Specimen: Blood   Result Value Ref Range    Magnesium 2.4 1.6 - 2.4 mg/dL   Calcium, Ionized    Collection Time: 02/07/25  2:37 AM    Specimen: Blood   Result Value Ref Range    Ionized Calcium 1.16 1.15 - 1.35 mmol/L   Iron Profile    Collection Time: 02/07/25  2:37 AM    Specimen: Blood   Result Value Ref Range    Iron 25 (L) 59 - 158 mcg/dL    Iron Saturation (TSAT) 15 (L) 20 - 50 %    Transferrin 115 (L) 200 - 360 mg/dL    TIBC 171 (L) 298 - 536 mcg/dL   Blood Gas, Arterial -    Collection Time: 02/07/25  2:55 AM    Specimen: Arterial Blood   Result Value Ref Range    Site Arterial Line     Cedric's Test N/A     pH, Arterial 7.370 7.350 - 7.450 pH units    pCO2, Arterial 50.3 (H) 35.0 - 45.0 mm Hg    pO2, Arterial 62.0 (L) 80.0 - 100.0 mm Hg    HCO3, Arterial 29.0 (H) 22.0 - 28.0 mmol/L    Base Excess, Arterial 3.3 (H) 0.0 - 2.0 mmol/L    O2 Saturation, Arterial 90.3 (L) 92.0 - 98.5 %    A-a DO2 0.7 mmHg    CO2 Content 30.6 (H) 23 - 27 mmol/L    Barometric Pressure for Blood Gas 753.4000 mmHg    Modality Room Air     FIO2 21 %    Rate 20 Breaths/minute    Hemodilution No     Device Comment sat 96%     PO2/FIO2 295 0 - 500   ECG 12 Lead Chest Pain    Collection Time: 02/07/25  5:11 AM   Result Value Ref Range    QT Interval 390 ms    QTC Interval 395 ms   POC Glucose Once    Collection Time: 02/07/25  7:44 AM    Specimen: Blood    Result Value Ref Range    Glucose 116 70 - 130 mg/dL   ECG 12 Lead Rhythm Change    Collection Time: 02/07/25  8:10 AM   Result Value Ref Range    QT Interval 325 ms    QTC Interval 463 ms   POC Glucose Once    Collection Time: 02/07/25 11:10 AM    Specimen: Blood   Result Value Ref Range    Glucose 139 (H) 70 - 130 mg/dL   ECG 12 Lead Rhythm Change    Collection Time: 02/07/25 11:47 AM   Result Value Ref Range    QT Interval 339 ms    QTC Interval 463 ms   CBC (No Diff)    Collection Time: 02/07/25  2:21 PM    Specimen: Blood   Result Value Ref Range    WBC 12.94 (H) 3.40 - 10.80 10*3/mm3    RBC 2.58 (L) 4.14 - 5.80 10*6/mm3    Hemoglobin 8.4 (L) 13.0 - 17.7 g/dL    Hematocrit 24.5 (L) 37.5 - 51.0 %    MCV 95.0 79.0 - 97.0 fL    MCH 32.6 26.6 - 33.0 pg    MCHC 34.3 31.5 - 35.7 g/dL    RDW 12.4 12.3 - 15.4 %    RDW-SD 42.3 37.0 - 54.0 fl    MPV 10.9 6.0 - 12.0 fL    Platelets 95 (L) 140 - 450 10*3/mm3   Basic Metabolic Panel    Collection Time: 02/07/25  2:21 PM    Specimen: Blood   Result Value Ref Range    Glucose 142 (H) 65 - 99 mg/dL    BUN 26 (H) 8 - 23 mg/dL    Creatinine 1.53 (H) 0.76 - 1.27 mg/dL    Sodium 136 136 - 145 mmol/L    Potassium 4.3 3.5 - 5.2 mmol/L    Chloride 103 98 - 107 mmol/L    CO2 25.1 22.0 - 29.0 mmol/L    Calcium 8.6 8.6 - 10.5 mg/dL    BUN/Creatinine Ratio 17.0 7.0 - 25.0    Anion Gap 7.9 5.0 - 15.0 mmol/L    eGFR 47.7 (L) >60.0 mL/min/1.73   Magnesium    Collection Time: 02/07/25  2:21 PM    Specimen: Blood   Result Value Ref Range    Magnesium 3.0 (H) 1.6 - 2.4 mg/dL   Calcium, Ionized    Collection Time: 02/07/25  2:21 PM    Specimen: Blood   Result Value Ref Range    Ionized Calcium 1.18 1.15 - 1.35 mmol/L   Blood Gas, Arterial -    Collection Time: 02/07/25  3:24 PM    Specimen: Arterial Blood   Result Value Ref Range    Site Arterial Line     Cedric's Test N/A     pH, Arterial 7.383 7.350 - 7.450 pH units    pCO2, Arterial 44.8 35.0 - 45.0 mm Hg    pO2, Arterial 89.2 80.0 - 100.0  mm Hg    HCO3, Arterial 26.7 22.0 - 28.0 mmol/L    Base Excess, Arterial 1.4 0.0 - 2.0 mmol/L    O2 Saturation, Arterial 96.7 92.0 - 98.5 %    CO2 Content 28.1 (H) 23 - 27 mmol/L    Barometric Pressure for Blood Gas 754.3000 mmHg    Modality Cannula - Nasal     Flow Rate 1.0000 lpm    Set Mech Resp Rate 18     Hemodilution No    POC Glucose Once    Collection Time: 02/07/25  5:56 PM    Specimen: Blood   Result Value Ref Range    Glucose 129 70 - 130 mg/dL   POC Glucose Once    Collection Time: 02/07/25  9:14 PM    Specimen: Blood   Result Value Ref Range    Glucose 129 70 - 130 mg/dL   CBC (No Diff)    Collection Time: 02/08/25  3:11 AM    Specimen: Blood   Result Value Ref Range    WBC 12.02 (H) 3.40 - 10.80 10*3/mm3    RBC 2.52 (L) 4.14 - 5.80 10*6/mm3    Hemoglobin 8.1 (L) 13.0 - 17.7 g/dL    Hematocrit 23.6 (L) 37.5 - 51.0 %    MCV 93.7 79.0 - 97.0 fL    MCH 32.1 26.6 - 33.0 pg    MCHC 34.3 31.5 - 35.7 g/dL    RDW 12.2 (L) 12.3 - 15.4 %    RDW-SD 41.7 37.0 - 54.0 fl    MPV 11.2 6.0 - 12.0 fL    Platelets 102 (L) 140 - 450 10*3/mm3   Basic Metabolic Panel    Collection Time: 02/08/25  3:11 AM    Specimen: Blood   Result Value Ref Range    Glucose 110 (H) 65 - 99 mg/dL    BUN 27 (H) 8 - 23 mg/dL    Creatinine 1.54 (H) 0.76 - 1.27 mg/dL    Sodium 134 (L) 136 - 145 mmol/L    Potassium 3.9 3.5 - 5.2 mmol/L    Chloride 99 98 - 107 mmol/L    CO2 24.6 22.0 - 29.0 mmol/L    Calcium 8.3 (L) 8.6 - 10.5 mg/dL    BUN/Creatinine Ratio 17.5 7.0 - 25.0    Anion Gap 10.4 5.0 - 15.0 mmol/L    eGFR 47.3 (L) >60.0 mL/min/1.73   Magnesium    Collection Time: 02/08/25  3:11 AM    Specimen: Blood   Result Value Ref Range    Magnesium 2.4 1.6 - 2.4 mg/dL   Calcium, Ionized    Collection Time: 02/08/25  3:11 AM    Specimen: Blood   Result Value Ref Range    Ionized Calcium 1.15 1.15 - 1.35 mmol/L   Blood Gas, Arterial -    Collection Time: 02/08/25  3:25 AM    Specimen: Arterial Blood   Result Value Ref Range    Site Arterial Line      Cedric's Test N/A     pH, Arterial 7.424 7.350 - 7.450 pH units    pCO2, Arterial 39.4 35.0 - 45.0 mm Hg    pO2, Arterial 89.0 80.0 - 100.0 mm Hg    HCO3, Arterial 25.8 22.0 - 28.0 mmol/L    Base Excess, Arterial 1.3 0.0 - 2.0 mmol/L    O2 Saturation, Arterial 97.1 92.0 - 98.5 %    CO2 Content 27.0 23 - 27 mmol/L    Barometric Pressure for Blood Gas 751.5000 mmHg    Modality Cannula     Flow Rate 2.0000 lpm    Rate 17 Breaths/minute    Hemodilution No     Device Comment sat 99%    POC Glucose Once    Collection Time: 02/08/25 11:17 AM    Specimen: Blood   Result Value Ref Range    Glucose 92 70 - 130 mg/dL   Potassium    Collection Time: 02/08/25  2:54 PM    Specimen: Blood   Result Value Ref Range    Potassium 4.1 3.5 - 5.2 mmol/L   Vitamin D,25-Hydroxy    Collection Time: 02/08/25  2:54 PM    Specimen: Blood   Result Value Ref Range    25 Hydroxy, Vitamin D 29.4 (L) 30.0 - 100.0 ng/ml   POC Glucose Once    Collection Time: 02/08/25  4:23 PM    Specimen: Blood   Result Value Ref Range    Glucose 105 70 - 130 mg/dL   Basic Metabolic Panel    Collection Time: 02/09/25  2:59 AM    Specimen: Blood   Result Value Ref Range    Glucose 100 (H) 65 - 99 mg/dL    BUN 28 (H) 8 - 23 mg/dL    Creatinine 1.66 (H) 0.76 - 1.27 mg/dL    Sodium 135 (L) 136 - 145 mmol/L    Potassium 3.9 3.5 - 5.2 mmol/L    Chloride 100 98 - 107 mmol/L    CO2 24.2 22.0 - 29.0 mmol/L    Calcium 7.8 (L) 8.6 - 10.5 mg/dL    BUN/Creatinine Ratio 16.9 7.0 - 25.0    Anion Gap 10.8 5.0 - 15.0 mmol/L    eGFR 43.3 (L) >60.0 mL/min/1.73   CBC (No Diff)    Collection Time: 02/09/25  2:59 AM    Specimen: Blood   Result Value Ref Range    WBC 7.44 3.40 - 10.80 10*3/mm3    RBC 2.40 (L) 4.14 - 5.80 10*6/mm3    Hemoglobin 7.5 (L) 13.0 - 17.7 g/dL    Hematocrit 23.2 (L) 37.5 - 51.0 %    MCV 96.7 79.0 - 97.0 fL    MCH 31.3 26.6 - 33.0 pg    MCHC 32.3 31.5 - 35.7 g/dL    RDW 12.6 12.3 - 15.4 %    RDW-SD 43.9 37.0 - 54.0 fl    MPV 10.6 6.0 - 12.0 fL    Platelets 115 (L)  140 - 450 10*3/mm3   Magnesium    Collection Time: 02/09/25  2:59 AM    Specimen: Blood   Result Value Ref Range    Magnesium 2.3 1.6 - 2.4 mg/dL   Potassium    Collection Time: 02/09/25 12:22 PM    Specimen: Blood   Result Value Ref Range    Potassium 4.1 3.5 - 5.2 mmol/L   Hemoglobin & Hematocrit, Blood    Collection Time: 02/09/25  6:47 PM    Specimen: Blood   Result Value Ref Range    Hemoglobin 8.9 (L) 13.0 - 17.7 g/dL    Hematocrit 25.9 (L) 37.5 - 51.0 %   Basic Metabolic Panel    Collection Time: 02/10/25  3:56 AM    Specimen: Blood   Result Value Ref Range    Glucose 102 (H) 65 - 99 mg/dL    BUN 26 (H) 8 - 23 mg/dL    Creatinine 1.43 (H) 0.76 - 1.27 mg/dL    Sodium 133 (L) 136 - 145 mmol/L    Potassium 4.1 3.5 - 5.2 mmol/L    Chloride 102 98 - 107 mmol/L    CO2 23.4 22.0 - 29.0 mmol/L    Calcium 8.0 (L) 8.6 - 10.5 mg/dL    BUN/Creatinine Ratio 18.2 7.0 - 25.0    Anion Gap 7.6 5.0 - 15.0 mmol/L    eGFR 51.7 (L) >60.0 mL/min/1.73   CBC (No Diff)    Collection Time: 02/10/25  3:56 AM    Specimen: Blood   Result Value Ref Range    WBC 5.94 3.40 - 10.80 10*3/mm3    RBC 2.53 (L) 4.14 - 5.80 10*6/mm3    Hemoglobin 8.1 (L) 13.0 - 17.7 g/dL    Hematocrit 23.3 (L) 37.5 - 51.0 %    MCV 92.1 79.0 - 97.0 fL    MCH 32.0 26.6 - 33.0 pg    MCHC 34.8 31.5 - 35.7 g/dL    RDW 12.4 12.3 - 15.4 %    RDW-SD 41.6 37.0 - 54.0 fl    MPV 10.7 6.0 - 12.0 fL    Platelets 146 140 - 450 10*3/mm3   Magnesium    Collection Time: 02/10/25  3:56 AM    Specimen: Blood   Result Value Ref Range    Magnesium 2.5 (H) 1.6 - 2.4 mg/dL   ECG 12 Lead QT Measurement    Collection Time: 02/10/25  7:50 AM   Result Value Ref Range    QT Interval 463 ms    QTC Interval 520 ms   Basic Metabolic Panel    Collection Time: 02/11/25  2:45 AM    Specimen: Blood   Result Value Ref Range    Glucose 97 65 - 99 mg/dL    BUN 22 8 - 23 mg/dL    Creatinine 1.49 (H) 0.76 - 1.27 mg/dL    Sodium 136 136 - 145 mmol/L    Potassium 4.2 3.5 - 5.2 mmol/L    Chloride 102 98 -  107 mmol/L    CO2 24.6 22.0 - 29.0 mmol/L    Calcium 8.3 (L) 8.6 - 10.5 mg/dL    BUN/Creatinine Ratio 14.8 7.0 - 25.0    Anion Gap 9.4 5.0 - 15.0 mmol/L    eGFR 49.2 (L) >60.0 mL/min/1.73   Magnesium    Collection Time: 02/11/25  2:45 AM    Specimen: Blood   Result Value Ref Range    Magnesium 2.3 1.6 - 2.4 mg/dL   Vitamin D,25-Hydroxy    Collection Time: 02/11/25  2:45 AM    Specimen: Blood   Result Value Ref Range    25 Hydroxy, Vitamin D 31.2 30.0 - 100.0 ng/ml   CBC (No Diff)    Collection Time: 02/11/25  2:46 AM    Specimen: Blood   Result Value Ref Range    WBC 6.14 3.40 - 10.80 10*3/mm3    RBC 2.71 (L) 4.14 - 5.80 10*6/mm3    Hemoglobin 8.8 (L) 13.0 - 17.7 g/dL    Hematocrit 25.5 (L) 37.5 - 51.0 %    MCV 94.1 79.0 - 97.0 fL    MCH 32.5 26.6 - 33.0 pg    MCHC 34.5 31.5 - 35.7 g/dL    RDW 12.6 12.3 - 15.4 %    RDW-SD 43.5 37.0 - 54.0 fl    MPV 10.6 6.0 - 12.0 fL    Platelets 170 140 - 450 10*3/mm3   Basic Metabolic Panel    Collection Time: 02/12/25  2:52 AM    Specimen: Blood   Result Value Ref Range    Glucose 104 (H) 65 - 99 mg/dL    BUN 19 8 - 23 mg/dL    Creatinine 1.31 (H) 0.76 - 1.27 mg/dL    Sodium 136 136 - 145 mmol/L    Potassium 4.3 3.5 - 5.2 mmol/L    Chloride 102 98 - 107 mmol/L    CO2 23.2 22.0 - 29.0 mmol/L    Calcium 8.5 (L) 8.6 - 10.5 mg/dL    BUN/Creatinine Ratio 14.5 7.0 - 25.0    Anion Gap 10.8 5.0 - 15.0 mmol/L    eGFR 57.5 (L) >60.0 mL/min/1.73   CBC (No Diff)    Collection Time: 02/12/25  2:52 AM    Specimen: Blood   Result Value Ref Range    WBC 8.21 3.40 - 10.80 10*3/mm3    RBC 2.84 (L) 4.14 - 5.80 10*6/mm3    Hemoglobin 9.0 (L) 13.0 - 17.7 g/dL    Hematocrit 26.5 (L) 37.5 - 51.0 %    MCV 93.3 79.0 - 97.0 fL    MCH 31.7 26.6 - 33.0 pg    MCHC 34.0 31.5 - 35.7 g/dL    RDW 12.3 12.3 - 15.4 %    RDW-SD 42.2 37.0 - 54.0 fl    MPV 10.7 6.0 - 12.0 fL    Platelets 238 140 - 450 10*3/mm3   Magnesium    Collection Time: 02/12/25  2:52 AM    Specimen: Blood   Result Value Ref Range    Magnesium  2.4 1.6 - 2.4 mg/dL   ECG 12 Lead QT Measurement    Collection Time: 02/12/25  8:59 AM   Result Value Ref Range    QT Interval 302 ms    QTC Interval 436 ms   Prepare RBC, 2 Units    Collection Time: 02/13/25  7:14 AM   Result Value Ref Range    Product Code E0018J82     Unit Number [id number]     UNIT  ABO O     UNIT  RH POS     Crossmatch Interpretation Compatible     Dispense Status RE     Blood Expiration Date 202502282359     Blood Type Barcode 5100     Product Code Q3649V17     Unit Number [id number]     UNIT  ABO O     UNIT  RH POS     Crossmatch Interpretation Compatible     Dispense Status PT     Blood Expiration Date [id number]     Blood Type Barcode 5100        XR Chest 1 View    Result Date: 2/8/2025  Increased bibasilar opacities. Otherwise, no significant change.  This report was finalized on 2/8/2025 9:53 AM by Dr. Andrea Lynne M.D on Workstation: [id number]      XR Chest 1 View    Result Date: 2/7/2025  1. Interval removal of the Coyote-Regan catheter since yesterday. 2. Chest otherwise appears largely unchanged.   This report was finalized on 2/7/2025 6:18 AM by Dr. Brett Carson M.D on Workstation: [id number]      XR Chest 1 View    Result Date: 2/6/2025  1. Cardiomegaly. The heart appears slightly larger in size than on yesterday's study but this may be related to technique as today's study is underinflated. 2. Satisfactory position of life support devices. No pneumothorax is seen.   This report was finalized on 2/6/2025 6:17 AM by Dr. Brett Carson M.D on Workstation: [id number]      XR Chest 1 View    Result Date: 2/5/2025  As above    This report was finalized on 2/5/2025 11:38 AM by Dr. Geo Álvarez M.D on Workstation: [id number]      Chest X-Ray PA & Lateral    Result Date: 2/3/2025  1. No acute process.   This report was finalized on 2/3/2025 3:19 PM by Dr. Brett Carson M.D on Workstation: [id number]           Assessment & Plan  Encounter for  examination following treatment at hospital         Severe mitral regurgitation         Status post mitral valve repair         Coronary artery disease involving native coronary artery of native heart without angina pectoris           Status post coronary artery bypass grafting         PFO (patent foramen ovale)         Status post patent foramen ovale closure         Primary hypertension           Mixed hyperlipidemia            Stage 3a chronic kidney disease             Continue all current medications.    Home health in place.    Discussed f/u w/ Cardiology and CT surgery.      Medications, including side effects, were discussed with the patient. Patient verbalized understanding.  The plan of care was discussed. All questions were answered. Patient verbalized understanding.      Return in about 3 months (around 5/18/2025) for fasting labs one week prior to.

## 2025-02-21 ENCOUNTER — OFFICE VISIT (OUTPATIENT)
Age: 75
End: 2025-02-21
Payer: MEDICARE

## 2025-02-21 VITALS
WEIGHT: 156 LBS | HEIGHT: 67 IN | SYSTOLIC BLOOD PRESSURE: 110 MMHG | BODY MASS INDEX: 24.48 KG/M2 | DIASTOLIC BLOOD PRESSURE: 60 MMHG | OXYGEN SATURATION: 99 %

## 2025-02-21 DIAGNOSIS — I34.0 SEVERE MITRAL REGURGITATION: ICD-10-CM

## 2025-02-21 DIAGNOSIS — I48.91 ATRIAL FIBRILLATION, UNSPECIFIED TYPE: ICD-10-CM

## 2025-02-21 DIAGNOSIS — I10 PRIMARY HYPERTENSION: ICD-10-CM

## 2025-02-21 DIAGNOSIS — I34.0 NONRHEUMATIC MITRAL VALVE REGURGITATION: ICD-10-CM

## 2025-02-21 DIAGNOSIS — I79.8 OTHER DISORDERS OF ARTERIES, ARTERIOLES AND CAPILLARIES IN DISEASES CLASSIFIED ELSEWHERE: ICD-10-CM

## 2025-02-21 DIAGNOSIS — I25.10 CORONARY ARTERY DISEASE INVOLVING NATIVE CORONARY ARTERY OF NATIVE HEART, UNSPECIFIED WHETHER ANGINA PRESENT: ICD-10-CM

## 2025-02-21 DIAGNOSIS — E78.2 MIXED HYPERLIPIDEMIA: ICD-10-CM

## 2025-02-21 DIAGNOSIS — I34.0 NONRHEUMATIC MITRAL VALVE REGURGITATION: Primary | ICD-10-CM

## 2025-02-21 RX ORDER — FUROSEMIDE 40 MG/1
40 TABLET ORAL DAILY
Qty: 30 TABLET | Refills: 0 | Status: SHIPPED | OUTPATIENT
Start: 2025-02-21 | End: 2025-03-23

## 2025-02-21 RX ORDER — AMIODARONE HYDROCHLORIDE 200 MG/1
200 TABLET ORAL DAILY
Qty: 30 TABLET | Refills: 1 | Status: SHIPPED | OUTPATIENT
Start: 2025-02-21

## 2025-02-21 RX ORDER — HYDROCODONE BITARTRATE AND ACETAMINOPHEN 5; 325 MG/1; MG/1
1 TABLET ORAL EVERY 6 HOURS PRN
COMMUNITY

## 2025-02-21 RX ORDER — ASPIRIN 81 MG/1
81 TABLET ORAL DAILY
Qty: 90 TABLET | Refills: 3 | Status: SHIPPED | OUTPATIENT
Start: 2025-02-21 | End: 2025-05-22

## 2025-02-21 RX ORDER — ATENOLOL 25 MG/1
25 TABLET ORAL EVERY 12 HOURS SCHEDULED
Qty: 180 TABLET | Refills: 0 | Status: SHIPPED | OUTPATIENT
Start: 2025-02-21 | End: 2025-05-22

## 2025-02-21 NOTE — PROGRESS NOTES
CARDIOLOGY        Patient Name: Jackson Tariq  :1950  Age: 74 y.o.  Primary Cardiologist: Konstantin Driscoll MD  Encounter Provider:  Jg Zuluaga PA-C    Date of Service: 25            CHIEF COMPLAINT / REASON FOR OFFICE VISIT     Hospital follow-up    HISTORY OF PRESENT ILLNESS       HPI  Jackson Tariq is a 74 y.o. male who presents today for hospital follow-up.     Pt has a  history significant for nonrheumatic mitral valve regurgitation and s/p CABG/MVR presents for hospital follow-up..  Patient was admitted on 2025 through 2025 for worsening symptoms from his valvular heart disease.  He was scheduled for surgery on 2025 he underwent CABGx2(LIMA to LAD, SVG to OM1)/Mitral valve repair/PFO closure/MANJULA closure with AtriClip by Dr. Gaitan.  He did have some postop complications including questionable seizure activity, pericarditis, ISATU, and atrial flutter.  He had anemia postop day 4 and was transfused 1 unit of packed red blood cells.  He remained in A-fib and EP was consulted with plans of following up outpatient.  He was discharged on Eliquis and amiodarone.  He was only given 5 tablets of his amiodarone and ran out.    Patient has been doing well since his discharge.  He has not had any chest pain or chest pressure.  He has some soreness from his incision.  He denies any shortness of air, palpitations, or lightheadedness.  He does have some intermittent swelling to his legs and improve overnight.  He has been tolerating his medications.  He has not had any bleeding issues being on Eliquis.  He has been in touch with cardiac rehab.    The following portions of the patient's history were reviewed and updated as appropriate: allergies, current medications, past family history, past medical history, past social history, past surgical history and problem list.      VITAL SIGNS     Visit Vitals  /60 (BP Location: Left arm, Patient Position: Sitting, Cuff Size: Adult)   Ht  "170.2 cm (67\")   Wt 70.8 kg (156 lb)   SpO2 99%   BMI 24.43 kg/m²       @[email or web address]  Wt Readings from Last 3 Encounters:   02/21/25 70.8 kg (156 lb)   02/18/25 71.3 kg (157 lb 3.2 oz)   02/12/25 70.8 kg (156 lb 1.6 oz)     Body mass index is 24.43 kg/m².        PHYSICAL EXAMINATION     Constitutional:       General: Awake. Not in acute distress.     Appearance: Not in distress. Chronically ill-appearing.   Pulmonary:      Effort: Pulmonary effort is normal.      Breath sounds: Normal breath sounds.   Chest:          Comments: Surgical incision healing well with no erythema, warmth or dehiscence.  Cardiovascular:      Normal rate. Regularly irregular rhythm.      Murmurs: There is no murmur.   Edema:     Pretibial: bilateral trace edema of the pretibial area.  Skin:     General: Skin is warm.   Neurological:      Mental Status: Alert.   Psychiatric:         Behavior: Behavior is cooperative.           REVIEWED DATA       ECG 12 Lead    Date/Time: 2/21/2025 1:27 PM  Performed by: Jg Zuluaga PA-C    Authorized by: Jg Zuluaga PA-C  Comparison: compared with previous ECG   Similar to previous ECG  Rhythm: atrial flutter  Rate: normal  BPM: 68  QRS axis: normal    Clinical impression: abnormal EKG  Comments: Similar to previous.  Reviewed with Dr. Driscoll and QTc was okay.          Cardiac Procedures:    Operative Note - Dr. Gaitan     Date of Dictation: 02/05/25     Date of Procedure: Same     Referring Physician: Konstantin Driscoll MD     Indications:   Severe mitral regurgitation, symptomatic     Preoperative diagnosis:  1.  Severe degenerative mitral valve regurgitation  2.  Mitral valve prolapse with P2 segment affectation  3.  Patent foramen ovale  4.  Two-vessel coronary artery disease  5.  CKD 3     Postoperative diagnosis:  Same     Procedure:  1.  Complex mitral valve repair with a 38 mm Medtronic flexible band annuloplasty and triangular resection of the P2 segment  2.  CABG x 2 with a " LIMA to the mid LAD and reverse individual saphenous vein graft to the first marginal  3.  Primary closure of patent foramen ovale  4.  Endoscopic vein harvest of the right lower extremity  5.  Epicardial closure of the left atrial appendage with a 40 mm atrial clip device    Transthoracic echo on 6-6 24    Left ventricular systolic function is moderately decreased. Calculated left ventricular EF = 35.7%    Left atrial volume is moderately increased.    The study is technically difficult for diagnosis. The quality of the study is limited due to patient positioning.     BRIE on 2/5/2025  Echocardiogram Comments:       Diagnosis: non-rheumatic aortic insufficiency.  Severe mitral regurgitation, eccentric wall-hugging jet away from posterior leaflet.  P2 flail with torn chordae.       Post-CPB there was no mitral regurgitation.  Mitral valve annuloplasty ring seen.  Left atrial appendage obliterated.  Interatrial septum intact.  Occasional LALITO seen when patient was hypovolemic.  Thick, hypertrophied LV.          Lipid Panel          7/26/2024    08:10 2/3/2025    07:13   Lipid Panel   Total Cholesterol  118    Total Cholesterol 171     Triglycerides 96  91    HDL Cholesterol 63  60    VLDL Cholesterol 17  17    LDL Cholesterol  91  41    LDL/HDL Ratio  0.66        Lab Results   Component Value Date     02/12/2025     02/11/2025    K 4.3 02/12/2025    K 4.2 02/11/2025     02/12/2025     02/11/2025    CO2 23.2 02/12/2025    CO2 24.6 02/11/2025    BUN 19 02/12/2025    BUN 22 02/11/2025    CREATININE 1.31 (H) 02/12/2025    CREATININE 1.49 (H) 02/11/2025    GLUCOSE 104 (H) 02/12/2025    GLUCOSE 97 02/11/2025    CALCIUM 8.5 (L) 02/12/2025    CALCIUM 8.3 (L) 02/11/2025    ALBUMIN 3.8 02/06/2025    ALBUMIN 3.7 02/05/2025    BILITOT 1.0 02/03/2025    BILITOT 1.1 07/26/2024    AST 25 02/03/2025    AST 18 07/26/2024    ALT 26 02/03/2025    ALT 12 07/26/2024     Lab Results   Component Value Date    WBC 8.21  "02/12/2025    WBC 6.14 02/11/2025    HGB 9.0 (L) 02/12/2025    HGB 8.8 (L) 02/11/2025    HCT 26.5 (L) 02/12/2025    HCT 25.5 (L) 02/11/2025    MCV 93.3 02/12/2025    MCV 94.1 02/11/2025     02/12/2025     02/11/2025     Lab Results   Component Value Date    PROBNP 1,551.0 (H) 02/05/2025    PROBNP 326.0 02/03/2025     No results found for: \"CKTOTAL\", \"CKMB\", \"CKMBINDEX\", \"TROPONINI\", \"TROPONINT\"  Lab Results   Component Value Date    TSH 2.850 07/21/2023             ASSESSMENT & PLAN     Diagnoses and all orders for this visit:    1. Nonrheumatic mitral valve regurgitation (Primary)    2. Other disorders of arteries, arterioles and capillaries in diseases classified elsewhere    3. Coronary artery disease involving native coronary artery of native heart, unspecified whether angina present    4. Severe mitral regurgitation    5. Primary hypertension    6. Mixed hyperlipidemia    7. Atrial fibrillation, unspecified type      Multivessel coronary artery disease  Status post CABG x 2 vessel, LIMA to mid LAD and reverse SVG to first marginal  Encouraged incentive spirometer/ambulation  The patient presents today for follow up after open heart surgery.  The following were reviewed/discussed:    [x] Incision Check   [x] ECG for rhythm review  [] Was patient placed on Amiodarone ? [x] Continue  [] Discontinue  [] Was patient placed on Diuretics ?  [x] Continue  [] Discontinue  [x] Medication Education Review for GDMT medications  [] Labs ordered for any new medications  [x] Cardiac Rehab/Home Health ordered  [x] Nutrition Education  [x] Post op Echo ordered if valvular surgery  [x] SBE antibiotic education for TAVR, AV surgery, aortic repair  [x] Driving Release - Per CT surgery    Mitral valve regurgitation  Status post mitral valve repair which was complex  Hypertension  BP stable  Hyperlipidemia  Continue atorvastatin  Stage III renal disease  Baseline creatinine 1.4  Creatinine 1.3 at discharge  History of " esophagitis/dysphagia  Postop anemia  Atrial fibrillation/flutter  He has follow up with EP on March 26th.   Continue amiodarone but will reduce dose to 200 mg daily and will write enough about to see EP  On apixaban for anticoagulation      Patient is doing well from his discharge from the hospital.  I reviewed patient's EKG treatment with Dr. Driscoll and agreeable to continue his amiodarone 200 mg daily. I have refilled his medications.  Have put him out for a repeat echocardiogram in 1 month s/p CABG/MVR.  I spoke to patient and wife about this and make changes on their MAR.  I will have him follow-up in 1 month with Dr. Driscoll.  May need to get labs at this appointment.    Return in about 4 weeks (around 3/21/2025) for Dr. Driscoll.    Future Appointments         Provider Department Center    3/13/2025 1:00 PM Dian Hilario APRN [facility] CARDIAC SURGERY [place]    3/26/2025 8:30 AM Konstatnin Driscoll MD [facility] CARDIOLOGY [place]    3/26/2025 1:00 PM Eddi Morley APRSAMIRA [facility] CARDIOLOGY     4/30/2025 9:30 AM Jg Zuluaga PA-C [facility] CARDIOLOGY [place]    5/29/2025 8:00 AM Caroline Medina APRSAMIRA [facility] PRIMARY CARE [facility]    8/29/2025 8:30 AM Caroline Medina [facility] PRIMARY CARE [facility]                MEDICATIONS         Discharge Medications            Accurate as of February 21, 2025  2:53 PM. If you have any questions, ask your nurse or doctor.                Changes to Medications        Instructions Start Date   amiodarone 200 MG tablet  Commonly known as: PACERONE  What changed: when to take this  Changed by: Jg Zuluaga   200 mg, Oral, Daily      omeprazole 20 MG capsule  Commonly known as: priLOSEC  What changed: when to take this   20 mg, Oral, 2 Times Daily             Continue These Medications        Instructions Start Date   apixaban 5 MG tablet tablet  Commonly known  as: ELIQUIS   5 mg, Oral, Every 12 Hours Scheduled      aspirin 81 MG EC tablet   81 mg, Oral, Daily      atenolol 25 MG tablet  Commonly known as: TENORMIN   25 mg, Oral, Every 12 Hours Scheduled      atorvastatin 40 MG tablet  Commonly known as: LIPITOR   40 mg, Oral, Nightly      CALCIUM 500 +D PO   500 Int'l Units, 2 Times Daily      clotrimazole-betamethasone 1-0.05 % cream  Commonly known as: LOTRISONE   1 Application, Topical, 2 Times Daily      furosemide 40 MG tablet  Commonly known as: LASIX   40 mg, Oral, Daily      HYDROcodone-acetaminophen 5-325 MG per tablet  Commonly known as: NORCO   1 tablet, Every 6 Hours PRN      potassium chloride 20 MEQ CR tablet  Commonly known as: KLOR-CON M20   20 mEq, Oral, Daily                   **Dragon Disclaimer:   Much of this encounter note is an electronic transcription/translation of spoken language to printed text. The electronic translation of spoken language may permit erroneous, or at times, nonsensical words or phrases to be inadvertently transcribed. Although I have reviewed the note for such errors, some may still exist.

## 2025-03-13 ENCOUNTER — OFFICE VISIT (OUTPATIENT)
Dept: CARDIAC SURGERY | Facility: CLINIC | Age: 75
End: 2025-03-13
Payer: MEDICARE

## 2025-03-13 VITALS
DIASTOLIC BLOOD PRESSURE: 74 MMHG | OXYGEN SATURATION: 99 % | BODY MASS INDEX: 24.33 KG/M2 | HEART RATE: 61 BPM | HEIGHT: 67 IN | RESPIRATION RATE: 18 BRPM | WEIGHT: 155 LBS | SYSTOLIC BLOOD PRESSURE: 151 MMHG

## 2025-03-13 DIAGNOSIS — I10 PRIMARY HYPERTENSION: ICD-10-CM

## 2025-03-13 DIAGNOSIS — Z98.890 S/P MVR (MITRAL VALVE REPAIR): Primary | ICD-10-CM

## 2025-03-13 DIAGNOSIS — I25.10 CORONARY ARTERY DISEASE INVOLVING NATIVE CORONARY ARTERY OF NATIVE HEART, UNSPECIFIED WHETHER ANGINA PRESENT: ICD-10-CM

## 2025-03-13 DIAGNOSIS — I34.0 NONRHEUMATIC MITRAL VALVE REGURGITATION: ICD-10-CM

## 2025-03-13 DIAGNOSIS — Z95.1 S/P CABG X 2: ICD-10-CM

## 2025-03-13 DIAGNOSIS — I79.8 OTHER DISORDERS OF ARTERIES, ARTERIOLES AND CAPILLARIES IN DISEASES CLASSIFIED ELSEWHERE: ICD-10-CM

## 2025-03-13 DIAGNOSIS — I34.0 SEVERE MITRAL REGURGITATION: ICD-10-CM

## 2025-03-13 PROCEDURE — 99024 POSTOP FOLLOW-UP VISIT: CPT

## 2025-03-13 NOTE — Clinical Note
He is having persistent SOA, which he states he did not have prior to surgery. I did note a slightly decreased EF in a post-op echo. Hope it is improved. Thanks.

## 2025-03-13 NOTE — PROGRESS NOTES
CARDIOVASCULAR SURGERY FOLLOW-UP PROGRESS NOTE  Chief Complaint: Post-op follow-up appointment     HPI:   Dear Ms. Medina and Colleagues:    It was my pleasure to see your patient Jackson Tariq in the office today.  As you know, he is a very pleasant 74 y.o. male with a past medical history of MV regurgitation, hypertension, CAD, and CKD III who underwent CABG x 2/MVR by Dr. Gaitan on 2/5/2025. Overall, he did well postoperatively and was discharged shortly after surgery. He comes in today for routine post-operative follow-up.  His activity level has been poor. He reports having significant SOA, especially with activity, which he did not have prior to surgery. He has only had mild post-operative pain. He has had follow-up with his PCP and has an echo and cardiology appointment next week.       Medications:    Current Outpatient Medications:     amiodarone (PACERONE) 200 MG tablet, Take 1 tablet by mouth Daily., Disp: 30 tablet, Rfl: 1    apixaban (ELIQUIS) 5 MG tablet tablet, Take 1 tablet by mouth Every 12 (Twelve) Hours. Indications: Atrial Fibrillation, Disp: 14 tablet, Rfl: 0    aspirin 81 MG EC tablet, Take 1 tablet by mouth Daily for 90 days., Disp: 90 tablet, Rfl: 3    atenolol (TENORMIN) 25 MG tablet, Take 1 tablet by mouth Every 12 (Twelve) Hours for 90 days., Disp: 180 tablet, Rfl: 0    atorvastatin (LIPITOR) 40 MG tablet, Take 1 tablet by mouth Every Night for 90 days., Disp: 90 tablet, Rfl: 0    Calcium Carb-Cholecalciferol (CALCIUM 500 +D PO), Take 500 Int'l Units by mouth 2 (Two) Times a Day., Disp: , Rfl:     clotrimazole-betamethasone (LOTRISONE) 1-0.05 % cream, Apply 1 Application topically to the appropriate area as directed 2 (Two) Times a Day., Disp: 15 g, Rfl: 0    furosemide (LASIX) 40 MG tablet, Take 1 tablet by mouth Daily for 30 days., Disp: 30 tablet, Rfl: 0    omeprazole (priLOSEC) 20 MG capsule, Take 1 capsule by mouth twice daily (Patient taking differently: Take 1 capsule by mouth  "Daily.), Disp: 180 capsule, Rfl: 3    potassium chloride (KLOR-CON M20) 20 MEQ CR tablet, Take 1 tablet by mouth Daily for 30 days., Disp: 30 tablet, Rfl: 0    HYDROcodone-acetaminophen (NORCO) 5-325 MG per tablet, Take 1 tablet by mouth Every 6 (Six) Hours As Needed for Severe Pain. (Patient not taking: Reported on 3/13/2025), Disp: , Rfl:      Physical Exam:         /74 (BP Location: Left arm, Patient Position: Sitting, Cuff Size: Large Adult)   Pulse 61   Resp 18   Ht 170.2 cm (67\")   Wt 70.3 kg (155 lb)   SpO2 99%   BMI 24.28 kg/m²   Heart:  irregular rhythm, controlled rate, S1, S2 normal, no murmur, click, rub or gallop  Lungs:  clear to auscultation bilaterally  Extremities:  no edema, peripheral pulses 2+ and symmetric  Incision(s):  mid chest healing well, no drainage, no dehiscence; Sternum stable to palpation.    Assessment/Plan:       S/P CABG and mitral valve repair.     Post-op atrial fibrillation- on PO amio and Eliquis  Post-op blood loss anemia s/p blood transfusion    No heavy lifting > 10 pounds for 2 more weeks  Keep incisions clean and dry  OK to drive if not taking narcotic pain medicine  OK to begin cardiac rehab  Follow-up as scheduled with cardiology  Follow-up as scheduled with PCP  Follow-up with CT surgery prn    The above recommendations and restrictions were discussed with patient and spouse.    Continue sternal precautions including no lifting more than 10 lbs until 6 weeks post-operatively and then no more than 50 lbs until 12 weeks post-operatively.   Avoid excessive jarring or twisting motions until 12 weeks from the date of surgery.     At this time I do not believe we need to see him for any further postoperative follow-up.  I am a bit concerned about his continued shortness of breath.  Will send my note to cardiology and follow-up with them after his upcoming echo.    I have encouraged the patient to reach out to our office with any questions or concerns.     Thank " you for allowing me to participate in the care of your patient. If you have any questions or concerns feel free to contact myself or Dr. Gaitan.    Regards,  ENRIQUE Ellison

## 2025-03-18 ENCOUNTER — HOSPITAL ENCOUNTER (OUTPATIENT)
Dept: CARDIOLOGY | Facility: HOSPITAL | Age: 75
Discharge: HOME | End: 2025-03-18
Admitting: PHYSICIAN ASSISTANT
Payer: MEDICARE

## 2025-03-18 VITALS
BODY MASS INDEX: 24.33 KG/M2 | HEART RATE: 55 BPM | WEIGHT: 155 LBS | DIASTOLIC BLOOD PRESSURE: 70 MMHG | SYSTOLIC BLOOD PRESSURE: 134 MMHG | HEIGHT: 67 IN

## 2025-03-18 DIAGNOSIS — I34.0 NONRHEUMATIC MITRAL VALVE REGURGITATION: ICD-10-CM

## 2025-03-18 DIAGNOSIS — I34.0 SEVERE MITRAL REGURGITATION: ICD-10-CM

## 2025-03-18 DIAGNOSIS — I79.8 OTHER DISORDERS OF ARTERIES, ARTERIOLES AND CAPILLARIES IN DISEASES CLASSIFIED ELSEWHERE: ICD-10-CM

## 2025-03-18 DIAGNOSIS — I10 PRIMARY HYPERTENSION: ICD-10-CM

## 2025-03-18 DIAGNOSIS — Z98.890 S/P MVR (MITRAL VALVE REPAIR): Primary | ICD-10-CM

## 2025-03-18 DIAGNOSIS — I25.10 CORONARY ARTERY DISEASE INVOLVING NATIVE CORONARY ARTERY OF NATIVE HEART, UNSPECIFIED WHETHER ANGINA PRESENT: ICD-10-CM

## 2025-03-18 LAB
AORTIC ARCH: 2.6 CM
AORTIC DIMENSIONLESS INDEX: 0.65 (DI)
ASCENDING AORTA: 3.7 CM
AV MEAN PRESS GRAD SYS DOP V1V2: 3.2 MMHG
AV VMAX SYS DOP: 112.3 CM/SEC
BH CV ECHO MEAS - ACS: 1.96 CM
BH CV ECHO MEAS - AI P1/2T: 858.8 MSEC
BH CV ECHO MEAS - AO MAX PG: 5 MMHG
BH CV ECHO MEAS - AO ROOT DIAM: 3.2 CM
BH CV ECHO MEAS - AO V2 VTI: 27.9 CM
BH CV ECHO MEAS - AVA(I,D): 2.22 CM2
BH CV ECHO MEAS - EDV(CUBED): 130.6 ML
BH CV ECHO MEAS - EDV(MOD-SP2): 156 ML
BH CV ECHO MEAS - EDV(MOD-SP4): 140 ML
BH CV ECHO MEAS - EF(MOD-SP2): 36.5 %
BH CV ECHO MEAS - EF(MOD-SP4): 36.4 %
BH CV ECHO MEAS - ESV(CUBED): 102.5 ML
BH CV ECHO MEAS - ESV(MOD-SP2): 99 ML
BH CV ECHO MEAS - ESV(MOD-SP4): 89 ML
BH CV ECHO MEAS - FS: 7.8 %
BH CV ECHO MEAS - IVS/LVPW: 0.94 CM
BH CV ECHO MEAS - IVSD: 1 CM
BH CV ECHO MEAS - LA DIMENSION: 3.6 CM
BH CV ECHO MEAS - LAT PEAK E' VEL: 11 CM/SEC
BH CV ECHO MEAS - LV DIASTOLIC VOL/BSA (35-75): 77.2 CM2
BH CV ECHO MEAS - LV MASS(C)D: 195.8 GRAMS
BH CV ECHO MEAS - LV MAX PG: 2.6 MMHG
BH CV ECHO MEAS - LV MEAN PG: 1.22 MMHG
BH CV ECHO MEAS - LV SYSTOLIC VOL/BSA (12-30): 49 CM2
BH CV ECHO MEAS - LV V1 MAX: 81.2 CM/SEC
BH CV ECHO MEAS - LV V1 VTI: 18.1 CM
BH CV ECHO MEAS - LVIDD: 5.1 CM
BH CV ECHO MEAS - LVIDS: 4.7 CM
BH CV ECHO MEAS - LVOT AREA: 3.4 CM2
BH CV ECHO MEAS - LVOT DIAM: 2.09 CM
BH CV ECHO MEAS - LVPWD: 1.07 CM
BH CV ECHO MEAS - MED PEAK E' VEL: 5.2 CM/SEC
BH CV ECHO MEAS - MV A MAX VEL: 35.1 CM/SEC
BH CV ECHO MEAS - MV DEC SLOPE: 561.9 CM/SEC2
BH CV ECHO MEAS - MV DEC TIME: 0.17 SEC
BH CV ECHO MEAS - MV E MAX VEL: 105 CM/SEC
BH CV ECHO MEAS - MV E/A: 3
BH CV ECHO MEAS - MV MAX PG: 6.3 MMHG
BH CV ECHO MEAS - MV MEAN PG: 2.48 MMHG
BH CV ECHO MEAS - MV P1/2T: 70.2 MSEC
BH CV ECHO MEAS - MV V2 VTI: 38.5 CM
BH CV ECHO MEAS - MVA(P1/2T): 3.1 CM2
BH CV ECHO MEAS - MVA(VTI): 1.61 CM2
BH CV ECHO MEAS - PA ACC TIME: 0.14 SEC
BH CV ECHO MEAS - PA V2 MAX: 73.8 CM/SEC
BH CV ECHO MEAS - PI END-D VEL: 113.9 CM/SEC
BH CV ECHO MEAS - QP/QS: 1.95
BH CV ECHO MEAS - RAP SYSTOLE: 3 MMHG
BH CV ECHO MEAS - RV MAX PG: 0.89 MMHG
BH CV ECHO MEAS - RV V1 MAX: 47.2 CM/SEC
BH CV ECHO MEAS - RV V1 VTI: 12.5 CM
BH CV ECHO MEAS - RVDD: 2.13 CM
BH CV ECHO MEAS - RVOT DIAM: 3.5 CM
BH CV ECHO MEAS - RVSP: 27.7 MMHG
BH CV ECHO MEAS - SUP REN AO DIAM: 2.1 CM
BH CV ECHO MEAS - SV(LVOT): 62.1 ML
BH CV ECHO MEAS - SV(MOD-SP2): 57 ML
BH CV ECHO MEAS - SV(MOD-SP4): 51 ML
BH CV ECHO MEAS - SV(RVOT): 120.8 ML
BH CV ECHO MEAS - SVI(LVOT): 34.2 ML/M2
BH CV ECHO MEAS - SVI(MOD-SP2): 31.4 ML/M2
BH CV ECHO MEAS - SVI(MOD-SP4): 28.1 ML/M2
BH CV ECHO MEAS - TAPSE (>1.6): 1.94 CM
BH CV ECHO MEAS - TR MAX PG: 24.7 MMHG
BH CV ECHO MEAS - TR MAX VEL: 248.6 CM/SEC
BH CV ECHO MEASUREMENTS AVERAGE E/E' RATIO: 12.96
BH CV XLRA - RV BASE: 3.9 CM
BH CV XLRA - RV LENGTH: 6 CM
BH CV XLRA - RV MID: 3.3 CM
BH CV XLRA - TDI S': 8.6 CM/SEC
LEFT ATRIUM VOLUME INDEX: 18.4 ML/M2
LV EF BIPLANE MOD: 35 %
SINUS: 3.3 CM
STJ: 3.3 CM

## 2025-03-18 PROCEDURE — 93306 TTE W/DOPPLER COMPLETE: CPT

## 2025-03-18 PROCEDURE — 25510000001 PERFLUTREN 6.52 MG/ML SUSPENSION 2 ML VIAL: Performed by: PHYSICIAN ASSISTANT

## 2025-03-18 RX ADMIN — SODIUM CHLORIDE 2 ML: 9 INJECTION INTRAMUSCULAR; INTRAVENOUS; SUBCUTANEOUS at 09:00

## 2025-03-26 ENCOUNTER — OFFICE VISIT (OUTPATIENT)
Dept: CARDIOLOGY | Facility: CLINIC | Age: 75
End: 2025-03-26
Payer: MEDICARE

## 2025-03-26 ENCOUNTER — OFFICE VISIT (OUTPATIENT)
Age: 75
End: 2025-03-26
Payer: MEDICARE

## 2025-03-26 VITALS
WEIGHT: 147 LBS | BODY MASS INDEX: 23.07 KG/M2 | HEIGHT: 67 IN | SYSTOLIC BLOOD PRESSURE: 110 MMHG | DIASTOLIC BLOOD PRESSURE: 60 MMHG | HEART RATE: 57 BPM

## 2025-03-26 VITALS
BODY MASS INDEX: 22.91 KG/M2 | HEIGHT: 67 IN | WEIGHT: 146 LBS | DIASTOLIC BLOOD PRESSURE: 60 MMHG | SYSTOLIC BLOOD PRESSURE: 110 MMHG | HEART RATE: 57 BPM

## 2025-03-26 DIAGNOSIS — Z98.890 S/P MITRAL VALVE REPAIR: ICD-10-CM

## 2025-03-26 DIAGNOSIS — I48.92 ATRIAL FLUTTER WITH CONTROLLED RESPONSE: ICD-10-CM

## 2025-03-26 DIAGNOSIS — I34.0 SEVERE MITRAL REGURGITATION: ICD-10-CM

## 2025-03-26 DIAGNOSIS — Z95.1 S/P CABG (CORONARY ARTERY BYPASS GRAFT): ICD-10-CM

## 2025-03-26 DIAGNOSIS — I48.92 ATRIAL FLUTTER WITH CONTROLLED RESPONSE: Primary | ICD-10-CM

## 2025-03-26 DIAGNOSIS — I50.22 CHRONIC HFREF (HEART FAILURE WITH REDUCED EJECTION FRACTION): Primary | ICD-10-CM

## 2025-03-26 DIAGNOSIS — N18.31 STAGE 3A CHRONIC KIDNEY DISEASE: ICD-10-CM

## 2025-03-26 DIAGNOSIS — I25.10 CORONARY ARTERY DISEASE INVOLVING NATIVE CORONARY ARTERY OF NATIVE HEART WITHOUT ANGINA PECTORIS: ICD-10-CM

## 2025-03-26 RX ORDER — METOPROLOL SUCCINATE 25 MG/1
25 TABLET, EXTENDED RELEASE ORAL DAILY
Qty: 90 TABLET | Refills: 3 | Status: SHIPPED | OUTPATIENT
Start: 2025-03-26

## 2025-03-26 RX ORDER — SACUBITRIL AND VALSARTAN 24; 26 MG/1; MG/1
1 TABLET, FILM COATED ORAL 2 TIMES DAILY
COMMUNITY

## 2025-03-26 NOTE — PROGRESS NOTES
[facility]    Subjective:     Encounter Date:03/26/25      Patient ID: Jackson Tariq is a 75 y.o. male.    Chief Complaint:   Chief Complaint   Patient presents with    Follow-up   Follow-up for mitral regurgitation  History of Present Illness    Mr. Tariq is a very pleasant 75 y.o. gentleman without significant past medical history outside of osteopenia and vitamin D deficiency, who initially presented for evaluation of a cardiac murmur was found have severe mitral regurgitation.    He was initially asymptomatic, however on serial examinations, his LVEF dropped to less than 60%, and even though he was asymptomatic he was referred for mitral valve repair given the drop of EF.    He underwent mitral valve repair with Dr. Gaitan.  There was a brief respiratory-related arrest that occurred postoperatively, and he developed atrial flutter, however that it subsequently recovered and he is in sinus rhythm today on EKG.    He presents today with ongoing dyspnea on exertion complaints.  He feels worse after surgery he did before.  He underwent a repeat echocardiogram which showed that his EF is dropped from 60% to 35%.     Besides ongoing dyspnea complaints, he is otherwise doing okay.  He is set to start cardiac rehab soon.    Previous card testing:   Echocardiogram performed July 1724:    Left ventricular systolic function is normal. Calculated left ventricular EF = 59.6%    The left ventricular cavity is borderline dilated when indexed for BSA    Left ventricular diastolic function was not assessed.    Severe mitral valve regurgitation is present.  PI SA radius 1.2 cm.  The jet is highly eccentric however appears definitively severe on today's study and there appears to be a mobile element around the P2 segment likely indicative of a torn cord with subsequent flail component.  The left atrial size does appear mildly enlarged when compared to previous study.    Estimated right ventricular systolic  pressure from tricuspid regurgitation is normal (<35 mmHg). Calculated right ventricular systolic pressure from tricuspid regurgitation is 29 mmHg.    Holter monitor, 5-day October 2023:    A relatively benign monitor study.    There was a 6 beat ventricular run.    There were 3 nonsustained atrial runs with longest of 5 beats.    No significant sustained tachyarrhythmia noted.    A patient triggered event did not have arrhythmia correlation.    Stress echo August 23:    Equivocal echocardiographic evidence for myocardial ischemia.  Wall motion was normal, however delayed stress echo images were obtained, in lieu of obtaining TR severity to evaluate severity of mitral regurgitation.  Functional capacity is normal    Estimated right ventricular systolic pressure from tricuspid regurgitation normal at rest.  Calculated right ventricular systolic pressure from tricuspid regurgitation is 42 mmHg at peak stress, notably his blood pressure showed a hypertensive response with /62 at time of peak stress.    Left ventricular systolic function is normal. Calculated left ventricular EF = 60.4%    Left ventricular diastolic function was normal.    There are myxomatous changes of the mitral valve apparatus present. There is bileaflet mitral valve prolapse present.    Equivocal ECG evidence of myocardial ischemia. Baseline previous EKG with LVH findings renders the ST segment changes at peak stress equivocal    Transesophageal echo performed October 17, 2024:    The mitral valve leaflets are thickened. There is a torn chordae associated with the P2 segment of the posterior mitral valve leaflet. The posterior mitral valve leaflet is partially flail.    There is severe and highly eccentric anteriorly directed mitral regurgitation. There is flow reversal in the left upper pulmonary vein    The left ventricular cavity is moderately dilated    Left ventricular systolic function is normal. Left ventricular ejection fraction  appears to be 61 - 65%.    Left ventricular diastolic function was normal.    Normal right ventricular cavity size and systolic function noted.    The left atrial cavity is mildly dilated.    There is a tiny PFO noted during the agitated saline study    Mild tricuspid valve regurgitation is present    Calculated right ventricular systolic pressure from tricuspid regurgitation is 33 mmHg.    There are mild plaques in the descending thoracic aorta.    There is no evidence of pericardial effusion.    Mitral valve repair, 38 mm Medtronic flexible band annuloplasty, Trenkle resection of P2 segment, CABG X2, LIMA-mid LAD, SVG-OM, February 5, 2025 with Dr. Gaitan.  40 mm atrial clip device.  Primary closure of PFO.    TTE March 18, 2025: EF 35%.  Mildly dilated left ventricular cavity, RVSP normal.  No residual mitral regurgitation.    The following portions of the patient's history were reviewed and updated as appropriate: allergies, current medications, past family history, past medical history, past social history, past surgical history and problem list.    Past Medical History:   Diagnosis Date    Chronic cough     Coronary artery disease     GERD (gastroesophageal reflux disease)     Hyperlipidemia     Hypertension     Mitral valve insufficiency     Osteopenia of multiple sites 07/17/2023    Vitamin D deficiency        Past Surgical History:   Procedure Laterality Date    CARDIAC CATHETERIZATION N/A 7/24/2024    Procedure: Left Heart Cath;  Surgeon: Susan De Jesus MD;  Location: [facility] INVASIVE LOCATION;  Service: Cardiovascular;  Laterality: N/A;  severe mitral regurgitation    CARDIAC CATHETERIZATION N/A 7/24/2024    Procedure: Right Heart Cath;  Surgeon: Susan De Jesus MD;  Location: [facility] CATH INVASIVE LOCATION;  Service: Cardiovascular;  Laterality: N/A;    CARDIAC CATHETERIZATION N/A 7/24/2024    Procedure: Coronary angiography;  Surgeon: Susan De Jesus MD;  Location: [facility] CATH INVASIVE LOCATION;   "Service: Cardiovascular;  Laterality: N/A;    COLONOSCOPY      CORONARY ARTERY BYPASS GRAFT WITH MITRAL VALVE REPAIR/REPLACEMENT N/A 2/5/2025    Procedure: CORONARY ARTERY BYPASS GRAFT X2  WITH INTERNAL MAMMARY ARTERY GRAFT AND ENDOSCOPICALLY HARVESTED RIGHT SAPHENOUS VEIN;  STERNOTOMY; PRP; MITRAL VALVE REPAIR; PATENT FORAMEN OVALE CLOSURE; LEFT ATRIAL APPENDAGE CLOSURE WITH ATRICARE; TRANSESOPHAGEAL ECHOCARDIOGRAM WITH ANESTHESIA;  Surgeon: Farshad Gaitan MD;  Location: [facility];  Service: Cardiothoracic;  Laterality: N/A;    ENDOSCOPY N/A 05/03/2019    Procedure: ESOPHAGOGASTRODUODENOSCOPY with biopsies;  Surgeon: Radha Del Real MD;  Location: [facility] OR;  Service: Gastroenterology    TRIGGER FINGER RELEASE      doesn't remember which side           ECG 12 Lead    Date/Time: 3/26/2025 8:29 AM  Performed by: Konstantin Driscoll MD    Authorized by: Konstantin Driscoll MD  Comparison: compared with previous ECG from 2/21/2025  Similar to previous ECG  Rhythm: sinus rhythm  Rate: normal  Conduction: 1st degree AV block  ST Segments: ST segments normal  T Waves: T waves normal  QRS axis: normal  Other findings: left ventricular hypertrophy with strain    Clinical impression: abnormal EKG             Objective:     Vitals:    03/26/25 0827   BP: 110/60   BP Location: Left arm   Patient Position: Sitting   Cuff Size: Adult   Pulse: 57   Weight: 66.7 kg (147 lb)   Height: 170.2 cm (67\")           Constitutional:       Appearance: Healthy appearance. Not in distress.   Neck:      Vascular: JVD normal.   Pulmonary:      Effort: Pulmonary effort is normal.      Breath sounds: Normal air entry.      Comments: Trace rales at bases.  Cardiovascular:      PMI at left midclavicular line. Normal rate. Regular rhythm. Normal S2.       Murmurs: There is no murmur.   Pulses:     Intact distal pulses.   Edema:     Peripheral edema absent.   Skin:     General: Skin is warm and dry.   Neurological:      General: No focal deficit present. "      Mental Status: Alert, oriented to person, place, and time and oriented to person, place and time.   Psychiatric:         Mood and Affect: Mood and affect normal.         Lab Review:     Lipid Panel          7/26/2024    08:10 2/3/2025    07:13   Lipid Panel   Total Cholesterol  118    Total Cholesterol 171     Triglycerides 96  91    HDL Cholesterol 63  60    VLDL Cholesterol 17  17    LDL Cholesterol  91  41    LDL/HDL Ratio  0.66        BUN   Date Value Ref Range Status   02/12/2025 19 8 - 23 mg/dL Final     Creatinine   Date Value Ref Range Status   02/12/2025 1.31 (H) 0.76 - 1.27 mg/dL Final   02/05/2025 1.92 (H) 0.60 - 1.30 mg/dL Final     Potassium   Date Value Ref Range Status   02/12/2025 4.3 3.5 - 5.2 mmol/L Final     Comment:     Slight hemolysis detected by analyzer. Result may be falsely elevated.     ALT (SGPT)   Date Value Ref Range Status   02/03/2025 26 1 - 41 U/L Final     AST (SGOT)   Date Value Ref Range Status   02/03/2025 25 1 - 40 U/L Final            Assessment:          Diagnosis Plan   1. Chronic HFrEF (heart failure with reduced ejection fraction)  ECG 12 Lead      2. Atrial flutter with controlled response        3. Stage 3a chronic kidney disease        4. Coronary artery disease involving native coronary artery of native heart without angina pectoris        5. S/P CABG (coronary artery bypass graft)        6. S/P mitral valve repair                   Plan:         Mitral regurgitation, severe, status post mitral valve repair with 38 mm physio ring, P2 resection, obliteration of atrial appendage with 40 mm atrial clip, February 5, 2025 with Dr. Gaitan.  Cardiomyopathy, systolic.  EF 35%.  Expected drop in EF post mitral valve repair, aggressively uptitrate GDMT.  He has dyspnea on exertion.  NYHA class III.  Stop atenolol, replace with metoprolol succinate 25  Start Entresto 24/26  Start Jardiance 10  RTC 1 to 2 weeks for GDMT up titration, he has resting sinus bradycardia so  metoprolol likely will stay the same at 25, would likely increase Entresto to 49 mg at next visit.  He was on a pretty high dose of valsartan previously so I expect we can get up to the 97/103 dose  Coronary disease.  Status post CABG X2, LIMA-LAD and SVG-OM  Aspirin 81  Eliquis per below  Continue statin  History of hypertension.  Per above.  Atrial flutter.  Postoperative.  He had complicated postoperative course with ventricular standstill requiring epicardial pacing.  He was considered for a cardioversion but with the ventricular standstill this was postponed.  Currently remains in sinus rhythm.  He has appointment with cardiac EP also today.  Thankfully he is in sinus rhythm so no acute things to do at this time.  I suspect we can probably come off the amiodarone but will defer to the EP team for this.    RTC 3 months.  Mitral valve repair next week.  I expect that we can probably come off the amiodarone and the Eliquis in about 3 months or so, or until his heart function recovers.     I spent 45 minutes today with direct face-to-face evaluation patient, direct view interpretation of his prior echocardiogram result, and complex care coordination regarding treatment of systolic heart failure postoperatively.     Konstantin Driscoll MD  [facility]  03/26/25  10:04 EDT       Current Outpatient Medications:     amiodarone (PACERONE) 200 MG tablet, Take 1 tablet by mouth Daily., Disp: 30 tablet, Rfl: 1    apixaban (ELIQUIS) 5 MG tablet tablet, Take 1 tablet by mouth Every 12 (Twelve) Hours. Indications: Atrial Fibrillation, Disp: 14 tablet, Rfl: 0    aspirin 81 MG EC tablet, Take 1 tablet by mouth Daily for 90 days., Disp: 90 tablet, Rfl: 3    atorvastatin (LIPITOR) 40 MG tablet, Take 1 tablet by mouth Every Night for 90 days., Disp: 90 tablet, Rfl: 0    Calcium Carb-Cholecalciferol (CALCIUM 500 +D PO), Take 500 Int'l Units by mouth 2 (Two) Times a Day., Disp: , Rfl:     clotrimazole-betamethasone  (LOTRISONE) 1-0.05 % cream, Apply 1 Application topically to the appropriate area as directed 2 (Two) Times a Day., Disp: 15 g, Rfl: 0    omeprazole (priLOSEC) 20 MG capsule, Take 1 capsule by mouth twice daily (Patient taking differently: Take 1 capsule by mouth Daily.), Disp: 180 capsule, Rfl: 3    sacubitril-valsartan (Entresto) 24-26 MG tablet, Take 1 tablet by mouth 2 (Two) Times a Day., Disp: , Rfl:     empagliflozin (Jardiance) 10 MG tablet tablet, Take 1 tablet by mouth Daily., Disp: 30 tablet, Rfl: 11    metoprolol succinate XL (TOPROL-XL) 25 MG 24 hr tablet, Take 1 tablet by mouth Daily., Disp: 90 tablet, Rfl: 3         No follow-ups on file.      Part of this note may be an electronic transcription/translation of spoken language to printed text using the Dragon Dictation System.

## 2025-03-26 NOTE — PROGRESS NOTES
Date of Office Visit: 2025  Encounter Provider: ENRIQUE Strong  Place of Service: [facility] CARDIOLOGY  Patient Name: Jackson Tariq  :1950    Chief Complaint   Patient presents with    Atrial Flutter    HFrEF   :     HPI: Jackson Tariq is a 75 y.o. male who follows with Dr. Driscoll. He has mitral valve regurgitation, mitral valve prolapse, CABG, HTN, and CKD.      He has mitral valve regurgitation that has been followed.     He underwent BRIE showing severe MR and bileaflet prolapse.         He was admitted  for scheduled surgery with Dr. Gaitan.       he underwent mitral valve repair, CABGx2. And MANJULA closure.     Postoperatively he had typical atrial flutter with controlled rates.  We saw him during admission, he did not have any symptoms associated with this.  We elected to continue amiodarone and started on apixaban.                He presents today for follow-up appointment.    He is now back in normal sinus rhythm.    His only complaint is shortness of breath with exertion.    He has not had a follow-up echo since his procedure.  This is scheduled for May.    He had an EKG done in [place] this morning.  It showed normal sinus rhythm.    He did not have any symptoms associated with atrial flutter.    Currently he will be doing amiodarone 200 mg daily.    He is on apixaban for AC.  He had his left atrial appendage clipped as part of his procedure          Past Medical History:   Diagnosis Date    Chronic cough     Coronary artery disease     GERD (gastroesophageal reflux disease)     Hyperlipidemia     Hypertension     Mitral valve insufficiency     Osteopenia of multiple sites 2023    Vitamin D deficiency        Past Surgical History:   Procedure Laterality Date    CARDIAC CATHETERIZATION N/A 2024    Procedure: Left Heart Cath;  Surgeon: Susan De Jesus MD;  Location: [facility] CATH INVASIVE LOCATION;  Service: Cardiovascular;  Laterality: N/A;   severe mitral regurgitation    CARDIAC CATHETERIZATION N/A 7/24/2024    Procedure: Right Heart Cath;  Surgeon: Susan De Jesus MD;  Location:  [facility] CATH INVASIVE LOCATION;  Service: Cardiovascular;  Laterality: N/A;    CARDIAC CATHETERIZATION N/A 7/24/2024    Procedure: Coronary angiography;  Surgeon: Susan De Jesus MD;  Location:  [facility] CATH INVASIVE LOCATION;  Service: Cardiovascular;  Laterality: N/A;    COLONOSCOPY      CORONARY ARTERY BYPASS GRAFT WITH MITRAL VALVE REPAIR/REPLACEMENT N/A 2/5/2025    Procedure: CORONARY ARTERY BYPASS GRAFT X2  WITH INTERNAL MAMMARY ARTERY GRAFT AND ENDOSCOPICALLY HARVESTED RIGHT SAPHENOUS VEIN;  STERNOTOMY; PRP; MITRAL VALVE REPAIR; PATENT FORAMEN OVALE CLOSURE; LEFT ATRIAL APPENDAGE CLOSURE WITH ATRICARE; TRANSESOPHAGEAL ECHOCARDIOGRAM WITH ANESTHESIA;  Surgeon: Farshad Gaitan MD;  Location: [facility] CVOR;  Service: Cardiothoracic;  Laterality: N/A;    ENDOSCOPY N/A 05/03/2019    Procedure: ESOPHAGOGASTRODUODENOSCOPY with biopsies;  Surgeon: Radha Del Real MD;  Location: [facility] OR;  Service: Gastroenterology    TRIGGER FINGER RELEASE      doesn't remember which side       Social History     Socioeconomic History    Marital status:    Tobacco Use    Smoking status: Never     Passive exposure: Never    Smokeless tobacco: Never   Vaping Use    Vaping status: Never Used   Substance and Sexual Activity    Alcohol use: No    Drug use: Never    Sexual activity: Defer       Family History   Problem Relation Age of Onset    Colon cancer Neg Hx     Colon polyps Neg Hx     Malig Hyperthermia Neg Hx        Review of Systems   Constitutional: Negative for chills, fever and malaise/fatigue.   Cardiovascular:  Negative for chest pain, dyspnea on exertion, leg swelling, near-syncope, orthopnea, palpitations, paroxysmal nocturnal dyspnea and syncope.   Respiratory:  Negative for cough and shortness of breath.    Hematologic/Lymphatic: Negative.    Musculoskeletal:  Negative for joint  "pain, joint swelling and myalgias.   Gastrointestinal:  Negative for abdominal pain, diarrhea, melena, nausea and vomiting.   Genitourinary:  Negative for frequency and hematuria.   Neurological:  Negative for light-headedness, numbness, paresthesias and seizures.   Allergic/Immunologic: Negative.    All other systems reviewed and are negative.      No Known Allergies      Current Outpatient Medications:     amiodarone (PACERONE) 200 MG tablet, Take 1 tablet by mouth Daily., Disp: 30 tablet, Rfl: 1    apixaban (ELIQUIS) 5 MG tablet tablet, Take 1 tablet by mouth Every 12 (Twelve) Hours. Indications: Atrial Fibrillation, Disp: 14 tablet, Rfl: 0    aspirin 81 MG EC tablet, Take 1 tablet by mouth Daily for 90 days., Disp: 90 tablet, Rfl: 3    atorvastatin (LIPITOR) 40 MG tablet, Take 1 tablet by mouth Every Night for 90 days., Disp: 90 tablet, Rfl: 0    Calcium Carb-Cholecalciferol (CALCIUM 500 +D PO), Take 500 Int'l Units by mouth 2 (Two) Times a Day., Disp: , Rfl:     clotrimazole-betamethasone (LOTRISONE) 1-0.05 % cream, Apply 1 Application topically to the appropriate area as directed 2 (Two) Times a Day., Disp: 15 g, Rfl: 0    empagliflozin (Jardiance) 10 MG tablet tablet, Take 1 tablet by mouth Daily., Disp: 30 tablet, Rfl: 11    metoprolol succinate XL (TOPROL-XL) 25 MG 24 hr tablet, Take 1 tablet by mouth Daily., Disp: 90 tablet, Rfl: 3    omeprazole (priLOSEC) 20 MG capsule, Take 1 capsule by mouth twice daily (Patient taking differently: Take 1 capsule by mouth Daily.), Disp: 180 capsule, Rfl: 3    sacubitril-valsartan (Entresto) 24-26 MG tablet, Take 1 tablet by mouth 2 (Two) Times a Day., Disp: , Rfl:       Objective:     Vitals:    03/26/25 1300   BP: 110/60   BP Location: Right arm   Patient Position: Sitting   Cuff Size: Adult   Pulse: 57   Weight: 66.2 kg (146 lb)   Height: 170.2 cm (67\")     Body mass index is 22.87 kg/m².    PHYSICAL EXAM:    Vitals Reviewed.   General Appearance: No acute distress, " well developed and well nourished.   Respiratory: No signs of respiratory distress. Respiration rhythm and depth normal.   Cardiovascular:  Heart Rate and Rhythm: Normal  Skin: Warm and dry.   Psychiatric: Patient alert and oriented to person, place, and time. Speech and behavior appropriate. Normal mood and affect.     Procedures      Assessment:       Diagnosis Plan   1. Atrial flutter with controlled response        2. Severe mitral regurgitation        3. S/P mitral valve repair        4. Coronary artery disease involving native coronary artery of native heart without angina pectoris        5. S/P CABG (coronary artery bypass graft)               Plan:   1.  Typical atrial flutter--- he had rate controlled atrial flutter and was asymptomatic postoperatively.  He is now in normal sinus rhythm, he is currently on amiodarone 200 mg daily.  We discussed today and we will go ahead and stop amiodarone.  His [id number] is 2, I recommended that he continue apixaban.  If his echo is completely normal and he has not had any recurrent episodes we could consider discontinuing this.  If he has recurrent atrial flutter then I would recommend ablation, we briefly discussed this today    2-5. CAD--s/p CABG, MR--s/p MVR----he follows with Dr. Gaitan and Dr. Elder.  He is still having some shortness of breath on exertion, plans to repeat an echo in May.            He is going to follow-up with EP as needed.  If he has recurrent atrial flutter would recommend considering ablation.             I spent at least 30 minutes reviewing previous notes, labs, EKGs, device reports and/or time with the patient.               As always, it has been a pleasure to participate in your patient's care.      Sincerely,         ENRIQUE Strong

## 2025-03-26 NOTE — PATIENT INSTRUCTIONS
After your surgery, although the surgery was very successful in fixing your heart valve, the heart muscle is down a bit.  We call this congestive heart failure.  Normal heart function is 51% and yours is 35%.  To help get the heart function better, I would like to make a few medication changes.    I like for you to start a new medication called Entresto.  We have the 24 mg tablet samples we gave you.  You will take 1 tablet twice daily, and continue this for the next few weeks.  When you see Jg in a few weeks, we will likely increase this dose to 49 mg.  If you do well on this, when it comes time to fill the prescription I did give you a co-pay card which can to 30 days for free of this medication.  I did not send the Entresto medication to your pharmacy yet, because we are likely going to be changing the dose of this    I also like to add a medication called Jardiance.  This 10 mg tablet is taken every day.  Helps prevent the progression of chronic kidney disease, and helps you pee out blood sugar and by doing so it helps treat heart failure and helps the heart function recover.  This should help your breathing improved as well.  Jardiance is just 10 mg and the dose does not change.  If you do well on the samples, I would encourage you to pick this medication up from the pharmacy because this will likely be long-term at least for the next few months to years.    We can stop the atenolol medication and we are replacing it with a medication called metoprolol.  Metoprolol does a better job than atenolol at helping the heart function recover.    I think we can continue the amiodarone and the Eliquis medications for now.  After a few months, so long as your heart rhythm stays normal.  We can probably stop these medications.  You should continue the aspirin 81 mg for now along with the Eliquis.    In short, I do not think the amiodarone Eliquis to be long-term, but the Jardiance and Entresto likely will be on board  "at least for the next 6 months to a year until the heart recovers.  I would look into the Medicare part the \"moving\" plans which might help reduce the co-pays of your medications.    I have attached some information about congestive heart failure, including some diet changes and maintaining a low-sodium diet.  "

## 2025-03-28 ENCOUNTER — TREATMENT (OUTPATIENT)
Dept: CARDIAC REHAB | Facility: HOSPITAL | Age: 75
End: 2025-03-28
Payer: MEDICARE

## 2025-03-28 DIAGNOSIS — Z98.890 S/P MVR (MITRAL VALVE REPAIR): ICD-10-CM

## 2025-03-28 DIAGNOSIS — Z95.1 S/P CABG (CORONARY ARTERY BYPASS GRAFT): Primary | ICD-10-CM

## 2025-03-28 PROCEDURE — 93797 PHYS/QHP OP CAR RHAB WO ECG: CPT

## 2025-03-28 PROCEDURE — 93798 PHYS/QHP OP CAR RHAB W/ECG: CPT

## 2025-03-31 ENCOUNTER — TREATMENT (OUTPATIENT)
Dept: CARDIAC REHAB | Facility: HOSPITAL | Age: 75
End: 2025-03-31
Payer: MEDICARE

## 2025-03-31 DIAGNOSIS — Z95.1 S/P CABG (CORONARY ARTERY BYPASS GRAFT): Primary | ICD-10-CM

## 2025-03-31 PROCEDURE — 93798 PHYS/QHP OP CAR RHAB W/ECG: CPT

## 2025-04-02 ENCOUNTER — TREATMENT (OUTPATIENT)
Dept: CARDIAC REHAB | Facility: HOSPITAL | Age: 75
End: 2025-04-02
Payer: MEDICARE

## 2025-04-02 DIAGNOSIS — Z95.1 S/P CABG (CORONARY ARTERY BYPASS GRAFT): Primary | ICD-10-CM

## 2025-04-02 PROCEDURE — 93798 PHYS/QHP OP CAR RHAB W/ECG: CPT

## 2025-04-04 ENCOUNTER — TREATMENT (OUTPATIENT)
Dept: CARDIAC REHAB | Facility: HOSPITAL | Age: 75
End: 2025-04-04
Payer: MEDICARE

## 2025-04-04 DIAGNOSIS — Z95.1 S/P CABG (CORONARY ARTERY BYPASS GRAFT): Primary | ICD-10-CM

## 2025-04-04 PROCEDURE — 93798 PHYS/QHP OP CAR RHAB W/ECG: CPT

## 2025-04-07 ENCOUNTER — TREATMENT (OUTPATIENT)
Dept: CARDIAC REHAB | Facility: HOSPITAL | Age: 75
End: 2025-04-07
Payer: MEDICARE

## 2025-04-07 DIAGNOSIS — Z95.1 S/P CABG (CORONARY ARTERY BYPASS GRAFT): Primary | ICD-10-CM

## 2025-04-07 PROCEDURE — 93798 PHYS/QHP OP CAR RHAB W/ECG: CPT

## 2025-04-08 ENCOUNTER — OFFICE VISIT (OUTPATIENT)
Age: 75
End: 2025-04-08
Payer: MEDICARE

## 2025-04-08 VITALS
OXYGEN SATURATION: 99 % | BODY MASS INDEX: 21.66 KG/M2 | WEIGHT: 138 LBS | SYSTOLIC BLOOD PRESSURE: 130 MMHG | DIASTOLIC BLOOD PRESSURE: 72 MMHG | HEIGHT: 67 IN

## 2025-04-08 DIAGNOSIS — I50.22 CHRONIC HFREF (HEART FAILURE WITH REDUCED EJECTION FRACTION): Primary | ICD-10-CM

## 2025-04-08 DIAGNOSIS — I10 PRIMARY HYPERTENSION: ICD-10-CM

## 2025-04-08 DIAGNOSIS — Z98.890 S/P MITRAL VALVE REPAIR: ICD-10-CM

## 2025-04-08 DIAGNOSIS — I48.92 ATRIAL FLUTTER WITH CONTROLLED RESPONSE: ICD-10-CM

## 2025-04-08 DIAGNOSIS — I25.10 CORONARY ARTERY DISEASE INVOLVING NATIVE CORONARY ARTERY OF NATIVE HEART WITHOUT ANGINA PECTORIS: ICD-10-CM

## 2025-04-08 DIAGNOSIS — N18.31 STAGE 3A CHRONIC KIDNEY DISEASE: ICD-10-CM

## 2025-04-08 DIAGNOSIS — I34.0 SEVERE MITRAL REGURGITATION: ICD-10-CM

## 2025-04-08 DIAGNOSIS — E78.2 MIXED HYPERLIPIDEMIA: ICD-10-CM

## 2025-04-08 RX ORDER — VALSARTAN 160 MG/1
160 TABLET ORAL DAILY
Qty: 30 TABLET | Refills: 11 | Status: SHIPPED | OUTPATIENT
Start: 2025-04-08

## 2025-04-08 NOTE — PROGRESS NOTES
CARDIOLOGY        Patient Name: Jackson Tariq  :1950  Age: 75 y.o.  Primary Cardiologist: Konstantin Driscoll MD  Encounter Provider:  Jg Zuluaga PA-C    Date of Service: 25            CHIEF COMPLAINT / REASON FOR OFFICE VISIT     1-2 week follow up      HISTORY OF PRESENT ILLNESS       HPI  Jackson Tariq is a 75 y.o. male who presents today for 1-2 week follow up.     Pt has a  history significant for nonrheumatic mitral valve regurgitation and s/p CABG/MVR presents for follow-up.  Patient was admitted on 2025 through 2025 for worsening symptoms from his valvular heart disease.  He was scheduled for surgery on 2025 he underwent CABGx2(LIMA to LAD, SVG to OM1)/Mitral valve repair/PFO closure/MANJULA closure with AtriClip by Dr. Gaitan.  He did have some postop complications including questionable seizure activity, pericarditis, ISATU, and atrial flutter.  He had anemia postop day 4 and was transfused 1 unit of packed red blood cells.  He remained in A-fib and EP was consulted with plans of following up outpatient.  He was discharged on Eliquis and amiodarone.  He was only given 5 tablets of his amiodarone and ran out.     On 25 he had been doing well since his discharge.  He has not had any chest pain or chest pressure.  He has some soreness from his incision.  He denies any shortness of air, palpitations, or lightheadedness.  He does have some intermittent swelling to his legs and improve overnight.  He has been tolerating his medications.  He has not had any bleeding issues being on Eliquis.  He has been in touch with cardiac rehab.  He was set up for a echocardiogram which she had done on 3/18/2025.    His EF dropped from 60 to 35%.  He followed up with Dr. Driscoll on 3/26/2025 where he continued to have dyspnea on exertion.  His atenolol was stopped and replaced with metoprolol succinate 25 mg daily.  He was started on Jardiance and Entresto.  He was to follow-up in 1 to 2 weeks  "for GDMT up titration.  He did follow-up with EP same day which his amiodarone was stopped.    Patient states he started taking Entresto and started vomiting and has not taken it since.  He has been tolerating his metoprolol and Jardiance.  He he continues to do cardiac rehab.  His shortness of breath has greatly improved.  Denies any chest pain or chest discomfort.  No palpitations, edema to his legs, orthopnea, fatigue, lightheadedness, or dizziness.  No bleeding issues being on Eliquis.      The following portions of the patient's history were reviewed and updated as appropriate: allergies, current medications, past family history, past medical history, past social history, past surgical history and problem list.      VITAL SIGNS     Visit Vitals  /72 (BP Location: Left arm, Patient Position: Sitting, Cuff Size: Adult)   Ht 170.2 cm (67\")   Wt 62.6 kg (138 lb)   SpO2 99%   BMI 21.61 kg/m²       @[email or web address]  Wt Readings from Last 3 Encounters:   04/08/25 62.6 kg (138 lb)   03/26/25 66.2 kg (146 lb)   03/26/25 66.7 kg (147 lb)     Body mass index is 21.61 kg/m².        PHYSICAL EXAMINATION     Constitutional:       General: Awake. Not in acute distress.     Appearance: Not in distress.   Pulmonary:      Effort: Pulmonary effort is normal.      Breath sounds: Normal breath sounds.   Cardiovascular:      Normal rate. Regular rhythm.      Murmurs: There is no murmur.   Skin:     General: Skin is warm.   Neurological:      Mental Status: Alert.   Psychiatric:         Behavior: Behavior is cooperative.           REVIEWED DATA       ECG 12 Lead    Date/Time: 4/8/2025 9:14 AM  Performed by: Jg Zuluaga PA-C    Authorized by: Jg Zuluaga PA-C  Comparison: compared with previous ECG   Similar to previous ECG  Rhythm: sinus rhythm  Rate: normal  BPM: 81  Conduction: 1st degree AV block  QRS axis: normal  Other findings: left ventricular hypertrophy with strain    Clinical impression: " abnormal EKG  Comments: Similar to previous.  QTc 465          Cardiac Procedures:    Transthoracic echo on 3/18/2025    Left ventricular systolic function is moderately decreased. Calculated left ventricular EF = 35%. The left ventricular cavity is mildly dilated.    Left ventricular diastolic function was indeterminate.    Mild tricuspid regurgitation. Estimated right ventricular systolic pressure from tricuspid regurgitation is normal (<35 mmHg).    There is history of mitral valve repair. No significant mitral valve regurgitation or stenosis.    Mild aortic regurgitation.       Operative Note - Dr. Gaitan     Date of Dictation: 02/05/25     Date of Procedure: Same     Referring Physician: Konstantin Driscoll MD     Indications:   Severe mitral regurgitation, symptomatic     Preoperative diagnosis:  1.  Severe degenerative mitral valve regurgitation  2.  Mitral valve prolapse with P2 segment affectation  3.  Patent foramen ovale  4.  Two-vessel coronary artery disease  5.  CKD 3     Postoperative diagnosis:  Same     Procedure:  1.  Complex mitral valve repair with a 38 mm Medtronic flexible band annuloplasty and triangular resection of the P2 segment  2.  CABG x 2 with a LIMA to the mid LAD and reverse individual saphenous vein graft to the first marginal  3.  Primary closure of patent foramen ovale  4.  Endoscopic vein harvest of the right lower extremity  5.  Epicardial closure of the left atrial appendage with a 40 mm atrial clip device     Transthoracic echo on 6-6 24    Left ventricular systolic function is moderately decreased. Calculated left ventricular EF = 35.7%    Left atrial volume is moderately increased.    The study is technically difficult for diagnosis. The quality of the study is limited due to patient positioning.     BRIE on 2/5/2025  Echocardiogram Comments:       Diagnosis: non-rheumatic aortic insufficiency.  Severe mitral regurgitation, eccentric wall-hugging jet away from posterior leaflet.  P2  "flail with torn chordae.       Post-CPB there was no mitral regurgitation.  Mitral valve annuloplasty ring seen.  Left atrial appendage obliterated.  Interatrial septum intact.  Occasional LALITO seen when patient was hypovolemic.  Thick, hypertrophied LV.         Lipid Panel          7/26/2024    08:10 2/3/2025    07:13   Lipid Panel   Total Cholesterol  118    Total Cholesterol 171     Triglycerides 96  91    HDL Cholesterol 63  60    VLDL Cholesterol 17  17    LDL Cholesterol  91  41    LDL/HDL Ratio  0.66        Lab Results   Component Value Date     02/12/2025     02/11/2025    K 4.3 02/12/2025    K 4.2 02/11/2025     02/12/2025     02/11/2025    CO2 23.2 02/12/2025    CO2 24.6 02/11/2025    BUN 19 02/12/2025    BUN 22 02/11/2025    CREATININE 1.31 (H) 02/12/2025    CREATININE 1.49 (H) 02/11/2025    GLUCOSE 104 (H) 02/12/2025    GLUCOSE 97 02/11/2025    CALCIUM 8.5 (L) 02/12/2025    CALCIUM 8.3 (L) 02/11/2025    ALBUMIN 3.8 02/06/2025    ALBUMIN 3.7 02/05/2025    BILITOT 1.0 02/03/2025    BILITOT 1.1 07/26/2024    AST 25 02/03/2025    AST 18 07/26/2024    ALT 26 02/03/2025    ALT 12 07/26/2024     Lab Results   Component Value Date    WBC 8.21 02/12/2025    WBC 6.14 02/11/2025    HGB 9.0 (L) 02/12/2025    HGB 8.8 (L) 02/11/2025    HCT 26.5 (L) 02/12/2025    HCT 25.5 (L) 02/11/2025    MCV 93.3 02/12/2025    MCV 94.1 02/11/2025     02/12/2025     02/11/2025     Lab Results   Component Value Date    PROBNP 1,551.0 (H) 02/05/2025    PROBNP 326.0 02/03/2025     No results found for: \"CKTOTAL\", \"CKMB\", \"CKMBINDEX\", \"TROPONINI\", \"TROPONINT\"  Lab Results   Component Value Date    TSH 2.850 07/21/2023             ASSESSMENT & PLAN     Diagnoses and all orders for this visit:    Mitral regurgitation, severe, status post mitral valve repair with 38 mm physio ring, P2 resection, obliteration of atrial appendage with 40 mm atrial clip, February 5, 2025 with Dr. Gaitan.  Cardiomyopathy, " systolic.  EF 35%.  Expected drop in EF post mitral valve repair, aggressively uptitrate GDMT.  He has dyspnea on exertion.  NYHA class III.  Continue metoprolol succinate 25. HR in 50s at cardiac rehab.   Unable to tolerate Entresto and will switch back to valsartan 160 mg daily  Tolerating Jardiance 10 mg, given samples in office today    Coronary disease.  Status post CABG X2, LIMA-LAD and SVG-OM  Aspirin 81  Eliquis per below  Continue statin    History of hypertension.  Per above.    Atrial flutter.  Postoperative.  He had complicated postoperative course with ventricular standstill requiring epicardial pacing.  He was considered for a cardioversion but with the ventricular standstill this was postponed.  Currently remains in sinus rhythm.  Taken off amiodarone on 3/26/2025     Patient has continued to improve from his surgery.  He is continue to do cardiac rehab.  His shortness of breath is greatly improved.  He has no edema to his legs or orthopnea.  He had intolerance to Entresto and we will switch him back to a lower dose of valsartan.  He will come back in 2 weeks for a blood pressure check and same-day labs.  He will check his blood pressures daily along with his heart rate.  He will continue Eliquis and may be able to come off in about 3 months or so, or until his heart function recovers.  He will follow-up with Dr. Driscoll in 3 months.    Return in 3 months (on 7/8/2025) for Dr. Driscoll.    Future Appointments         Provider Department [facility]    4/9/2025 1:00 PM [facility] PHASE II - [facility] L[facility] CARD REHAB [facility] [place] C[facility] REH[facility]    4/11/2025 1:00 PM [facility] PHASE II - [facility] CARD REHAB [facility] [place] CARD REH[facility]    4/14/2025 1:00 PM [facility] PHASE II - BH [facility] CARD REHAB [facility] [place] CARD REH[facility]    4/16/2025 1:00 PM [facility] PHASE II - BH [facility] CARD REHAB [facility] [place] [facility]ARD REH[facility]    4/18/2025 1:00 PM [facility] PHASE II - BH [facility] CARD REHAB [facility] [place] CARD REH[facility]     4/21/2025 1:00 PM [facility] PHASE II - [facility] RE[facility] [place] CARD REH[facility]    4/22/2025 10:30 AM [facility]ARDI[facility]    4/23/2025 1:00 PM [facility] PHASE II - [facility] RE[facility] [place] [facility] REH[facility]    4/25/2025 1:00 PM [facility] PHASE II - [facility] RE[facility] [place] [facility]    4/28/2025 1:00 PM [facility] PHASE II - [facility] RE[facility] [place] [facility]    4/30/2025 1:00 PM [facility] PHASE II - [facility] RE[facility] [place] [facility]    5/2/2025 1:00 PM [facility] PHASE II - [facility] RE[facility] [place] [facility]    5/5/2025 1:00 PM [facility] PHASE II - [facility] RE[facility] [place] [facility]    5/7/2025 1:00 PM [facility] PHASE II - [facility] RE[facility] [place] [facility]    5/9/2025 1:00 PM [facility] PHASE II - [facility] C[facility] RE[facility] [place] [facility]    5/12/2025 1:00 PM [facility] PHASE II - [facility] CARD RE[facility] [place] [facility]    5/14/2025 1:00 PM [facility] PHASE II - [facility] CARD RE[facility] [place] [facility]    5/16/2025 1:00 PM [facility] PHASE II - [facility] RE[facility] [place] [facility]    5/19/2025 1:00 PM [facility] PHASE II - [facility] CARD RE[facility] [place] [facility]    5/21/2025 1:00 PM [facility] PHASE II - [facility] CARD REH[facility] [place] [facility]    5/23/2025 1:00 PM [facility] PHASE II - [facility] CARD RE[facility] [place] [facility]    5/28/2025 1:00 PM [facility] PHASE II - [facility] CARD REH[facility] [place] [facility]    5/29/2025 8:00 AM Caroline Medina APRN [facility] PRIMARY CARE LAG    5/30/2025 1:00 PM [facility] PHASE II -  LAG CARD REHAB [facility] [place] [facility] REHAB [facility]    6/2/2025 1:00 PM [facility] PHASE II -  LAG CARD REHAB [facility] [place] [facility]    7/9/2025 9:30 AM Konstantin Driscoll MD [facility] CARDIOLOGY  LAG    8/29/2025 8:30 AM Caroline Medina APRN [facility] PRIMARY CARE LAG                MEDICATIONS         Discharge Medications            Accurate as of April 8, 2025  9:33 AM. If you have any questions, ask your nurse or doctor.                New Medications        Instructions Start Date   valsartan 160 MG tablet  Commonly known as: DIOVAN  Started by: Jg Zuluaga   160 mg, Oral, Daily             Changes to Medications        Instructions Start Date   omeprazole 20 MG capsule  Commonly known as: priLOSEC  What changed: when to take this   20 mg, Oral, 2 Times Daily             Continue These Medications        Instructions Start Date   amiodarone 200 MG tablet  Commonly known as: PACERONE   200 mg, Oral, Daily      apixaban 5 MG tablet tablet  Commonly known as: ELIQUIS   5 mg, Oral, Every 12 Hours Scheduled      aspirin 81 MG EC tablet   81 mg, Oral, Daily      atorvastatin 40 MG tablet  Commonly known as: LIPITOR   40 mg, Oral, Nightly      CALCIUM 500 +D PO   500 Int'l Units, 2 Times Daily      clotrimazole-betamethasone 1-0.05 % cream  Commonly known as: LOTRISONE   1 Application, Topical, 2 Times Daily      empagliflozin 10 MG tablet tablet  Commonly known as: Jardiance   10 mg, Oral, Daily      metoprolol succinate XL 25 MG 24 hr tablet  Commonly known as: TOPROL-XL   25 mg, Oral, Daily                   **Dragon Disclaimer:   Much of this encounter note is an electronic transcription/translation of spoken language to printed text. The electronic translation of spoken language may permit erroneous, or at times, nonsensical words or phrases to be inadvertently transcribed. Although I have reviewed the note for such errors, some may still exist.

## 2025-04-09 ENCOUNTER — TREATMENT (OUTPATIENT)
Dept: CARDIAC REHAB | Facility: HOSPITAL | Age: 75
End: 2025-04-09
Payer: MEDICARE

## 2025-04-09 DIAGNOSIS — Z95.1 S/P CABG (CORONARY ARTERY BYPASS GRAFT): Primary | ICD-10-CM

## 2025-04-09 PROCEDURE — 93798 PHYS/QHP OP CAR RHAB W/ECG: CPT

## 2025-04-10 ENCOUNTER — TELEPHONE (OUTPATIENT)
Dept: CARDIOLOGY | Age: 75
End: 2025-04-10
Payer: MEDICARE

## 2025-04-10 NOTE — TELEPHONE ENCOUNTER
Called and informed patient that samples are available for them at the [place] location and they can  whenever they are able to.    Patient verbalized understanding and call was ended.    LOT#[id number]  Exp:Dec 2026

## 2025-04-10 NOTE — TELEPHONE ENCOUNTER
Caller: JUSTINE    Relationship:SPOUSE    Callback number: [phone]   Is it ok to leave a message: [] Yes [] No    Requested medication for samples: JARDIANCE    How much medication does the patient currently have left: 3 DAYS    Who will be picking up the samples: PATIENT OR SPOUSE    Do you need information about patient financial assistance for this medication: [] Yes [x] No    Additional details provided: PICKUP IN [place] PLEASE CALL AND ADVISE.

## 2025-04-11 ENCOUNTER — TREATMENT (OUTPATIENT)
Dept: CARDIAC REHAB | Facility: HOSPITAL | Age: 75
End: 2025-04-11
Payer: MEDICARE

## 2025-04-11 DIAGNOSIS — Z98.890 S/P MVR (MITRAL VALVE REPAIR): ICD-10-CM

## 2025-04-11 DIAGNOSIS — Z95.1 S/P CABG (CORONARY ARTERY BYPASS GRAFT): Primary | ICD-10-CM

## 2025-04-11 PROCEDURE — 93798 PHYS/QHP OP CAR RHAB W/ECG: CPT

## 2025-04-14 ENCOUNTER — TREATMENT (OUTPATIENT)
Dept: CARDIAC REHAB | Facility: HOSPITAL | Age: 75
End: 2025-04-14
Payer: MEDICARE

## 2025-04-14 DIAGNOSIS — Z95.1 S/P CABG (CORONARY ARTERY BYPASS GRAFT): Primary | ICD-10-CM

## 2025-04-14 DIAGNOSIS — Z98.890 S/P MVR (MITRAL VALVE REPAIR): ICD-10-CM

## 2025-04-14 PROCEDURE — 93798 PHYS/QHP OP CAR RHAB W/ECG: CPT

## 2025-04-16 ENCOUNTER — TREATMENT (OUTPATIENT)
Dept: CARDIAC REHAB | Facility: HOSPITAL | Age: 75
End: 2025-04-16
Payer: MEDICARE

## 2025-04-16 ENCOUNTER — LAB (OUTPATIENT)
Dept: LAB | Facility: HOSPITAL | Age: 75
End: 2025-04-16
Payer: MEDICARE

## 2025-04-16 DIAGNOSIS — I48.92 ATRIAL FLUTTER WITH CONTROLLED RESPONSE: ICD-10-CM

## 2025-04-16 DIAGNOSIS — I25.10 CORONARY ARTERY DISEASE INVOLVING NATIVE CORONARY ARTERY OF NATIVE HEART WITHOUT ANGINA PECTORIS: ICD-10-CM

## 2025-04-16 DIAGNOSIS — E78.2 MIXED HYPERLIPIDEMIA: ICD-10-CM

## 2025-04-16 DIAGNOSIS — I10 PRIMARY HYPERTENSION: ICD-10-CM

## 2025-04-16 DIAGNOSIS — Z95.1 S/P CABG (CORONARY ARTERY BYPASS GRAFT): Primary | ICD-10-CM

## 2025-04-16 DIAGNOSIS — I50.22 CHRONIC HFREF (HEART FAILURE WITH REDUCED EJECTION FRACTION): ICD-10-CM

## 2025-04-16 DIAGNOSIS — N18.31 STAGE 3A CHRONIC KIDNEY DISEASE: ICD-10-CM

## 2025-04-16 DIAGNOSIS — Z98.890 S/P MITRAL VALVE REPAIR: ICD-10-CM

## 2025-04-16 DIAGNOSIS — I34.0 SEVERE MITRAL REGURGITATION: ICD-10-CM

## 2025-04-16 LAB
ANION GAP SERPL CALCULATED.3IONS-SCNC: 11.9 MMOL/L (ref 5–15)
BUN SERPL-MCNC: 18 MG/DL (ref 8–23)
BUN/CREAT SERPL: 10.9 (ref 7–25)
CALCIUM SPEC-SCNC: 9.3 MG/DL (ref 8.6–10.5)
CHLORIDE SERPL-SCNC: 103 MMOL/L (ref 98–107)
CO2 SERPL-SCNC: 23.1 MMOL/L (ref 22–29)
CREAT SERPL-MCNC: 1.65 MG/DL (ref 0.76–1.27)
EGFRCR SERPLBLD CKD-EPI 2021: 43.3 ML/MIN/1.73
GLUCOSE SERPL-MCNC: 94 MG/DL (ref 65–99)
POTASSIUM SERPL-SCNC: 4.3 MMOL/L (ref 3.5–5.2)
SODIUM SERPL-SCNC: 138 MMOL/L (ref 136–145)

## 2025-04-16 PROCEDURE — 36415 COLL VENOUS BLD VENIPUNCTURE: CPT

## 2025-04-16 PROCEDURE — 80048 BASIC METABOLIC PNL TOTAL CA: CPT

## 2025-04-16 PROCEDURE — 93798 PHYS/QHP OP CAR RHAB W/ECG: CPT

## 2025-04-17 ENCOUNTER — RESULTS FOLLOW-UP (OUTPATIENT)
Age: 75
End: 2025-04-17
Payer: MEDICARE

## 2025-04-18 ENCOUNTER — TREATMENT (OUTPATIENT)
Dept: CARDIAC REHAB | Facility: HOSPITAL | Age: 75
End: 2025-04-18
Payer: MEDICARE

## 2025-04-18 DIAGNOSIS — Z95.1 S/P CABG (CORONARY ARTERY BYPASS GRAFT): Primary | ICD-10-CM

## 2025-04-18 DIAGNOSIS — Z98.890 S/P MVR (MITRAL VALVE REPAIR): ICD-10-CM

## 2025-04-18 PROCEDURE — 93798 PHYS/QHP OP CAR RHAB W/ECG: CPT

## 2025-04-21 ENCOUNTER — TREATMENT (OUTPATIENT)
Dept: CARDIAC REHAB | Facility: HOSPITAL | Age: 75
End: 2025-04-21
Payer: MEDICARE

## 2025-04-21 DIAGNOSIS — Z98.890 S/P MVR (MITRAL VALVE REPAIR): ICD-10-CM

## 2025-04-21 DIAGNOSIS — Z95.1 S/P CABG (CORONARY ARTERY BYPASS GRAFT): Primary | ICD-10-CM

## 2025-04-21 PROCEDURE — 93798 PHYS/QHP OP CAR RHAB W/ECG: CPT

## 2025-04-22 ENCOUNTER — CLINICAL SUPPORT (OUTPATIENT)
Dept: CARDIOLOGY | Facility: CLINIC | Age: 75
End: 2025-04-22
Payer: MEDICARE

## 2025-04-22 ENCOUNTER — LAB (OUTPATIENT)
Dept: LAB | Facility: HOSPITAL | Age: 75
End: 2025-04-22
Payer: MEDICARE

## 2025-04-22 DIAGNOSIS — I50.22 CHRONIC HFREF (HEART FAILURE WITH REDUCED EJECTION FRACTION): ICD-10-CM

## 2025-04-22 DIAGNOSIS — N18.31 STAGE 3A CHRONIC KIDNEY DISEASE: ICD-10-CM

## 2025-04-22 DIAGNOSIS — I48.92 ATRIAL FLUTTER WITH CONTROLLED RESPONSE: Primary | ICD-10-CM

## 2025-04-22 DIAGNOSIS — I48.92 ATRIAL FLUTTER WITH CONTROLLED RESPONSE: ICD-10-CM

## 2025-04-22 LAB
ANION GAP SERPL CALCULATED.3IONS-SCNC: 9.5 MMOL/L (ref 5–15)
BUN SERPL-MCNC: 19 MG/DL (ref 8–23)
BUN/CREAT SERPL: 11.7 (ref 7–25)
CALCIUM SPEC-SCNC: 9.4 MG/DL (ref 8.6–10.5)
CHLORIDE SERPL-SCNC: 104 MMOL/L (ref 98–107)
CO2 SERPL-SCNC: 25.5 MMOL/L (ref 22–29)
CREAT SERPL-MCNC: 1.62 MG/DL (ref 0.76–1.27)
EGFRCR SERPLBLD CKD-EPI 2021: 44.3 ML/MIN/1.73
GLUCOSE SERPL-MCNC: 89 MG/DL (ref 65–99)
POTASSIUM SERPL-SCNC: 4.1 MMOL/L (ref 3.5–5.2)
SODIUM SERPL-SCNC: 139 MMOL/L (ref 136–145)

## 2025-04-22 PROCEDURE — 36415 COLL VENOUS BLD VENIPUNCTURE: CPT

## 2025-04-22 PROCEDURE — 80048 BASIC METABOLIC PNL TOTAL CA: CPT

## 2025-04-22 NOTE — PROGRESS NOTES
Pt present today for a BP Check. Allergies, Hx, and Medications reviewed and confirmed.     BP/HR are as follows:  110/60  HR 58  O2 99    Pt states no c/o     Per NM, pt okay to go home today.    Recommendations:    Continue same meds, go down to lab today for a BMP.     Tamia HOWARD CMA

## 2025-04-23 ENCOUNTER — TREATMENT (OUTPATIENT)
Dept: CARDIAC REHAB | Facility: HOSPITAL | Age: 75
End: 2025-04-23
Payer: MEDICARE

## 2025-04-23 DIAGNOSIS — Z98.890 S/P MVR (MITRAL VALVE REPAIR): ICD-10-CM

## 2025-04-23 DIAGNOSIS — Z95.1 S/P CABG (CORONARY ARTERY BYPASS GRAFT): Primary | ICD-10-CM

## 2025-04-23 PROCEDURE — 93798 PHYS/QHP OP CAR RHAB W/ECG: CPT

## 2025-04-25 ENCOUNTER — TREATMENT (OUTPATIENT)
Dept: CARDIAC REHAB | Facility: HOSPITAL | Age: 75
End: 2025-04-25
Payer: MEDICARE

## 2025-04-25 DIAGNOSIS — Z95.1 S/P CABG (CORONARY ARTERY BYPASS GRAFT): Primary | ICD-10-CM

## 2025-04-25 DIAGNOSIS — Z98.890 S/P MVR (MITRAL VALVE REPAIR): ICD-10-CM

## 2025-04-25 PROCEDURE — 93798 PHYS/QHP OP CAR RHAB W/ECG: CPT

## 2025-04-28 ENCOUNTER — TREATMENT (OUTPATIENT)
Dept: CARDIAC REHAB | Facility: HOSPITAL | Age: 75
End: 2025-04-28
Payer: MEDICARE

## 2025-04-28 DIAGNOSIS — Z98.890 S/P MVR (MITRAL VALVE REPAIR): ICD-10-CM

## 2025-04-28 DIAGNOSIS — Z95.1 S/P CABG (CORONARY ARTERY BYPASS GRAFT): Primary | ICD-10-CM

## 2025-04-28 PROCEDURE — 93798 PHYS/QHP OP CAR RHAB W/ECG: CPT

## 2025-04-30 ENCOUNTER — TREATMENT (OUTPATIENT)
Dept: CARDIAC REHAB | Facility: HOSPITAL | Age: 75
End: 2025-04-30
Payer: MEDICARE

## 2025-04-30 DIAGNOSIS — Z98.890 S/P MVR (MITRAL VALVE REPAIR): Primary | ICD-10-CM

## 2025-04-30 DIAGNOSIS — Z95.1 S/P CABG (CORONARY ARTERY BYPASS GRAFT): ICD-10-CM

## 2025-04-30 PROCEDURE — 93798 PHYS/QHP OP CAR RHAB W/ECG: CPT

## 2025-05-02 ENCOUNTER — TREATMENT (OUTPATIENT)
Dept: CARDIAC REHAB | Facility: HOSPITAL | Age: 75
End: 2025-05-02
Payer: MEDICARE

## 2025-05-02 DIAGNOSIS — Z98.890 S/P MVR (MITRAL VALVE REPAIR): Primary | ICD-10-CM

## 2025-05-02 DIAGNOSIS — Z95.1 S/P CABG (CORONARY ARTERY BYPASS GRAFT): ICD-10-CM

## 2025-05-02 PROCEDURE — 93798 PHYS/QHP OP CAR RHAB W/ECG: CPT

## 2025-05-05 ENCOUNTER — TREATMENT (OUTPATIENT)
Dept: CARDIAC REHAB | Facility: HOSPITAL | Age: 75
End: 2025-05-05
Payer: MEDICARE

## 2025-05-05 ENCOUNTER — TELEPHONE (OUTPATIENT)
Dept: CARDIOLOGY | Age: 75
End: 2025-05-05
Payer: MEDICARE

## 2025-05-05 DIAGNOSIS — Z95.1 S/P CABG (CORONARY ARTERY BYPASS GRAFT): ICD-10-CM

## 2025-05-05 DIAGNOSIS — Z98.890 S/P MVR (MITRAL VALVE REPAIR): Primary | ICD-10-CM

## 2025-05-05 PROCEDURE — 93798 PHYS/QHP OP CAR RHAB W/ECG: CPT

## 2025-05-05 NOTE — TELEPHONE ENCOUNTER
Caller: JUSTINE    Relationship:WIFE    Callback number: [phone]    Is it ok to leave a message: [] Yes [] No    Requested medication for samples: JARDIANCE    How much medication does the patient currently have left: 1 PILL LEFT    Who will be picking up the samples:     Do you need information about patient financial assistance for this medication: [] Yes [] No    Additional details provided:

## 2025-05-05 NOTE — TELEPHONE ENCOUNTER
5/5/25      Spoke with the patient wife and she stated they will wait till next week to see if he will need some samples then and the call was ended.

## 2025-05-07 ENCOUNTER — TREATMENT (OUTPATIENT)
Dept: CARDIAC REHAB | Facility: HOSPITAL | Age: 75
End: 2025-05-07
Payer: MEDICARE

## 2025-05-07 DIAGNOSIS — Z95.1 S/P CABG (CORONARY ARTERY BYPASS GRAFT): ICD-10-CM

## 2025-05-07 DIAGNOSIS — Z98.890 S/P MVR (MITRAL VALVE REPAIR): Primary | ICD-10-CM

## 2025-05-07 PROCEDURE — 93798 PHYS/QHP OP CAR RHAB W/ECG: CPT

## 2025-05-08 ENCOUNTER — TRANSCRIBE ORDERS (OUTPATIENT)
Dept: ADMINISTRATIVE | Facility: HOSPITAL | Age: 75
End: 2025-05-08
Payer: MEDICARE

## 2025-05-08 ENCOUNTER — LAB (OUTPATIENT)
Dept: LAB | Facility: HOSPITAL | Age: 75
End: 2025-05-08
Payer: MEDICARE

## 2025-05-08 DIAGNOSIS — R97.20 ELEVATED PROSTATE SPECIFIC ANTIGEN (PSA): Primary | ICD-10-CM

## 2025-05-08 DIAGNOSIS — R97.20 ELEVATED PROSTATE SPECIFIC ANTIGEN (PSA): ICD-10-CM

## 2025-05-08 PROCEDURE — 36415 COLL VENOUS BLD VENIPUNCTURE: CPT

## 2025-05-08 PROCEDURE — 84154 ASSAY OF PSA FREE: CPT

## 2025-05-08 PROCEDURE — 84153 ASSAY OF PSA TOTAL: CPT

## 2025-05-09 ENCOUNTER — TREATMENT (OUTPATIENT)
Dept: CARDIAC REHAB | Facility: HOSPITAL | Age: 75
End: 2025-05-09
Payer: MEDICARE

## 2025-05-09 DIAGNOSIS — Z98.890 S/P MVR (MITRAL VALVE REPAIR): Primary | ICD-10-CM

## 2025-05-09 DIAGNOSIS — Z95.1 S/P CABG (CORONARY ARTERY BYPASS GRAFT): ICD-10-CM

## 2025-05-09 LAB
PSA FREE MFR SERPL: 34.5 %
PSA FREE SERPL-MCNC: 1.07 NG/ML
PSA SERPL-MCNC: 3.1 NG/ML (ref 0–4)

## 2025-05-09 PROCEDURE — 93798 PHYS/QHP OP CAR RHAB W/ECG: CPT

## 2025-05-12 ENCOUNTER — TREATMENT (OUTPATIENT)
Dept: CARDIAC REHAB | Facility: HOSPITAL | Age: 75
End: 2025-05-12
Payer: MEDICARE

## 2025-05-12 DIAGNOSIS — Z95.1 S/P CABG (CORONARY ARTERY BYPASS GRAFT): ICD-10-CM

## 2025-05-12 DIAGNOSIS — Z98.890 S/P MVR (MITRAL VALVE REPAIR): Primary | ICD-10-CM

## 2025-05-12 PROCEDURE — 93798 PHYS/QHP OP CAR RHAB W/ECG: CPT

## 2025-05-14 ENCOUNTER — TELEPHONE (OUTPATIENT)
Dept: CARDIOLOGY | Age: 75
End: 2025-05-14

## 2025-05-14 ENCOUNTER — TREATMENT (OUTPATIENT)
Dept: CARDIAC REHAB | Facility: HOSPITAL | Age: 75
End: 2025-05-14
Payer: MEDICARE

## 2025-05-14 DIAGNOSIS — Z95.1 S/P CABG (CORONARY ARTERY BYPASS GRAFT): ICD-10-CM

## 2025-05-14 DIAGNOSIS — Z98.890 S/P MVR (MITRAL VALVE REPAIR): Primary | ICD-10-CM

## 2025-05-14 PROCEDURE — 93798 PHYS/QHP OP CAR RHAB W/ECG: CPT

## 2025-05-14 NOTE — TELEPHONE ENCOUNTER
Reached out to the [place] office and they did not have any samples of the jardanice 10 mg. I advised I could reach out to the main office and she declined.

## 2025-05-16 ENCOUNTER — TREATMENT (OUTPATIENT)
Dept: CARDIAC REHAB | Facility: HOSPITAL | Age: 75
End: 2025-05-16
Payer: MEDICARE

## 2025-05-16 DIAGNOSIS — Z98.890 S/P MVR (MITRAL VALVE REPAIR): Primary | ICD-10-CM

## 2025-05-16 PROCEDURE — 93798 PHYS/QHP OP CAR RHAB W/ECG: CPT

## 2025-05-19 ENCOUNTER — TREATMENT (OUTPATIENT)
Dept: CARDIAC REHAB | Facility: HOSPITAL | Age: 75
End: 2025-05-19
Payer: MEDICARE

## 2025-05-19 DIAGNOSIS — Z95.1 S/P CABG (CORONARY ARTERY BYPASS GRAFT): ICD-10-CM

## 2025-05-19 DIAGNOSIS — Z98.890 S/P MVR (MITRAL VALVE REPAIR): Primary | ICD-10-CM

## 2025-05-19 PROCEDURE — 93798 PHYS/QHP OP CAR RHAB W/ECG: CPT

## 2025-05-21 ENCOUNTER — TREATMENT (OUTPATIENT)
Dept: CARDIAC REHAB | Facility: HOSPITAL | Age: 75
End: 2025-05-21
Payer: MEDICARE

## 2025-05-21 ENCOUNTER — LAB (OUTPATIENT)
Dept: LAB | Facility: HOSPITAL | Age: 75
End: 2025-05-21
Payer: MEDICARE

## 2025-05-21 DIAGNOSIS — Z98.890 S/P MVR (MITRAL VALVE REPAIR): Primary | ICD-10-CM

## 2025-05-21 DIAGNOSIS — Z95.1 S/P CABG (CORONARY ARTERY BYPASS GRAFT): ICD-10-CM

## 2025-05-21 PROCEDURE — 85025 COMPLETE CBC W/AUTO DIFF WBC: CPT | Performed by: NURSE PRACTITIONER

## 2025-05-21 PROCEDURE — 80053 COMPREHEN METABOLIC PANEL: CPT | Performed by: NURSE PRACTITIONER

## 2025-05-21 PROCEDURE — 82746 ASSAY OF FOLIC ACID SERUM: CPT | Performed by: NURSE PRACTITIONER

## 2025-05-21 PROCEDURE — 93798 PHYS/QHP OP CAR RHAB W/ECG: CPT

## 2025-05-21 PROCEDURE — 82728 ASSAY OF FERRITIN: CPT | Performed by: NURSE PRACTITIONER

## 2025-05-21 PROCEDURE — 82607 VITAMIN B-12: CPT | Performed by: NURSE PRACTITIONER

## 2025-05-21 PROCEDURE — 83540 ASSAY OF IRON: CPT | Performed by: NURSE PRACTITIONER

## 2025-05-21 PROCEDURE — 84466 ASSAY OF TRANSFERRIN: CPT | Performed by: NURSE PRACTITIONER

## 2025-05-21 PROCEDURE — 82306 VITAMIN D 25 HYDROXY: CPT | Performed by: NURSE PRACTITIONER

## 2025-05-23 ENCOUNTER — TREATMENT (OUTPATIENT)
Dept: CARDIAC REHAB | Facility: HOSPITAL | Age: 75
End: 2025-05-23
Payer: MEDICARE

## 2025-05-23 DIAGNOSIS — Z98.890 S/P MVR (MITRAL VALVE REPAIR): Primary | ICD-10-CM

## 2025-05-23 DIAGNOSIS — Z95.1 S/P CABG (CORONARY ARTERY BYPASS GRAFT): ICD-10-CM

## 2025-05-23 PROCEDURE — 93798 PHYS/QHP OP CAR RHAB W/ECG: CPT

## 2025-05-28 ENCOUNTER — TREATMENT (OUTPATIENT)
Dept: CARDIAC REHAB | Facility: HOSPITAL | Age: 75
End: 2025-05-28
Payer: MEDICARE

## 2025-05-28 DIAGNOSIS — Z95.1 S/P CABG (CORONARY ARTERY BYPASS GRAFT): Primary | ICD-10-CM

## 2025-05-28 PROCEDURE — 93798 PHYS/QHP OP CAR RHAB W/ECG: CPT

## 2025-05-29 ENCOUNTER — OFFICE VISIT (OUTPATIENT)
Dept: INTERNAL MEDICINE | Facility: CLINIC | Age: 75
End: 2025-05-29
Payer: MEDICARE

## 2025-05-29 ENCOUNTER — TELEPHONE (OUTPATIENT)
Dept: CARDIOLOGY | Facility: CLINIC | Age: 75
End: 2025-05-29
Payer: MEDICARE

## 2025-05-29 VITALS
OXYGEN SATURATION: 99 % | TEMPERATURE: 97.7 F | WEIGHT: 142.4 LBS | BODY MASS INDEX: 22.35 KG/M2 | HEIGHT: 67 IN | DIASTOLIC BLOOD PRESSURE: 56 MMHG | HEART RATE: 67 BPM | SYSTOLIC BLOOD PRESSURE: 118 MMHG

## 2025-05-29 DIAGNOSIS — Z98.890 S/P MITRAL VALVE REPAIR: ICD-10-CM

## 2025-05-29 DIAGNOSIS — R97.20 ELEVATED PSA: ICD-10-CM

## 2025-05-29 DIAGNOSIS — I10 PRIMARY HYPERTENSION: ICD-10-CM

## 2025-05-29 DIAGNOSIS — I34.0 SEVERE MITRAL REGURGITATION: ICD-10-CM

## 2025-05-29 DIAGNOSIS — I25.10 CORONARY ARTERY DISEASE INVOLVING NATIVE CORONARY ARTERY OF NATIVE HEART WITHOUT ANGINA PECTORIS: ICD-10-CM

## 2025-05-29 DIAGNOSIS — E55.9 VITAMIN D DEFICIENCY: Chronic | ICD-10-CM

## 2025-05-29 DIAGNOSIS — D64.9 MILD ANEMIA: ICD-10-CM

## 2025-05-29 DIAGNOSIS — I10 PRIMARY HYPERTENSION: Primary | ICD-10-CM

## 2025-05-29 DIAGNOSIS — Z95.1 S/P CABG (CORONARY ARTERY BYPASS GRAFT): ICD-10-CM

## 2025-05-29 DIAGNOSIS — N18.31 STAGE 3A CHRONIC KIDNEY DISEASE: ICD-10-CM

## 2025-05-29 DIAGNOSIS — I79.8 OTHER DISORDERS OF ARTERIES, ARTERIOLES AND CAPILLARIES IN DISEASES CLASSIFIED ELSEWHERE: ICD-10-CM

## 2025-05-29 DIAGNOSIS — I34.0 NONRHEUMATIC MITRAL VALVE REGURGITATION: ICD-10-CM

## 2025-05-29 DIAGNOSIS — I25.10 CORONARY ARTERY DISEASE INVOLVING NATIVE CORONARY ARTERY OF NATIVE HEART, UNSPECIFIED WHETHER ANGINA PRESENT: ICD-10-CM

## 2025-05-29 RX ORDER — ATORVASTATIN CALCIUM 40 MG/1
1 TABLET, FILM COATED ORAL DAILY
COMMUNITY
Start: 2025-05-16

## 2025-05-29 NOTE — ASSESSMENT & PLAN NOTE
- S/P CABG  - continue current medication regimen  - continue cardiac rehab  - samples of Jardiance given today.  - there are no samples of Eliquis in this office but I asked Mr. Peters to stop by the Cardiology office and inquire.

## 2025-05-29 NOTE — PROGRESS NOTES
"Chief Complaint   Patient presents with    Hypertension    Follow-up    Hyperlipidemia       SUBJECTIVE  Jackson Tariq is a 75 y.o. male presenting today for follow up of his chronic health conditions.    These include HTN, CAD, dysphagia, CKD, osteopenia.    He is recovering well after mitral valve repair and CABG. Cardiac rehab is going well.    He has not been taking Eliquis or Jardiance d/t cost.       Outpatient Medications Marked as Taking for the 5/29/25 encounter (Office Visit) with Caroline Medina APRN   Medication Sig Dispense Refill    ASPIRIN 81 PO Take 81 mg by mouth Daily.      atorvastatin (LIPITOR) 40 MG tablet Take 1 tablet by mouth Daily.      Calcium Carb-Cholecalciferol (CALCIUM 500 +D PO) Take 500 Int'l Units by mouth 2 (Two) Times a Day.      clotrimazole-betamethasone (LOTRISONE) 1-0.05 % cream Apply 1 Application topically to the appropriate area as directed 2 (Two) Times a Day. 15 g 0    metoprolol succinate XL (TOPROL-XL) 25 MG 24 hr tablet Take 1 tablet by mouth Daily. 90 tablet 3    omeprazole (priLOSEC) 20 MG capsule Take 1 capsule by mouth twice daily (Patient taking differently: Take 1 capsule by mouth Daily.) 180 capsule 3    valsartan (DIOVAN) 160 MG tablet Take 1 tablet by mouth Daily. 30 tablet 11         The following portions of the patient's history were reviewed and updated as appropriate: allergies, current medications, past family history, past medical history, past social history, past surgical history and problem list.    Review of Systems   Respiratory:  Negative for shortness of breath.    Cardiovascular:  Negative for chest pain, palpitations and leg swelling.       Objective   Vitals:    05/29/25 0756   BP: 118/56   BP Location: Left arm   Patient Position: Sitting   Cuff Size: Adult   Pulse: 67   Temp: 97.7 °F (36.5 °C)   TempSrc: Infrared   SpO2: 99%   Weight: 64.6 kg (142 lb 6.4 oz)   Height: 170.2 cm (67\")       BP Readings from Last 3 Encounters: "   05/29/25 118/56   04/08/25 130/72   03/26/25 110/60        Wt Readings from Last 3 Encounters:   05/29/25 64.6 kg (142 lb 6.4 oz)   04/08/25 62.6 kg (138 lb)   03/26/25 66.2 kg (146 lb)        Body mass index is 22.3 kg/m².  Nursing notes and vitals reviewed.    Physical Exam  Constitutional:       General: He is not in acute distress.     Appearance: He is well-developed.   Cardiovascular:      Rate and Rhythm: Regular rhythm.      Pulses: Normal pulses.      Heart sounds: Normal heart sounds.   Pulmonary:      Effort: Pulmonary effort is normal.      Breath sounds: Normal breath sounds.   Musculoskeletal:      Right lower leg: No edema.      Left lower leg: No edema.   Neurological:      Mental Status: He is alert and oriented to person, place, and time.   Psychiatric:         Attention and Perception: He is attentive.         Mood and Affect: Mood and affect normal.         Speech: Speech normal.         Behavior: Behavior normal.         Thought Content: Thought content normal.           Data Reviewed:  Recent Results (from the past 4 weeks)   PSA, Total & Free    Collection Time: 05/08/25  8:23 AM    Specimen: Blood   Result Value Ref Range    PSA 3.1 0.0 - 4.0 ng/mL    PSA, Free 1.07 N/A ng/mL    PSA, Free % 34.5 %   Comprehensive Metabolic Panel    Collection Time: 05/21/25  2:25 PM    Specimen: Blood   Result Value Ref Range    Glucose 97 65 - 99 mg/dL    BUN 15 8 - 23 mg/dL    Creatinine 1.51 (H) 0.76 - 1.27 mg/dL    Sodium 139 136 - 145 mmol/L    Potassium 4.0 3.5 - 5.2 mmol/L    Chloride 104 98 - 107 mmol/L    CO2 23.0 22.0 - 29.0 mmol/L    Calcium 9.1 8.6 - 10.5 mg/dL    Total Protein 7.0 6.0 - 8.5 g/dL    Albumin 4.2 3.5 - 5.2 g/dL    ALT (SGPT) 21 1 - 41 U/L    AST (SGOT) 22 1 - 40 U/L    Alkaline Phosphatase 140 (H) 39 - 117 U/L    Total Bilirubin 0.7 0.0 - 1.2 mg/dL    Globulin 2.8 gm/dL    A/G Ratio 1.5 g/dL    BUN/Creatinine Ratio 9.9 7.0 - 25.0    Anion Gap 12.0 5.0 - 15.0 mmol/L    eGFR 48.2  (L) >60.0 mL/min/1.73   Ferritin    Collection Time: 05/21/25  2:25 PM    Specimen: Blood   Result Value Ref Range    Ferritin 186.00 30.00 - 400.00 ng/mL   Folate    Collection Time: 05/21/25  2:25 PM    Specimen: Blood   Result Value Ref Range    Folate 9.93 4.78 - 24.20 ng/mL   Iron Profile    Collection Time: 05/21/25  2:25 PM    Specimen: Blood   Result Value Ref Range    Iron 80 59 - 158 mcg/dL    Iron Saturation (TSAT) 26 20 - 50 %    Transferrin 204 200 - 360 mg/dL    TIBC 304 298 - 536 mcg/dL   Vitamin B12    Collection Time: 05/21/25  2:25 PM    Specimen: Blood   Result Value Ref Range    Vitamin B-12 424 211 - 946 pg/mL   Vitamin D,25-Hydroxy    Collection Time: 05/21/25  2:25 PM    Specimen: Blood   Result Value Ref Range    25 Hydroxy, Vitamin D 45.2 30.0 - 100.0 ng/ml   CBC Auto Differential    Collection Time: 05/21/25  2:25 PM    Specimen: Blood   Result Value Ref Range    WBC 4.88 3.40 - 10.80 10*3/mm3    RBC 3.59 (L) 4.14 - 5.80 10*6/mm3    Hemoglobin 11.5 (L) 13.0 - 17.7 g/dL    Hematocrit 34.2 (L) 37.5 - 51.0 %    MCV 95.3 79.0 - 97.0 fL    MCH 32.0 26.6 - 33.0 pg    MCHC 33.6 31.5 - 35.7 g/dL    RDW 13.2 12.3 - 15.4 %    RDW-SD 47.1 37.0 - 54.0 fl    MPV 11.0 6.0 - 12.0 fL    Platelets 202 140 - 450 10*3/mm3    Neutrophil % 64.6 42.7 - 76.0 %    Lymphocyte % 25.0 19.6 - 45.3 %    Monocyte % 8.0 5.0 - 12.0 %    Eosinophil % 1.4 0.3 - 6.2 %    Basophil % 0.8 0.0 - 1.5 %    Immature Grans % 0.2 0.0 - 0.5 %    Neutrophils, Absolute 3.15 1.70 - 7.00 10*3/mm3    Lymphocytes, Absolute 1.22 0.70 - 3.10 10*3/mm3    Monocytes, Absolute 0.39 0.10 - 0.90 10*3/mm3    Eosinophils, Absolute 0.07 0.00 - 0.40 10*3/mm3    Basophils, Absolute 0.04 0.00 - 0.20 10*3/mm3    Immature Grans, Absolute 0.01 0.00 - 0.05 10*3/mm3    nRBC 0.0 0.0 - 0.2 /100 WBC     Office Visit with Jg Zuluaga PA-C (04/08/2025)     Assessment and Plan:       Other secondary hypertension  Hypertension is stable and  controlled  Continue current treatment regimen.  Blood pressure will be reassessed in 6 months.         Coronary artery disease involving native coronary artery of native heart without angina pectoris  - S/P CABG  - continue current medication regimen  - continue cardiac rehab  - samples of Jardiance given today.  - there are no samples of Eliquis in this office but I asked Mr. Peters to stop by the Cardiology office and inquire.         S/P mitral valve repair         S/P CABG (coronary artery bypass graft)         Stage 3a chronic kidney disease  Renal condition is stable.  Continue current treatment regimen.  Renal condition will be reassessed in 6 months.         Elevated PSA  - has f/u w/ Urology later today       Mild anemia  - post-operative  - improving           Medications, including side effects, were discussed with the patient. Patient verbalized understanding.  The plan of care was discussed. All questions were answered. Patient verbalized understanding.      Return in about 6 months (around 11/29/2025) for fasting labs one week prior to, Medicare Wellness.

## 2025-05-29 NOTE — TELEPHONE ENCOUNTER
Patient received 2 boxes of Jardiance 10 mg samples    LOT [id number]  EXP 02/28/2027    Patient received 2 boxes of Eliquis 5 mg samples    LOT [id number]  EXP 07/31/2026

## 2025-05-30 ENCOUNTER — TREATMENT (OUTPATIENT)
Dept: CARDIAC REHAB | Facility: HOSPITAL | Age: 75
End: 2025-05-30
Payer: MEDICARE

## 2025-05-30 DIAGNOSIS — Z95.1 S/P CABG (CORONARY ARTERY BYPASS GRAFT): Primary | ICD-10-CM

## 2025-05-30 DIAGNOSIS — Z98.890 S/P MVR (MITRAL VALVE REPAIR): ICD-10-CM

## 2025-05-30 PROCEDURE — 93798 PHYS/QHP OP CAR RHAB W/ECG: CPT

## 2025-06-02 ENCOUNTER — TREATMENT (OUTPATIENT)
Dept: CARDIAC REHAB | Facility: HOSPITAL | Age: 75
End: 2025-06-02
Payer: MEDICARE

## 2025-06-02 DIAGNOSIS — Z98.890 S/P MVR (MITRAL VALVE REPAIR): ICD-10-CM

## 2025-06-02 DIAGNOSIS — Z95.1 S/P CABG (CORONARY ARTERY BYPASS GRAFT): Primary | ICD-10-CM

## 2025-06-02 PROCEDURE — 93798 PHYS/QHP OP CAR RHAB W/ECG: CPT

## 2025-06-04 ENCOUNTER — TELEPHONE (OUTPATIENT)
Dept: CARDIAC SURGERY | Facility: CLINIC | Age: 75
End: 2025-06-04
Payer: MEDICARE

## 2025-06-04 NOTE — TELEPHONE ENCOUNTER
Spoke with Ysabel Tariq, pt's wife. Informed her that orders have been placed for s/p 3mo ECHO and provided centralized scheduling's phone number. Pt's wife verbalized understanding.

## 2025-06-12 ENCOUNTER — HOSPITAL ENCOUNTER (OUTPATIENT)
Dept: CARDIOLOGY | Facility: HOSPITAL | Age: 75
Discharge: HOME | End: 2025-06-12
Admitting: THORACIC SURGERY (CARDIOTHORACIC VASCULAR SURGERY)
Payer: MEDICARE

## 2025-06-12 VITALS
BODY MASS INDEX: 22.15 KG/M2 | DIASTOLIC BLOOD PRESSURE: 60 MMHG | SYSTOLIC BLOOD PRESSURE: 129 MMHG | HEIGHT: 67 IN | WEIGHT: 141.09 LBS | HEART RATE: 62 BPM

## 2025-06-12 DIAGNOSIS — Z98.890 S/P MVR (MITRAL VALVE REPAIR): ICD-10-CM

## 2025-06-12 LAB
AORTIC DIMENSIONLESS INDEX: 0.72 (DI)
AV MEAN PRESS GRAD SYS DOP V1V2: 4 MMHG
AV VMAX SYS DOP: 127 CM/SEC
BH CV ECHO LEFT VENTRICLE GLOBAL LONGITUDINAL STRAIN: -19.5 %
BH CV ECHO MEAS - ACS: 1.7 CM
BH CV ECHO MEAS - AI P1/2T: 549.4 MSEC
BH CV ECHO MEAS - AO MAX PG: 6.5 MMHG
BH CV ECHO MEAS - AO ROOT DIAM: 3.5 CM
BH CV ECHO MEAS - AO V2 VTI: 29.3 CM
BH CV ECHO MEAS - AVA(I,D): 2.22 CM2
BH CV ECHO MEAS - EDV(CUBED): 148.9 ML
BH CV ECHO MEAS - EDV(MOD-SP2): 107 ML
BH CV ECHO MEAS - EDV(MOD-SP4): 111 ML
BH CV ECHO MEAS - EF(MOD-SP2): 55.1 %
BH CV ECHO MEAS - EF(MOD-SP4): 59.5 %
BH CV ECHO MEAS - ESV(CUBED): 82.4 ML
BH CV ECHO MEAS - ESV(MOD-SP2): 48 ML
BH CV ECHO MEAS - ESV(MOD-SP4): 45 ML
BH CV ECHO MEAS - FS: 17.9 %
BH CV ECHO MEAS - IVS/LVPW: 1 CM
BH CV ECHO MEAS - IVSD: 0.9 CM
BH CV ECHO MEAS - LV DIASTOLIC VOL/BSA (35-75): 63.7 CM2
BH CV ECHO MEAS - LV MASS(C)D: 174.5 GRAMS
BH CV ECHO MEAS - LV MAX PG: 3.3 MMHG
BH CV ECHO MEAS - LV MEAN PG: 2 MMHG
BH CV ECHO MEAS - LV SYSTOLIC VOL/BSA (12-30): 25.8 CM2
BH CV ECHO MEAS - LV V1 MAX: 90.9 CM/SEC
BH CV ECHO MEAS - LV V1 VTI: 21.1 CM
BH CV ECHO MEAS - LVIDD: 5.3 CM
BH CV ECHO MEAS - LVIDS: 4.4 CM
BH CV ECHO MEAS - LVOT AREA: 3.1 CM2
BH CV ECHO MEAS - LVOT DIAM: 1.98 CM
BH CV ECHO MEAS - LVPWD: 0.9 CM
BH CV ECHO MEAS - MV A MAX VEL: 68.6 CM/SEC
BH CV ECHO MEAS - MV DEC SLOPE: 468.7 CM/SEC2
BH CV ECHO MEAS - MV DEC TIME: 0.22 SEC
BH CV ECHO MEAS - MV E MAX VEL: 110 CM/SEC
BH CV ECHO MEAS - MV E/A: 1.6
BH CV ECHO MEAS - MV MAX PG: 6.4 MMHG
BH CV ECHO MEAS - MV MEAN PG: 1.69 MMHG
BH CV ECHO MEAS - MV P1/2T: 81.8 MSEC
BH CV ECHO MEAS - MV V2 VTI: 40.2 CM
BH CV ECHO MEAS - MVA(P1/2T): 2.7 CM2
BH CV ECHO MEAS - MVA(VTI): 1.61 CM2
BH CV ECHO MEAS - PA ACC TIME: 0.11 SEC
BH CV ECHO MEAS - PA V2 MAX: 78.6 CM/SEC
BH CV ECHO MEAS - PULM A REVS DUR: 0.14 SEC
BH CV ECHO MEAS - PULM A REVS VEL: 21.8 CM/SEC
BH CV ECHO MEAS - PULM DIAS VEL: 58.1 CM/SEC
BH CV ECHO MEAS - PULM S/D: 0.67
BH CV ECHO MEAS - PULM SYS VEL: 39 CM/SEC
BH CV ECHO MEAS - QP/QS: 1.17
BH CV ECHO MEAS - RAP SYSTOLE: 3 MMHG
BH CV ECHO MEAS - RV MAX PG: 1.28 MMHG
BH CV ECHO MEAS - RV V1 MAX: 56.6 CM/SEC
BH CV ECHO MEAS - RV V1 VTI: 13.2 CM
BH CV ECHO MEAS - RVOT DIAM: 2.7 CM
BH CV ECHO MEAS - RVSP: 21 MMHG
BH CV ECHO MEAS - SV(LVOT): 64.9 ML
BH CV ECHO MEAS - SV(MOD-SP2): 59 ML
BH CV ECHO MEAS - SV(MOD-SP4): 66 ML
BH CV ECHO MEAS - SV(RVOT): 76 ML
BH CV ECHO MEAS - SVI(LVOT): 37.3 ML/M2
BH CV ECHO MEAS - SVI(MOD-SP2): 33.9 ML/M2
BH CV ECHO MEAS - SVI(MOD-SP4): 37.9 ML/M2
BH CV ECHO MEAS - TR MAX PG: 18.6 MMHG
BH CV ECHO MEAS - TR MAX VEL: 215.6 CM/SEC
BH CV ECHO MEAS RV FREE WALL STRAIN: -14.7 %
LEFT ATRIUM VOLUME INDEX: 32.9 ML/M2
LV EF BIPLANE MOD: 56 %
SINUS: 3.3 CM
STJ: 2.8 CM

## 2025-06-12 PROCEDURE — 93306 TTE W/DOPPLER COMPLETE: CPT

## 2025-06-12 PROCEDURE — 93356 MYOCRD STRAIN IMG SPCKL TRCK: CPT

## 2025-06-25 ENCOUNTER — TELEPHONE (OUTPATIENT)
Dept: CARDIOLOGY | Facility: CLINIC | Age: 75
End: 2025-06-25
Payer: MEDICARE

## 2025-06-25 DIAGNOSIS — I34.0 NONRHEUMATIC MITRAL VALVE REGURGITATION: ICD-10-CM

## 2025-06-25 DIAGNOSIS — I25.10 CORONARY ARTERY DISEASE INVOLVING NATIVE CORONARY ARTERY OF NATIVE HEART, UNSPECIFIED WHETHER ANGINA PRESENT: ICD-10-CM

## 2025-06-25 DIAGNOSIS — I79.8 OTHER DISORDERS OF ARTERIES, ARTERIOLES AND CAPILLARIES IN DISEASES CLASSIFIED ELSEWHERE: ICD-10-CM

## 2025-06-25 DIAGNOSIS — I10 PRIMARY HYPERTENSION: ICD-10-CM

## 2025-06-25 DIAGNOSIS — I34.0 SEVERE MITRAL REGURGITATION: ICD-10-CM

## 2025-06-25 NOTE — TELEPHONE ENCOUNTER
Pt. Received 2 boxes of Eliquis 5 mg    Lot # [id number]  Exp: 07/31/2025    Lot # [id number]  Exp: 07/31/2026    Patient received 2 bottles of Jardiance 10 mg    Lot# [id number]    Exp 02/28/2027

## 2025-07-02 ENCOUNTER — OFFICE VISIT (OUTPATIENT)
Dept: CARDIOLOGY | Facility: CLINIC | Age: 75
End: 2025-07-02
Payer: MEDICARE

## 2025-07-02 VITALS
WEIGHT: 142.4 LBS | HEART RATE: 62 BPM | SYSTOLIC BLOOD PRESSURE: 112 MMHG | HEIGHT: 66 IN | DIASTOLIC BLOOD PRESSURE: 58 MMHG | OXYGEN SATURATION: 96 % | BODY MASS INDEX: 22.88 KG/M2

## 2025-07-02 DIAGNOSIS — I10 ESSENTIAL HYPERTENSION: Primary | ICD-10-CM

## 2025-07-02 DIAGNOSIS — Z98.890 S/P MITRAL VALVE REPAIR: ICD-10-CM

## 2025-07-02 DIAGNOSIS — I34.0 SEVERE MITRAL REGURGITATION: ICD-10-CM

## 2025-07-02 DIAGNOSIS — N18.31 STAGE 3A CHRONIC KIDNEY DISEASE: ICD-10-CM

## 2025-07-02 DIAGNOSIS — I50.22 CHRONIC HFREF (HEART FAILURE WITH REDUCED EJECTION FRACTION): ICD-10-CM

## 2025-07-02 DIAGNOSIS — Z95.1 S/P CABG (CORONARY ARTERY BYPASS GRAFT): ICD-10-CM

## 2025-07-02 DIAGNOSIS — E78.2 MIXED HYPERLIPIDEMIA: ICD-10-CM

## 2025-07-02 DIAGNOSIS — I10 PRIMARY HYPERTENSION: ICD-10-CM

## 2025-07-02 DIAGNOSIS — I25.10 CORONARY ARTERY DISEASE INVOLVING NATIVE CORONARY ARTERY OF NATIVE HEART WITHOUT ANGINA PECTORIS: ICD-10-CM

## 2025-07-02 DIAGNOSIS — I48.92 ATRIAL FLUTTER WITH CONTROLLED RESPONSE: ICD-10-CM

## 2025-07-02 RX ORDER — ATORVASTATIN CALCIUM 40 MG/1
40 TABLET, FILM COATED ORAL NIGHTLY
Qty: 90 TABLET | Refills: 3 | Status: SHIPPED | OUTPATIENT
Start: 2025-07-02 | End: 2026-07-02

## 2025-07-02 RX ORDER — VALSARTAN 160 MG/1
160 TABLET ORAL DAILY
Qty: 90 TABLET | Refills: 3 | Status: SHIPPED | OUTPATIENT
Start: 2025-07-02 | End: 2026-07-02

## 2025-07-02 RX ORDER — METOPROLOL SUCCINATE 25 MG/1
25 TABLET, EXTENDED RELEASE ORAL DAILY
Qty: 90 TABLET | Refills: 3 | Status: SHIPPED | OUTPATIENT
Start: 2025-07-02

## 2025-07-02 NOTE — PATIENT INSTRUCTIONS
Like we discussed, I think we can stop the Jardiance and the Eliquis medications.  You need to the Jardiance initially due to heart failure after surgery but your heart has recovered so you no longer need this.    The Eliquis medicine was needed due to the irregular heartbeat that occurred after surgery, but thankfully looks like that is gone away.  I would recommend he keep the Eliquis at home in the event that she have any flutters or palpitations or any irregular heartbeats that are detected, and we can discuss going back on this sometime in the future if we need to.  Thankfully, not right now.    The valsartan and the metoprolol medications we should definitely continue to help control blood pressure.  We can consider stopping the metoprolol 1 year out from surgery.  Make sure you stay on the baby aspirin every day, in the event that you get any dental work or dental cleanings, let us or your dentist know we can send in antibiotics for that prosthetic heart valve piece

## 2025-07-02 NOTE — PROGRESS NOTES
[facility]    Subjective:     Encounter Date:07/02/25      Patient ID: Jackson Tariq is a 75 y.o. male.    Chief Complaint:   Chief Complaint   Patient presents with    Follow-up   Follow-up for mitral regurgitation  History of Present Illness    Mr. Tariq is a very pleasant 75 y.o. gentleman without significant past medical history outside of osteopenia and vitamin D deficiency, who initially presented for evaluation of a cardiac murmur was found have severe mitral regurgitation.    He was initially asymptomatic, however on serial examinations, his LVEF dropped to less than 60%, and even though he was asymptomatic he was referred for mitral valve repair given the drop of EF.    He underwent mitral valve repair with Dr. Gaitan.  There was a brief respiratory-related arrest that occurred postoperatively, and he developed atrial flutter, however that it subsequently recovered and he is in sinus rhythm clinically.    He had a bit of a brittani course with dyspnea on exertion, but cardiac rehab helped him tremendously, as well as GDMT for CHF.  His EF was initially 35% post surgery and now recovered to normal.  He is back to his normal self and actually feels better than he did prior to surgery.  He feels great.  He had some struggles with the cost of Jardiance and Eliquis relying on samples, but these medications are stopped today per below       Previous card testing:  TTE June 12, 2025: EF 56%.  Well-repaired mitral valve with no significant residual mitral regurgitation or mitral stenosis.  Normal RVSP.     Echocardiogram performed July 2024:    Left ventricular systolic function is normal. Calculated left ventricular EF = 59.6%    The left ventricular cavity is borderline dilated when indexed for BSA    Left ventricular diastolic function was not assessed.    Severe mitral valve regurgitation is present.  PI SA radius 1.2 cm.  The jet is highly eccentric however appears definitively severe on  today's study and there appears to be a mobile element around the P2 segment likely indicative of a torn cord with subsequent flail component.  The left atrial size does appear mildly enlarged when compared to previous study.    Estimated right ventricular systolic pressure from tricuspid regurgitation is normal (<35 mmHg). Calculated right ventricular systolic pressure from tricuspid regurgitation is 29 mmHg.    Holter monitor, 5-day October 2023:    A relatively benign monitor study.    There was a 6 beat ventricular run.    There were 3 nonsustained atrial runs with longest of 5 beats.    No significant sustained tachyarrhythmia noted.    A patient triggered event did not have arrhythmia correlation.    Stress echo August 23:    Equivocal echocardiographic evidence for myocardial ischemia.  Wall motion was normal, however delayed stress echo images were obtained, in lieu of obtaining TR severity to evaluate severity of mitral regurgitation.  Functional capacity is normal    Estimated right ventricular systolic pressure from tricuspid regurgitation normal at rest.  Calculated right ventricular systolic pressure from tricuspid regurgitation is 42 mmHg at peak stress, notably his blood pressure showed a hypertensive response with /62 at time of peak stress.    Left ventricular systolic function is normal. Calculated left ventricular EF = 60.4%    Left ventricular diastolic function was normal.    There are myxomatous changes of the mitral valve apparatus present. There is bileaflet mitral valve prolapse present.    Equivocal ECG evidence of myocardial ischemia. Baseline previous EKG with LVH findings renders the ST segment changes at peak stress equivocal    Transesophageal echo performed October 17, 2024:    The mitral valve leaflets are thickened. There is a torn chordae associated with the P2 segment of the posterior mitral valve leaflet. The posterior mitral valve leaflet is partially flail.    There is  severe and highly eccentric anteriorly directed mitral regurgitation. There is flow reversal in the left upper pulmonary vein    The left ventricular cavity is moderately dilated    Left ventricular systolic function is normal. Left ventricular ejection fraction appears to be 61 - 65%.    Left ventricular diastolic function was normal.    Normal right ventricular cavity size and systolic function noted.    The left atrial cavity is mildly dilated.    There is a tiny PFO noted during the agitated saline study    Mild tricuspid valve regurgitation is present    Calculated right ventricular systolic pressure from tricuspid regurgitation is 33 mmHg.    There are mild plaques in the descending thoracic aorta.    There is no evidence of pericardial effusion.    Mitral valve repair, 38 mm [employer] flexible band annuloplasty, Trenkle resection of P2 segment, CABG X2, LIMA-mid LAD, SVG-OM, February 5, 2025 with Dr. Gaitan.  40 mm atrial clip device.  Primary closure of PFO.    TTE March 18, 2025: EF 35%.  Mildly dilated left ventricular cavity, RVSP normal.  No residual mitral regurgitation.    The following portions of the patient's history were reviewed and updated as appropriate: allergies, current medications, past family history, past medical history, past social history, past surgical history and problem list.    Past Medical History:   Diagnosis Date    Chronic cough     Coronary artery disease     GERD (gastroesophageal reflux disease)     Hyperlipidemia     Hypertension     Mitral valve insufficiency     Osteopenia of multiple sites 07/17/2023    Vitamin D deficiency        Past Surgical History:   Procedure Laterality Date    CARDIAC CATHETERIZATION N/A 7/24/2024    Procedure: Left Heart Cath;  Surgeon: Susan De Jesus MD;  Location: [facility] CATH INVASIVE LOCATION;  Service: Cardiovascular;  Laterality: N/A;  severe mitral regurgitation    CARDIAC CATHETERIZATION N/A 7/24/2024    Procedure: Right Heart Cath;   "Surgeon: Susan De Jesus MD;  Location: [facility] CATH INVASIVE LOCATION;  Service: Cardiovascular;  Laterality: N/A;    CARDIAC CATHETERIZATION N/A 7/24/2024    Procedure: Coronary angiography;  Surgeon: Susan De Jesus MD;  Location:  [facility] CATH INVASIVE LOCATION;  Service: Cardiovascular;  Laterality: N/A;    COLONOSCOPY      CORONARY ARTERY BYPASS GRAFT WITH MITRAL VALVE REPAIR/REPLACEMENT N/A 2/5/2025    Procedure: CORONARY ARTERY BYPASS GRAFT X2  WITH INTERNAL MAMMARY ARTERY GRAFT AND ENDOSCOPICALLY HARVESTED RIGHT SAPHENOUS VEIN;  STERNOTOMY; PRP; MITRAL VALVE REPAIR; PATENT FORAMEN OVALE CLOSURE; LEFT ATRIAL APPENDAGE CLOSURE WITH ATRICARE; TRANSESOPHAGEAL ECHOCARDIOGRAM WITH ANESTHESIA;  Surgeon: Farshad Gaitan MD;  Location: [facility] CVOR;  Service: Cardiothoracic;  Laterality: N/A;    ENDOSCOPY N/A 05/03/2019    Procedure: ESOPHAGOGASTRODUODENOSCOPY with biopsies;  Surgeon: Radha Del Real MD;  Location: [facility] OR;  Service: Gastroenterology    TRIGGER FINGER RELEASE      doesn't remember which side           ECG 12 Lead    Date/Time: 7/2/2025 10:23 AM  Performed by: Konstantin Driscoll MD    Authorized by: Konstantin Driscoll MD  Comparison: compared with previous ECG from 4/8/2025  Similar to previous ECG  Rhythm: sinus rhythm  Rate: normal  Conduction: 1st degree AV block  ST Segments: ST segments normal  T Waves: T waves normal  QRS axis: normal  Other: no other findings    Clinical impression: abnormal EKG             Objective:     Vitals:    07/02/25 1016   BP: 112/58   Pulse: 62   SpO2: 96%   Weight: 64.6 kg (142 lb 6.4 oz)   Height: 167.6 cm (66\")           Constitutional:       Appearance: Healthy appearance. Not in distress.   Neck:      Vascular: JVD normal.   Pulmonary:      Effort: Pulmonary effort is normal.      Breath sounds: Normal breath sounds and air entry.      Comments:    Cardiovascular:      PMI at left midclavicular line. Normal rate. Regular rhythm. Normal S2.       Murmurs: There is no " murmur.   Pulses:     Intact distal pulses.   Edema:     Peripheral edema absent.   Skin:     General: Skin is warm and dry.   Neurological:      General: No focal deficit present.      Mental Status: Alert, oriented to person, place, and time and oriented to person, place and time.   Psychiatric:         Mood and Affect: Mood and affect normal.         Lab Review:     Lipid Panel          7/26/2024    08:10 2/3/2025    07:13   Lipid Panel   Total Cholesterol  118    Total Cholesterol 171     Triglycerides 96  91    HDL Cholesterol 63  60    VLDL Cholesterol 17  17    LDL Cholesterol  91  41    LDL/HDL Ratio  0.66        BUN   Date Value Ref Range Status   05/21/2025 15 8 - 23 mg/dL Final     Creatinine   Date Value Ref Range Status   05/21/2025 1.51 (H) 0.76 - 1.27 mg/dL Final   02/05/2025 1.92 (H) 0.60 - 1.30 mg/dL Final     Potassium   Date Value Ref Range Status   05/21/2025 4.0 3.5 - 5.2 mmol/L Final     ALT (SGPT)   Date Value Ref Range Status   05/21/2025 21 1 - 41 U/L Final     AST (SGOT)   Date Value Ref Range Status   05/21/2025 22 1 - 40 U/L Final            Assessment:          Diagnosis Plan   1. Essential hypertension  ECG 12 Lead      2. S/P CABG (coronary artery bypass graft)        3. Chronic HFrEF (heart failure with reduced ejection fraction)  valsartan (DIOVAN) 160 MG tablet      4. Atrial flutter with controlled response  valsartan (DIOVAN) 160 MG tablet      5. Stage 3a chronic kidney disease  valsartan (DIOVAN) 160 MG tablet      6. Coronary artery disease involving native coronary artery of native heart without angina pectoris  valsartan (DIOVAN) 160 MG tablet      7. Severe mitral regurgitation  valsartan (DIOVAN) 160 MG tablet      8. S/P mitral valve repair  valsartan (DIOVAN) 160 MG tablet      9. Primary hypertension  valsartan (DIOVAN) 160 MG tablet      10. Mixed hyperlipidemia  valsartan (DIOVAN) 160 MG tablet                   Plan:         Mitral regurgitation, severe, status post  mitral valve repair with 38 mm physio ring, P2 resection, obliteration of atrial appendage with 40 mm atrial clip, February 5, 2025 with Dr. Gaitan.  We discussed dental precautions but he is edentulous.  Cardiomyopathy, systolic.  EF 35%, recovered to 55%.  Likely transient postoperative post mitral valve repair  Continue metoprolol succinate for now, will continue this 1 year post surgery and likely stop after that, he does have some resting sinus bradycardia.   Continue valsartan.  NYHA class I.  No longer requires Jardiance or Entresto  Coronary disease.  Status post CABG X2, LIMA-LAD and SVG-OM  Aspirin 81   Continue statin  History of hypertension.  Per above.  Atrial flutter.  Postoperative.  He remains in sinus rhythm now.  This was likely a transient episode that occurred after surgery he did require rhythm control briefly.  His left atrial Penders was obliterated, and he is no longer in atrial fibrillation.  I do not think needs Eliquis anymore.      RTC February 2026 for consideration of discontinuation of metoprolol     Konstantin Driscoll MD  [facility]  07/02/25  10:04 EDT       Current Outpatient Medications:     ASPIRIN 81 PO, Take 81 mg by mouth Daily., Disp: , Rfl:     atorvastatin (LIPITOR) 40 MG tablet, Take 1 tablet by mouth Every Night., Disp: 90 tablet, Rfl: 3    Calcium Carb-Cholecalciferol (CALCIUM 500 +D PO), Take 500 Int'l Units by mouth 2 (Two) Times a Day., Disp: , Rfl:     clotrimazole-betamethasone (LOTRISONE) 1-0.05 % cream, Apply 1 Application topically to the appropriate area as directed 2 (Two) Times a Day., Disp: 15 g, Rfl: 0    empagliflozin (Jardiance) 10 MG tablet tablet, Take 1 tablet by mouth Daily., Disp: 14 tablet, Rfl: 0    metoprolol succinate XL (TOPROL-XL) 25 MG 24 hr tablet, Take 1 tablet by mouth Daily., Disp: 90 tablet, Rfl: 3    omeprazole (priLOSEC) 20 MG capsule, Take 1 capsule by mouth twice daily (Patient taking differently: Take 1 capsule by mouth  Daily.), Disp: 180 capsule, Rfl: 3    valsartan (DIOVAN) 160 MG tablet, Take 1 tablet by mouth Daily., Disp: 90 tablet, Rfl: 3         Return in about 31 weeks (around 2/4/2026).      Part of this note may be an electronic transcription/translation of spoken language to printed text using the Dragon Dictation System.

## (undated) DEVICE — GUIDE SELECT ATRICLIP

## (undated) DEVICE — SPNG GZ WOVN 4X4IN 12PLY 10/BX STRL

## (undated) DEVICE — ROTATING SURGICAL PUNCHES, 1 PER POUCH: Brand: A&E MEDICAL / ROTATING SURGICAL PUNCHES

## (undated) DEVICE — FRCP BX RADJAW4 NDL 2.8 240CM LG OG BX40

## (undated) DEVICE — INTENDED FOR TISSUE SEPARATION, AND OTHER PROCEDURES THAT REQUIRE A SHARP SURGICAL BLADE TO PUNCTURE OR CUT.: Brand: BARD-PARKER ® CARBON RIB-BACK BLADES

## (undated) DEVICE — CORONARY ARTERY BYPASS GRAFT MARKERS, STAINLESS STEEL, DISTAL, WITHOUT HOLDER: Brand: ANASTOMARK CORONARY ARTERY BYPASS GRAFT MARKERS, STAINLESS STEEL, DISTAL

## (undated) DEVICE — SUT ETHIBOND 2/0 CV V5  30IN PXX52

## (undated) DEVICE — SYR LL 3CC

## (undated) DEVICE — TPE UMB 3/STRND 0.125X36IN

## (undated) DEVICE — PK PERFUS W/TB CUST ADLT

## (undated) DEVICE — PK ATS CUST W CARDIOTOMY RESEVOIR

## (undated) DEVICE — Device

## (undated) DEVICE — GW INQWIRE FC PTFE J/3MM .021 180

## (undated) DEVICE — BNDG ELAS MATRX V/CLS 4IN 5YD LF

## (undated) DEVICE — Y-TYPE BLOOD/SOLUTION SET WITH STANDARD BLOOD FILTER, 170 TO 260 MICRON FILTER, LUER ACTIVATED VALVE, MALE LUER LOCK ADAPTER WITH RETRACTABLE COLLAR: Brand: CLEARLINK

## (undated) DEVICE — VIAL FORMALIN CAP 10P 40ML

## (undated) DEVICE — ADAPT ANTEGRADE RETRGR

## (undated) DEVICE — CLAMP INSERT: Brand: STEALTH® CLAMP INSERT

## (undated) DEVICE — PK PERFUS CUST W/CARDIOPLEGIA

## (undated) DEVICE — SYS PERFUS SEP PLATLT W TIPS CUST

## (undated) DEVICE — DGW .035 FC J3MM 260CM TEF: Brand: EMERALD

## (undated) DEVICE — THE SAPHENA MEDICAL ONEPASS ENDOSCOPIC VESSEL HARVESTING SYSTEM IS INDICATED FOR USE IN MINIMALLY INVASIVE SURGERY ALLOWING ACCESS FOR VESSEL HARVESTING, AND IS PRIMARILY INDICATED FOR PATIENTS UNDERGOING ENDOSCOPIC SURGERY FOR ARTERIAL BYPASS. IT IS INDICATED FOR CUTTING TISSUE AND CONTROLLING BLEEDING THROUGH COAGULATION, AND FOR PATIENTS REQUIRING BLUNT DISSECTION OF TISSUE INCLUDING DISSECTION OF BLOOD VESSELS, DISSECTION OF BLOOD VESSELS OF THE EXTREMITIES, DISSECTION OF DUCTS AND OTHER STRUCTURES IN THE EXTRA PERITONEALL OR SUBCUTANEOUS EXTREMITY AND THORACIC SPACE. EXTREMITY PROCEDURES INCLUDE TISSUE DISSECTION ALONG THE SAPHENOUS VEIN FOR USE IN CORONARY ARTERY BYPASS GRAFTING AND PERIPHERAL ARTERY BYPASS OR RADIAL ARTERY FOR USE IN CORONARY ARTERY BYPASS GRAFTING. THORASCOPIC PROCEDURES INCLUDE EXPOSURE AND DISSECTION OF STRUCTURES EXTERNAL TOTHE PARIETAL PLEURA, INCLUDING NERVES, BLOOD VESSELS, AND OTHER TISSUES OF THE CHEST WALL.: Brand: VENAPAX

## (undated) DEVICE — CANN AORT ROOT DLP VNT 14G 7F

## (undated) DEVICE — SOL NACL 0.9PCT 1000ML

## (undated) DEVICE — DRSNG WND GZ PAD BORDERED 4X8IN STRL

## (undated) DEVICE — MASK,FACE,FLUID RES,SHLD,FOGFREE,TIES: Brand: MEDLINE

## (undated) DEVICE — Device: Brand: DEFENDO AIR/WATER/SUCTION AND BIOPSY VALVE

## (undated) DEVICE — CATH DIAG IMPULSE FR4 5F 100CM

## (undated) DEVICE — 28 FR STRAIGHT – SOFT PVC CATHETER: Brand: PVC THORACIC CATHETERS

## (undated) DEVICE — SOL ISO/ALC 70PCT 4OZ

## (undated) DEVICE — GLIDESHEATH SLENDER STAINLESS STEEL KIT: Brand: GLIDESHEATH SLENDER

## (undated) DEVICE — DRP SLUSH WARMR MACH RECTG 66X44IN

## (undated) DEVICE — JACKT LAB KNIT COLR LG BLU

## (undated) DEVICE — CATH VENT DLP W/CONN MALL NOVNT SILICON 16FR 16IN

## (undated) DEVICE — TOWEL,OR,DSP,ST,BLUE,STD,4/PK,20PK/CS: Brand: MEDLINE

## (undated) DEVICE — DECANTER BAG 9": Brand: MEDLINE INDUSTRIES, INC.

## (undated) DEVICE — BG TRANSF W/COUPLER SPK 600ML

## (undated) DEVICE — APPL CHLORAPREP HI/LITE 26ML ORNG

## (undated) DEVICE — BNDG ELAS MATRX V/CLS 6IN 5YD LF

## (undated) DEVICE — BLOWER/MISTER AXIOUS OPCAB W/TBG

## (undated) DEVICE — GLIDESHEATH BASIC HYDROPHILIC COATED INTRODUCER SHEATH: Brand: GLIDESHEATH

## (undated) DEVICE — ORGANIZER SUT SHELIGH 3T 213013

## (undated) DEVICE — BNDG,ELSTC,MATRIX,STRL,6"X5YD,LF,HOOK&LP: Brand: MEDLINE

## (undated) DEVICE — CATH DIAG IMPULSE FL3.5 5F 100CM

## (undated) DEVICE — HEMOCONCENTRATOR PERFUS LPS06

## (undated) DEVICE — GLV SURG SENSICARE MICRO PF LF 6 STRL

## (undated) DEVICE — SUCTION CANISTER, 3000CC,SAFELINER: Brand: DEROYAL

## (undated) DEVICE — TBG ART PRESS 60 IN

## (undated) DEVICE — BALN PRESS WEDGE 5F 110CM

## (undated) DEVICE — DRSNG WND BORDR/ADHS NONADHR/GZ LF 4X14IN STRL

## (undated) DEVICE — 1LYRTR 16FR10ML100%SILTMPS SNP: Brand: MEDLINE INDUSTRIES, INC.

## (undated) DEVICE — SYR LUERLOK 30CC

## (undated) DEVICE — GLV SURG BIOGEL LTX PF 7 1/2

## (undated) DEVICE — KT MANIFLD CARDIAC

## (undated) DEVICE — GOWN ISOL W/THUMB UNIV BLU BX/15

## (undated) DEVICE — AVID DUAL STAGE VENOUS DRAINAGE CANNULA: Brand: AVID DUAL STAGE VENOUS DRAINAGE CANNULA

## (undated) DEVICE — PK HEART OPN 40

## (undated) DEVICE — 28 FR RIGHT ANGLE – SOFT PVC CATHETER: Brand: PVC THORACIC CATHETERS

## (undated) DEVICE — CANN ART EOPA 3D NV W/CONN 20F

## (undated) DEVICE — SOL IRR NACL 0.9PCT BO 1000ML

## (undated) DEVICE — PK CATH CARD 40

## (undated) DEVICE — SYR LUERLOK 5CC

## (undated) DEVICE — ST TOURNI COMPL A/ 7IN

## (undated) DEVICE — THE BITE BLOCK MAXI, LATEX FREE STRAP IS USED TO PROTECT THE ENDOSCOPE INSERTION TUBE FROM BEING BITTEN BY THE PATIENT.

## (undated) DEVICE — BNDG,ELSTC,MATRIX,STRL,4"X5YD,LF,HOOK&LP: Brand: MEDLINE

## (undated) DEVICE — SPONGE,DISSECTOR,K,XRAY,9/16"X1/4",STRL: Brand: MEDLINE

## (undated) DEVICE — OASIS DRAIN, SINGLE, INLINE & ATS COMPATIBLE: Brand: OASIS

## (undated) DEVICE — SYR LL TP 10ML STRL

## (undated) DEVICE — DRN WND CH RND FUL/FLUT NO/TROC 3/8IN 28F

## (undated) DEVICE — BW-412T DISP COMBO CLEANING BRUSH: Brand: SINGLE USE COMBINATION CLEANING BRUSH

## (undated) DEVICE — LP VESL MAXI 2.5X1MM RED 2PK

## (undated) DEVICE — 3M™ TEGADERM™ CHG DRESSING 25/CARTON 4 CARTONS/CASE 1658: Brand: TEGADERM™

## (undated) DEVICE — SOL IRR H2O BO 1000ML STRL

## (undated) DEVICE — IRRIGATOR BULB ASEPTO 60CC STRL

## (undated) DEVICE — TBG INSUFFLATION LUER LOCK: Brand: MEDLINE INDUSTRIES, INC.

## (undated) DEVICE — CANN VESL FREE FLO 2MM

## (undated) DEVICE — CVR PROB 96IN LF STRL

## (undated) DEVICE — 8 FOOT DISPOSABLE EXTENSION CABLE WITH SAFE CONNECT / ALLIGATOR CLIP

## (undated) DEVICE — SENSR CERBRL O2 PK/2

## (undated) DEVICE — CANN RETRGR STYLET RSCP 15F